# Patient Record
Sex: MALE | Race: WHITE | NOT HISPANIC OR LATINO | Employment: OTHER | URBAN - METROPOLITAN AREA
[De-identification: names, ages, dates, MRNs, and addresses within clinical notes are randomized per-mention and may not be internally consistent; named-entity substitution may affect disease eponyms.]

---

## 2017-01-23 ENCOUNTER — ALLSCRIPTS OFFICE VISIT (OUTPATIENT)
Dept: OTHER | Facility: OTHER | Age: 72
End: 2017-01-23

## 2017-04-07 ENCOUNTER — ALLSCRIPTS OFFICE VISIT (OUTPATIENT)
Dept: OTHER | Facility: OTHER | Age: 72
End: 2017-04-07

## 2017-05-01 ENCOUNTER — HOSPITAL ENCOUNTER (OUTPATIENT)
Dept: CT IMAGING | Facility: HOSPITAL | Age: 72
Discharge: HOME | End: 2017-05-01
Payer: MEDICARE

## 2017-05-01 ENCOUNTER — ALLSCRIPTS OFFICE VISIT (OUTPATIENT)
Dept: OTHER | Facility: OTHER | Age: 72
End: 2017-05-01

## 2017-05-01 ENCOUNTER — APPOINTMENT (OUTPATIENT)
Dept: LAB | Facility: HOSPITAL | Age: 72
End: 2017-05-01
Payer: MEDICARE

## 2017-05-01 ENCOUNTER — TRANSCRIBE ORDERS (OUTPATIENT)
Dept: ADMINISTRATIVE | Facility: HOSPITAL | Age: 72
End: 2017-05-01

## 2017-05-01 DIAGNOSIS — R10.9 ABDOMINAL PAIN: ICD-10-CM

## 2017-05-01 DIAGNOSIS — N18.2 CHRONIC KIDNEY DISEASE, STAGE II (MILD): ICD-10-CM

## 2017-05-01 LAB
ALBUMIN SERPL BCP-MCNC: 3.5 G/DL (ref 3.5–5)
ALP SERPL-CCNC: 68 U/L (ref 46–116)
ALT SERPL W P-5'-P-CCNC: 45 U/L (ref 12–78)
AMYLASE SERPL-CCNC: 56 IU/L (ref 25–115)
ANION GAP SERPL CALCULATED.3IONS-SCNC: 10 MMOL/L (ref 4–13)
AST SERPL W P-5'-P-CCNC: 38 U/L (ref 5–45)
BASOPHILS # BLD AUTO: 0.02 THOUSANDS/ΜL (ref 0–0.1)
BASOPHILS NFR BLD AUTO: 0 % (ref 0–1)
BILIRUB SERPL-MCNC: 0.6 MG/DL (ref 0.2–1)
BILIRUB UR QL STRIP: NORMAL
BUN SERPL-MCNC: 20 MG/DL (ref 5–25)
CALCIUM SERPL-MCNC: 8.5 MG/DL (ref 8.3–10.1)
CHLORIDE SERPL-SCNC: 109 MMOL/L (ref 100–108)
CLARITY UR: NORMAL
CO2 SERPL-SCNC: 28 MMOL/L (ref 21–32)
COLOR UR: YELLOW
CREAT SERPL-MCNC: 1.38 MG/DL (ref 0.6–1.3)
EOSINOPHIL # BLD AUTO: 0.11 THOUSAND/ΜL (ref 0–0.61)
EOSINOPHIL NFR BLD AUTO: 2 % (ref 0–6)
ERYTHROCYTE [DISTWIDTH] IN BLOOD BY AUTOMATED COUNT: 13.7 % (ref 11.6–15.1)
ERYTHROCYTE [SEDIMENTATION RATE] IN BLOOD: 9 MM/HOUR (ref 0–10)
GFR SERPL CREATININE-BSD FRML MDRD: 50.6 ML/MIN/1.73SQ M
GLUCOSE (HISTORICAL): NORMAL
GLUCOSE SERPL-MCNC: 113 MG/DL (ref 65–140)
HCT VFR BLD AUTO: 42.5 % (ref 36.5–49.3)
HGB BLD-MCNC: 14.4 G/DL (ref 12–17)
HGB UR QL STRIP.AUTO: NORMAL
KETONES UR STRIP-MCNC: NORMAL MG/DL
LEUKOCYTE ESTERASE UR QL STRIP: NORMAL
LIPASE SERPL-CCNC: 193 U/L (ref 73–393)
LYMPHOCYTES # BLD AUTO: 1.96 THOUSANDS/ΜL (ref 0.6–4.47)
LYMPHOCYTES NFR BLD AUTO: 31 % (ref 14–44)
MCH RBC QN AUTO: 32.9 PG (ref 26.8–34.3)
MCHC RBC AUTO-ENTMCNC: 33.9 G/DL (ref 31.4–37.4)
MCV RBC AUTO: 97 FL (ref 82–98)
MONOCYTES # BLD AUTO: 0.72 THOUSAND/ΜL (ref 0.17–1.22)
MONOCYTES NFR BLD AUTO: 11 % (ref 4–12)
NEUTROPHILS # BLD AUTO: 3.58 THOUSANDS/ΜL (ref 1.85–7.62)
NEUTS SEG NFR BLD AUTO: 56 % (ref 43–75)
NITRITE UR QL STRIP: NORMAL
PH UR STRIP.AUTO: 6 [PH]
PLATELET # BLD AUTO: 157 THOUSANDS/UL (ref 149–390)
PMV BLD AUTO: 10.4 FL (ref 8.9–12.7)
POTASSIUM SERPL-SCNC: 4.2 MMOL/L (ref 3.5–5.3)
PROT SERPL-MCNC: 7 G/DL (ref 6.4–8.2)
PROT UR STRIP-MCNC: NORMAL MG/DL
RBC # BLD AUTO: 4.38 MILLION/UL (ref 3.88–5.62)
SODIUM SERPL-SCNC: 147 MMOL/L (ref 136–145)
SP GR UR STRIP.AUTO: 1
UROBILINOGEN UR QL STRIP.AUTO: NORMAL
WBC # BLD AUTO: 6.39 THOUSAND/UL (ref 4.31–10.16)

## 2017-05-01 PROCEDURE — 36415 COLL VENOUS BLD VENIPUNCTURE: CPT

## 2017-05-01 PROCEDURE — 82150 ASSAY OF AMYLASE: CPT

## 2017-05-01 PROCEDURE — 74178 CT ABD&PLV WO CNTR FLWD CNTR: CPT

## 2017-05-01 PROCEDURE — 80053 COMPREHEN METABOLIC PANEL: CPT

## 2017-05-01 PROCEDURE — 83690 ASSAY OF LIPASE: CPT

## 2017-05-01 PROCEDURE — 85652 RBC SED RATE AUTOMATED: CPT

## 2017-05-01 PROCEDURE — 85025 COMPLETE CBC W/AUTO DIFF WBC: CPT

## 2017-05-01 RX ADMIN — IODIXANOL 85 ML: 320 INJECTION, SOLUTION INTRAVASCULAR at 14:51

## 2017-05-03 ENCOUNTER — APPOINTMENT (OUTPATIENT)
Dept: LAB | Facility: CLINIC | Age: 72
End: 2017-05-03
Payer: MEDICARE

## 2017-05-03 ENCOUNTER — GENERIC CONVERSION - ENCOUNTER (OUTPATIENT)
Dept: OTHER | Facility: OTHER | Age: 72
End: 2017-05-03

## 2017-05-03 DIAGNOSIS — N18.2 CHRONIC KIDNEY DISEASE, STAGE II (MILD): ICD-10-CM

## 2017-05-03 LAB
ANION GAP SERPL CALCULATED.3IONS-SCNC: 9 MMOL/L (ref 4–13)
BUN SERPL-MCNC: 18 MG/DL (ref 5–25)
CALCIUM SERPL-MCNC: 9 MG/DL (ref 8.3–10.1)
CHLORIDE SERPL-SCNC: 106 MMOL/L (ref 100–108)
CO2 SERPL-SCNC: 27 MMOL/L (ref 21–32)
CREAT SERPL-MCNC: 1.19 MG/DL (ref 0.6–1.3)
GFR SERPL CREATININE-BSD FRML MDRD: >60 ML/MIN/1.73SQ M
GLUCOSE P FAST SERPL-MCNC: 103 MG/DL (ref 65–99)
POTASSIUM SERPL-SCNC: 4.2 MMOL/L (ref 3.5–5.3)
SODIUM SERPL-SCNC: 142 MMOL/L (ref 136–145)

## 2017-05-03 PROCEDURE — 36415 COLL VENOUS BLD VENIPUNCTURE: CPT

## 2017-05-03 PROCEDURE — 80048 BASIC METABOLIC PNL TOTAL CA: CPT

## 2017-05-15 ENCOUNTER — APPOINTMENT (OUTPATIENT)
Dept: LAB | Facility: CLINIC | Age: 72
End: 2017-05-15
Payer: MEDICARE

## 2017-05-15 DIAGNOSIS — C64.9 MALIGNANT NEOPLASM OF KIDNEY EXCLUDING RENAL PELVIS (HCC): ICD-10-CM

## 2017-05-15 LAB
ALBUMIN SERPL BCP-MCNC: 3.7 G/DL (ref 3.5–5)
ALP SERPL-CCNC: 66 U/L (ref 46–116)
ALT SERPL W P-5'-P-CCNC: 35 U/L (ref 12–78)
ANION GAP SERPL CALCULATED.3IONS-SCNC: 8 MMOL/L (ref 4–13)
AST SERPL W P-5'-P-CCNC: 26 U/L (ref 5–45)
BASOPHILS # BLD MANUAL: 0.13 THOUSAND/UL (ref 0–0.1)
BASOPHILS NFR MAR MANUAL: 2 % (ref 0–1)
BILIRUB SERPL-MCNC: 0.77 MG/DL (ref 0.2–1)
BUN SERPL-MCNC: 19 MG/DL (ref 5–25)
CALCIUM SERPL-MCNC: 9 MG/DL (ref 8.3–10.1)
CHLORIDE SERPL-SCNC: 103 MMOL/L (ref 100–108)
CO2 SERPL-SCNC: 29 MMOL/L (ref 21–32)
CREAT SERPL-MCNC: 1.23 MG/DL (ref 0.6–1.3)
EOSINOPHIL # BLD MANUAL: 0.13 THOUSAND/UL (ref 0–0.4)
EOSINOPHIL NFR BLD MANUAL: 2 % (ref 0–6)
ERYTHROCYTE [DISTWIDTH] IN BLOOD BY AUTOMATED COUNT: 13.7 % (ref 11.6–15.1)
GFR SERPL CREATININE-BSD FRML MDRD: 57.8 ML/MIN/1.73SQ M
GLUCOSE P FAST SERPL-MCNC: 93 MG/DL (ref 65–99)
HCT VFR BLD AUTO: 44.1 % (ref 36.5–49.3)
HGB BLD-MCNC: 14.9 G/DL (ref 12–17)
LDH SERPL-CCNC: 169 U/L (ref 81–234)
LYMPHOCYTES # BLD AUTO: 1.43 THOUSAND/UL (ref 0.6–4.47)
LYMPHOCYTES # BLD AUTO: 22 % (ref 14–44)
MCH RBC QN AUTO: 33 PG (ref 26.8–34.3)
MCHC RBC AUTO-ENTMCNC: 33.8 G/DL (ref 31.4–37.4)
MCV RBC AUTO: 98 FL (ref 82–98)
MONOCYTES # BLD AUTO: 0.78 THOUSAND/UL (ref 0–1.22)
MONOCYTES NFR BLD: 12 % (ref 4–12)
NEUTROPHILS # BLD MANUAL: 3.25 THOUSAND/UL (ref 1.85–7.62)
NEUTS SEG NFR BLD AUTO: 50 % (ref 43–75)
NRBC BLD AUTO-RTO: 0 /100 WBCS
PLATELET # BLD AUTO: 198 THOUSANDS/UL (ref 149–390)
PLATELET BLD QL SMEAR: ADEQUATE
PMV BLD AUTO: 11.1 FL (ref 8.9–12.7)
POTASSIUM SERPL-SCNC: 3.9 MMOL/L (ref 3.5–5.3)
PROT SERPL-MCNC: 7.2 G/DL (ref 6.4–8.2)
RBC # BLD AUTO: 4.51 MILLION/UL (ref 3.88–5.62)
RBC MORPH BLD: NORMAL
SODIUM SERPL-SCNC: 140 MMOL/L (ref 136–145)
VARIANT LYMPHS # BLD AUTO: 12 %
WBC # BLD AUTO: 6.5 THOUSAND/UL (ref 4.31–10.16)

## 2017-05-15 PROCEDURE — 80053 COMPREHEN METABOLIC PANEL: CPT

## 2017-05-15 PROCEDURE — 83615 LACTATE (LD) (LDH) ENZYME: CPT

## 2017-05-15 PROCEDURE — 85007 BL SMEAR W/DIFF WBC COUNT: CPT

## 2017-05-15 PROCEDURE — 85027 COMPLETE CBC AUTOMATED: CPT

## 2017-05-15 PROCEDURE — 36415 COLL VENOUS BLD VENIPUNCTURE: CPT

## 2017-05-22 ENCOUNTER — ALLSCRIPTS OFFICE VISIT (OUTPATIENT)
Dept: OTHER | Facility: OTHER | Age: 72
End: 2017-05-22

## 2017-06-07 ENCOUNTER — TRANSCRIBE ORDERS (OUTPATIENT)
Dept: ADMINISTRATIVE | Facility: HOSPITAL | Age: 72
End: 2017-06-07

## 2017-06-07 ENCOUNTER — APPOINTMENT (OUTPATIENT)
Dept: LAB | Facility: CLINIC | Age: 72
End: 2017-06-07
Payer: MEDICARE

## 2017-06-07 DIAGNOSIS — R33.9 RETENTION OF URINE, UNSPECIFIED: Primary | ICD-10-CM

## 2017-06-07 DIAGNOSIS — C64.9 ADENOCARCINOMA, RENAL CELL, UNSPECIFIED LATERALITY (HCC): ICD-10-CM

## 2017-06-07 DIAGNOSIS — R33.9 RETENTION OF URINE, UNSPECIFIED: ICD-10-CM

## 2017-06-07 DIAGNOSIS — E78.5 OTHER AND UNSPECIFIED HYPERLIPIDEMIA: ICD-10-CM

## 2017-06-07 DIAGNOSIS — E78.5 OTHER AND UNSPECIFIED HYPERLIPIDEMIA: Primary | ICD-10-CM

## 2017-06-07 LAB
BACTERIA UR QL AUTO: NORMAL /HPF
BILIRUB UR QL STRIP: NEGATIVE
CHOLEST SERPL-MCNC: 178 MG/DL (ref 50–200)
CLARITY UR: CLEAR
COLOR UR: YELLOW
EST. AVERAGE GLUCOSE BLD GHB EST-MCNC: 120 MG/DL
GLUCOSE UR STRIP-MCNC: NEGATIVE MG/DL
HBA1C MFR BLD: 5.8 % (ref 4.2–6.3)
HDLC SERPL-MCNC: 34 MG/DL (ref 40–60)
HGB UR QL STRIP.AUTO: ABNORMAL
HYALINE CASTS #/AREA URNS LPF: NORMAL /LPF
KETONES UR STRIP-MCNC: NEGATIVE MG/DL
LDLC SERPL CALC-MCNC: 73 MG/DL (ref 0–100)
LEUKOCYTE ESTERASE UR QL STRIP: NEGATIVE
NITRITE UR QL STRIP: NEGATIVE
NON-SQ EPI CELLS URNS QL MICRO: NORMAL /HPF
PH UR STRIP.AUTO: 6 [PH] (ref 4.5–8)
PROT UR STRIP-MCNC: NEGATIVE MG/DL
PSA SERPL-MCNC: 1.9 NG/ML (ref 0–4)
RBC #/AREA URNS AUTO: NORMAL /HPF
SP GR UR STRIP.AUTO: 1.02 (ref 1–1.03)
TRIGL SERPL-MCNC: 357 MG/DL
TSH SERPL DL<=0.05 MIU/L-ACNC: 1.29 UIU/ML (ref 0.36–3.74)
UROBILINOGEN UR QL STRIP.AUTO: 0.2 E.U./DL
WBC #/AREA URNS AUTO: NORMAL /HPF

## 2017-06-07 PROCEDURE — G0103 PSA SCREENING: HCPCS

## 2017-06-07 PROCEDURE — 83036 HEMOGLOBIN GLYCOSYLATED A1C: CPT

## 2017-06-07 PROCEDURE — 84443 ASSAY THYROID STIM HORMONE: CPT

## 2017-06-07 PROCEDURE — 36415 COLL VENOUS BLD VENIPUNCTURE: CPT

## 2017-06-07 PROCEDURE — 81001 URINALYSIS AUTO W/SCOPE: CPT | Performed by: UROLOGY

## 2017-06-07 PROCEDURE — 80061 LIPID PANEL: CPT

## 2017-06-26 ENCOUNTER — ALLSCRIPTS OFFICE VISIT (OUTPATIENT)
Dept: OTHER | Facility: OTHER | Age: 72
End: 2017-06-26

## 2017-07-24 ENCOUNTER — ALLSCRIPTS OFFICE VISIT (OUTPATIENT)
Dept: OTHER | Facility: OTHER | Age: 72
End: 2017-07-24

## 2017-10-15 ENCOUNTER — HOSPITAL ENCOUNTER (EMERGENCY)
Facility: HOSPITAL | Age: 72
Discharge: HOME | End: 2017-10-15
Attending: EMERGENCY MEDICINE | Admitting: EMERGENCY MEDICINE
Payer: MEDICARE

## 2017-10-15 VITALS
BODY MASS INDEX: 28.7 KG/M2 | SYSTOLIC BLOOD PRESSURE: 168 MMHG | HEART RATE: 72 BPM | HEIGHT: 71 IN | DIASTOLIC BLOOD PRESSURE: 85 MMHG | WEIGHT: 205 LBS | TEMPERATURE: 97.4 F | RESPIRATION RATE: 16 BRPM

## 2017-10-15 DIAGNOSIS — S61.012A LACERATION OF LEFT THUMB: Primary | ICD-10-CM

## 2017-10-15 PROCEDURE — 99282 EMERGENCY DEPT VISIT SF MDM: CPT

## 2017-10-15 PROCEDURE — 90715 TDAP VACCINE 7 YRS/> IM: CPT | Performed by: EMERGENCY MEDICINE

## 2017-10-15 PROCEDURE — 90471 IMMUNIZATION ADMIN: CPT

## 2017-10-15 RX ORDER — LIDOCAINE HYDROCHLORIDE 10 MG/ML
5 INJECTION, SOLUTION EPIDURAL; INFILTRATION; INTRACAUDAL; PERINEURAL ONCE
Status: COMPLETED | OUTPATIENT
Start: 2017-10-15 | End: 2017-10-15

## 2017-10-15 RX ADMIN — LIDOCAINE HYDROCHLORIDE 5 ML: 10 INJECTION, SOLUTION EPIDURAL; INFILTRATION; INTRACAUDAL; PERINEURAL at 13:17

## 2017-10-15 RX ADMIN — TETANUS TOXOID, REDUCED DIPHTHERIA TOXOID AND ACELLULAR PERTUSSIS VACCINE, ADSORBED 0.5 ML: 5; 2.5; 8; 8; 2.5 SUSPENSION INTRAMUSCULAR at 13:17

## 2017-10-15 NOTE — ED PROVIDER NOTES
History  Chief Complaint   Patient presents with    Finger Laceration     Ambulatory into ED with c/o laceration to tip of left thumb which just occurred while cutting with a bandsaw, bleeding controlled with pressure     Cut tip of L thumb with band saw; 2 5 cm lac noted, no bleeding        History provided by:  Patient   used: No    Finger Laceration   Location:  Finger  Finger laceration location:  L thumb  Length:  2 5  Depth: Through dermis  Bleeding: controlled    Injury mechanism: saw  Pain details:     Quality:  Dull    Severity:  Mild    Timing:  Constant    Progression:  Unchanged  Foreign body present:  No foreign bodies  Relieved by:  Nothing  Worsened by:  Nothing  Ineffective treatments:  None tried  Tetanus status:  Out of date      Prior to Admission Medications   Prescriptions Last Dose Informant Patient Reported? Taking?   multivitamin (THERAGRAN) TABS   Yes No   Sig: Take 1 tablet by mouth daily  rosuvastatin (CRESTOR) 10 MG tablet   Yes No   Sig: Take 10 mg by mouth daily Indications: High Amount of Fats in the Blood  Facility-Administered Medications: None       Past Medical History:   Diagnosis Date    Cancer of left kidney ([facility] [address] )     Hyperlipidemia        Past Surgical History:   Procedure Laterality Date    NEPHRECTOMY Left     TOTAL HIP ARTHROPLASTY Left     original hardware became infected and pt needed a second surgery to replace hardware       History reviewed  No pertinent family history  I have reviewed and agree with the history as documented  Social History   Substance Use Topics    Smoking status: Former Smoker    Smokeless tobacco: Never Used    Alcohol use Yes        Review of Systems   Musculoskeletal:        Cut L thumb   All other systems reviewed and are negative        Physical Exam  ED Triage Vitals [10/15/17 1241]   Temperature Pulse Respirations Blood Pressure SpO2   (!) 97 4 °F (36 3 °C) 72 16 168/85 --      Temp src Heart Rate Source Patient Position - Orthostatic VS BP Location FiO2 (%)   -- -- -- -- --      Pain Score       4           Physical Exam   Constitutional: He is oriented to person, place, and time  He appears well-developed and well-nourished  HENT:   Nose: Nose normal    Eyes: Pupils are equal, round, and reactive to light  Neck: Normal range of motion  Cardiovascular: Normal rate  Pulmonary/Chest: Effort normal    Abdominal: Soft  Neurological: He is alert and oriented to person, place, and time  Skin: Skin is warm and dry  Psychiatric: He has a normal mood and affect  His behavior is normal  Judgment and thought content normal    Nursing note and vitals reviewed  ED Medications  Medications   lidocaine (PF) (XYLOCAINE-MPF) 1 % injection 5 mL (5 mL Infiltration Given 10/15/17 1317)   tetanus-diphtheria-acellular pertussis (BOOSTRIX) IM injection 0 5 mL (0 5 mL Intramuscular Given 10/15/17 1317)       Diagnostic Studies  Labs Reviewed - No data to display    No orders to display       Procedures  Lac Repair  Date/Time: 10/15/2017 3:27 PM  Performed by: Dorothy Polanco  Authorized by: Dorothy Polanco     Patient location:  ED  Other Assisting Provider: No    Consent:     Consent obtained:  Verbal  Universal protocol:     Patient identity confirmed:  Verbally with patient  Anesthesia (see MAR for exact dosages):      Anesthesia method:  Local infiltration    Local anesthetic:  Lidocaine 1% w/o epi  Laceration details:     Location:  Finger    Finger location:  L thumb    Length (cm) of Repair:  2 5    Depth (mm):  3  Repair type:     Repair type:  Simple  Pre-procedure details:     Preparation:  Patient was prepped and draped in usual sterile fashion  Exploration:     Hemostasis achieved with:  Direct pressure    Contaminated: no    Treatment:     Area cleansed with:  Betadine    Visualized foreign bodies/material removed: no    Skin repair:     Repair method:  Sutures    Suture size:  5-0 Suture material:  Nylon    Number of sutures:  4  Approximation:     Approximation:  Close    Approximation difficulty:  Simple    Vermilion border: well-aligned    Post-procedure details:     Dressing:  Sterile dressing    Patient tolerance of procedure: Tolerated well, no immediate complications          Phone Contacts  ED Phone Contact    ED Course  ED Course                                MDM  Number of Diagnoses or Management Options  Laceration of left thumb: new and does not require workup  Patient Progress  Patient progress: improved    CritCare Time    Disposition  Final diagnoses:   Laceration of left thumb     ED Disposition     ED Disposition Condition Comment    Discharge  Fang De La Cruz discharge to home/self care  Condition at discharge: Stable        Follow-up Information     Follow up With Specialties Details Why Contact Info      In 10 days For suture removal     Robinson Mora MD [address] [place] [address] Road  [phone]          Discharge Medication List as of 10/15/2017 12:54 PM      CONTINUE these medications which have NOT CHANGED    Details   multivitamin (THERAGRAN) TABS Take 1 tablet by mouth daily  , Until Discontinued, Historical Med      rosuvastatin (CRESTOR) 10 MG tablet Take 10 mg by mouth daily Indications: High Amount of Fats in the Blood , Until Discontinued, Historical Med           No discharge procedures on file      ED Provider  Electronically Signed by       Juan Antonio Mensah MD  10/15/17 5366

## 2017-10-15 NOTE — DISCHARGE INSTRUCTIONS
Finger Laceration   WHAT YOU NEED TO KNOW:   A finger laceration is a deep cut in your skin  It is often caused by a sharp object, such as a knife, or blunt force to your finger  Your blood vessels, bones, joints, tendons, or nerves may also be injured  DISCHARGE INSTRUCTIONS:   Return to the emergency department if:   · Your wound comes apart  · Blood soaks through your bandage  · You have severe pain in your finger or hand  · Your finger is pale and cold  · You have sudden trouble moving your finger  · Your swelling suddenly gets worse  · You have red streaks on your skin coming from your wound  Contact your healthcare provider or hand specialist if:   · You have new numbness or tingling  · Your finger feels warm, looks swollen or red, and is draining pus  · You have a fever  · You have questions or concerns about your condition or care  Medicines: You may  need any of the following:  · Antibiotics  help prevent a bacterial infection  · Acetaminophen  decreases pain and fever  It is available without a doctor's order  Ask how much to take and how often to take it  Follow directions  Read the labels of all other medicines you are using to see if they also contain acetaminophen, or ask your doctor or pharmacist  Acetaminophen can cause liver damage if not taken correctly  Do not use more than 4 grams (4,000 milligrams) total of acetaminophen in one day  · Prescription pain medicine  may be given  Ask your healthcare provider how to take this medicine safely  Some prescription pain medicines contain acetaminophen  Do not take other medicines that contain acetaminophen without talking to your healthcare provider  Too much acetaminophen may cause liver damage  Prescription pain medicine may cause constipation  Ask your healthcare provider how to prevent or treat constipation  · Take your medicine as directed    Contact your healthcare provider if you think your medicine is not helping or if you have side effects  Tell him or her if you are allergic to any medicine  Keep a list of the medicines, vitamins, and herbs you take  Include the amounts, and when and why you take them  Bring the list or the pill bottles to follow-up visits  Carry your medicine list with you in case of an emergency  Self-care:   · Apply ice  on your finger for 15 to 20 minutes every hour or as directed  Use an ice pack, or put crushed ice in a plastic bag  Cover it with a towel before you apply it to your skin  Ice helps prevent tissue damage and decreases swelling and pain  · Elevate  your hand above the level of your heart as often as you can  This will help decrease swelling and pain  Prop your hand on pillows or blankets to keep it elevated comfortably  · Wear your splint as directed  A splint will decrease movement and stress on your wound  The splint may help your wound heal faster  Ask your healthcare provider how to apply and remove a splint  · Apply ointments to decrease scarring  Do not apply ointments until your healthcare provider says it is okay  You may need to wait until your wound is healed  Ask which ointment to buy and how often to use it  Wound care:   · Do not get your wound wet until your healthcare provider says it is okay  Do not soak your hand in water  Do not go swimming until your healthcare provider says it is okay  When your healthcare provider says it is okay, carefully wash around the wound with soap and water  Let soap and water run over your wound  Gently pat the area dry or allow it to air dry  · Change your bandages when they get wet, dirty, or after washing  Apply new, clean bandages as directed  Do not apply elastic bandages or tape too tightly  Do not put powders or lotions on your wound  · Apply antibiotic ointment as directed  Your healthcare provider may give you antibiotic ointment to put over your wound if you have stitches   If you have Strips-Strips over your wound, let them dry up and fall off on their own  If they do not fall off within 14 days, gently remove them  If you have glue over your wound, do not remove or pick at it  If your glue comes off, do not replace it with glue that you have at home  · Check your wound every day for signs of infection  Signs of infection include swelling, redness, or pus  Follow up with your healthcare provider or hand specialist in 2 days:  Write down your questions so you remember to ask them during your visits  © 2017 [employer] [employer] Information is for End User's use only and may not be sold, redistributed or otherwise used for commercial purposes  All illustrations and images included in CareNotes® are the copyrighted property of [email or web address] , [employer]  or [employer]ish [employer]  The above information is an  only  It is not intended as medical advice for individual conditions or treatments  Talk to your doctor, nurse or pharmacist before following any medical regimen to see if it is safe and effective for you    Keep cleqan, dry, covered; sutures out 10 days

## 2017-10-25 ENCOUNTER — ALLSCRIPTS OFFICE VISIT (OUTPATIENT)
Dept: OTHER | Facility: OTHER | Age: 72
End: 2017-10-25

## 2017-10-26 NOTE — PROCEDURES
Current Meds   1  Fish Oil OIL; Therapy: (Recorded:21Nov2016) to Recorded   2  Multivitamin TABS; Therapy: (Recorded:21Nov2016) to Recorded   3  Rosuvastatin Calcium 10 MG Oral Tablet; take 1 tablet every day; Therapy: [id number] to (Evaluate:02Shl7375)  Requested for: [id number]; Last   Rx:04Jan2017 Ordered    Allergies  1  No Known Drug Allergies    Vitals  Signs   Temperature: 98 3 F, Tympanic  Heart Rate: 80, L Radial  Pulse Quality: Regular, L Radial  Systolic: 884, LUE, Sitting  Diastolic: 82, LUE, Sitting  Height: 5 ft 11 in  Weight: 210 lb   BMI Calculated: 29 29  BSA Calculated: 2 15    Procedure  Procedure: suture removal  The wound was located on the left thumb finger(s)  The wound was 2 cm in length  Wound Exam: well healed with no sign of infection  Procedure Note: 4 sutures were removed  Dressing: an antibiotic ointment was applied  Patient Status:  the patient tolerated the procedure well  Complications:  there were no complications  Assessment  1  Laceration of left thumb, sequela (906 1) (Q80 288B)    Future Appointments    Date/Time Provider Specialty Site   12/04/2017 09:20 AM ANGELES Muller   Hematology [facility]   02/05/2018 01:20 PM Saulo Busby MD Family Medicine Dominguez Hennessy MD   07/02/2018 10:15 AM Buck Chacko, [facility]     Signatures   Electronically signed by : Riley Flaherty MD; Oct 25 2017  2:10PM EST                       (Author)

## 2017-11-17 ENCOUNTER — APPOINTMENT (OUTPATIENT)
Dept: LAB | Facility: CLINIC | Age: 72
End: 2017-11-17
Payer: MEDICARE

## 2017-11-17 DIAGNOSIS — C64.9 MALIGNANT NEOPLASM OF KIDNEY EXCLUDING RENAL PELVIS (HCC): ICD-10-CM

## 2017-11-17 LAB
ALBUMIN SERPL BCP-MCNC: 3.7 G/DL (ref 3.5–5)
ALP SERPL-CCNC: 73 U/L (ref 46–116)
ALT SERPL W P-5'-P-CCNC: 36 U/L (ref 12–78)
ANION GAP SERPL CALCULATED.3IONS-SCNC: 4 MMOL/L (ref 4–13)
AST SERPL W P-5'-P-CCNC: 28 U/L (ref 5–45)
BASOPHILS # BLD AUTO: 0.03 THOUSANDS/ΜL (ref 0–0.1)
BASOPHILS NFR BLD AUTO: 0 % (ref 0–1)
BILIRUB SERPL-MCNC: 0.39 MG/DL (ref 0.2–1)
BUN SERPL-MCNC: 19 MG/DL (ref 5–25)
CALCIUM SERPL-MCNC: 9.2 MG/DL (ref 8.3–10.1)
CHLORIDE SERPL-SCNC: 107 MMOL/L (ref 100–108)
CO2 SERPL-SCNC: 31 MMOL/L (ref 21–32)
CREAT SERPL-MCNC: 1.37 MG/DL (ref 0.6–1.3)
EOSINOPHIL # BLD AUTO: 0.18 THOUSAND/ΜL (ref 0–0.61)
EOSINOPHIL NFR BLD AUTO: 3 % (ref 0–6)
ERYTHROCYTE [DISTWIDTH] IN BLOOD BY AUTOMATED COUNT: 13.3 % (ref 11.6–15.1)
GFR SERPL CREATININE-BSD FRML MDRD: 51 ML/MIN/1.73SQ M
GLUCOSE P FAST SERPL-MCNC: 101 MG/DL (ref 65–99)
HCT VFR BLD AUTO: 43.6 % (ref 36.5–49.3)
HGB BLD-MCNC: 14.8 G/DL (ref 12–17)
LYMPHOCYTES # BLD AUTO: 2.02 THOUSANDS/ΜL (ref 0.6–4.47)
LYMPHOCYTES NFR BLD AUTO: 29 % (ref 14–44)
MCH RBC QN AUTO: 32.7 PG (ref 26.8–34.3)
MCHC RBC AUTO-ENTMCNC: 33.9 G/DL (ref 31.4–37.4)
MCV RBC AUTO: 97 FL (ref 82–98)
MONOCYTES # BLD AUTO: 0.77 THOUSAND/ΜL (ref 0.17–1.22)
MONOCYTES NFR BLD AUTO: 11 % (ref 4–12)
NEUTROPHILS # BLD AUTO: 3.96 THOUSANDS/ΜL (ref 1.85–7.62)
NEUTS SEG NFR BLD AUTO: 57 % (ref 43–75)
NRBC BLD AUTO-RTO: 0 /100 WBCS
PLATELET # BLD AUTO: 183 THOUSANDS/UL (ref 149–390)
PMV BLD AUTO: 11.1 FL (ref 8.9–12.7)
POTASSIUM SERPL-SCNC: 4.3 MMOL/L (ref 3.5–5.3)
PROT SERPL-MCNC: 7.2 G/DL (ref 6.4–8.2)
RBC # BLD AUTO: 4.52 MILLION/UL (ref 3.88–5.62)
SODIUM SERPL-SCNC: 142 MMOL/L (ref 136–145)
WBC # BLD AUTO: 6.98 THOUSAND/UL (ref 4.31–10.16)

## 2017-11-17 PROCEDURE — 36415 COLL VENOUS BLD VENIPUNCTURE: CPT

## 2017-11-17 PROCEDURE — 80053 COMPREHEN METABOLIC PANEL: CPT

## 2017-11-17 PROCEDURE — 85025 COMPLETE CBC W/AUTO DIFF WBC: CPT

## 2017-11-29 ENCOUNTER — HOSPITAL ENCOUNTER (OUTPATIENT)
Dept: CT IMAGING | Facility: HOSPITAL | Age: 72
Discharge: HOME | End: 2017-11-29
Attending: INTERNAL MEDICINE
Payer: MEDICARE

## 2017-11-29 DIAGNOSIS — C64.9 MALIGNANT NEOPLASM OF KIDNEY EXCLUDING RENAL PELVIS (HCC): ICD-10-CM

## 2017-11-29 PROCEDURE — 71250 CT THORAX DX C-: CPT

## 2017-12-04 ENCOUNTER — GENERIC CONVERSION - ENCOUNTER (OUTPATIENT)
Dept: OTHER | Facility: OTHER | Age: 72
End: 2017-12-04

## 2018-01-10 NOTE — RESULT NOTES
Verified Results  (1) COMPREHENSIVE METABOLIC PANEL [id number] 28:49KW Martha Garcia   Has the patient been fasting for 10-12 hours? -YES     Test Name Result Flag Reference   SODIUM 137 mmol/L  136-145   POTASSIUM 4 3 mmol/L  3 5-5 3   CHLORIDE 106 mmol/L  100-108   CARBON DIOXIDE 25 mmol/L  21-32   ANION GAP (CALC) 6 mmol/L  4-13   BILI, TOTAL 0 43 mg/dL  0 20-1 00   TOTAL PROTEIN 7 2 g/dL  6 4-8 2   ALK PHOSPHATAS 89 U/L     ALT (SGPT) 30 U/L  12-78   AST(SGOT) 20 U/L  5-45   GLUCOSE,RANDM 112 mg/dL     If patient is fasting, the ADA then defines impaired fasting glucose as  >100 mg/dl and diabetes as  >or equal to 126 mg/dl  ALBUMIN 3 6 g/dL  3 5-5 0   BLOOD UREA NITROGEN 21 mg/dL  5-25   CALCIUM 8 6 mg/dL  8 3-10 1   CREATININE 1 22 mg/dL  0 60-1 30   Standardized to IDMS reference method   eGFR African American >60 ml/min/1 73sq m     [facility] Energy Disease Education Program recommendations are as  follows:  GFR calculation is accurate only with a steady state creatinine  Chronic Kidney disease less than 60 ml/min/1 73 sq  meters  Kidney failure less than 15 ml/min/1 73 sq  meters  eGFR Non-African American 59 ml/min/1 73sq m       (1) LIPID PANEL, FASTING [id number] 07:15AM Nieves Gonzalez   Has the patient been fasting for 10-12 hours? -YES     Test Name Result Flag Reference   TRIGLYCERIDES 642 mg/dL     TRIGLYCERIDE:  Normal               <150 mg/dl  Borderline High     150-199 mg/dl  High                200-499 mg/dl  Very High           >499 mg/dl  _______________________________________   CHOLESTEROL 218 mg/dL     CHOLESTEROL:  Desirable         <200 mg/dl  Borderline High   200-239 mg/dl  High               >239 mg/dl  ____________________________________   HDL,DIRECT 32 mg/dL     HDL:  High       >59 mg/dl  Low        <41 mg/dl  ______________________________   LDL CHOLESTEROL CALCULATED   0-100   LDL- unable to calculate when Triglyceride > 400 mg/dL   LDL, CALCULATED:  This screening LDL is a calculated result  It does not have the accuracy of the Direct Measured LDL in the  monitoring of patients with hyperlipidemia and/or statin therapy  Direct Measured LDL (Test Code 7394) must be ordered separately in these  patients   ______________________________     (1) TSH WITH FT4 REFLEX [id number] 07:15AM Cali Enrique     Test Name Result Flag Reference   TSH, 3RD GEN W/REFLEX TO FT4 1 100 uIU/ML  0 358-3 740   *Patients undergoing fluorescein dye angiography may retain small  amounts of fluorescein in the body for 48-72 hours post procedure  Samples containing fluorescein can produce falsely depressed TSH   values  If the patient had this procedure, a specimen should be  resubmitted post fluorescein clearance  Pt aware  1      1 Amended By: Christoph Aquino; Jan 11 2016 1:48 PM EST    Discussion/Summary   Liver and kidney tests are good but triglyceride fats are high  Can he increase the Crestor to 20mg? He can double up the 10mg tabs he has now

## 2018-01-12 VITALS
DIASTOLIC BLOOD PRESSURE: 80 MMHG | WEIGHT: 215.13 LBS | HEIGHT: 68 IN | OXYGEN SATURATION: 98 % | RESPIRATION RATE: 18 BRPM | HEART RATE: 85 BPM | SYSTOLIC BLOOD PRESSURE: 132 MMHG | TEMPERATURE: 98.5 F | BODY MASS INDEX: 32.6 KG/M2

## 2018-01-12 NOTE — RESULT NOTES
Verified Results  (1) LIPID PANEL, FASTING 01Sep2016 07:10AM Joanna Kwon Order Number: [id number]     Test Name Result Flag Reference   CHOLESTEROL 177 mg/dL     HDL,DIRECT 36 mg/dL L 40-60   Specimen collection should occur prior to Metamizole administration due to the potential for falsely depressed results  LDL CHOLESTEROL CALCULATED 88 mg/dL  0-100   - Patient Instructions: This is a fasting blood test  Water,black tea or black  coffee only after 9:00pm the night before test   Drink 2 glasses of water the morning of test     - Patient Instructions: This is a fasting blood test  Water,black tea or black  coffee only after 9:00pm the night before test Drink 2 glasses of water the morning of test   Triglyceride:         Normal              <150 mg/dl       Borderline High    150-199 mg/dl       High               200-499 mg/dl       Very High          >499 mg/dl  Cholesterol:         Desirable        <200 mg/dl      Borderline High  200-239 mg/dl      High             >239 mg/dl  HDL Cholesterol:        High    >59 mg/dL      Low     <41 mg/dL  LDL CALCULATED:    This screening LDL is a calculated result  It does not have the accuracy of the Direct Measured LDL in the monitoring of patients with hyperlipidemia and/or statin therapy  Direct Measure LDL (JMK369) must be ordered separately in these patients  TRIGLYCERIDES 266 mg/dL H <=150   Specimen collection should occur prior to N-Acetylcysteine or Metamizole administration due to the potential for falsely depressed results  Discussion/Summary    Cholesterol is excellent  Keep same   Patient aware

## 2018-01-13 VITALS
BODY MASS INDEX: 29.4 KG/M2 | HEIGHT: 71 IN | DIASTOLIC BLOOD PRESSURE: 86 MMHG | WEIGHT: 210 LBS | HEART RATE: 72 BPM | SYSTOLIC BLOOD PRESSURE: 144 MMHG | TEMPERATURE: 98.3 F

## 2018-01-13 VITALS
WEIGHT: 213.2 LBS | SYSTOLIC BLOOD PRESSURE: 138 MMHG | TEMPERATURE: 98.7 F | HEIGHT: 68 IN | HEART RATE: 84 BPM | DIASTOLIC BLOOD PRESSURE: 88 MMHG | BODY MASS INDEX: 32.31 KG/M2

## 2018-01-14 VITALS
WEIGHT: 211.5 LBS | HEIGHT: 71 IN | DIASTOLIC BLOOD PRESSURE: 78 MMHG | HEART RATE: 80 BPM | BODY MASS INDEX: 29.61 KG/M2 | SYSTOLIC BLOOD PRESSURE: 136 MMHG

## 2018-01-14 VITALS
SYSTOLIC BLOOD PRESSURE: 142 MMHG | BODY MASS INDEX: 32.13 KG/M2 | WEIGHT: 212 LBS | HEART RATE: 90 BPM | TEMPERATURE: 99.9 F | DIASTOLIC BLOOD PRESSURE: 100 MMHG | HEIGHT: 68 IN

## 2018-01-14 VITALS
HEIGHT: 71 IN | SYSTOLIC BLOOD PRESSURE: 140 MMHG | TEMPERATURE: 98.3 F | WEIGHT: 210 LBS | HEART RATE: 80 BPM | BODY MASS INDEX: 29.4 KG/M2 | DIASTOLIC BLOOD PRESSURE: 82 MMHG

## 2018-01-14 VITALS
HEART RATE: 88 BPM | BODY MASS INDEX: 32.77 KG/M2 | SYSTOLIC BLOOD PRESSURE: 138 MMHG | WEIGHT: 216.2 LBS | DIASTOLIC BLOOD PRESSURE: 84 MMHG | HEIGHT: 68 IN | TEMPERATURE: 99.2 F

## 2018-01-14 NOTE — RESULT NOTES
Verified Results  (1) BASIC METABOLIC PROFILE [id number] 07:31AM Jorge Alberto Learn Order Number: [id number]     Test Name Result Flag Reference   SODIUM 142 mmol/L  136-145   POTASSIUM 4 2 mmol/L  3 5-5 3   CHLORIDE 106 mmol/L  100-108   CARBON DIOXIDE 27 mmol/L  21-32   ANION GAP (CALC) 9 mmol/L  4-13   BLOOD UREA NITROGEN 18 mg/dL  5-25   CREATININE 1 19 mg/dL  0 60-1 30   Standardized to IDMS reference method   CALCIUM 9 0 mg/dL  8 3-10 1   eGFR Non-African American      >60 0 ml/min/1 73sq m   [facility] Energy Disease Education Program recommendations are as follows:  GFR calculation is accurate only with a steady state creatinine  Chronic Kidney disease less than 60 ml/min/1 73 sq  meters  Kidney failure less than 15 ml/min/1 73 sq  meters     GLUCOSE FASTING 103 mg/dL H 65-99          Patient notified of results

## 2018-01-17 NOTE — PROGRESS NOTES
Assessment    1  Encounter for preventive health examination (V70 0) (Z00 00)   2  Hypertension (401 9) (I10)   3  Hyperlipidemia (272 4) (E78 5)   4  Acute maxillary sinusitis, recurrence not specified (461 0) (J01 00)    Plan  Abnormal fasting glucose, Hyperlipidemia    · (1) HEMOGLOBIN A1C; Status:Hold For - Exact Date; Requested for:Approx 52KWQ5067;   Acute maxillary sinusitis, recurrence not specified    · Amoxicillin 500 MG Oral Capsule; TAKE 2 [facility] Maintenance    · *VB - Fall Risk Assessment  (Dx Z13 89 Screen for Neurologic Disorder);  Status:Complete;   Done: 25XZJ5333 10:49AM   · *VB - Urinary Incontinence Screen (Dx Z13 89 Screen for UI); Status:Complete;   Done:  18KHS6314 10:49AM   · Eat a low fat and low cholesterol diet ; Status:Complete;   Done: 16GQP7537   · Some eating tips that can help you lose weight ; Status:Complete;   Done: 92QMY3080   · Stretch and warm up your muscles during the first 10 minutes , then cool down your  muscles for the last 10 minutes of exercise ; Status:Complete;   Done: 36DLS9991   · The plan of care for urinary incontinence is detailed in the plan and/or discussion section  of today's note ; Status:Complete;   Done: 11XGH3018   · These are things you can do to prevent falls in and around the home ; Status:Complete;    Done: 78JFL4189   · We recommend that you bring your body mass index down to 26 ; Status:Complete;    Done: 01WUR5030   · We recommend that you create an advance directive ; Status:Complete;   Done:  69HYB5678   · Follow-up visit in 6 months Evaluation and Treatment  Follow-up  Status: Hold For -  Scheduling  Requested for: 23Jan2017  Hyperlipidemia    · (1) LIPID PANEL, FASTING; Status:Hold For - Exact Date; Requested for:Approx  03PWN6404;    · (1) TSH WITH FT4 REFLEX; Status:Hold For - Exact Date;  Requested for:Approx  [id number];     Discussion/Summary  Impression: Subsequent Annual Wellness Visit, with preventive exam as well as age and risk appropriate counseling completed  Cardiovascular screening and counseling: screening is current  Diabetes screening and counseling: screening is current  Colorectal cancer screening and counseling: screening is current and done 7/16/14  Prostate cancer screening and counseling: screening is current  Osteoporosis screening and counseling: screening not indicated  Glaucoma screening and counseling: screening is current  HIV screening and counseling: screening not indicated  Immunizations: influenza vaccine is up to date this year, the lifetime pneumococcal vaccine has been completed, hepatitis B vaccination series is not indicated at this time due to the patient's low risk of louise the disease, the risks and benefits of the Zostavax vaccine were discussed with the patient and the patient declines the Td vaccine  Advance Directive Planning: complete and up to date  Advice and education were given regarding fall risk reduction  He was referred to none  Medical Equipment/Suppliers: none  Patient Discussion: plan discussed with the patient, follow-up visit needed in 4 months  Patient is able to Self-Care  Chief Complaint  HM      Advance Directives  Advance Directive [facility]:   YES - Patient has an advance health care directive  The patient has a living will located  in patient's home  Capacity/Competence: This patient has full decision making capacity for discussion of advance care planning and This patient has full decision making competency for discussion of advance care planning  History of Present Illness  HPI: Has head congestion and runny nose over the past week, some yellow mucus  Now descending into the chest  Some heartburn sensation  Welcome to [employer] and Wellness Visits: The patient is being seen for the subsequent annual wellness visit     Medicare Screening and Risk Factors   Hospitalizations: he has been previously hospitalizied and he has been hospitalized 1 times  Once per lifetime medicare screening tests: ECG ~U() and AAA screening US has not yet been done  Medicare Screening Tests Risk Questions   Abdominal aortic aneurysm risk assessment: none indicated  Osteoporosis risk assessment: none indicated  HIV risk assessment: none indicated  Drug and Alcohol Use: The patient is a former cigarette smoker  The patient reports occasional alcohol use  He has never used illicit drugs  Diet and Physical Activity: Current diet includes well balanced meals  He exercises daily  Exercise: walking  Mood Disorder and Cognitive Impairment Screening: PHQ-9 Depression Scale   Over the past 2 weeks, how often have you been bothered by the following problems? 1 ) Little interest or pleasure in doing things? Not at all    2 ) Feeling down, depressed or hopeless? Not at all    3 ) Trouble falling asleep or sleeping too much? Not at all    4 ) Feeling tired or having little energy? Several days  5 ) Poor appetite or overeating? Not at all    6 ) Feeling bad about yourself, or that you are a failure, or have let yourself or your family down? Not at all    7 ) Trouble concentrating on things, such as reading a newspaper or watching television? Not at all    8 ) Moving or speaking so slowly that other people could have noticed, or the opposite, moving or speaking faster than usual? Not at all  TOTAL SCORE: 1  How difficult have these problems made it for you to do your work, take care of things at home, or get along with people? Not at all  Depression screening  negative for symptoms  He denies feeling down, depressed, or hopeless over the past two weeks  He denies feeling little interest or pleasure in doing things over the past two weeks     Cognitive impairment screening: denies difficulty learning/retaining new information, denies difficulty handling complex tasks, denies difficulty with reasoning, denies difficulty with spatial ability and orientation, denies difficulty with language and denies difficulty with behavior  Functional Ability/Level of Safety: Hearing is slightly decreased and a hearing aid is not used  The patient is currently able to do activities of daily living without limitations, able to do instrumental activities of daily living without limitations, able to participate in social activities without limitations and able to drive without limitations  Activities of daily living details: does not need help using the phone, no transportation help needed, does not need help shopping, no meal preparation help needed, does not need help doing housework, does not need help doing laundry, does not need help managing medications and does not need help managing money  Advance Directives: Advance directives: living will, durable power of  for health care directives and advance directives  end of life decisions were reviewed with the patient and I agree with the patient's decisions  Co-Managers and Medical Equipment/Suppliers: See Patient Care Team       Reviewed Updated [facility]:   Last Medicare Wellness Visit Information was reviewed, patient interviewed and updates made to the record today  Preventive Quality Program 65 and Older: Falls Risk: The patient fell 0 times in the past 12 months  The patient is currently asymptomatic Symptoms Include:  Associated symptoms:  No associated symptoms are reported no impaired mobility and no impaired ability to live independently  Urinary Incontinence Symptoms includes: nocturia, but no urinary incontinence, no urinary frequency, no urinary urgency and no urinary hesitancy    followed by Dr Cammy Liu  Patient Care Team    Care Team Member Role Specialty Office Number   Dontrell Mendez MD  Orthopedic Surgery [phone]   Zach Fox MD  Urology [phone] [address] [place] [phone]   Belinda RONQUILLO    Nephrology [phone]   Meenakshi Chandni Carter 76 Specialist Orthopedic Surgery [phone]     Review of Systems    Constitutional: malaise, but no fever  ENT: scratchy throat and nasal discharge, but no earache and no white patches in the mouth  Cardiovascular: no chest pain and no palpitations  Respiratory: cough and productive cough, but no shortness of breath  Genitourinary: nocturia, but no urinary frequency  Musculoskeletal: no diffuse joint pain  Integumentary and Breasts: no rashes  Active Problems    1  Abdominal pain, periumbilical (924 53) (V50 92)   2  Abnormal fasting glucose (790 29) (R73 01)   3  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)   4  Acute hip pain (719 45) (M25 559)   5  Aftercare following joint replacement surgery (V54 81) (Z47 1)   6  Aftercare following surgery of the musculoskeletal system (V58 78) (Z47 89)   7  Ankle pain (719 47) (M25 579)   8  Carcinoma, renal cell (189 0) (C64 9)   9  Cervicalgia (723 1) (M54 2)   10  CKD (chronic kidney disease), 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)   11  Claustrophobia (300 29) (F40 240)   12  Constipation (564 00) (K59 00)   13  Disc degeneration, 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)   14  Diverticulitis of colon (562 11) (K57 32)   15  Edema (782 3) (R60 9)   16  Encounter for screening colonoscopy (V76 51) (Z12 11)   17  Enlarged prostate (600 00) (N40 0)   18  Flu vaccine need (V04 81) (Z23)   19  Hip pain, left (719 45) (M25 552)   20  Hyperlipidemia (272 4) (E78 5)   21  Hypertension (401 9) (I10)   22  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)   23  Infection of prosthetic total hip joint, initial encounter (996 66,V43 64)    (T84 59XA,Z96 649)   24  Left ankle sprain (845 00) (S93 402A)   25  Mass of left kidney (593 9) (N28 89)   26  Osteoarthritis of knee (715 36) (M17 9)   27  Preoperative examination (V72 84) (Z01 818)   28  Primary localized osteoarthritis of left knee (715 16) (M17 12)   29   Primary localized osteoarthritis of right knee (715 16) (M17 11)   30  Right flank pain (789 09) (R10 9)   31  S/p nephrectomy (V45 73) (Z90 5)   32  S/P revision of total hip (V43 64) (Z96 649)   33  Special screening examination for neoplasm of prostate (V76 44) (Z12 5)   34  Stiffness of shoulder joint, unspecified laterality (719 51) (M25 619)   35  Urinary retention (788 20) (R33 9)   36  URTI (acute upper respiratory infection) (465 9) (J06 9)   37  UTI symptoms (788 99) (R39 9)   38  Weight gain (783 1) (R63 5)        Carcinoma, renal cell (189 0) (C64 9)          Past Medical History    · Aftercare following surgery of the musculoskeletal system (V58 78) (Z47 89)   · History of upper respiratory infection (V12 09) (Z87 09)   · History of Renal neoplasm (239 5) (D49 519)    The active problems and past medical history were reviewed and updated today  Surgical History    · History of Cholecystectomy Laparoscopic   · History of Diagnostic Cystoscopy   · History of Nephrectomy Left   · History of Shoulder Arthroscopy With Rotator Cuff Repair   · History of Total Hip Replacement   · History of Umbilical Hernia Repair    The surgical history was reviewed and updated today  Family History  Mother    · Family history of Tuberculosis  Father    · Family history of Emphysema lung    The family history was reviewed and updated today  Social History    · Being A Social Drinker   · Daily caffeine consumption, 2-3 servings a day   · Denied: Drug use (305 90) (F19 90)   · Former smoker (X43 14) (H22 471)   · Marital History - Currently    · Non-smoker (V49 89) (Z78 9)  The social history was reviewed and updated today  The social history was reviewed and is unchanged  Current Meds   1  Acetaminophen 325 MG Oral Tablet; Therapy: (Recorded:21Nov2016) to Recorded   2   Diclofenac Sodium 75 MG Oral Tablet Delayed Release; TAKE 1 TABLET BY MOUTH   TWICE A DAY AS NEEDED FOR PAIN;   Therapy: 01Sep2016 to (Evaluate:31Oct2016)  Requested for: 01Sep2016; Last   Rx:91Vow1910 Ordered   3  Fish Oil OIL; Therapy: (Recorded:21Nov2016) to Recorded   4  Multivitamin TABS; Therapy: (Recorded:21Nov2016) to Recorded   5  Rosuvastatin Calcium 10 MG Oral Tablet; take 1 tablet every day; Therapy: 92DFA9207 to (Diego Berger)  Requested for: 06UZY0690; Last   Rx:04Jan2017 Ordered    The medication list was reviewed and updated today  Allergies    1  No Known Drug Allergies    Immunizations   ** Printed in Appendix #1 below  Vitals  Signs    Temperature: 99 2 F, Tympanic  Heart Rate: 88, L Radial  Pulse Quality: Regular  Systolic: 510, LUE, Sitting  Diastolic: 84, LUE, Sitting  Height: 5 ft 8 in  Weight: 216 lb 3 2 oz  BMI Calculated: 32 87  BSA Calculated: 2 12    Physical Exam    Constitutional   General appearance: No acute distress, well appearing and well nourished  Ears, Nose, Mouth, and Throat   Nasal mucosa, septum, and turbinates: Abnormal   thick nasal mucus  Lips, teeth, and gums: Normal, good dentition  Oropharynx: Abnormal   red pharynx and tonsils  Neck   Neck: Supple, symmetric, trachea midline, no masses  Thyroid: Normal, no thyromegaly  Pulmonary   Respiratory effort: Abnormal   occasional cough  Auscultation of lungs: Abnormal   scattered rhonchi  Cardiovascular   Auscultation of heart: Normal rate and rhythm, normal S1 and S2, no murmurs  Carotid pulses: 2+ bilaterally  Examination of extremities for edema and/or varicosities: Normal     Lymphatic   Palpation of lymph nodes in neck: No lymphadenopathy  Musculoskeletal   Gait and station: Normal     Psychiatric   Judgment and insight: Normal     Orientation to person, place and time: Normal     Recent and remote memory: Intact  Mood and affect: Normal        Procedure    Procedure: Visual Acuity Test    Indication: routine screening  Inforrmation supplied by a Snellen chart     Results: 20/30 in both eyes with corrective device, 20/25 in the right eye with corrective device, 20/30 in the left eye with corrective device      Health Management  Encounter for screening colonoscopy   COLONOSCOPY; every 5 years; Last 37DYG2236; Next Due: 17NWE5359; Active    Future Appointments    Date/Time Provider Specialty Site   2017 09:15 AM ANGELES Benítez   Hematology [facility]cology [facility]     Signatures   Electronically signed by : Kavya Murry MD; 2017 11:05AM EST                       (Author)    Appendix #1     Patient: Michael Caballero ; : 1945; MRN: [id number]      1 2 3 4    Influenza  05-Sep-2012 Approx [id number] 22-Sep-2014 04-Constantin-2016    PPSV  12-Sep-2014       Zoster  Temporarily Deferred: Pt requests deferral

## 2018-01-24 VITALS
HEIGHT: 68 IN | SYSTOLIC BLOOD PRESSURE: 144 MMHG | RESPIRATION RATE: 18 BRPM | WEIGHT: 212 LBS | OXYGEN SATURATION: 97 % | DIASTOLIC BLOOD PRESSURE: 87 MMHG | TEMPERATURE: 97.8 F | HEART RATE: 73 BPM | BODY MASS INDEX: 32.13 KG/M2

## 2018-02-04 PROBLEM — R73.01 ABNORMAL FASTING GLUCOSE: Status: ACTIVE | Noted: 2017-01-23

## 2018-02-04 PROBLEM — R31.9 HEMATURIA: Status: ACTIVE | Noted: 2017-06-26

## 2018-02-04 RX ORDER — ROSUVASTATIN CALCIUM 10 MG/1
1 TABLET, COATED ORAL DAILY
Status: ON HOLD | COMMUNITY
Start: 2012-01-22 | End: 2018-04-27 | Stop reason: SDUPTHER

## 2018-02-04 RX ORDER — ELECTROLYTES/DEXTROSE
SOLUTION, ORAL ORAL
COMMUNITY

## 2018-02-04 RX ORDER — CHLORAL HYDRATE 500 MG
CAPSULE ORAL
COMMUNITY

## 2018-02-05 ENCOUNTER — OFFICE VISIT (OUTPATIENT)
Dept: FAMILY MEDICINE CLINIC | Facility: HOSPITAL | Age: 73
End: 2018-02-05
Payer: MEDICARE

## 2018-02-05 VITALS
BODY MASS INDEX: 29.88 KG/M2 | TEMPERATURE: 98.3 F | WEIGHT: 213.4 LBS | HEIGHT: 71 IN | RESPIRATION RATE: 18 BRPM | HEART RATE: 88 BPM | SYSTOLIC BLOOD PRESSURE: 122 MMHG | DIASTOLIC BLOOD PRESSURE: 84 MMHG

## 2018-02-05 DIAGNOSIS — Z23 NEED FOR PNEUMOCOCCAL VACCINATION: ICD-10-CM

## 2018-02-05 DIAGNOSIS — Z00.00 MEDICARE ANNUAL WELLNESS VISIT, SUBSEQUENT: Primary | ICD-10-CM

## 2018-02-05 DIAGNOSIS — R73.01 ABNORMAL FASTING GLUCOSE: ICD-10-CM

## 2018-02-05 DIAGNOSIS — Z12.11 SCREENING FOR COLON CANCER: ICD-10-CM

## 2018-02-05 DIAGNOSIS — E78.2 MIXED HYPERLIPIDEMIA: ICD-10-CM

## 2018-02-05 DIAGNOSIS — I10 ESSENTIAL HYPERTENSION: ICD-10-CM

## 2018-02-05 PROCEDURE — 90471 IMMUNIZATION ADMIN: CPT | Performed by: FAMILY MEDICINE

## 2018-02-05 PROCEDURE — 90670 PCV13 VACCINE IM: CPT | Performed by: FAMILY MEDICINE

## 2018-02-05 PROCEDURE — G0439 PPPS, SUBSEQ VISIT: HCPCS | Performed by: FAMILY MEDICINE

## 2018-02-05 NOTE — PROGRESS NOTES
Assessment/Plan:       Diagnoses and all orders for this visit:    Medicare annual wellness visit, subsequent    Essential hypertension  -     CBC and Platelet; Future  -     Comprehensive metabolic panel; Future    Abnormal fasting glucose  -     Hemoglobin A1c; Future    Mixed hyperlipidemia  -     Lipid panel; Future  -     TSH, 3rd generation; Future    Screening for colon cancer  -     Ambulatory referral to Gastroenterology; Future          Subjective:      Patient ID: Gilberto Olivares is a 67 y o  male  Pain in R flank after taking Cold and Flu medication  Not sharp pain  Started 1-2 weeks ago  No urine infections and bowels are good  The following portions of the patient's history were reviewed and updated as appropriate: allergies, current medications, past family history, past medical history, past social history, past surgical history and problem list     Review of Systems   Constitutional: Negative for chills and fatigue  HENT: Negative for congestion, postnasal drip, sinus pressure and sore throat  Eyes: Positive for itching  Respiratory: Negative for cough, chest tightness and wheezing  Cardiovascular: Negative for chest pain and palpitations  Gastrointestinal: Positive for abdominal pain  Negative for nausea  Genitourinary: Positive for flank pain  Negative for dysuria  Musculoskeletal: Positive for back pain  Negative for neck pain  Neurological: Negative for light-headedness  Psychiatric/Behavioral: The patient is not nervous/anxious          AWV Clinical     ISAR:   Previous hospitalizations?:  No       Once in a Lifetime Medicare Screening:   AAA screening performed? (if performed, please add date to Health Maintenance):  No       Medicare Screening Tests and Risk Assessment:   AAA Risk Assessment    Osteoporosis Risk Assessment    HIV Risk Assessment        Drug and Alcohol Use:   Tobacco use    Cigarettes:  former smoker    Smokeless:  never used smokeless tobacco Tobacco use duration    Tobacco Cessation Readiness    Alcohol use    Alcohol use:  occasional use    Alcohol Treatment Readiness   Illicit Drug Use    Drug use:  never        Diet & Exercise:   Diet   What is your diet?:  Regular   How many servings a day of the following:   Fruits and Vegetables:  1-2 Meat:  1-2   Whole Grains:  1    Dairy:  1 Soda:  0   Coffee:  2 Tea:  1   Exercise    Do you currently exercise?:  currently not exercising       Cognitive Impairment Screening:   Cognitive Impairment Screening        Functional Ability/Level of Safety:   Hearing    Hearing difficulties:  Yes Bilateral:  slightly decreased   Hearing aid:  No    Hearing Impairment Assessment    Current Activities    Status:  unlimited ADL's   Help needed with the folllowing:    ADL    Fall Risk   Injury History       Home Safety:   Are there hazards in your environment?:  No   If you fell, would you need help to get back up from the ground?:  No Do you have problems or concerns getting in/out of a bed, chair, tub, or toilet?:  No   Do you feel unsteady when walking?:  No Is your activity limited by pain?:  No   Do you have handrails and grab-bars in the home?:  Yes    Are you and/or family members aware of the dangers of smoking in bed?:  Yes    Do you have working smoke alarms and fire extinguisher?:  Yes    Have you left the stove on unsupervised?:  No    Home Safety Risk Factors   Unfamilar with surroundings:  No Uneven floors:  No   Stairs or handrail saftey risk:  Yes Loose rugs:  No   Household clutter:  No Poor household lighting:  No   No grab bars in bathroom:  Yes        Advanced Directives:   Advanced Directives    Living Will:  Yes Durable POA for healthcare: Yes   Advanced directive:  Yes    Patient's End of Life Decisions        Urinary Incontinence:       Glaucoma:               Objective:     Physical Exam   Constitutional: He appears well-developed and well-nourished  HENT:   Head: Atraumatic     Eyes: Conjunctivae are normal    Neck: No thyromegaly present  Cardiovascular: Normal rate, regular rhythm and normal heart sounds  Pulmonary/Chest: Effort normal  He has decreased breath sounds  He has no wheezes  He has no rhonchi  Abdominal: Bowel sounds are normal  There is no tenderness  Lymphadenopathy:     He has no cervical adenopathy

## 2018-02-05 NOTE — PATIENT INSTRUCTIONS

## 2018-02-06 ENCOUNTER — APPOINTMENT (OUTPATIENT)
Dept: LAB | Facility: CLINIC | Age: 73
End: 2018-02-06
Payer: MEDICARE

## 2018-02-06 DIAGNOSIS — I10 ESSENTIAL HYPERTENSION: ICD-10-CM

## 2018-02-06 DIAGNOSIS — E78.2 MIXED HYPERLIPIDEMIA: ICD-10-CM

## 2018-02-06 DIAGNOSIS — R73.01 ABNORMAL FASTING GLUCOSE: ICD-10-CM

## 2018-02-06 LAB
ALBUMIN SERPL BCP-MCNC: 3.7 G/DL (ref 3.5–5)
ALP SERPL-CCNC: 65 U/L (ref 46–116)
ALT SERPL W P-5'-P-CCNC: 38 U/L (ref 12–78)
ANION GAP SERPL CALCULATED.3IONS-SCNC: 7 MMOL/L (ref 4–13)
AST SERPL W P-5'-P-CCNC: 31 U/L (ref 5–45)
BILIRUB SERPL-MCNC: 0.68 MG/DL (ref 0.2–1)
BUN SERPL-MCNC: 18 MG/DL (ref 5–25)
CALCIUM SERPL-MCNC: 9 MG/DL (ref 8.3–10.1)
CHLORIDE SERPL-SCNC: 105 MMOL/L (ref 100–108)
CHOLEST SERPL-MCNC: 176 MG/DL (ref 50–200)
CO2 SERPL-SCNC: 29 MMOL/L (ref 21–32)
CREAT SERPL-MCNC: 1.21 MG/DL (ref 0.6–1.3)
ERYTHROCYTE [DISTWIDTH] IN BLOOD BY AUTOMATED COUNT: 13.5 % (ref 11.6–15.1)
EST. AVERAGE GLUCOSE BLD GHB EST-MCNC: 117 MG/DL
GFR SERPL CREATININE-BSD FRML MDRD: 59 ML/MIN/1.73SQ M
GLUCOSE P FAST SERPL-MCNC: 93 MG/DL (ref 65–99)
HBA1C MFR BLD: 5.7 % (ref 4.2–6.3)
HCT VFR BLD AUTO: 43 % (ref 36.5–49.3)
HDLC SERPL-MCNC: 33 MG/DL (ref 40–60)
HGB BLD-MCNC: 14.6 G/DL (ref 12–17)
LDLC SERPL CALC-MCNC: 63 MG/DL (ref 0–100)
MCH RBC QN AUTO: 32.4 PG (ref 26.8–34.3)
MCHC RBC AUTO-ENTMCNC: 34 G/DL (ref 31.4–37.4)
MCV RBC AUTO: 96 FL (ref 82–98)
PLATELET # BLD AUTO: 176 THOUSANDS/UL (ref 149–390)
PMV BLD AUTO: 10.9 FL (ref 8.9–12.7)
POTASSIUM SERPL-SCNC: 4.1 MMOL/L (ref 3.5–5.3)
PROT SERPL-MCNC: 7 G/DL (ref 6.4–8.2)
RBC # BLD AUTO: 4.5 MILLION/UL (ref 3.88–5.62)
SODIUM SERPL-SCNC: 141 MMOL/L (ref 136–145)
TRIGL SERPL-MCNC: 399 MG/DL
TSH SERPL DL<=0.05 MIU/L-ACNC: 1.5 UIU/ML (ref 0.36–3.74)
WBC # BLD AUTO: 8.55 THOUSAND/UL (ref 4.31–10.16)

## 2018-02-06 PROCEDURE — 84443 ASSAY THYROID STIM HORMONE: CPT

## 2018-02-06 PROCEDURE — 85027 COMPLETE CBC AUTOMATED: CPT

## 2018-02-06 PROCEDURE — 80061 LIPID PANEL: CPT

## 2018-02-06 PROCEDURE — 83036 HEMOGLOBIN GLYCOSYLATED A1C: CPT

## 2018-02-06 PROCEDURE — 80053 COMPREHEN METABOLIC PANEL: CPT

## 2018-02-06 PROCEDURE — 36415 COLL VENOUS BLD VENIPUNCTURE: CPT

## 2018-03-14 DIAGNOSIS — E78.2 MIXED HYPERLIPIDEMIA: Primary | ICD-10-CM

## 2018-03-14 RX ORDER — ROSUVASTATIN CALCIUM 10 MG/1
TABLET, COATED ORAL
Qty: 90 TABLET | Refills: 0 | Status: ON HOLD | OUTPATIENT
Start: 2018-03-14 | End: 2018-04-27 | Stop reason: SDUPTHER

## 2018-03-19 ENCOUNTER — OFFICE VISIT (OUTPATIENT)
Dept: FAMILY MEDICINE CLINIC | Facility: HOSPITAL | Age: 73
End: 2018-03-19
Payer: MEDICARE

## 2018-03-19 VITALS
DIASTOLIC BLOOD PRESSURE: 80 MMHG | RESPIRATION RATE: 18 BRPM | BODY MASS INDEX: 29.76 KG/M2 | WEIGHT: 212.6 LBS | HEIGHT: 71 IN | HEART RATE: 84 BPM | SYSTOLIC BLOOD PRESSURE: 132 MMHG | TEMPERATURE: 98.7 F

## 2018-03-19 DIAGNOSIS — R10.31 RIGHT LOWER QUADRANT PAIN: ICD-10-CM

## 2018-03-19 DIAGNOSIS — R30.0 DYSURIA: ICD-10-CM

## 2018-03-19 DIAGNOSIS — M54.6 ACUTE RIGHT-SIDED THORACIC BACK PAIN: Primary | ICD-10-CM

## 2018-03-19 LAB
SL AMB  POCT GLUCOSE, UA: NORMAL
SL AMB LEUKOCYTE ESTERASE,UA: NORMAL
SL AMB POCT BILIRUBIN,UA: NORMAL
SL AMB POCT BLOOD,UA: 50
SL AMB POCT CLARITY,UA: CLEAR
SL AMB POCT COLOR,UA: YELLOW
SL AMB POCT KETONES,UA: NORMAL
SL AMB POCT NITRITE,UA: NORMAL
SL AMB POCT PH,UA: 5
SL AMB POCT SPECIFIC GRAVITY,UA: 1.01
SL AMB POCT URINE PROTEIN: NORMAL
SL AMB POCT UROBILINOGEN: NORMAL

## 2018-03-19 PROCEDURE — 99214 OFFICE O/P EST MOD 30 MIN: CPT | Performed by: NURSE PRACTITIONER

## 2018-03-19 PROCEDURE — 81002 URINALYSIS NONAUTO W/O SCOPE: CPT | Performed by: NURSE PRACTITIONER

## 2018-03-19 PROCEDURE — 81001 URINALYSIS AUTO W/SCOPE: CPT | Performed by: NURSE PRACTITIONER

## 2018-03-19 RX ORDER — NITROFURANTOIN 25; 75 MG/1; MG/1
100 CAPSULE ORAL 2 TIMES DAILY
Qty: 10 CAPSULE | Refills: 0 | Status: SHIPPED | OUTPATIENT
Start: 2018-03-19 | End: 2018-03-24

## 2018-03-19 RX ORDER — MELOXICAM 15 MG/1
15 TABLET ORAL DAILY
Qty: 30 TABLET | Refills: 0 | Status: SHIPPED | OUTPATIENT
Start: 2018-03-19 | End: 2018-03-19 | Stop reason: CLARIF

## 2018-03-19 NOTE — PROGRESS NOTES
Assessment/Plan:     Urine dip positive so will treat presumptively for UTI  Will send urine for UA with reflex to C/S  If urine culture is negative need to consider musculoskeletal etiology vs kidney although CT A/P a few months ago was normal  Also possible GI etiology  May need spinal imaging vs retroperitoneal US vs abdominal Us  Instructed to call with no improvement or worsening symptoms  Diagnoses and all orders for this visit:    Acute right-sided thoracic back pain    Dysuria    Right lower quadrant pain          Subjective:     Patient ID: Mary Mai is a 68 y o  male  Right back pain that radiates to front  Now has upset stomach and poor appetite  Has been going on for 2 weeks  Had over 1 month ago and pain went away  Occasional Tylenol that helps a little  Denies fever, chills  Has arthritis in low back up to neck  Not losing weight  Denies night sweats  Denies leg pain  Occasional right leg weakness  Denies N/t  Has some pain when initiating urinary stream but no urgency, frequency, incontinence  Has loose Bm's over the past 1 1/2 weeks  H/o renal cell cancer left side with subsequent nephrectomy  Had Ct of A/P a few months ago and right kidney was normal  Sometimes eating will make pain worse  Review of Systems   Constitutional: Negative for chills and fever  Gastrointestinal: Positive for abdominal pain, diarrhea and nausea  Negative for blood in stool, constipation and vomiting  Genitourinary: Positive for dysuria and flank pain  Negative for decreased urine volume, difficulty urinating, frequency and urgency  Musculoskeletal: Positive for back pain  Neurological: Positive for weakness  Negative for numbness           The following portions of the patient's history were reviewed and updated as appropriate: allergies, current medications, past family history, past medical history, past social history, past surgical history and problem list     Objective:  Vitals: 03/19/18 1403   BP: 132/80   Pulse: 84   Resp: 18   Temp: 98 7 °F (37 1 °C)      Physical Exam   Constitutional: He is oriented to person, place, and time  He appears well-developed and well-nourished  Cardiovascular: Normal rate, regular rhythm and normal heart sounds  Pulmonary/Chest: Effort normal and breath sounds normal    Abdominal: Normal appearance and bowel sounds are normal  There is no hepatosplenomegaly  There is tenderness in the right lower quadrant  There is no CVA tenderness  Neurological: He is alert and oriented to person, place, and time  Skin: Skin is warm and dry  Psychiatric: He has a normal mood and affect

## 2018-03-20 LAB
BACTERIA UR QL AUTO: ABNORMAL /HPF
BILIRUB UR QL STRIP: NEGATIVE
CLARITY UR: CLEAR
COLOR UR: YELLOW
GLUCOSE UR STRIP-MCNC: NEGATIVE MG/DL
HGB UR QL STRIP.AUTO: ABNORMAL
HYALINE CASTS #/AREA URNS LPF: ABNORMAL /LPF
KETONES UR STRIP-MCNC: NEGATIVE MG/DL
LEUKOCYTE ESTERASE UR QL STRIP: ABNORMAL
NITRITE UR QL STRIP: NEGATIVE
NON-SQ EPI CELLS URNS QL MICRO: ABNORMAL /HPF
PH UR STRIP.AUTO: 6 [PH] (ref 4.5–8)
PROT UR STRIP-MCNC: NEGATIVE MG/DL
RBC #/AREA URNS AUTO: ABNORMAL /HPF
SP GR UR STRIP.AUTO: 1.02 (ref 1–1.03)
UROBILINOGEN UR QL STRIP.AUTO: 0.2 E.U./DL
WBC #/AREA URNS AUTO: ABNORMAL /HPF

## 2018-04-02 ENCOUNTER — OFFICE VISIT (OUTPATIENT)
Dept: GASTROENTEROLOGY | Facility: MEDICAL CENTER | Age: 73
End: 2018-04-02
Payer: MEDICARE

## 2018-04-02 VITALS
SYSTOLIC BLOOD PRESSURE: 138 MMHG | BODY MASS INDEX: 29.82 KG/M2 | DIASTOLIC BLOOD PRESSURE: 82 MMHG | WEIGHT: 213 LBS | TEMPERATURE: 98.6 F | HEART RATE: 91 BPM | HEIGHT: 71 IN

## 2018-04-02 DIAGNOSIS — R10.30 LOWER ABDOMINAL PAIN: Primary | ICD-10-CM

## 2018-04-02 DIAGNOSIS — Z86.010 HISTORY OF COLON POLYPS: ICD-10-CM

## 2018-04-02 DIAGNOSIS — R19.4 CHANGE IN BOWEL HABITS: ICD-10-CM

## 2018-04-02 DIAGNOSIS — Z12.11 SCREENING FOR COLON CANCER: ICD-10-CM

## 2018-04-02 DIAGNOSIS — R19.7 DIARRHEA, UNSPECIFIED TYPE: ICD-10-CM

## 2018-04-02 PROBLEM — Z86.0100 HISTORY OF COLON POLYPS: Status: ACTIVE | Noted: 2018-04-02

## 2018-04-02 PROCEDURE — 99204 OFFICE O/P NEW MOD 45 MIN: CPT | Performed by: INTERNAL MEDICINE

## 2018-04-02 NOTE — LETTER
April 2, 2018     Bárbara Shukla, [address]  [address] [facility]  [address] [address]    Patient: Gerri Baird   YOB: 1945   Date of Visit: 4/2/2018       Dear Dr Kacie Martinez: Thank you for referring Navid Blum to me for evaluation  Below are my notes for this consultation  If you have questions, please do not hesitate to call me  I look forward to following your patient along with you  Sincerely,    ANGELES Camejo  [facility]  Mobile: [phone]  Available on invino  [email or web address]             CC: No Recipients  Kaity Solis MD  4/2/2018 11:52 AM  Sign at close encounter  [facility] - Outpatient Consultation  Gerri Baird 68 y o  male MRN: [id number]  Encounter: [id number]          ASSESSMENT AND PLAN:      1  Lower abdominal pain  2  Diarrhea, unspecified type  3  Change in bowel habits  Low right-sided back pain with radiation to the RLQ  Discussed pain is likely consistent with musculoskeletal, but given temporal relationship with change in bowel habits will plan for colonoscopy for screening given his history of colon polyps and to rule out pathology  I suspect he may have some component of post-cholecystectomy syndrome causing the diarrhea and may benefit from a bile acid sequestrant  We will attempt this treatment at his next visit if colonoscopy is unrevealing  4  Screening for colon cancer  5  History of colon polyps  - Case request operating room: COLONOSCOPY; Standing  - Case request operating room: COLONOSCOPY  - Na Sulfate-K Sulfate-Mg Sulf (SUPREP BOWEL PREP KIT) 17 5-3 13-1 6 GM/180ML SOLN; Take 177 mL by mouth once for 1 dose  Dispense: 2 Bottle; Refill: 0      ______________________________________________________________________    HPI:      Patient is a 68year old male referred for evaluation of abdominal pain and diarrhea   He has a past medical history of hypertension, osteoarthritis, s/p remote cholecystectomy and renal cell carcinoma s/p left nephrectomy  He complains of a 6 month history of loose bowel movements occurring 4-5 times per day  Onset of loose stools has been associated with lower right sided back pain which radiates to the RLQ  Back pain is worsened by palpation, but has been temporally associated with loose bowel movements  He is s/p cholecystectomy remotely > 10 years ago, but change in bowel habits is recent with watery consistency and increasing frequency only occurring over the past 6 months  Pain is not relieved by bowel movements but worsens with "upset stomach "     When seen by his primary care, he did note palpation of the back right lower muscle underneath his ribs reproduced the pain  Workup for kidney/bladder infection was negative  His last colonoscopy is unknown, between 5-7 years ago, and he reports history of colonic polyps  No family history of colon cancer  He has not noted unintentional weight loss, rectal bleeding or melena  REVIEW OF SYSTEMS:    CONSTITUTIONAL: Denies any fever, chills, rigors, and weight loss  HEENT: No earache or tinnitus  Denies hearing loss or visual disturbances  CARDIOVASCULAR: No chest pain or palpitations  RESPIRATORY: Denies any cough, hemoptysis, shortness of breath or dyspnea on exertion  GASTROINTESTINAL: As noted in the History of Present Illness  GENITOURINARY: No problems with urination  Denies any hematuria or dysuria  NEUROLOGIC: No dizziness or vertigo, denies headaches  MUSCULOSKELETAL: Denies any muscle or joint pain  SKIN: Denies skin rashes or itching  ENDOCRINE: Denies excessive thirst  Denies intolerance to heat or cold  PSYCHOSOCIAL: Denies depression or anxiety  Denies any recent memory loss         Historical Information   Past Medical History:   Diagnosis Date    Cancer of left kidney ([facility] [address] [facility]     Colon polyp     Diverticulitis of colon     Last assessed - 5/12/14    Hypercholesterolemia     Hyperlipidemia     Renal neoplasm      Past Surgical History:   Procedure Laterality Date    COLONOSCOPY      CYSTOSCOPY      Onset - 5/2/16 Mike Elias     LAPAROSCOPIC CHOLECYSTECTOMY      NEPHRECTOMY Left     SHOULDER ARTHROSCOPY W/ ROTATOR CUFF REPAIR Left     TOTAL HIP ARTHROPLASTY Left     original hardware became infected and pt needed a second surgery to replace hardware    UMBILICAL HERNIA REPAIR       Social History   History   Alcohol Use    Yes     Comment: Social     History   Drug Use No     History   Smoking Status    Former Smoker   Smokeless Tobacco    Never Used     Family History   Problem Relation Age of Onset    Tuberculosis Mother     Emphysema Father      Lung       Meds/Allergies       Current Outpatient Prescriptions:     Multiple Vitamins-Minerals (MULTIVITAMIN ADULT) TABS    multivitamin (THERAGRAN) TABS    Omega-3 Fatty Acids (FISH OIL) 1,000 mg    rosuvastatin (CRESTOR) 10 MG tablet    rosuvastatin (CRESTOR) 10 MG tablet    Na Sulfate-K Sulfate-Mg Sulf (SUPREP BOWEL PREP KIT) 17 5-3 13-1 6 GM/180ML SOLN    rosuvastatin (CRESTOR) 10 MG tablet    No Known Allergies        Objective     Blood pressure 138/82, pulse 91, temperature 98 6 °F (37 °C), temperature source Oral, height 5' 10 98" (1 803 m), weight 96 6 kg (213 lb)  Body mass index is 29 72 kg/m²  PHYSICAL EXAM:      General Appearance:   Alert, cooperative, no distress   HEENT:   Normocephalic, atraumatic, anicteric      Neck:  Supple, symmetrical, trachea midline   Lungs:   Clear to auscultation bilaterally; no rales, rhonchi or wheezing; respirations unlabored    Heart[de-identified]   Regular rate and rhythm; no murmur, rub, or gallop     Abdomen:   Soft, +RLQ tenderness, mild, non-distended; normal bowel sounds; no masses, no organomegaly   Genitalia:   Deferred    Rectal:   Deferred    Extremities:  No cyanosis, clubbing or edema    Pulses:  2+ and symmetric    Skin:  No jaundice, rashes, or lesions    Lymph nodes:  No palpable cervical lymphadenopathy        Lab Results:       Radiology Results:   No results found

## 2018-04-02 NOTE — PROGRESS NOTES
[facility] - Outpatient Consultation  Minus Debbie 68 y o  male MRN: [id number]  Encounter: [id number]          ASSESSMENT AND PLAN:      1  Lower abdominal pain  2  Diarrhea, unspecified type  3  Change in bowel habits  Low right-sided back pain with radiation to the RLQ  Discussed pain is likely consistent with musculoskeletal, but given temporal relationship with change in bowel habits will plan for colonoscopy for screening given his history of colon polyps and to rule out pathology  I suspect he may have some component of post-cholecystectomy syndrome causing the diarrhea and may benefit from a bile acid sequestrant  We will attempt this treatment at his next visit if colonoscopy is unrevealing  4  Screening for colon cancer  5  History of colon polyps  - Case request operating room: COLONOSCOPY; Standing  - Case request operating room: COLONOSCOPY  - Na Sulfate-K Sulfate-Mg Sulf (SUPREP BOWEL PREP KIT) 17 5-3 13-1 6 GM/180ML SOLN; Take 177 mL by mouth once for 1 dose  Dispense: 2 Bottle; Refill: 0      ______________________________________________________________________    HPI:      Patient is a 68year old male referred for evaluation of abdominal pain and diarrhea  He has a past medical history of hypertension, osteoarthritis, s/p remote cholecystectomy and renal cell carcinoma s/p left nephrectomy  He complains of a 6 month history of loose bowel movements occurring 4-5 times per day  Onset of loose stools has been associated with lower right sided back pain which radiates to the RLQ  Back pain is worsened by palpation, but has been temporally associated with loose bowel movements  He is s/p cholecystectomy remotely > 10 years ago, but change in bowel habits is recent with watery consistency and increasing frequency only occurring over the past 6 months   Pain is not relieved by bowel movements but worsens with "upset stomach "     When seen by his primary care, he did note palpation of the back right lower muscle underneath his ribs reproduced the pain  Workup for kidney/bladder infection was negative  His last colonoscopy is unknown, between 5-7 years ago, and he reports history of colonic polyps  No family history of colon cancer  He has not noted unintentional weight loss, rectal bleeding or melena  REVIEW OF SYSTEMS:    CONSTITUTIONAL: Denies any fever, chills, rigors, and weight loss  HEENT: No earache or tinnitus  Denies hearing loss or visual disturbances  CARDIOVASCULAR: No chest pain or palpitations  RESPIRATORY: Denies any cough, hemoptysis, shortness of breath or dyspnea on exertion  GASTROINTESTINAL: As noted in the History of Present Illness  GENITOURINARY: No problems with urination  Denies any hematuria or dysuria  NEUROLOGIC: No dizziness or vertigo, denies headaches  MUSCULOSKELETAL: Denies any muscle or joint pain  SKIN: Denies skin rashes or itching  ENDOCRINE: Denies excessive thirst  Denies intolerance to heat or cold  PSYCHOSOCIAL: Denies depression or anxiety  Denies any recent memory loss         Historical Information   Past Medical History:   Diagnosis Date    Cancer of left kidney ([address] )     Colon polyp     Diverticulitis of colon     Last assessed - 5/12/14    Hypercholesterolemia     Hyperlipidemia     Renal neoplasm      Past Surgical History:   Procedure Laterality Date    COLONOSCOPY      CYSTOSCOPY      Onset - 5/2/16 Ivelisse Hall     LAPAROSCOPIC CHOLECYSTECTOMY      NEPHRECTOMY Left     SHOULDER ARTHROSCOPY W/ ROTATOR CUFF REPAIR Left     TOTAL HIP ARTHROPLASTY Left     original hardware became infected and pt needed a second surgery to replace hardware    UMBILICAL HERNIA REPAIR       Social History   History   Alcohol Use    Yes     Comment: Social     History   Drug Use No     History   Smoking Status    Former Smoker   Smokeless Tobacco    Never Used     Family History   Problem Relation Age of Onset    Tuberculosis Mother     Emphysema Father      Lung       Meds/Allergies       Current Outpatient Prescriptions:     Multiple Vitamins-Minerals (MULTIVITAMIN ADULT) TABS    multivitamin (THERAGRAN) TABS    Omega-3 Fatty Acids (FISH OIL) 1,000 mg    rosuvastatin (CRESTOR) 10 MG tablet    rosuvastatin (CRESTOR) 10 MG tablet    Na Sulfate-K Sulfate-Mg Sulf (SUPREP BOWEL PREP KIT) 17 5-3 13-1 6 GM/180ML SOLN    rosuvastatin (CRESTOR) 10 MG tablet    No Known Allergies        Objective     Blood pressure 138/82, pulse 91, temperature 98 6 °F (37 °C), temperature source Oral, height 5' 10 98" (1 803 m), weight 96 6 kg (213 lb)  Body mass index is 29 72 kg/m²  PHYSICAL EXAM:      General Appearance:   Alert, cooperative, no distress   HEENT:   Normocephalic, atraumatic, anicteric      Neck:  Supple, symmetrical, trachea midline   Lungs:   Clear to auscultation bilaterally; no rales, rhonchi or wheezing; respirations unlabored    Heart[de-identified]   Regular rate and rhythm; no murmur, rub, or gallop  Abdomen:   Soft, +RLQ tenderness, mild, non-distended; normal bowel sounds; no masses, no organomegaly   Genitalia:   Deferred    Rectal:   Deferred    Extremities:  No cyanosis, clubbing or edema    Pulses:  2+ and symmetric    Skin:  No jaundice, rashes, or lesions    Lymph nodes:  No palpable cervical lymphadenopathy        Lab Results:       Radiology Results:   No results found

## 2018-04-02 NOTE — PATIENT INSTRUCTIONS
Pt is sched at [facility] with dr Lakshmi Luevano on 04/27/2018  m/a gave pt instructions to suprep and procedure he is aware he will get a call the day before with time

## 2018-04-26 ENCOUNTER — ANESTHESIA EVENT (OUTPATIENT)
Dept: GASTROENTEROLOGY | Facility: MEDICAL CENTER | Age: 73
End: 2018-04-26
Payer: MEDICARE

## 2018-04-27 ENCOUNTER — HOSPITAL ENCOUNTER (OUTPATIENT)
Facility: MEDICAL CENTER | Age: 73
Setting detail: OUTPATIENT SURGERY
Discharge: HOME | End: 2018-04-27
Attending: INTERNAL MEDICINE | Admitting: INTERNAL MEDICINE
Payer: MEDICARE

## 2018-04-27 ENCOUNTER — ANESTHESIA (OUTPATIENT)
Dept: GASTROENTEROLOGY | Facility: MEDICAL CENTER | Age: 73
End: 2018-04-27
Payer: MEDICARE

## 2018-04-27 VITALS
RESPIRATION RATE: 12 BRPM | DIASTOLIC BLOOD PRESSURE: 105 MMHG | SYSTOLIC BLOOD PRESSURE: 201 MMHG | HEIGHT: 70 IN | OXYGEN SATURATION: 96 % | TEMPERATURE: 98.5 F | BODY MASS INDEX: 30.49 KG/M2 | HEART RATE: 78 BPM | WEIGHT: 213 LBS

## 2018-04-27 DIAGNOSIS — Z86.010 HISTORY OF COLON POLYPS: ICD-10-CM

## 2018-04-27 DIAGNOSIS — Z12.11 SCREENING FOR COLON CANCER: ICD-10-CM

## 2018-04-27 DIAGNOSIS — R19.7 DIARRHEA, UNSPECIFIED TYPE: ICD-10-CM

## 2018-04-27 DIAGNOSIS — R10.30 LOWER ABDOMINAL PAIN: ICD-10-CM

## 2018-04-27 PROCEDURE — 88305 TISSUE EXAM BY PATHOLOGIST: CPT | Performed by: PATHOLOGY

## 2018-04-27 PROCEDURE — 45385 COLONOSCOPY W/LESION REMOVAL: CPT | Performed by: INTERNAL MEDICINE

## 2018-04-27 RX ORDER — PROPOFOL 10 MG/ML
INJECTION, EMULSION INTRAVENOUS AS NEEDED
Status: DISCONTINUED | OUTPATIENT
Start: 2018-04-27 | End: 2018-04-27 | Stop reason: SURG

## 2018-04-27 RX ORDER — SODIUM CHLORIDE 9 MG/ML
125 INJECTION, SOLUTION INTRAVENOUS CONTINUOUS
Status: DISCONTINUED | OUTPATIENT
Start: 2018-04-27 | End: 2018-04-27 | Stop reason: HOSPADM

## 2018-04-27 RX ADMIN — PROPOFOL 50 MG: 10 INJECTION, EMULSION INTRAVENOUS at 07:50

## 2018-04-27 RX ADMIN — SODIUM CHLORIDE 125 ML/HR: 0.9 INJECTION, SOLUTION INTRAVENOUS at 07:24

## 2018-04-27 RX ADMIN — PROPOFOL 120 MG: 10 INJECTION, EMULSION INTRAVENOUS at 07:42

## 2018-04-27 RX ADMIN — PROPOFOL 50 MG: 10 INJECTION, EMULSION INTRAVENOUS at 07:55

## 2018-04-27 RX ADMIN — PROPOFOL 30 MG: 10 INJECTION, EMULSION INTRAVENOUS at 07:45

## 2018-04-27 RX ADMIN — PROPOFOL 20 MG: 10 INJECTION, EMULSION INTRAVENOUS at 07:48

## 2018-04-27 RX ADMIN — PROPOFOL 30 MG: 10 INJECTION, EMULSION INTRAVENOUS at 07:44

## 2018-04-27 NOTE — ANESTHESIA PREPROCEDURE EVALUATION
Review of Systems/Medical History  Patient summary reviewed  Chart reviewed  No history of anesthetic complications     Cardiovascular  Hyperlipidemia,    Pulmonary  Sleep apnea CPAP,        GI/Hepatic  Negative GI/hepatic ROS          Genitourinary malignancy renal cancer,        Endo/Other  Negative endo/other ROS      GYN  Negative gynecology ROS          Hematology  Negative hematology ROS      Musculoskeletal    Arthritis     Neurology  Negative neurology ROS      Psychology   Negative psychology ROS              Physical Exam    Airway    Mallampati score: II  TM Distance: >3 FB  Neck ROM: full     Dental   upper dentures,     Cardiovascular  Rhythm: regular, Rate: normal,     Pulmonary  Breath sounds clear to auscultation,     Other Findings        Anesthesia Plan  ASA Score- 2     Anesthesia Type- IV sedation with anesthesia with ASA Monitors  Additional Monitors:   Airway Plan:         Plan Factors- Patient instructed to abstain from smoking on day of procedure  Patient did not smoke on day of surgery  Induction- intravenous  Postoperative Plan-     Informed Consent- Anesthetic plan and risks discussed with patient

## 2018-04-27 NOTE — H&P (VIEW-ONLY)
[facility] - Outpatient Consultation  Fiona Alves 68 y o  male MRN: [id number]  Encounter: [id number]          ASSESSMENT AND PLAN:      1  Lower abdominal pain  2  Diarrhea, unspecified type  3  Change in bowel habits  Low right-sided back pain with radiation to the RLQ  Discussed pain is likely consistent with musculoskeletal, but given temporal relationship with change in bowel habits will plan for colonoscopy for screening given his history of colon polyps and to rule out pathology  I suspect he may have some component of post-cholecystectomy syndrome causing the diarrhea and may benefit from a bile acid sequestrant  We will attempt this treatment at his next visit if colonoscopy is unrevealing  4  Screening for colon cancer  5  History of colon polyps  - Case request operating room: COLONOSCOPY; Standing  - Case request operating room: COLONOSCOPY  - Na Sulfate-K Sulfate-Mg Sulf (SUPREP BOWEL PREP KIT) 17 5-3 13-1 6 GM/180ML SOLN; Take 177 mL by mouth once for 1 dose  Dispense: 2 Bottle; Refill: 0      ______________________________________________________________________    HPI:      Patient is a 68year old male referred for evaluation of abdominal pain and diarrhea  He has a past medical history of hypertension, osteoarthritis, s/p remote cholecystectomy and renal cell carcinoma s/p left nephrectomy  He complains of a 6 month history of loose bowel movements occurring 4-5 times per day  Onset of loose stools has been associated with lower right sided back pain which radiates to the RLQ  Back pain is worsened by palpation, but has been temporally associated with loose bowel movements  He is s/p cholecystectomy remotely > 10 years ago, but change in bowel habits is recent with watery consistency and increasing frequency only occurring over the past 6 months   Pain is not relieved by bowel movements but worsens with "upset stomach "     When seen by his primary care, he did note palpation of the back right lower muscle underneath his ribs reproduced the pain  Workup for kidney/bladder infection was negative  His last colonoscopy is unknown, between 5-7 years ago, and he reports history of colonic polyps  No family history of colon cancer  He has not noted unintentional weight loss, rectal bleeding or melena  REVIEW OF SYSTEMS:    CONSTITUTIONAL: Denies any fever, chills, rigors, and weight loss  HEENT: No earache or tinnitus  Denies hearing loss or visual disturbances  CARDIOVASCULAR: No chest pain or palpitations  RESPIRATORY: Denies any cough, hemoptysis, shortness of breath or dyspnea on exertion  GASTROINTESTINAL: As noted in the History of Present Illness  GENITOURINARY: No problems with urination  Denies any hematuria or dysuria  NEUROLOGIC: No dizziness or vertigo, denies headaches  MUSCULOSKELETAL: Denies any muscle or joint pain  SKIN: Denies skin rashes or itching  ENDOCRINE: Denies excessive thirst  Denies intolerance to heat or cold  PSYCHOSOCIAL: Denies depression or anxiety  Denies any recent memory loss         Historical Information   Past Medical History:   Diagnosis Date    Cancer of left kidney ([address] )     Colon polyp     Diverticulitis of colon     Last assessed - 5/12/14    Hypercholesterolemia     Hyperlipidemia     Renal neoplasm      Past Surgical History:   Procedure Laterality Date    COLONOSCOPY      CYSTOSCOPY      Onset - 5/2/16 Jonathan Nixon     LAPAROSCOPIC CHOLECYSTECTOMY      NEPHRECTOMY Left     SHOULDER ARTHROSCOPY W/ ROTATOR CUFF REPAIR Left     TOTAL HIP ARTHROPLASTY Left     original hardware became infected and pt needed a second surgery to replace hardware    UMBILICAL HERNIA REPAIR       Social History   History   Alcohol Use    Yes     Comment: Social     History   Drug Use No     History   Smoking Status    Former Smoker   Smokeless Tobacco    Never Used     Family History   Problem Relation Age of Onset    Tuberculosis Mother     Emphysema Father      Lung       Meds/Allergies       Current Outpatient Prescriptions:     Multiple Vitamins-Minerals (MULTIVITAMIN ADULT) TABS    multivitamin (THERAGRAN) TABS    Omega-3 Fatty Acids (FISH OIL) 1,000 mg    rosuvastatin (CRESTOR) 10 MG tablet    rosuvastatin (CRESTOR) 10 MG tablet    Na Sulfate-K Sulfate-Mg Sulf (SUPREP BOWEL PREP KIT) 17 5-3 13-1 6 GM/180ML SOLN    rosuvastatin (CRESTOR) 10 MG tablet    No Known Allergies        Objective     Blood pressure 138/82, pulse 91, temperature 98 6 °F (37 °C), temperature source Oral, height 5' 10 98" (1 803 m), weight 96 6 kg (213 lb)  Body mass index is 29 72 kg/m²  PHYSICAL EXAM:      General Appearance:   Alert, cooperative, no distress   HEENT:   Normocephalic, atraumatic, anicteric      Neck:  Supple, symmetrical, trachea midline   Lungs:   Clear to auscultation bilaterally; no rales, rhonchi or wheezing; respirations unlabored    Heart[de-identified]   Regular rate and rhythm; no murmur, rub, or gallop  Abdomen:   Soft, +RLQ tenderness, mild, non-distended; normal bowel sounds; no masses, no organomegaly   Genitalia:   Deferred    Rectal:   Deferred    Extremities:  No cyanosis, clubbing or edema    Pulses:  2+ and symmetric    Skin:  No jaundice, rashes, or lesions    Lymph nodes:  No palpable cervical lymphadenopathy        Lab Results:       Radiology Results:   No results found

## 2018-04-27 NOTE — OP NOTE
**** GI/ENDOSCOPY REPORT ****     PATIENT NAME: GRISEL SHORT ------ VISIT ID:  Patient ID:   [id number] YOB: 1945     INTRODUCTION: Colonoscopy - A 68 male patient presents for an outpatient   Colonoscopy at [address] [place] [address]  PREVIOUS COLONOSCOPY: Patient's last colonoscopy was 7 years ago  INDICATIONS: Change in bowel habits  Diarrhea  Screening-ADR  CONSENT:  The benefits, risks, and alternatives to the procedure were   discussed and informed consent was obtained from the patient  PREPARATION: EKG, pulse, pulse oximetry and blood pressure were monitored   throughout the procedure  The patient was identified by myself both   verbally and by visual inspection of ID band  Airway Assessment   Classification: Airway class 2 - Visualization of the soft palate, fauces   and uvula  ASA Classification: See anesthesia record  MEDICATIONS: Anesthesia-check records     PROCEDURE:  The endoscope was passed without difficulty through the anus   under direct visualization and advanced to the cecum, confirmed by   appendiceal orifice and ileocecal valve  The scope was withdrawn and the   mucosa was carefully examined  The quality of the preparation was  Cecal   Intubation Time: Minute(s) Scope Withdrawal Time: Minute(s)     RECTAL EXAM: External hemorrhoids were found  FINDINGS:  A single polyp, measuring less than 5 mm in size, was found in   the ascending colon  The polyp was completely removed by cold biopsy   polypectomy  The polyp was retrieved  Two diminutive polyps were found in   the transverse colon  All polyps were completely removed by cold biopsy   polypectomy  The polyps were retrieved  Three diminutive polyps were found   in the rectosigmoid junction  All polyps were completely removed by cold   biopsy polypectomy  The polyps were retrieved  Otherwise, the colon   appeared to be normal  A biopsy was taken from the whole colon   On   retroflexed view, small internal and external hemorrhoids were found  COMPLICATIONS: There were no complications  IMPRESSIONS: External hemorrhoids, external were found during the rectal   exam  A single polyp found in the ascending colon; removed by cold biopsy   polypectomy  Two diminutive polyps found in the transverse colon; all   polyps removed by cold biopsy polypectomy  Three diminutive polyps found   in the rectosigmoid junction; all polyps removed by cold biopsy   polypectomy  Internal and external hemorrhoids  RECOMMENDATIONS: Colonoscopy recommended in a 3-5 year  Resume regular   diet as tolerated  Follow-up on the results of the biopsy specimens  ESTIMATED BLOOD LOSS:     PATHOLOGY SPECIMENS: Completely removed by cold biopsy polypectomy  All   polyps completely removed by cold biopsy polypectomy  All polyps   completely removed by cold biopsy polypectomy  Random biopsy taken from   the whole colon  PROCEDURE CODES:     ICD-9 Codes: 787 99 Other symptoms involving digestive system 787 91   Diarrhea 455 3 External hemorrhoids without mention of complication 525 4   Benign neoplasm of colon 211 3 Benign neoplasm of colon 211 3 Benign   neoplasm of colon     ICD-10 Codes: R19 4 Change in bowel habit R19 7 Diarrhea, unspecified   K64 9 Unspecified hemorrhoids K63 5 Polyp of colon K63 5 Polyp of colon   K63 5 Polyp of colon K64 9 Unspecified hemorrhoids     PERFORMED BY: ANGELES Sorensen  on 04/27/2018  Version 1, electronically signed by ANGELES Sorensen  on 04/27/2018   at 08:12

## 2018-04-27 NOTE — DISCHARGE INSTRUCTIONS
Colonoscopy   WHAT YOU NEED TO KNOW:   A colonoscopy is a procedure to examine the inside of your colon (intestine) with a scope  Polyps or tissue growths may have been removed during your colonoscopy  It is normal to feel bloated and to have some abdominal discomfort  You should be passing gas  If you have hemorrhoids or you had polyps removed, you may have a small amount of bleeding  DISCHARGE INSTRUCTIONS:   Seek care immediately if:   · You have a large amount of bright red blood in your bowel movements  · Your abdomen is hard and firm and you have severe pain  · You have sudden trouble breathing  Contact your healthcare provider if:   · You develop a rash or hives  · You have a fever within 24 hours of your procedure       · You have not had a bowel movement for 3 days after your procedure  · You have questions or concerns about your condition or care  Activity:   · Do not lift, strain, or run  for 3 days after your procedure  · Rest after your procedure  You have been given medicine to relax you  Do not  drive or make important decisions until the day after your procedure  Return to your normal activity as directed  · Relieve gas and discomfort from bloating  by lying on your right side with a heating pad on your abdomen  You may need to take short walks to help the gas move out  Eat small meals until bloating is relieved  If you had polyps removed: For 7 days after your procedure:  · Do not  take aspirin  · Do not  go on long car rides  Follow up with your healthcare provider as directed:  Write down your questions so you remember to ask them during your visits  © 2017 [address] [employer] is for End User's use only and may not be sold, redistributed or otherwise used for commercial purposes  All illustrations and images included in CareNotes® are the copyrighted property of [employer]  or Harish Vanessa    The above information is an  only  It is not intended as medical advice for individual conditions or treatments  Talk to your doctor, nurse or pharmacist before following any medical regimen to see if it is safe and effective for you  Colorectal Polyps   WHAT YOU NEED TO KNOW:   What are colorectal polyps? Colorectal polyps are small growths of tissue in the lining of the colon and rectum  Most polyps are hyperplastic polyps and are usually benign (noncancerous)  Certain types of polyps, called adenomatous polyps, may turn into cancer  What increases my risk of colorectal polyps? The exact cause of colorectal polyps is unknown  The following may increase your risk:  · Older age    · A diet of foods high in fat and low in fiber     · Family history of polyps    · Intestinal diseases, such as Crohn's disease or ulcerative colitis    · An unhealthy lifestyle, such as physical inactivity, smoking, or drinking alcohol    · Obesity  What are the signs and symptoms of colorectal polyps? · Blood in your bowel movement or bleeding from the rectum    · Change in bowel movement habits, such as diarrhea and constipation    · Abdominal pain  How are colorectal polyps diagnosed? You should have fecal blood screening once a year for colorectal disease if you are over 48years old  You should be screened earlier if you have an intestinal disease or a family history of polyps or colorectal cancer  During this screening, a sample of your bowel movement is checked for blood, which may be an early sign of colorectal polyps or cancer  You may also need any of the following tests:  · Digital rectal exam:  Your healthcare provider will examine your anus and use a finger to check your rectum for polyps  · Barium enema: A barium enema is an x-ray of the colon  A tube is put into your anus, and a liquid called barium is put through the tube  Barium is used so that caregivers can see your colon better on the x-ray film       · Virtual colonoscopy: This is a CT scan that takes pictures of the inside of your colon and rectum  A small, flexible tube is put into your rectum and air or carbon dioxide (gas) is used to expand your colon  This lets healthcare providers clearly see your colon and any polyps on a monitor  · Colonoscopy or sigmoidoscopy: These procedures help your healthcare provider see the inside of your colon using a flexible tube with a small light and camera on the end  During a sigmoidoscopy, your healthcare provider will only look at rectum and lower colon  During a colonoscopy, healthcare providers will look at the full length of your colon  Healthcare providers may remove a small amount of tissue from the colon for a biopsy  How are colorectal polyps treated? · Polyp removal:  Polyps may be removed during your sigmoidoscopy or colonoscopy  · Polypectomy: This is surgery to remove your polyps  You may need laparoscopic or open surgery, depending on the type, size, and number of polyps that you have  Laparoscopy is done by inserting a small, flexible scope into incisions made on your abdomen  Open surgery is done by making a larger incision on your abdomen   What are the risks of colorectal polyps? You may bleed during a colonoscopy procedure  Your bowel may be perforated (torn) when polyps are removed  This may lead to an open abdominal surgery  During surgery, you may bleed too much or get an infection  Adenomatous polyps that are not removed may turn into cancer and become more difficult to treat  Where can I find support and more information? · Bernie Murry ([facility])  [address], [place] [address]  Phone: [phone]  Web Address: Africa Morris  [facility] gov  When should I contact my healthcare provider? · You have a fever  · You have chills, a cough, or feel weak and achy      · You have abdominal pain that does not go away or gets worse after you take medicine  · Your abdomen is swollen  · You are losing weight without trying  · You have questions or concerns about your condition or care  When should I seek immediate care or call 911? · You have sudden shortness of breath  · You have a fast heart rate, fast breathing, or are too dizzy to stand up  · You have severe abdominal pain  · You see blood in your bowel movement  CARE AGREEMENT:   You have the right to help plan your care  Learn about your health condition and how it may be treated  Discuss treatment options with your caregivers to decide what care you want to receive  You always have the right to refuse treatment  The above information is an  only  It is not intended as medical advice for individual conditions or treatments  Talk to your doctor, nurse or pharmacist before following any medical regimen to see if it is safe and effective for you  © 2017 [address] [employer] Information is for End User's use only and may not be sold, redistributed or otherwise used for commercial purposes  All illustrations and images included in CareNotes® are the copyrighted property of [employer]  or Harish Vanessa

## 2018-05-01 NOTE — PROGRESS NOTES
Please call patient - he had two tubular adenomas (precancerous polyps with potential to turn into cancer if we hadn't removed them)  His biopsies were negative for microscopic colitis  He needs a repeat colonoscopy in 5 years  I would recommend he try a bile acid medicine, which helps in people with diarrhea who have had their gallbladder taken out  The only side effect is constipation  Please let me know if he is willing to try this med and I will send it to his pharmacy

## 2018-05-08 DIAGNOSIS — R19.7 DIARRHEA, UNSPECIFIED TYPE: Primary | ICD-10-CM

## 2018-05-08 RX ORDER — CHOLESTYRAMINE 4 G/9G
1 POWDER, FOR SUSPENSION ORAL
Qty: 270 PACKET | Refills: 3 | Status: SHIPPED | OUTPATIENT
Start: 2018-05-08 | End: 2018-05-23 | Stop reason: ALTCHOICE

## 2018-05-23 ENCOUNTER — OFFICE VISIT (OUTPATIENT)
Dept: FAMILY MEDICINE CLINIC | Facility: HOSPITAL | Age: 73
End: 2018-05-23
Payer: MEDICARE

## 2018-05-23 VITALS
BODY MASS INDEX: 30.38 KG/M2 | WEIGHT: 212.2 LBS | HEIGHT: 70 IN | DIASTOLIC BLOOD PRESSURE: 80 MMHG | SYSTOLIC BLOOD PRESSURE: 138 MMHG | HEART RATE: 86 BPM | TEMPERATURE: 98.1 F

## 2018-05-23 DIAGNOSIS — G89.29 CHRONIC RIGHT-SIDED LOW BACK PAIN WITHOUT SCIATICA: Primary | ICD-10-CM

## 2018-05-23 DIAGNOSIS — M54.50 CHRONIC RIGHT-SIDED LOW BACK PAIN WITHOUT SCIATICA: Primary | ICD-10-CM

## 2018-05-23 DIAGNOSIS — M51.37 DISC DEGENERATION, LUMBOSACRAL: ICD-10-CM

## 2018-05-23 DIAGNOSIS — I35.8 HEART MURMUR, AORTIC: ICD-10-CM

## 2018-05-23 PROCEDURE — 99214 OFFICE O/P EST MOD 30 MIN: CPT | Performed by: NURSE PRACTITIONER

## 2018-05-23 NOTE — PROGRESS NOTES
Assessment/Plan:     Given location of pain with known DDD of lumbar spine and exam likely musculoskeletal in nature  Unable to take NSAIDs  Will refer to PT  Call with no improvement or worsening  New heart murmur over aortic area  Will order echo  Diagnoses and all orders for this visit:    Chronic right-sided low back pain without sciatica  -     Ambulatory referral to Physical Therapy; Future    Disc degeneration, lumbosacral  -     Ambulatory referral to Physical Therapy; Future    Heart murmur, aortic  -     Echo complete with contrast if indicated; Future          Subjective:     Patient ID: Danae Diaz is a 68 y o  male  Pain right side of low back  Has been ongoing for at least 3-4 months  Radiates up right side and across low back  Denies radiating pain to buttock, hip, legs  Does get N/T in both feet  Thought to be UTI in march but urine was negative  Dull pain that gets worse and better  Changing postions make better  Sometimes pain worse if leaning towards right  Feels tired  Denies fever,chills, rash  h/o lumbar DDD  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  Had recent colonoscopy and was told by anesthesia that he had heart murmur  Review of Systems   Constitutional: Negative for chills, fever and unexpected weight change  Genitourinary: Negative for dysuria, frequency and urgency  Musculoskeletal: Positive for back pain  Neurological: Positive for numbness  Negative for weakness  The following portions of the patient's history were reviewed and updated as appropriate: allergies, current medications, past family history, past medical history, past social history, past surgical history and problem list     Objective:  Vitals:    05/23/18 0953   BP: 138/80   Pulse: 86   Temp: 98 1 °F (36 7 °C)      Physical Exam   Constitutional: He is oriented to person, place, and time  He appears well-developed and well-nourished     Cardiovascular: Normal rate and regular rhythm  Murmur heard  Pulmonary/Chest: Effort normal and breath sounds normal    Musculoskeletal:        Lumbar back: He exhibits decreased range of motion  He exhibits no tenderness and no bony tenderness  Some limited lumbar spine ROM with forward flexion  Right sided back pain with forward flexion, right and left lateral bending  Neurological: He is alert and oriented to person, place, and time  He has normal strength  Reflex Scores:       Patellar reflexes are 1+ on the right side and 1+ on the left side  Achilles reflexes are 1+ on the right side and 1+ on the left side  Skin: Skin is warm and dry  Psychiatric: He has a normal mood and affect

## 2018-05-30 ENCOUNTER — HOSPITAL ENCOUNTER (OUTPATIENT)
Dept: NON INVASIVE DIAGNOSTICS | Facility: HOSPITAL | Age: 73
Discharge: HOME | End: 2018-05-30
Payer: MEDICARE

## 2018-05-30 DIAGNOSIS — I35.8 HEART MURMUR, AORTIC: ICD-10-CM

## 2018-05-30 PROCEDURE — 93306 TTE W/DOPPLER COMPLETE: CPT | Performed by: INTERNAL MEDICINE

## 2018-05-30 PROCEDURE — 93306 TTE W/DOPPLER COMPLETE: CPT

## 2018-06-01 DIAGNOSIS — C64.9 MALIGNANT NEOPLASM OF KIDNEY EXCLUDING RENAL PELVIS (HCC): ICD-10-CM

## 2018-06-04 ENCOUNTER — APPOINTMENT (OUTPATIENT)
Dept: LAB | Facility: CLINIC | Age: 73
End: 2018-06-04
Payer: MEDICARE

## 2018-06-04 DIAGNOSIS — C64.9 MALIGNANT NEOPLASM OF KIDNEY EXCLUDING RENAL PELVIS (HCC): ICD-10-CM

## 2018-06-04 LAB
ALBUMIN SERPL BCP-MCNC: 3.8 G/DL (ref 3.5–5)
ALP SERPL-CCNC: 75 U/L (ref 46–116)
ALT SERPL W P-5'-P-CCNC: 33 U/L (ref 12–78)
ANION GAP SERPL CALCULATED.3IONS-SCNC: 4 MMOL/L (ref 4–13)
AST SERPL W P-5'-P-CCNC: 26 U/L (ref 5–45)
BASOPHILS # BLD AUTO: 0.04 THOUSANDS/ΜL (ref 0–0.1)
BASOPHILS NFR BLD AUTO: 1 % (ref 0–1)
BILIRUB SERPL-MCNC: 0.52 MG/DL (ref 0.2–1)
BUN SERPL-MCNC: 24 MG/DL (ref 5–25)
CALCIUM SERPL-MCNC: 9 MG/DL (ref 8.3–10.1)
CHLORIDE SERPL-SCNC: 104 MMOL/L (ref 100–108)
CO2 SERPL-SCNC: 31 MMOL/L (ref 21–32)
CREAT SERPL-MCNC: 1.5 MG/DL (ref 0.6–1.3)
EOSINOPHIL # BLD AUTO: 0.14 THOUSAND/ΜL (ref 0–0.61)
EOSINOPHIL NFR BLD AUTO: 2 % (ref 0–6)
ERYTHROCYTE [DISTWIDTH] IN BLOOD BY AUTOMATED COUNT: 13.2 % (ref 11.6–15.1)
GFR SERPL CREATININE-BSD FRML MDRD: 46 ML/MIN/1.73SQ M
GLUCOSE P FAST SERPL-MCNC: 103 MG/DL (ref 65–99)
HCT VFR BLD AUTO: 45.5 % (ref 36.5–49.3)
HGB BLD-MCNC: 14.9 G/DL (ref 12–17)
IMM GRANULOCYTES # BLD AUTO: 0.03 THOUSAND/UL (ref 0–0.2)
IMM GRANULOCYTES NFR BLD AUTO: 0 % (ref 0–2)
LYMPHOCYTES # BLD AUTO: 2.09 THOUSANDS/ΜL (ref 0.6–4.47)
LYMPHOCYTES NFR BLD AUTO: 30 % (ref 14–44)
MCH RBC QN AUTO: 32.3 PG (ref 26.8–34.3)
MCHC RBC AUTO-ENTMCNC: 32.7 G/DL (ref 31.4–37.4)
MCV RBC AUTO: 99 FL (ref 82–98)
MONOCYTES # BLD AUTO: 0.79 THOUSAND/ΜL (ref 0.17–1.22)
MONOCYTES NFR BLD AUTO: 11 % (ref 4–12)
NEUTROPHILS # BLD AUTO: 3.96 THOUSANDS/ΜL (ref 1.85–7.62)
NEUTS SEG NFR BLD AUTO: 56 % (ref 43–75)
NRBC BLD AUTO-RTO: 0 /100 WBCS
PLATELET # BLD AUTO: 177 THOUSANDS/UL (ref 149–390)
PMV BLD AUTO: 11.2 FL (ref 8.9–12.7)
POTASSIUM SERPL-SCNC: 4.1 MMOL/L (ref 3.5–5.3)
PROT SERPL-MCNC: 7.4 G/DL (ref 6.4–8.2)
RBC # BLD AUTO: 4.62 MILLION/UL (ref 3.88–5.62)
SODIUM SERPL-SCNC: 139 MMOL/L (ref 136–145)
WBC # BLD AUTO: 7.05 THOUSAND/UL (ref 4.31–10.16)

## 2018-06-04 PROCEDURE — 85025 COMPLETE CBC W/AUTO DIFF WBC: CPT

## 2018-06-04 PROCEDURE — 80053 COMPREHEN METABOLIC PANEL: CPT

## 2018-06-04 PROCEDURE — 36415 COLL VENOUS BLD VENIPUNCTURE: CPT

## 2018-06-07 ENCOUNTER — EVALUATION (OUTPATIENT)
Dept: PHYSICAL THERAPY | Facility: CLINIC | Age: 73
End: 2018-06-07
Payer: MEDICARE

## 2018-06-07 DIAGNOSIS — G89.29 CHRONIC RIGHT-SIDED LOW BACK PAIN WITHOUT SCIATICA: ICD-10-CM

## 2018-06-07 DIAGNOSIS — M54.50 CHRONIC RIGHT-SIDED LOW BACK PAIN WITHOUT SCIATICA: ICD-10-CM

## 2018-06-07 DIAGNOSIS — M51.37 DISC DEGENERATION, LUMBOSACRAL: ICD-10-CM

## 2018-06-07 PROCEDURE — G8991 OTHER PT/OT GOAL STATUS: HCPCS | Performed by: PHYSICAL THERAPIST

## 2018-06-07 PROCEDURE — G8990 OTHER PT/OT CURRENT STATUS: HCPCS | Performed by: PHYSICAL THERAPIST

## 2018-06-07 PROCEDURE — 97110 THERAPEUTIC EXERCISES: CPT | Performed by: PHYSICAL THERAPIST

## 2018-06-07 PROCEDURE — 97161 PT EVAL LOW COMPLEX 20 MIN: CPT | Performed by: PHYSICAL THERAPIST

## 2018-06-07 NOTE — PROGRESS NOTES
PT Evaluation     Today's date: 2018  Patient name: Parks Seip  : 1945  MRN: [id number]  Referring provider: JOSE RAFAEL Dickson  Dx:   Encounter Diagnosis     ICD-10-CM    1  Chronic right-sided low back pain without sciatica M54 5 Ambulatory referral to Physical Therapy    G89 29    2  Disc degeneration, lumbosacral M51 37 Ambulatory referral to Physical Therapy                  Assessment  Impairments: abnormal coordination, abnormal gait, abnormal muscle firing, abnormal or restricted ROM, abnormal movement, activity intolerance, impaired balance, impaired physical strength, lacks appropriate home exercise program, pain with function and weight-bearing intolerance    Assessment details: Patient is a 68 y o  male who presents with the above listed impairments secondary to a 3 month history of l/s pain  Patient would benefit from skilled PT services to address these impairments and to maximize function    Thank you for the referral     Understanding of Dx/Px/POC: good   Prognosis: good    Goals  Impairment Goals  - Decrease pain to <2/10 with activity  - Improve ROM equal to contralateral side  - Increase strength equal to contralateral side    Functional Goals  - Increase Functional Status Measure to: 72  - Patient will be independent with comprehensive HEP  - Ambulation is improved to prior level of function  - Stair climbing is improved to prior level of function  - Squatting is improved to prior level of function      Plan  Patient would benefit from: skilled PT  Planned modality interventions: cryotherapy  Planned therapy interventions: joint mobilization, manual therapy, neuromuscular re-education, patient education, strengthening, stretching, therapeutic activities, therapeutic exercise, home exercise program, functional ROM exercises, Herbert taping and postural training  Frequency: 2x week (2-3x week)  Duration in weeks: 8  Treatment plan discussed with: patient  Plan details: POC ends 18        Subjective Evaluation    History of Present Illness  Mechanism of injury: Pt notes a 3 month insidious onset of l/s pain  He notes that activity seems to increase his pain (weeding, and other yard work)  He notes that he does get some pain at night, but if he gets out of bed and walks around he feels better  He notes that he does take some tylenol which does help some  He feels as if the pain is about the same intensity since the onset  Pt denies and n/t, pain into the extremities, and changes in b/b behavior  Quality of life: good    Pain  Current pain ratin  At best pain ratin  At worst pain ratin  Location: right l/s  Quality: dull ache, tight and pulling    Patient Goals  Patient goals for therapy: decreased pain, improved balance, increased motion, increased strength, independence with ADLs/IADLs and return to sport/leisure activities          Objective     Special Questions  Positive for night pain and disturbed sleep  Negative for bladder dysfunction, bowel dysfunction, saddle (S4) numbness, kidney problem, history of cancer and history of trauma    Palpation   Left   Tenderness of the erector spinae and quadratus lumborum  Right Tenderness of the erector spinae and quadratus lumborum  Tenderness     Lumbar Spine  Tenderness in the spinous process  Right Hip   Tenderness in the PSIS       Neurological Testing     Sensation     Lumbar   Left   Intact: light touch    Right   Intact: light touch    Hip   Left Hip   Intact: light touch    Right Hip   Intact: light touch    Reflexes   Left   Patellar (L4): normal (2+)  Achilles (S1): normal (2+)    Right   Patellar (L4): normal (2+)  Achilles (S1): normal (2+)    Active Range of Motion     Lumbar   Flexion: 45 degrees with pain  Extension: 5 degrees with pain  Left rotation: 55 degrees   Right rotation: 75 degrees with pain    Passive Range of Motion   Left Hip   Extension: 10 degrees     Right Hip   Extension: 10 degrees Strength/Myotome Testing     Left Hip   Planes of Motion   Flexion: 4  Extension: 3+  Abduction: 3+    Right Hip   Planes of Motion   Flexion: 4  Extension: 3+  Abduction: 4+    Left Knee   Flexion: 4+  Extension: 4+    Right Knee   Flexion: 4+  Extension: 4+    Left Ankle/Foot   Dorsiflexion: 5  Plantar flexion: 4  Great toe extension: 4+    Right Ankle/Foot   Dorsiflexion: 5  Plantar flexion: 4  Great toe extension: 4+    Muscle Activation   Patient able to activate left transverse abdominals, left external obliques, left internal obliques, left multifidus, right transverse abdominals, right external obliques, right internal obliques and right multifidus  Tests     Lumbar   Positive prone instability   Left   Positive passive SLR  Right   Positive passive SLR  Left Hip   Positive Ely's  Right Hip   Positive Ely's         Flowsheet Rows      Most Recent Value   PT/OT G-Codes   Current Score  50   Projected Score  65   FOTO information reviewed  Yes   Assessment Type  Evaluation   G code set  Other PT/OT Primary   Other PT Primary Current Status ()  CK   Other PT Primary Goal Status ()  CJ          Precautions: previous left total hip revision      Daily Treatment Diary       Manuals 6/7/18            B quad stretch nv            B hamstring stretch nv                                      Exercise Diary              Bike 10' lv1 new nv            s/l hip abd nv            Sl clams 2 position nv            Supine bridge with band GTB nv            Seated hamstring stretch 30'x3 nv            Prone active knee flex nv            Supine dead bug nv                                                                                                                                                                                                               Modalities

## 2018-06-11 ENCOUNTER — OFFICE VISIT (OUTPATIENT)
Dept: PHYSICAL THERAPY | Facility: CLINIC | Age: 73
End: 2018-06-11
Payer: MEDICARE

## 2018-06-11 DIAGNOSIS — M54.50 CHRONIC RIGHT-SIDED LOW BACK PAIN WITHOUT SCIATICA: Primary | ICD-10-CM

## 2018-06-11 DIAGNOSIS — G89.29 CHRONIC RIGHT-SIDED LOW BACK PAIN WITHOUT SCIATICA: Primary | ICD-10-CM

## 2018-06-11 DIAGNOSIS — M51.37 DISC DEGENERATION, LUMBOSACRAL: ICD-10-CM

## 2018-06-11 PROCEDURE — 97110 THERAPEUTIC EXERCISES: CPT | Performed by: PHYSICAL THERAPIST

## 2018-06-11 PROCEDURE — 97112 NEUROMUSCULAR REEDUCATION: CPT | Performed by: PHYSICAL THERAPIST

## 2018-06-14 ENCOUNTER — OFFICE VISIT (OUTPATIENT)
Dept: PHYSICAL THERAPY | Facility: CLINIC | Age: 73
End: 2018-06-14
Payer: MEDICARE

## 2018-06-14 DIAGNOSIS — M54.50 CHRONIC RIGHT-SIDED LOW BACK PAIN WITHOUT SCIATICA: Primary | ICD-10-CM

## 2018-06-14 DIAGNOSIS — M51.37 DISC DEGENERATION, LUMBOSACRAL: ICD-10-CM

## 2018-06-14 DIAGNOSIS — G89.29 CHRONIC RIGHT-SIDED LOW BACK PAIN WITHOUT SCIATICA: Primary | ICD-10-CM

## 2018-06-14 PROCEDURE — 97110 THERAPEUTIC EXERCISES: CPT

## 2018-06-14 PROCEDURE — 97112 NEUROMUSCULAR REEDUCATION: CPT

## 2018-06-14 PROCEDURE — 97140 MANUAL THERAPY 1/> REGIONS: CPT

## 2018-06-14 NOTE — PROGRESS NOTES
Daily Note     Today's date: 2018  Patient name: Aliyah Martinez  : 1945  MRN: [id number]  Referring provider: Donis Saint, CRNP  Dx:   Encounter Diagnosis     ICD-10-CM    1  Chronic right-sided low back pain without sciatica M54 5     G89 29    2  Disc degeneration, lumbosacral M51 37                   Subjective: no new c o offered      Objective: See treatment diary below  Precautions: previous left total hip revision      Daily Treatment Diary       Manuals 18          B quad stretch nv 4' 4'          B hamstring stretch nv 4' 4'                                    Exercise Diary              Bike 10' lv1 new nv 10' lv 1  lv1   10 min          s/l hip abd nv  2x10          Sl clams 2 position nv Green 2x10 ea Gtb  2x10          Supine bridge with band GTB nv GTB 2x10 GTB  2x10          Seated hamstring stretch 30'x3 nv 30''x3 30"x3          Prone active knee flex nv nv 2x10          Supine dead bug nv 2x10 ea nv          Prone knee hip ext  2x10 2x10          Prone hip ext with knee flex  2x10 2x10          Prone quad stretch   30"x3                                                                                                         Assessment: Tolerated treatment with no pain noted  Plan: Continue per plan of care

## 2018-06-18 ENCOUNTER — OFFICE VISIT (OUTPATIENT)
Dept: PHYSICAL THERAPY | Facility: CLINIC | Age: 73
End: 2018-06-18
Payer: MEDICARE

## 2018-06-18 ENCOUNTER — OFFICE VISIT (OUTPATIENT)
Dept: HEMATOLOGY ONCOLOGY | Facility: HOSPITAL | Age: 73
End: 2018-06-18
Payer: MEDICARE

## 2018-06-18 VITALS
BODY MASS INDEX: 31.83 KG/M2 | HEART RATE: 90 BPM | OXYGEN SATURATION: 96 % | TEMPERATURE: 98 F | RESPIRATION RATE: 16 BRPM | WEIGHT: 210 LBS | DIASTOLIC BLOOD PRESSURE: 82 MMHG | SYSTOLIC BLOOD PRESSURE: 136 MMHG | HEIGHT: 68 IN

## 2018-06-18 DIAGNOSIS — C64.9 CARCINOMA, RENAL CELL (HCC): Primary | ICD-10-CM

## 2018-06-18 DIAGNOSIS — M51.37 DISC DEGENERATION, LUMBOSACRAL: ICD-10-CM

## 2018-06-18 DIAGNOSIS — G89.29 CHRONIC RIGHT-SIDED LOW BACK PAIN WITHOUT SCIATICA: Primary | ICD-10-CM

## 2018-06-18 DIAGNOSIS — M54.50 CHRONIC RIGHT-SIDED LOW BACK PAIN WITHOUT SCIATICA: Primary | ICD-10-CM

## 2018-06-18 PROCEDURE — 99214 OFFICE O/P EST MOD 30 MIN: CPT | Performed by: INTERNAL MEDICINE

## 2018-06-18 PROCEDURE — 97110 THERAPEUTIC EXERCISES: CPT | Performed by: PHYSICAL THERAPIST

## 2018-06-18 PROCEDURE — 97112 NEUROMUSCULAR REEDUCATION: CPT | Performed by: PHYSICAL THERAPIST

## 2018-06-18 NOTE — PROGRESS NOTES
Daily Note     Today's date: 2018  Patient name: Chriss Kent  : 1945  MRN: [id number]  Referring provider: JOSE RAFAEL Yeboah  Dx:   Encounter Diagnosis     ICD-10-CM    1  Chronic right-sided low back pain without sciatica M54 5     G89 29    2  Disc degeneration, lumbosacral M51 37                   Subjective: pt notes that the back is not feeling bad at all  Objective: See treatment diary below  Precautions: previous left total hip revision      Daily Treatment Diary       Manuals 18         B quad stretch nv 4' 4' 4'         B hamstring stretch nv 4' 4' 4'                                   Exercise Diary              Bike 10' lv1 new nv 10' lv 1  lv1   10 min lv1 10'         s/l hip abd nv  2x10 3x10         Sl clams 2 position nv Green 2x10 ea Gtb  2x10 GTB 2x10          Supine bridge with band GTB nv GTB 2x10 GTB  2x10 BTB 3x10         Seated hamstring stretch 30'x3 nv 30''x3 30"x3 30''x3         Prone active knee flex nv nv 2x10 2x10 ea         Supine dead bug nv 2x10 ea nv 2x10         Prone knee hip ext  2x10 2x10 2x10         Prone hip ext with knee flex  2x10 2x10 2x10         Prone quad stretch   30"x3 30''x3                                                    Assessment: pt continues to have increased restrictions in the B quads and hamstings limiting both active knee flex and ext    Plan: Continue per plan of care

## 2018-06-18 NOTE — PROGRESS NOTES
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  His most recent CT scan of the abdomen and pelvis fromshowed no evidence of recurrence or metastatic disease  This was 6 months ago  There were no new areas or nodules seen  He has some chronic appearing nodules which have been there for the last 6 years and are probably benign  He is otherwise doing well  Denies any nausea denies any vomiting denies any constipation or diarrhea and his 14 point review of systems today was otherwise negative  His creatinine is slightly elevated compared to previously but it has been very hot recently  He may have been dehydrated  I have advised him to see a nephrologist to see if they would wish to start him on any medication to help protect his remaining kidney  Test Results:    Imaging: No results found      Labs:   Lab Results   Component Value Date    WBC 7 05 06/04/2018    HGB 14 9 06/04/2018    HCT 45 5 06/04/2018    MCV 99 (H) 06/04/2018     06/04/2018     Lab Results   Component Value Date     06/04/2018    K 4 1 06/04/2018     06/04/2018    CO2 31 06/04/2018    ANIONGAP 4 06/04/2018    BUN 24 06/04/2018    CREATININE 1 50 (H) 06/04/2018    GLUCOSE 113 05/01/2017    GLUF 103 (H) 06/04/2018    CALCIUM 9 0 06/04/2018    AST 26 06/04/2018    ALT 33 06/04/2018    ALKPHOS 75 06/04/2018    PROT 7 4 06/04/2018    BILITOT 0 52 06/04/2018    EGFR 46 06/04/2018         Lab Results   Component Value Date    PSA 1 9 06/07/2017    PSA 1 7 04/04/2016    PSA 2 8 11/03/2014       ROS: As stated in the history of present illness otherwise his 14 point review of systems today was negative  Active Problems:   Patient Active Problem List   Diagnosis    Abnormal fasting glucose    Carcinoma, renal cell (HCC)    CKD (chronic kidney disease), stage II    Disc degeneration, lumbosacral    Edema    Hematuria    Hyperlipidemia    Hypertension    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HCC)    Osteoarthritis of knee    Enlarged prostate    Lower abdominal pain    Screening for colon cancer    History of colon polyps    Diarrhea       Past Medical History:   Past Medical History:   Diagnosis Date    Cancer of left kidney ([facility] [address] )     2014    Colon polyp     Diverticulitis of colon     Last assessed - 5/12/14    Hypercholesterolemia     Hyperlipidemia     Renal neoplasm        Surgical History:   Past Surgical History:   Procedure Laterality Date    COLONOSCOPY      COLONOSCOPY N/A 4/27/2018    Procedure: COLONOSCOPY;  Surgeon: Wesly Garcia MD;  Location: [facility] GI LAB; Service: Gastroenterology    CYSTOSCOPY      Onset - 5/2/16 [facility]     JOINT REPLACEMENT Left     Hip    LAPAROSCOPIC CHOLECYSTECTOMY      NEPHRECTOMY Left     NEPHRECTOMY Left     SHOULDER ARTHROSCOPY W/ ROTATOR CUFF REPAIR Left     TOTAL HIP ARTHROPLASTY Left     original hardware became infected and pt needed a second surgery to replace hardware    UMBILICAL HERNIA REPAIR         Family History:    Family History   Problem Relation Age of Onset    Tuberculosis Mother     Emphysema Father         Lung       Social History:   Social History     Social History    Marital status: /Civil Union     Spouse name: N/A    Number of children: N/A    Years of education: N/A     Occupational History    Not on file       Social History Main Topics    Smoking status: Former Smoker    Smokeless tobacco: Never Used    Alcohol use Yes      Comment: Social    Drug use: No    Sexual activity: Not on file     Other Topics Concern    Not on file     Social History Narrative    Daily caffeine consumption, 2-3 servings a day       Current Medications:   Current Outpatient Prescriptions   Medication Sig Dispense Refill    Multiple Vitamins-Minerals (MULTIVITAMIN ADULT) TABS Take by mouth      multivitamin (THERAGRAN) TABS Take 1 tablet by mouth daily   Omega-3 Fatty Acids (FISH OIL) 1,000 mg by Does not apply route      rosuvastatin (CRESTOR) 10 MG tablet Take 10 mg by mouth daily Indications: High Amount of Fats in the Blood  No current facility-administered medications for this visit  Allergies: No Known Allergies    Physical Exam:    Body surface area is 2 09 meters squared  Wt Readings from Last 3 Encounters:   06/18/18 95 3 kg (210 lb)   05/23/18 96 3 kg (212 lb 3 2 oz)   04/27/18 96 6 kg (213 lb)        Temp Readings from Last 3 Encounters:   06/18/18 98 °F (36 7 °C) (Tympanic)   05/23/18 98 1 °F (36 7 °C) (Tympanic)   04/27/18 98 5 °F (36 9 °C) (Temporal)        BP Readings from Last 3 Encounters:   06/18/18 136/82   05/23/18 138/80   04/27/18 (!) 201/105         Pulse Readings from Last 3 Encounters:   06/18/18 90   05/23/18 86   04/27/18 78       Physical Exam     Constitutional   General appearance: No acute distress, well appearing and well nourished  Eyes   Conjunctiva and lids: No swelling, erythema or discharge  Pupils and irises: Equal, round and reactive to light  Ears, Nose, Mouth, and Throat   External inspection of ears and nose: Normal     Nasal mucosa, septum, and turbinates: Normal without edema or erythema  Oropharynx: Normal with no erythema, edema, exudate or lesions  Pulmonary   Respiratory effort: No increased work of breathing or signs of respiratory distress  Auscultation of lungs: Clear to auscultation  Cardiovascular   Palpation of heart: Normal PMI, no thrills      Auscultation of heart: Normal rate and rhythm, normal S1 and S2, without murmurs  Examination of extremities for edema and/or varicosities: Normal     Carotid pulses: Normal     Abdomen   Abdomen: Non-tender, no masses  Liver and spleen: No hepatomegaly or splenomegaly  Lymphatic   Palpation of lymph nodes in neck: No lymphadenopathy  Musculoskeletal   Gait and station: Normal     Digits and nails: Normal without clubbing or cyanosis  Inspection/palpation of joints, bones, and muscles: Normal     Skin   Skin and subcutaneous tissue: Normal without rashes or lesions  Neurologic   Cranial nerves: Cranial nerves 2-12 intact  Sensation: No sensory loss  Psychiatric   Orientation to person, place, and time: Normal     Mood and affect: Normal         Assessment / Plan: This is a pleasant 57-year-old male with past medical history of hypertension and hypercholesterolemia who was found to have a left-sided renal mass  Pathology from 10 September 2014 revealed a stage I clear cell carcinoma  He also had some lymph nodes that were enlarged at the time  It was not very clear with a lymph nodes were involved versus reactive  I Decided to observe him  He is doing well  His most recent imaging which was a CT abdomen and pelvis with and without contrast showed no evidence of recurrence  I'll see the patient back in 6 months with blood work  I will repeat imaging in a year  Goals and Barriers:  Current Goal:  Prolong Survival from Renal cell carcinoma  Barriers: None  Patient's Capacity to Self Care:  Patient  able to self care  Portions of the record may have been created with voice recognition software   Occasional wrong word or "sound a like" substitutions may have occurred due to the inherent limitations of voice recognition software   Read the chart carefully and recognize, using context, where substitutions have occurred

## 2018-06-20 ENCOUNTER — OFFICE VISIT (OUTPATIENT)
Dept: PHYSICAL THERAPY | Facility: CLINIC | Age: 73
End: 2018-06-20
Payer: MEDICARE

## 2018-06-20 DIAGNOSIS — M54.50 CHRONIC RIGHT-SIDED LOW BACK PAIN WITHOUT SCIATICA: Primary | ICD-10-CM

## 2018-06-20 DIAGNOSIS — M51.37 DISC DEGENERATION, LUMBOSACRAL: ICD-10-CM

## 2018-06-20 DIAGNOSIS — G89.29 CHRONIC RIGHT-SIDED LOW BACK PAIN WITHOUT SCIATICA: Primary | ICD-10-CM

## 2018-06-20 PROCEDURE — 97110 THERAPEUTIC EXERCISES: CPT

## 2018-06-20 PROCEDURE — 97140 MANUAL THERAPY 1/> REGIONS: CPT

## 2018-06-20 NOTE — PROGRESS NOTES
Daily Note     Today's date: 2018  Patient name: Elie York  : 1945  MRN: [id number]  Referring provider: JOSE RAFAEL Belcher  Dx:   Encounter Diagnosis     ICD-10-CM    1  Chronic right-sided low back pain without sciatica M54 5     G89 29    2  Disc degeneration, lumbosacral M51 37                   Subjective: better in pain and performing HEP in am daily      Objective: See treatment diary below  Precautions: previous left total hip revision      Daily Treatment Diary       Manuals 18        B quad stretch nv 4' 4' 4' 4'        B hamstring stretch nv 4' 4' 4' 4'                                  Exercise Diary              Bike 10' lv1 new nv 10' lv 1  lv1   10 min lv1 10' [id number]        s/l hip abd nv  2x10 3x10 3x10        Sl clams 2 position nv Green 2x10 ea Gtb  2x10 GTB 2x10  GTB  2x10 ea        Supine bridge with band GTB nv GTB 2x10 GTB  2x10 BTB 3x10 Blue  2x10        Seated hamstring stretch 30'x3 nv 30''x3 30"x3 30''x3 30"x3        Prone active knee flex nv nv 2x10 2x10 ea np        Supine dead bug nv 2x10 ea nv 2x10 2x10        Prone knee hip ext  2x10 2x10 2x10 2x10        Prone hip ext with knee flex  2x10 2x10 2x10 2x10        Prone quad stretch   30"x3 30''x3 30"x3        Ball press     5"  2x10                                        Assessment: Tolerated treatment with no increased pain in back  Patient required only minor adjustments for positioning      Plan: Continue per plan of care

## 2018-06-21 ENCOUNTER — APPOINTMENT (OUTPATIENT)
Dept: PHYSICAL THERAPY | Facility: CLINIC | Age: 73
End: 2018-06-21
Payer: MEDICARE

## 2018-06-25 ENCOUNTER — OFFICE VISIT (OUTPATIENT)
Dept: PHYSICAL THERAPY | Facility: CLINIC | Age: 73
End: 2018-06-25
Payer: MEDICARE

## 2018-06-25 ENCOUNTER — APPOINTMENT (OUTPATIENT)
Dept: LAB | Facility: CLINIC | Age: 73
End: 2018-06-25
Payer: MEDICARE

## 2018-06-25 DIAGNOSIS — M54.50 CHRONIC RIGHT-SIDED LOW BACK PAIN WITHOUT SCIATICA: Primary | ICD-10-CM

## 2018-06-25 DIAGNOSIS — R31.9 HEMATURIA: ICD-10-CM

## 2018-06-25 DIAGNOSIS — Z12.5 ENCOUNTER FOR SCREENING FOR MALIGNANT NEOPLASM OF PROSTATE: ICD-10-CM

## 2018-06-25 DIAGNOSIS — G89.29 CHRONIC RIGHT-SIDED LOW BACK PAIN WITHOUT SCIATICA: Primary | ICD-10-CM

## 2018-06-25 DIAGNOSIS — M51.37 DISC DEGENERATION, LUMBOSACRAL: ICD-10-CM

## 2018-06-25 LAB
BACTERIA UR QL AUTO: ABNORMAL /HPF
BILIRUB UR QL STRIP: NEGATIVE
CLARITY UR: CLEAR
COLOR UR: YELLOW
GLUCOSE UR STRIP-MCNC: NEGATIVE MG/DL
HGB UR QL STRIP.AUTO: ABNORMAL
HYALINE CASTS #/AREA URNS LPF: ABNORMAL /LPF
KETONES UR STRIP-MCNC: NEGATIVE MG/DL
LEUKOCYTE ESTERASE UR QL STRIP: NEGATIVE
NITRITE UR QL STRIP: NEGATIVE
NON-SQ EPI CELLS URNS QL MICRO: ABNORMAL /HPF
PH UR STRIP.AUTO: 5.5 [PH] (ref 4.5–8)
PROT UR STRIP-MCNC: NEGATIVE MG/DL
PSA SERPL-MCNC: 1.7 NG/ML (ref 0–4)
RBC #/AREA URNS AUTO: ABNORMAL /HPF
SP GR UR STRIP.AUTO: 1.02 (ref 1–1.03)
UROBILINOGEN UR QL STRIP.AUTO: 0.2 E.U./DL
WBC #/AREA URNS AUTO: ABNORMAL /HPF

## 2018-06-25 PROCEDURE — 88112 CYTOPATH CELL ENHANCE TECH: CPT | Performed by: PATHOLOGY

## 2018-06-25 PROCEDURE — 81001 URINALYSIS AUTO W/SCOPE: CPT

## 2018-06-25 PROCEDURE — 97110 THERAPEUTIC EXERCISES: CPT

## 2018-06-25 PROCEDURE — 97112 NEUROMUSCULAR REEDUCATION: CPT

## 2018-06-25 PROCEDURE — G0103 PSA SCREENING: HCPCS

## 2018-06-25 PROCEDURE — 97140 MANUAL THERAPY 1/> REGIONS: CPT

## 2018-06-25 PROCEDURE — 36415 COLL VENOUS BLD VENIPUNCTURE: CPT

## 2018-06-25 NOTE — PROGRESS NOTES
Daily Note     Today's date: 2018  Patient name: Katiana Levine  : 1945  MRN: [id number]  Referring provider: JOSE RAFAEL Buck  Dx:   Encounter Diagnosis     ICD-10-CM    1  Chronic right-sided low back pain without sciatica M54 5     G89 29    2  Disc degeneration, lumbosacral M51 37                   Subjective: pt notes stiffness as he did a lot walking over weekend  Objective: See treatment diary below  Precautions: previous left total hip revision      Daily Treatment Diary       Manuals 18         B quad stretch nv 4' 4' 4' 4'        B hamstring stretch nv 4' 4' 4' 4'                                  Exercise Diary              Bike 10' lv1 new nv 10' lv 1  lv1   10 min lv1 10' [id number] lv1  10 min       s/l hip abd nv  2x10 3x10 3x10 2#  2x10       Sl clams 2 position nv Green 2x10 ea Gtb  2x10 GTB 2x10  GTB  2x10 ea GTB  2x10       Supine bridge with band GTB nv GTB 2x10 GTB  2x10 BTB 3x10 Blue  2x10 Blue  3x10       Seated hamstring stretch 30'x3 nv 30''x3 30"x3 30''x3 30"x3 30"x3       Prone active knee flex nv nv 2x10 2x10 ea np 2#  2x10       Supine dead bug nv 2x10 ea nv 2x10 2x10 2x10       Prone knee hip ext  2x10 2x10 2x10 2x10 2#  2x10       Prone hip ext with knee flex  2x10 2x10 2x10 2x10 2#  2x10       Prone quad stretch   30"x3 30''x3 30"x3 30"x3       Ball press     5"  2x10 5"  2x10       Ball squats      2x10                          Assessment: Tolerated treatment with no pain  Patient with decreased stiffness post treatment      Plan: Continue per plan of care

## 2018-06-28 ENCOUNTER — OFFICE VISIT (OUTPATIENT)
Dept: PHYSICAL THERAPY | Facility: CLINIC | Age: 73
End: 2018-06-28
Payer: MEDICARE

## 2018-06-28 DIAGNOSIS — M54.50 CHRONIC RIGHT-SIDED LOW BACK PAIN WITHOUT SCIATICA: Primary | ICD-10-CM

## 2018-06-28 DIAGNOSIS — G89.29 CHRONIC RIGHT-SIDED LOW BACK PAIN WITHOUT SCIATICA: Primary | ICD-10-CM

## 2018-06-28 DIAGNOSIS — M51.37 DISC DEGENERATION, LUMBOSACRAL: ICD-10-CM

## 2018-06-28 PROCEDURE — 97110 THERAPEUTIC EXERCISES: CPT

## 2018-06-28 PROCEDURE — 97140 MANUAL THERAPY 1/> REGIONS: CPT

## 2018-06-28 NOTE — PROGRESS NOTES
Daily Note     Today's date: 2018  Patient name: Claire Buenrostro  : 1945  MRN: [id number]  Referring provider: JOSE RAFAEL Grant  Dx:   Encounter Diagnosis     ICD-10-CM    1  Chronic right-sided low back pain without sciatica M54 5     G89 29    2  Disc degeneration, lumbosacral M51 37                   Subjective: no c/o offered,no pain presently      Objective: See treatment diary below  Precautions: previous left total hip revision      Daily Treatment Diary       Manuals 18      B quad stretch nv 4' 4' 4' 4'  30"x4 ea      B hamstring stretch nv 4' 4' 4' 4'  30"x4ea                                Exercise Diary              Bike 10' lv1 new nv 10' lv 1  lv1   10 min lv1 10' [id number] lv1  10 min lv1  10 min      s/l hip abd nv  2x10 3x10 3x10 2#  2x10 2#  3x10      Sl clams 2 position nv Green 2x10 ea Gtb  2x10 GTB 2x10  GTB  2x10 ea GTB  2x10 GTB  2x10 ea      Supine bridge with band GTB nv GTB 2x10 GTB  2x10 BTB 3x10 Blue  2x10 Blue  3x10 Black  3x10      Seated hamstring stretch 30'x3 nv 30''x3 30"x3 30''x3 30"x3 30"x3 30"x3      Prone active knee flex nv nv 2x10 2x10 ea np 2#  2x10 2#  2x10      Supine dead bug nv 2x10 ea nv 2x10 2x10 2x10 2x10      Prone knee hip ext  2x10 2x10 2x10 2x10 2#  2x10 2#  2x10      Prone hip ext with knee flex  2x10 2x10 2x10 2x10 2#  2x10 2#  2x10      Prone quad stretch   30"x3 30''x3 30"x3 30"x3 30"x3      Ball press     5"  2x10 5"  2x10 5"  2x10      Ball squats      2x10 2x10      Step up fwd/lat       lv3  x10 B ea          Assessment: Tolerated treatment with no pain, able to advance with program         Plan: Continue per plan of care

## 2018-07-01 NOTE — PROGRESS NOTES
7/2/2018    Papo Rodriguez Dorothy  1945  [id number]      Assessment  -Renal cell carcinoma s/p left radical nephrectomy 9/2014 managed by Dr Will Quintero  -Resolved gross hematuria hematuria s/p negative cystoscopy (5/2016)  -Microscopic hematuria  -Routine prostate cancer screening    Discussion/Plan  Nicolas Park is a 68 y o  male being managed by Dr Arielle Buck        -We reviewed his recent PSA which remains stable at 1 7, last PSA was 1 9   UA shows evidence of moderate amount of blood, which is consistent with previous findings  Urine cytology negative for high grade urothelial carcinoma  He is s/p hematuria workup in 2016       -We discussed discontinuing routine prostate cancer screening, given patient's age, and no significant family history or findings on ROXI/PSA  At this time patient would like to continue routine prostate cancer screening  I do not find this unreasonable, we can discuss stopping within the next few years  -Follow up in 1 year with repeat PSA and UA prior to office visit  -All questions answered, patient agrees with plan      History of Present Illness  68 y o  male with a history of T1b RCC s/p left radical nephrectomy (9/2014) managed by oncology, microscopic/gross hematuria s/p negative workup (5/2016), and routine prostate cancer screening presents today for follow up  His kidney cancer continues to be managed by oncology  He was recently seen by Dr Will Quintero  CT scan showed no evidence of recurrene or metastatic disease  His chronic appearing nodules, have been present for the last 6 years and most likely benign  He was referred by Dr Will Quintero for further evaluation with nephrology given recent creatinine of 1 50  Patient denies any lower urinary tract symptoms, episodes of gross hematuria, or dysuria  He denies any overall changes in health since last office visit  Review of Systems  Review of Systems   Constitutional: Negative  HENT: Negative  Respiratory: Negative      Cardiovascular: Negative  Gastrointestinal: Negative  Genitourinary: Negative for decreased urine volume, difficulty urinating, dysuria, flank pain, frequency, hematuria and urgency  Musculoskeletal: Negative  Skin: Negative  Neurological: Negative  Psychiatric/Behavioral: Negative  AUA SYMPTOM SCORE      Most Recent Value   AUA SYMPTOM SCORE   How often have you had a sensation of not emptying your bladder completely after you finished urinating? 1   How often have you had to urinate again less than two hours after you finished urinating? 0   How often have you found you stopped and started again several times when you urinate? 1   How often have you found it difficult to postpone urination? 0   How often have you had a weak urinary stream?  1   How often have you had to push or strain to begin urination? 0   How many times did you most typically get up to urinate from the time you went to bed at night until the time you got up in the morning? 1   Quality of Life: If you were to spend the rest of your life with your urinary condition just the way it is now, how would you feel about that?  2   AUA SYMPTOM SCORE  4          Past Medical History  Past Medical History:   Diagnosis Date    [facility])     2014    Colon polyp     Diverticulitis of colon     Last assessed - 5/12/14    Hypercholesterolemia     Hyperlipidemia     Renal neoplasm        Past Social History  Past Surgical History:   Procedure Laterality Date    COLONOSCOPY      COLONOSCOPY N/A 4/27/2018    Procedure: COLONOSCOPY;  Surgeon: Danielle Bowles MD;  Location: [facility] GI LAB;   Service: Gastroenterology    CYSTOSCOPY      Onset - 5/2/16 [facility] Drothompson     JOINT REPLACEMENT Left     Hip    LAPAROSCOPIC CHOLECYSTECTOMY      NEPHRECTOMY Left     NEPHRECTOMY Left     SHOULDER ARTHROSCOPY W/ ROTATOR CUFF REPAIR Left     TOTAL HIP ARTHROPLASTY Left     original hardware became infected and pt needed a second surgery to replace hardware    UMBILICAL HERNIA REPAIR         Past Family History  Family History   Problem Relation Age of Onset    Tuberculosis Mother     Emphysema Father         Lung       Past Social history  Social History     Social History    Marital status: /Civil Union     Spouse name: N/A    Number of children: N/A    Years of education: N/A     Occupational History    Not on file  Social History Main Topics    Smoking status: Former Smoker    Smokeless tobacco: Never Used    Alcohol use Yes      Comment: Social    Drug use: No    Sexual activity: Not on file     Other Topics Concern    Not on file     Social History Narrative    Daily caffeine consumption, 2-3 servings a day       Current Medications  Current Outpatient Prescriptions   Medication Sig Dispense Refill    Multiple Vitamins-Minerals (MULTIVITAMIN ADULT) TABS Take by mouth      multivitamin (THERAGRAN) TABS Take 1 tablet by mouth daily   Omega-3 Fatty Acids (FISH OIL) 1,000 mg by Does not apply route      rosuvastatin (CRESTOR) 10 MG tablet Take 10 mg by mouth daily Indications: High Amount of Fats in the Blood  No current facility-administered medications for this visit  Allergies  No Known Allergies    Past Medical History, Social History, Family History, medications and allergies were reviewed  Vitals  There were no vitals filed for this visit  Physical Exam  Physical Exam   Constitutional: He is oriented to person, place, and time  He appears well-developed and well-nourished  HENT:   Head: Normocephalic  Eyes: Pupils are equal, round, and reactive to light  Neck: Normal range of motion  Cardiovascular: Normal rate and regular rhythm  Pulmonary/Chest: Effort normal    Abdominal: Soft  Normal appearance  There is no CVA tenderness     Genitourinary: Rectum normal and prostate normal    Genitourinary Comments: Prostate nl, 35gm, smooth, nontender, no nodules/induration     Musculoskeletal: Normal range of motion  Neurological: He is alert and oriented to person, place, and time  He has normal reflexes  Skin: Skin is warm and dry  Psychiatric: He has a normal mood and affect  His behavior is normal  Judgment and thought content normal        Results    I have personally reviewed all pertinent lab results and reviewed with patient  Lab Results   Component Value Date    PSA 1 7 06/25/2018    PSA 1 9 06/07/2017    PSA 1 7 04/04/2016     Lab Results   Component Value Date    GLUCOSE 113 05/01/2017    CALCIUM 9 0 06/04/2018     06/04/2018    K 4 1 06/04/2018    CO2 31 06/04/2018     06/04/2018    BUN 24 06/04/2018    CREATININE 1 50 (H) 06/04/2018     Lab Results   Component Value Date    WBC 7 05 06/04/2018    HGB 14 9 06/04/2018    HCT 45 5 06/04/2018    MCV 99 (H) 06/04/2018     06/04/2018     No results found for this or any previous visit (from the past 1 hour(s))

## 2018-07-02 ENCOUNTER — OFFICE VISIT (OUTPATIENT)
Dept: PHYSICAL THERAPY | Facility: CLINIC | Age: 73
End: 2018-07-02
Payer: MEDICARE

## 2018-07-02 ENCOUNTER — OFFICE VISIT (OUTPATIENT)
Dept: UROLOGY | Facility: AMBULATORY SURGERY CENTER | Age: 73
End: 2018-07-02
Payer: MEDICARE

## 2018-07-02 VITALS
SYSTOLIC BLOOD PRESSURE: 122 MMHG | HEART RATE: 78 BPM | DIASTOLIC BLOOD PRESSURE: 78 MMHG | HEIGHT: 70 IN | WEIGHT: 208.2 LBS | BODY MASS INDEX: 29.81 KG/M2

## 2018-07-02 DIAGNOSIS — G89.29 CHRONIC RIGHT-SIDED LOW BACK PAIN WITHOUT SCIATICA: Primary | ICD-10-CM

## 2018-07-02 DIAGNOSIS — M51.37 DISC DEGENERATION, LUMBOSACRAL: ICD-10-CM

## 2018-07-02 DIAGNOSIS — C64.2 RENAL CELL CARCINOMA OF LEFT KIDNEY (HCC): ICD-10-CM

## 2018-07-02 DIAGNOSIS — Z12.5 SCREENING FOR PROSTATE CANCER: Primary | ICD-10-CM

## 2018-07-02 DIAGNOSIS — R31.29 MICROSCOPIC HEMATURIA: ICD-10-CM

## 2018-07-02 DIAGNOSIS — M54.50 CHRONIC RIGHT-SIDED LOW BACK PAIN WITHOUT SCIATICA: Primary | ICD-10-CM

## 2018-07-02 PROCEDURE — G8990 OTHER PT/OT CURRENT STATUS: HCPCS | Performed by: PHYSICAL THERAPIST

## 2018-07-02 PROCEDURE — 99213 OFFICE O/P EST LOW 20 MIN: CPT | Performed by: NURSE PRACTITIONER

## 2018-07-02 PROCEDURE — 97110 THERAPEUTIC EXERCISES: CPT | Performed by: PHYSICAL THERAPIST

## 2018-07-02 PROCEDURE — G8991 OTHER PT/OT GOAL STATUS: HCPCS | Performed by: PHYSICAL THERAPIST

## 2018-07-02 PROCEDURE — 97140 MANUAL THERAPY 1/> REGIONS: CPT | Performed by: PHYSICAL THERAPIST

## 2018-07-05 ENCOUNTER — OFFICE VISIT (OUTPATIENT)
Dept: PHYSICAL THERAPY | Facility: CLINIC | Age: 73
End: 2018-07-05
Payer: MEDICARE

## 2018-07-05 DIAGNOSIS — G89.29 CHRONIC RIGHT-SIDED LOW BACK PAIN WITHOUT SCIATICA: Primary | ICD-10-CM

## 2018-07-05 DIAGNOSIS — M51.37 DISC DEGENERATION, LUMBOSACRAL: ICD-10-CM

## 2018-07-05 DIAGNOSIS — M54.50 CHRONIC RIGHT-SIDED LOW BACK PAIN WITHOUT SCIATICA: Primary | ICD-10-CM

## 2018-07-05 PROCEDURE — 97110 THERAPEUTIC EXERCISES: CPT

## 2018-07-05 NOTE — PROGRESS NOTES
Daily Note     Today's date: 2018  Patient name: Dallin Mclaughlin  : 1945  MRN: [id number]  Referring provider: JOSE RAFAEL Burton Res  Dx:   Encounter Diagnosis     ICD-10-CM    1  Chronic right-sided low back pain without sciatica M54 5     G89 29    2  Disc degeneration, lumbosacral M51 37                   Subjective: no c/o offered      Objective: See treatment diary below  Precautions: previous left total hip revision      Daily Treatment Diary       Manuals 18    B quad stretch nv 4' 4' 4' 4'  30"x4 ea nv nv    B hamstring stretch nv 4' 4' 4' 4'  30"x4ea 30"x4 ea 30"x4                              Exercise Diary              Bike 10' lv1 new nv 10' lv 1  lv1   10 min lv1 10' [id number] lv1  10 min lv1  10 min lv 1 10' lv1  10min    s/l hip abd nv  2x10 3x10 3x10 2#  2x10 2#  3x10 2# 3x10 2#  3x10    Sl clams 2 position nv Green 2x10 ea Gtb  2x10 GTB 2x10  GTB  2x10 ea GTB  2x10 GTB  2x10 ea BTB 2x10 Blue  2x10    Supine bridge with band GTB nv GTB 2x10 GTB  2x10 BTB 3x10 Blue  2x10 Blue  3x10 Black  3x10 Black 3x10 Black  3x10    Seated hamstring stretch 30'x3 nv 30''x3 30"x3 30''x3 30"x3 30"x3 30"x3 30''x3 30"x3    Prone active knee flex nv nv 2x10 2x10 ea np 2#  2x10 2#  2x10 lunges x10 ea Lunges  x10    Supine dead bug nv 2x10 ea nv 2x10 2x10 2x10 2x10 2x10 2x10    Prone  hip ext  2x10 2x10 2x10 2x10 2#  2x10 2#  2x10 2# 2x10 2#  2x10    Prone hip ext with knee flex  2x10 2x10 2x10 2x10 2#  2x10 2#  2x10 2# 2x10 2#  2x10    Prone quad stretch   30"x3 30''x3 30"x3 30"x3 30"x3 30''x3 30"x3    Ball press     5"  2x10 5"  2x10 5"  2x10 5''x2x10 5"  2x10    Ball squats      2x10 2x10 3*10 3x10    Step up fwd/lat       lv3  x10 B ea lv 3 lateral x10  lv3  Lateral  x10    crunch        2x10 2x10        Assessment: Tolerated treatment with good knowledge of exercises with minor corrections for forma    Patient demonstrated fatigue post treatment      Plan: Continue per plan of care

## 2018-07-09 ENCOUNTER — OFFICE VISIT (OUTPATIENT)
Dept: PHYSICAL THERAPY | Facility: CLINIC | Age: 73
End: 2018-07-09
Payer: MEDICARE

## 2018-07-09 DIAGNOSIS — G89.29 CHRONIC RIGHT-SIDED LOW BACK PAIN WITHOUT SCIATICA: Primary | ICD-10-CM

## 2018-07-09 DIAGNOSIS — M51.37 DISC DEGENERATION, LUMBOSACRAL: ICD-10-CM

## 2018-07-09 DIAGNOSIS — M54.50 CHRONIC RIGHT-SIDED LOW BACK PAIN WITHOUT SCIATICA: Primary | ICD-10-CM

## 2018-07-09 PROCEDURE — 97110 THERAPEUTIC EXERCISES: CPT

## 2018-07-09 PROCEDURE — 97140 MANUAL THERAPY 1/> REGIONS: CPT

## 2018-07-09 PROCEDURE — 97112 NEUROMUSCULAR REEDUCATION: CPT

## 2018-07-09 NOTE — PROGRESS NOTES
Daily Note     Today's date: 2018  Patient name: Montse Serna  : 1945  MRN: [id number]  Referring provider: JOSE RAFAEL Disla  Dx:   Encounter Diagnosis     ICD-10-CM    1  Chronic right-sided low back pain without sciatica M54 5     G89 29    2  Disc degeneration, lumbosacral M51 37       1:1 with PTA CR 11:40- 12:35  Subjective: " Some time if I move the wrong way I'll feel it " but not constant and does not restrict activity  Objective: See treatment diary below  Precautions: previous left total hip revision      Daily Treatment Diary       Manuals 18   B quad stretch nv 4' 4' 4' 4'  30"x4 ea nv nv 30"x4   B hamstring stretch nv 4' 4' 4' 4'  30"x4ea 30"x4 ea 30"x4 30"x4                             Exercise Diary              Bike 10' lv1 new nv 10' lv 1  lv1   10 min lv1 10' [id number] lv1  10 min lv1  10 min lv 1 10' lv1  10min lv1  10 mins     s/l hip abd nv  2x10 3x10 3x10 2#  2x10 2#  3x10 2# 3x10 2#  3x10 2#  3x10   Sl clams 2 position nv Green 2x10 ea Gtb  2x10 GTB 2x10  GTB  2x10 ea GTB  2x10 GTB  2x10 ea BTB 2x10 Blue  2x10 BTB  2x10   Supine bridge with band GTB nv GTB 2x10 GTB  2x10 BTB 3x10 Blue  2x10 Blue  3x10 Black  3x10 Black 3x10 Black  3x10 Black   3x10   Seated hamstring stretch 30'x3 nv 30''x3 30"x3 30''x3 30"x3 30"x3 30"x3 30''x3 30"x3 30"x3   Prone active knee flex nv nv 2x10 2x10 ea np 2#  2x10 2#  2x10 lunges x10 ea Lunges  x10 Lunges  x10 ea     Supine dead bug nv 2x10 ea nv 2x10 2x10 2x10 2x10 2x10 2x10 2x10   Prone  hip ext  2x10 2x10 2x10 2x10 2#  2x10 2#  2x10 2# 2x10 2#  2x10 2#  2x10   Prone hip ext with knee flex  2x10 2x10 2x10 2x10 2#  2x10 2#  2x10 2# 2x10 2#  2x10 2#  2x10   Prone quad stretch   30"x3 30''x3 30"x3 30"x3 30"x3 30''x3 30"x3 manual   Ball press     5"  2x10 5"  2x10 5"  2x10 5''x2x10 5"  2x10 5"  2x10   Ball squats      2x10 2x10 3*10 3x10 3x10   Step up fwd/lat       lv3  x10 B ea lv 3 lateral x10  lv3  Lateral  x10 L3  10 ea  crunch        2x10 2x10 2x10       Assessment:  Patient would benefit from continued PT  Resumed manual quad stretching  Occasional cues for technique but remained pain free  Cues to avoid valsalva  Plan: Continue per plan of care

## 2018-07-12 ENCOUNTER — OFFICE VISIT (OUTPATIENT)
Dept: PHYSICAL THERAPY | Facility: CLINIC | Age: 73
End: 2018-07-12
Payer: MEDICARE

## 2018-07-12 DIAGNOSIS — M51.37 DISC DEGENERATION, LUMBOSACRAL: ICD-10-CM

## 2018-07-12 DIAGNOSIS — G89.29 CHRONIC RIGHT-SIDED LOW BACK PAIN WITHOUT SCIATICA: Primary | ICD-10-CM

## 2018-07-12 DIAGNOSIS — M54.50 CHRONIC RIGHT-SIDED LOW BACK PAIN WITHOUT SCIATICA: Primary | ICD-10-CM

## 2018-07-12 PROCEDURE — 97140 MANUAL THERAPY 1/> REGIONS: CPT

## 2018-07-12 PROCEDURE — 97110 THERAPEUTIC EXERCISES: CPT

## 2018-07-12 NOTE — PROGRESS NOTES
Daily Note     Today's date: 2018  Patient name: Rebeca Wallace  : 1945  MRN: [id number]  Referring provider: JOSE RAFAEL Gómez  Dx:   Encounter Diagnosis     ICD-10-CM    1  Chronic right-sided low back pain without sciatica M54 5     G89 29    2  Disc degeneration, lumbosacral M51 37          Subjective: Patient noted no new complaints  Objective: See treatment diary below      Assessment: Tolerated treatment well  Increased reps for step ups with no patient complaints  VC for lunges to correct form  Patient would benefit from continued PT      Plan: Continue per plan of care  Precautions: previous left total hip revision      Daily Treatment Diary       Manuals 18   B quad stretch 30"x4 4' 4' 4' 4'  30"x4 ea nv nv 30"x4   B hamstring stretch 30"x4 4' 4' 4' 4'  30"x4ea 30"x4 ea 30"x4 30"x4                             Exercise Diary              Bike 10 min lvl 1 10' lv 1  lv1   10 min lv1 10' ax86vpl lv1  10 min lv1  10 min lv 1 10' lv1  10min lv1  10 mins     s/l hip abd 2#  3x10  2x10 3x10 3x10 2#  2x10 2#  3x10 2# 3x10 2#  3x10 2#  3x10   Sl clams 2 position Blue 2x10 Green 2x10 ea Gtb  2x10 GTB 2x10  GTB  2x10 ea GTB  2x10 GTB  2x10 ea BTB 2x10 Blue  2x10 BTB  2x10   Supine bridge with band Black 3x10 GTB 2x10 GTB  2x10 BTB 3x10 Blue  2x10 Blue  3x10 Black  3x10 Black 3x10 Black  3x10 Black   3x10   Seated hamstring stretch 30"x3 30''x3 30"x3 30''x3 30"x3 30"x3 30"x3 30''x3 30"x3 30"x3   Prone active knee flex Lunges x10 ea nv 2x10 2x10 ea np 2#  2x10 2#  2x10 lunges x10 ea Lunges  x10 Lunges  x10 ea     Supine dead bug nv 2x10 ea nv 2x10 2x10 2x10 2x10 2x10 2x10 2x10   Prone  hip ext 2#  2x10 2x10 2x10 2x10 2x10 2#  2x10 2#  2x10 2# 2x10 2#  2x10 2#  2x10   Prone hip ext with knee flex 2#  2x10 2x10 2x10 2x10 2x10 2#  2x10 2#  2x10 2# 2x10 2#  2x10 2#  2x10   Prone quad stretch manual  30"x3 30''x3 30"x3 30"x3 30"x3 30''x3 30"x3 manual Ball press 5"  2x10    5"  2x10 5"  2x10 5"  2x10 5''x2x10 5"  2x10 5"  2x10   Ball squats      2x10 2x10 3*10 3x10 3x10   Step up fwd/lat L3  15 ea  lv3  x10 B ea lv 3 lateral x10  lv3  Lateral  x10 L3  10 ea     crunch 2x10       2x10 2x10 2x10

## 2018-07-16 ENCOUNTER — OFFICE VISIT (OUTPATIENT)
Dept: PHYSICAL THERAPY | Facility: CLINIC | Age: 73
End: 2018-07-16
Payer: MEDICARE

## 2018-07-16 DIAGNOSIS — M51.37 DISC DEGENERATION, LUMBOSACRAL: ICD-10-CM

## 2018-07-16 DIAGNOSIS — M54.50 CHRONIC RIGHT-SIDED LOW BACK PAIN WITHOUT SCIATICA: Primary | ICD-10-CM

## 2018-07-16 DIAGNOSIS — G89.29 CHRONIC RIGHT-SIDED LOW BACK PAIN WITHOUT SCIATICA: Primary | ICD-10-CM

## 2018-07-16 PROCEDURE — 97140 MANUAL THERAPY 1/> REGIONS: CPT

## 2018-07-16 PROCEDURE — 97110 THERAPEUTIC EXERCISES: CPT

## 2018-07-16 PROCEDURE — G8991 OTHER PT/OT GOAL STATUS: HCPCS

## 2018-07-16 PROCEDURE — G8990 OTHER PT/OT CURRENT STATUS: HCPCS

## 2018-07-16 NOTE — PROGRESS NOTES
Daily Note     Today's date: 2018  Patient name: Semaj Argueta  : 1945  MRN: [id number]  Referring provider: JOSE RAFAEL Martínez  Dx:   Encounter Diagnosis     ICD-10-CM    1  Chronic right-sided low back pain without sciatica M54 5     G89 29    2  Disc degeneration, lumbosacral M51 37                   Subjective: pt notes stiffness in legs after walking over weekend a lot  Notes pain in back is when he gets OOB but goes away quickly with walking      Objective: See treatment diary below  Precautions: previous left total hip revision      Daily Treatment Diary       Manuals    B quad stretch 30"x4 30"x4        30"x4   B hamstring stretch 30"x4 30"x4        30"x4                             Exercise Diary              Bike 10 min lvl 1 lv1  10  min        lv1  10 mins     s/l hip abd 2#  3x10 2 5#  2x10        2#  3x10   Sl clams 2 position Blue 2x10 Blue  2x10        BTB  2x10   Supine bridge with band Black 3x10 nv        Black   3x10   Seated hamstring stretch 30"x3 30"x3        30"x3   Prone active knee flex Lunges x10 ea lunges x10        Lunges  x10 ea  Supine dead bug nv 2x10        2x10   Prone  hip ext 2#  2x10 2 5#  2x10        2#  2x10   Prone hip ext with knee flex 2#  2x10 2 5#  2x10        2#  2x10   Prone quad stretch manual         manual   Ball press 5"  2x10 5"  2x10        5"  2x10   Ball squats  3x10        3x10   Step up fwd/lat L3  15 ea  L3  15ea        L3  10 ea  crunch 2x10 2x10        2x10             Assessment: Tolerated treatment with noted pain with step up with right foot but not left  Patient had increased difficulty with clamshell on left side vs right      Plan: Continue per plan of care

## 2018-07-19 ENCOUNTER — OFFICE VISIT (OUTPATIENT)
Dept: PHYSICAL THERAPY | Facility: CLINIC | Age: 73
End: 2018-07-19
Payer: MEDICARE

## 2018-07-19 DIAGNOSIS — M51.37 DISC DEGENERATION, LUMBOSACRAL: ICD-10-CM

## 2018-07-19 DIAGNOSIS — G89.29 CHRONIC RIGHT-SIDED LOW BACK PAIN WITHOUT SCIATICA: Primary | ICD-10-CM

## 2018-07-19 DIAGNOSIS — M54.50 CHRONIC RIGHT-SIDED LOW BACK PAIN WITHOUT SCIATICA: Primary | ICD-10-CM

## 2018-07-19 PROCEDURE — 97110 THERAPEUTIC EXERCISES: CPT | Performed by: PHYSICAL THERAPIST

## 2018-07-19 NOTE — PROGRESS NOTES
Daily Note     Today's date: 2018  Patient name: Judy Barclay  : 1945  MRN: [id number]  Referring provider: Orman Simmonds, CRNP  Dx:   Encounter Diagnosis     ICD-10-CM    1  Chronic right-sided low back pain without sciatica M54 5     G89 29    2  Disc degeneration, lumbosacral M51 37                   Subjective: pt notes he really just has had pain in the l/s when he wakes  But moves around and it feels better  Objective: See treatment diary below  Precautions: previous left total hip revision      Daily Treatment Diary       Manuals           B quad stretch 30"x4 30"x4 30''x4          B hamstring stretch 30"x4 30"x4 30''x4          L/s pa and rot mobs   Db pt                       Exercise Diary              Bike 10 min lvl 1 lv1  10  min lv1 10'          s/l hip abd 2#  3x10 2 5#  2x10 2 5# 2x10          Sl clams 2 position Blue 2x10 Blue  2x10 Blue 2x10          Supine bridge with band Black 3x10 nv [id number]          Seated hamstring stretch 30"x3 30"x3 30''x3          lunge  x10 ea  x10 x10          Supine dead bug nv 2x10 2x10          Prone  hip ext 2#  2x10 2 5#  2x10 2 5# 2x10          Prone hip ext with knee flex 2#  2x10 2 5#  2x10 2 5# 2x10          Prone quad stretch manual            Ball press 5"  2x10 5"  2x10 diagonal crunch 2x10   (chop)       Ball squats  3x10 3*10          Step up fwd/lat L3  15 ea  L3  15ea nv          crunch 2x10 2x10 2*10  (pallof press)             Assessment: pt with increased hypomobility of the l/s that was improved with grade 3 l/s mobs in prone    Plan: Continue per plan of care

## 2018-07-23 ENCOUNTER — OFFICE VISIT (OUTPATIENT)
Dept: PHYSICAL THERAPY | Facility: CLINIC | Age: 73
End: 2018-07-23
Payer: MEDICARE

## 2018-07-23 DIAGNOSIS — M54.50 CHRONIC RIGHT-SIDED LOW BACK PAIN WITHOUT SCIATICA: Primary | ICD-10-CM

## 2018-07-23 DIAGNOSIS — G89.29 CHRONIC RIGHT-SIDED LOW BACK PAIN WITHOUT SCIATICA: Primary | ICD-10-CM

## 2018-07-23 DIAGNOSIS — M51.37 DISC DEGENERATION, LUMBOSACRAL: ICD-10-CM

## 2018-07-23 PROCEDURE — 97110 THERAPEUTIC EXERCISES: CPT

## 2018-07-23 PROCEDURE — 97140 MANUAL THERAPY 1/> REGIONS: CPT

## 2018-07-23 NOTE — PROGRESS NOTES
Daily Note     Today's date: 2018  Patient name: Rebeca Wallace  : 1945  MRN: [id number]  Referring provider: JOSE RAFAEL Gómez  Dx:   Encounter Diagnosis     ICD-10-CM    1  Chronic right-sided low back pain without sciatica M54 5     G89 29    2  Disc degeneration, lumbosacral M51 37      Subjective: Pt denies back pain upon arrival and "is having a good day"  Objective: See treatment diary below    Precautions: previous left total hip revision    Daily Treatment Diary   Manuals          B quad stretch 30"x4 30"x4 30''x4 SH         B hamstring stretch 30"x4 30"x4 30''x4 SH         L/s pa and rot mobs   Db pt DB                      Exercise Diary           Bike 10 min lvl 1 lv1  10 min lv1 10' L1 10'         s/l hip abd 2#  3x10 2 5#  2x10 2 5# 2x10 2 5#  2x10 ea         S/l clamshells   2 position Blue 2x10 Blue  2x10 Blue 2x10 Blue/  2 5# 2x10         Supine bridge with band Black 3x10 nv Black 3x10 Black 3x10         Seated hamstring stretch 30"x3 30"x3 30''x3 30"x3 B         Lunge B  x10 ea  x10 x10 10x ea         Supine dead bug nv 2x10 2x10 20x legs         Prone hip ext 2#  2x10 2 5#  2x10 2 5# 2x10 2 5# 2x10 ea         Prone hip ext with knee flex 2#  2x10 2 5#  2x10 2 5# 2x10 2 5# 2x10 ea         Prone quad stretch manual   -         Ball press 5"  2x10 5"  2x10 diagonal crunch 2x10 NP  (chop)       Ball squats  3x10 3x10 3x10         Step up fwd/lat L3  15 ea  L3  15ea nv L3 15x ea         crunch 2x10 2x10 2x10 GTB 2x10 ea           Assessment: Tolerated treatment well  Patient demonstrated fatigue post treatment, exhibited good technique with therapeutic exercises and would benefit from continued PT to improve core stability  Plan: Progress treatment as tolerated        Redmonjean Chavez, PTA

## 2018-07-26 ENCOUNTER — OFFICE VISIT (OUTPATIENT)
Dept: PHYSICAL THERAPY | Facility: CLINIC | Age: 73
End: 2018-07-26
Payer: MEDICARE

## 2018-07-26 DIAGNOSIS — M54.50 CHRONIC RIGHT-SIDED LOW BACK PAIN WITHOUT SCIATICA: Primary | ICD-10-CM

## 2018-07-26 DIAGNOSIS — G89.29 CHRONIC RIGHT-SIDED LOW BACK PAIN WITHOUT SCIATICA: Primary | ICD-10-CM

## 2018-07-26 DIAGNOSIS — M51.37 DISC DEGENERATION, LUMBOSACRAL: ICD-10-CM

## 2018-07-26 PROCEDURE — 97112 NEUROMUSCULAR REEDUCATION: CPT

## 2018-07-26 PROCEDURE — 97110 THERAPEUTIC EXERCISES: CPT

## 2018-07-26 NOTE — PROGRESS NOTES
Daily Note     Today's date: 2018  Patient name: Jarad Cano  : 1945  MRN: [id number]  Referring provider: JOSE RAFAEL Meier  Dx:   Encounter Diagnosis     ICD-10-CM    1  Chronic right-sided low back pain without sciatica M54 5     G89 29    2  Disc degeneration, lumbosacral M51 37                   Subjective: feeling good today      Objective: See treatment diary below  Precautions: previous left total hip revision    Daily Treatment Diary   Manuals         B quad stretch 30"x4 30"x4 30''x4 SH 30"x4        B hamstring stretch 30"x4 30"x4 30''x4 SH 30"x4        L/s pa and rot mobs   Db pt DB nv                     Exercise Diary          Bike 10 min lvl 1 lv1  10 min lv1 10' L1 10' lv1  10'        s/l hip abd 2#  3x10 2 5#  2x10 2 5# 2x10 2 5#  2x10 ea         S/l clamshells   2 position Blue 2x10 Blue  2x10 Blue 2x10 Blue/  2 5# 2x10         Supine bridge with band Black 3x10 nv Black 3x10 Black 3x10 Black  3x10        Seated hamstring stretch 30"x3 30"x3 30''x3 30"x3 B         Lunge B  x10 ea  x10 x10 10x ea nv        Supine dead bug nv 2x10 2x10 20x legs 2x10         Prone hip ext 2#  2x10 2 5#  2x10 2 5# 2x10 2 5# 2x10 ea 2 5#  2x10        Prone hip ext with knee flex 2#  2x10 2 5#  2x10 2 5# 2x10 2 5# 2x10 ea 2 5#  2x10        Prone quad stretch manual   -         Ball press 5"  2x10 5"  2x10 diagonal crunch 2x10 NP diagonal crunch  2x10        Ball squats  3x10 3x10 3x10 3x10        palloff press     GTB  2x10        chops     GTB  2x10        Step up fwd/lat L3  15 ea  L3  15ea nv L3 15x ea nv        crunch 2x10 2x10 2x10 GTB 2x10 ea Pink ball  2x10            Assessment: Tolerated treatment with Vc and TC for chops for correct positioning    Patient demonstrated fatigue post treatment      Plan: Continue per plan of care

## 2018-07-30 ENCOUNTER — APPOINTMENT (OUTPATIENT)
Dept: PHYSICAL THERAPY | Facility: CLINIC | Age: 73
End: 2018-07-30
Payer: MEDICARE

## 2018-08-01 ENCOUNTER — OFFICE VISIT (OUTPATIENT)
Dept: NEPHROLOGY | Facility: HOSPITAL | Age: 73
End: 2018-08-01
Attending: INTERNAL MEDICINE
Payer: MEDICARE

## 2018-08-01 VITALS
HEART RATE: 82 BPM | BODY MASS INDEX: 29.78 KG/M2 | WEIGHT: 208 LBS | SYSTOLIC BLOOD PRESSURE: 142 MMHG | HEIGHT: 70 IN | DIASTOLIC BLOOD PRESSURE: 82 MMHG

## 2018-08-01 DIAGNOSIS — N17.9 ACUTE RENAL FAILURE, UNSPECIFIED ACUTE RENAL FAILURE TYPE (HCC): ICD-10-CM

## 2018-08-01 DIAGNOSIS — R60.0 LOCALIZED EDEMA: ICD-10-CM

## 2018-08-01 DIAGNOSIS — I10 ESSENTIAL HYPERTENSION: ICD-10-CM

## 2018-08-01 DIAGNOSIS — N18.2 CKD (CHRONIC KIDNEY DISEASE), STAGE II: Primary | ICD-10-CM

## 2018-08-01 DIAGNOSIS — C64.9 CARCINOMA, RENAL CELL (HCC): ICD-10-CM

## 2018-08-01 LAB
SL AMB  POCT GLUCOSE, UA: NORMAL
SL AMB LEUKOCYTE ESTERASE,UA: NORMAL
SL AMB POCT BILIRUBIN,UA: NORMAL
SL AMB POCT BLOOD,UA: NORMAL
SL AMB POCT CLARITY,UA: CLEAR
SL AMB POCT COLOR,UA: YELLOW
SL AMB POCT KETONES,UA: NORMAL
SL AMB POCT NITRITE,UA: NORMAL
SL AMB POCT PH,UA: 6
SL AMB POCT SPECIFIC GRAVITY,UA: 1.01
SL AMB POCT URINE PROTEIN: NORMAL
SL AMB POCT UROBILINOGEN: 0.2

## 2018-08-01 PROCEDURE — 99214 OFFICE O/P EST MOD 30 MIN: CPT | Performed by: INTERNAL MEDICINE

## 2018-08-01 PROCEDURE — 81002 URINALYSIS NONAUTO W/O SCOPE: CPT | Performed by: INTERNAL MEDICINE

## 2018-08-01 RX ORDER — BLOOD PRESSURE TEST KIT
KIT MISCELLANEOUS DAILY
Qty: 1 EACH | Refills: 0 | Status: SHIPPED | OUTPATIENT
Start: 2018-08-01 | End: 2022-04-11 | Stop reason: ALTCHOICE

## 2018-08-01 NOTE — PROGRESS NOTES
OFFICE CONSULT - Nephrology   Claire Buenrostro 68 y o  male MRN: [id number]    Encounter: [id number]        ASSESSMENT and PLAN:    Diagnoses and all orders for this visit:    CKD (chronic kidney disease), stage II  -     Comprehensive metabolic panel  -     Magnesium  -     Phosphorus  -     Protein / creatinine ratio, urine  -     Urinalysis with microscopic  -     PTH, intact  -     Blood Pressure KIT; by Does not apply route daily    Carcinoma, renal cell ([facility] )  -     Ambulatory referral to Nephrology  -     POCT urine dip    Localized edema    Hematuria, unspecified type    Essential hypertension        ***    HPI:  Claire Buenrostro is a 68 y o male who was referred by {POD REFERRING DR:[id number]} for evaluation of No chief complaint on file     ***    I personally spent over half of a total *** minutes face to face with the patient in counseling and discussion and/or coordination of care as described above  ROS: All the systems were reviewed and were negative except as documented on the HPI  Allergies: Patient has no known allergies  Medications:   Current Outpatient Prescriptions:     Multiple Vitamins-Minerals (MULTIVITAMIN ADULT) TABS, Take by mouth, Disp: , Rfl:     multivitamin (THERAGRAN) TABS, Take 1 tablet by mouth daily  , Disp: , Rfl:     Omega-3 Fatty Acids (FISH OIL) 1,000 mg, by Does not apply route, Disp: , Rfl:     rosuvastatin (CRESTOR) 10 MG tablet, Take 10 mg by mouth daily Indications: High Amount of Fats in the Blood , Disp: , Rfl:     Blood Pressure KIT, by Does not apply route daily, Disp: 1 each, Rfl: 0    Past Medical History:   Diagnosis Date    [facility] )     2014    Colon polyp     Diverticulitis of colon     Last assessed - 5/12/14    Hypercholesterolemia     Hyperlipidemia     Renal neoplasm      Past Surgical History:   Procedure Laterality Date    COLONOSCOPY      COLONOSCOPY N/A 4/27/2018    Procedure: COLONOSCOPY;  Surgeon: Samantha Ann MD; Location: [facility] GI [facility]; Service: Gastroenterology    CYSTOSCOPY      Onset - 16 [address]     JOINT REPLACEMENT Left     Hip    LAPAROSCOPIC CHOLECYSTECTOMY      NEPHRECTOMY Left     NEPHRECTOMY Left     SHOULDER ARTHROSCOPY W/ ROTATOR CUFF REPAIR Left     TOTAL HIP ARTHROPLASTY Left     original hardware became infected and pt needed a second surgery to replace hardware    UMBILICAL HERNIA REPAIR       Family History   Problem Relation Age of Onset    Tuberculosis Mother     Emphysema Father         Lung      reports that he has quit smoking  He has never used smokeless tobacco  He reports that he drinks alcohol  He reports that he does not use drugs  Physical Exam:   Vitals:    18 1416   BP: 142/82   BP Location: Left arm   Patient Position: Sitting   Cuff Size: Adult   Pulse: 82   Weight: 94 3 kg (208 lb)   Height: 5' 10" (1 778 m)     Body mass index is 29 84 kg/m²  General: conscious, cooperative, in not acute distress  Eyes: conjunctivae pink, anicteric sclerae  ENT: lips and mucous membranes moist  Neck: supple, no JVD  Chest: clear breath sounds bilateral, no crackles, ronchus or wheezings  CVS: distinct S1 & S2, normal rate, regular rhythm  Abdomen: soft, non-tender, non-distended, normoactive bowel sounds  Extremities: no edema of both legs  Skin: no rash  Neuro: awake, alert, oriented      Lab Results:   No results found for this or any previous visit  DISCUSSION:    Patient Instructions   Makenzie Pacheco here today for follow-up of an abnormal kidney function  At this point we recommend the followin  Your blood pressure is borderline elevated today and has been elevated in previous office visits  Home blood pressures are much more accurate than office blood pressures  Please check your blood pressures at home daily for the next 2 weeks and let me know how they are doing either through my chart or by phone    We will then determine if you need blood pressure medication  2  Please try to drink at least 64 oz of  Non caffeinated non alcoholic fluid daily   3  Please repeat your blood work to make sure the creatinine isn't worsening which is your kidney number  Your electrolytes are stable   4  If everything is stable we can follow up in 6 months we will call you with the result of your blood work no matter what  If renal function is worsening I will see you sooner than that   5  We will make further recommendations after the blood work is complete  6  Avoid contrast for radiologic studies which can affect the kidneys  If contrast is needed please let me know we would will need to give you isotonic infusion before and after      Portions of the record may have been created with voice recognition software  Occasional wrong word or "sound a like" substitutions may have occurred due to the inherent limitations of voice recognition software  Read the chart carefully and recognize, using context, where substitutions have occurred  If you have any questions, please contact the dictating provider

## 2018-08-01 NOTE — LETTER
August 1, 2018     [facility] [id number]    Patient: Judy Barclay   YOB: 1945   Date of Visit: 8/1/2018       Dear Dr Cam Rene:    Thank you for referring Lorraine Gonzales to me for evaluation  Below are my notes for this consultation  If you have questions, please do not hesitate to call me  I look forward to following your patient along with you  Sincerely,        Ken Arriaza DO        CC: No Recipients  Ken Arriaza DO  8/1/2018  3:16 PM  Sign at close encounter  [id number] [facility] - Nephrology   Judy Barclay 68 y o  male MRN: [id number]    Encounter: [id number]        ASSESSMENT and PLAN:    72-year-old male with a past medical history of renal cell carcinoma status post nephrectomy, hyperlipidemia who presents for evaluation of an elevated creatinine     1  Acute kidney injury versus stage 3 chronic kidney disease  - baseline creatinine is usually around 1 2-1 3, most recent creatinine has gone up to 1 5  - he does admit to low volume intake, 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 to at least 64 oz daily  - he likely has a GFR around 55-60 for future contrast CT scan he will require intravenous isotonic fluid pre and post to reduce  Risk of contrast induced nephropathy  - urinalysis today was bland but does have a chronic history of hematuria no proteinuria  - blood pressure slightly elevated today and does have a history of hypertension document  - he is avoiding nephrotoxic agents     2  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  - baseline creatinine is around 1 2-1 3  - status post nephrectomy  - with hematuria but no proteinuria and being monitored by Urology  - will monitor for anemia related to chronic kidney disease and bone mineral disease  - other medications are appropriately dosed  - continue cardiovascular risk reduction     3   Renal cell carcinoma  - status post nephrectomy in 2014 and has been doing relatively well since the he continue surveillance no radiation or chemotherapy     4  Localized edema  - overall does not appear volume overload, does have an echocardiogram which shows a grade 2 diastolic dysfunction monitor blood pressures and volume status     5  Essential hypertension  - high blood pressures in the office currently not on any medication  - blood pressure cuff ordered for the patient  - I have asked him to check his blood pressures at home every day for the next 2 weeks and contact me with result  - he may require low-dose BP meds if blood pressure is not at goal    will repeat blood and if creatinine improves to baseline and blood pressure well controlled will follow up in 6 months  Prior to his CAT scan in December he will require isotonic fluid administration pre and post IV contrast   If this study can be done without contrast that would ideal    Diagnoses and all orders for this visit:    CKD (chronic kidney disease), stage II  -     Comprehensive metabolic panel  -     Magnesium  -     Phosphorus  -     Protein / creatinine ratio, urine  -     Urinalysis with microscopic  -     PTH, intact  -     Blood Pressure KIT; by Does not apply route daily    Carcinoma, renal cell ([facility] )  -     Ambulatory referral to Nephrology  -     POCT urine dip    Localized edema    Hematuria, unspecified type    Essential hypertension      HPI:  Natalie Acevedo is a 68 y o male who was referred by Ernesto Fajardo MD for evaluation of  Elevated creatinine     68year-old with a history of a renal cell carcinoma status post nephrectomy now on surveillance presents with a creatinine of 1 5  His baseline creatinine is around 1 3    He does admit to poor oral hydration as he works all day, denies any chest pain or shortness of Breath fevers or chills, does have a history documented of a higher blood pressure  But is not on any medication, he does not check his blood pressures at home either he has a history of hematuria and this is being monitored by Urology his surveillance scans revealed that he continues to do well without any recurrence of disease he denies any chest pain or shortness of Breath fevers or chills nausea vomiting diarrhea or constipation    I personally spent over half of a total 60 minutes face to face with the patient in counseling and discussion and/or coordination of care as described above  ROS: All the systems were reviewed and were negative except as documented on the HPI  Allergies: Patient has no known allergies  Medications:   Current Outpatient Prescriptions:     Multiple Vitamins-Minerals (MULTIVITAMIN ADULT) TABS, Take by mouth, Disp: , Rfl:     multivitamin (THERAGRAN) TABS, Take 1 tablet by mouth daily  , Disp: , Rfl:     Omega-3 Fatty Acids (FISH OIL) 1,000 mg, by Does not apply route, Disp: , Rfl:     rosuvastatin (CRESTOR) 10 MG tablet, Take 10 mg by mouth daily Indications: High Amount of Fats in the Blood , Disp: , Rfl:     Blood Pressure KIT, by Does not apply route daily, Disp: 1 each, Rfl: 0    Past Medical History:   Diagnosis Date    Cancer of left kidney ([facility] )     2014    Colon polyp     Diverticulitis of colon     Last assessed - 5/12/14    Hypercholesterolemia     Hyperlipidemia     Renal neoplasm      Past Surgical History:   Procedure Laterality Date    COLONOSCOPY      COLONOSCOPY N/A 4/27/2018    Procedure: COLONOSCOPY;  Surgeon: Ubaldo Reeves MD;  Location: [facility] GI [facility];   Service: Gastroenterology    CYSTOSCOPY      Onset - 5/2/16 [facility]     JOINT REPLACEMENT Left     Hip    LAPAROSCOPIC CHOLECYSTECTOMY      NEPHRECTOMY Left     NEPHRECTOMY Left     SHOULDER ARTHROSCOPY W/ ROTATOR CUFF REPAIR Left     TOTAL HIP ARTHROPLASTY Left     original hardware became infected and pt needed a second surgery to replace hardware    UMBILICAL HERNIA REPAIR       Family History   Problem Relation Age of Onset    Tuberculosis Mother     Emphysema Father         Lung      reports that he has quit smoking  He has never used smokeless tobacco  He reports that he drinks alcohol  He reports that he does not use drugs  Physical Exam:   Vitals:    18 1416   BP: 142/82   BP Location: Left arm   Patient Position: Sitting   Cuff Size: Adult   Pulse: 82   Weight: 94 3 kg (208 lb)   Height: 5' 10" (1 778 m)     Body mass index is 29 84 kg/m²  General: conscious, cooperative, in not acute distress  Eyes: conjunctivae pink, anicteric sclerae  ENT: lips and mucous membranes moist  Neck: supple, no JVD  Chest: clear breath sounds bilateral, no crackles, ronchus or wheezings  CVS: distinct S1 & S2, normal rate, regular rhythm  Abdomen: soft, non-tender, non-distended, normoactive bowel sounds  Extremities: no edema of both legs  Skin: no rash  Neuro: awake, alert, oriented      Lab Results:     Results for orders placed or performed in visit on 18   POCT urine dip   Result Value Ref Range    LEUKOCYTE ESTERASE,UA NEG      NITRITE,UA NEG     SL AMB POCT UROBILINOGEN 0 2     SL AMB POCT URINE PROTEIN NEG      PH,UA 6 0      BLOOD,UA NEG      SPECIFIC GRAVITY,UA 1 015      KETONES,UA NEG      BILIRUBIN,UA NEG     GLUCOSE, UA NEG      COLOR,UA YELLOW      CLARITY,UA CLEAR            DISCUSSION:    Patient Instructions   Gabriela Johnson here today for follow-up of an abnormal kidney function  At this point we recommend the followin  Your blood pressure is borderline elevated today and has been elevated in previous office visits  Home blood pressures are much more accurate than office blood pressures  Please check your blood pressures at home daily for the next 2 weeks and let me know how they are doing either through my chart or by phone  We will then determine if you need blood pressure medication  2  Please try to drink at least 64 oz of  Non caffeinated non alcoholic fluid daily   3   Please repeat your blood work to make sure the creatinine isn't worsening which is your kidney number  Your electrolytes are stable   4  If everything is stable we can follow up in 6 months we will call you with the result of your blood work no matter what  If renal function is worsening I will see you sooner than that   5  We will make further recommendations after the blood work is complete  6  Avoid contrast for radiologic studies which can affect the kidneys  If contrast is needed please let me know we would will need to give you isotonic infusion before and after      Portions of the record may have been created with voice recognition software  Occasional wrong word or "sound a like" substitutions may have occurred due to the inherent limitations of voice recognition software  Read the chart carefully and recognize, using context, where substitutions have occurred  If you have any questions, please contact the dictating provider

## 2018-08-01 NOTE — PROGRESS NOTES
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 to at least 64 oz daily  - he likely has a GFR around 55-60 for future contrast CT scan he will require intravenous isotonic fluid pre and post to reduce  Risk of contrast induced nephropathy  - urinalysis today was bland but does have a chronic history of hematuria no proteinuria  - blood pressure slightly elevated today and does have a history of hypertension document  - he is avoiding nephrotoxic agents     2  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  - baseline creatinine is around 1 2-1 3  - status post nephrectomy  - with hematuria but no proteinuria and being monitored by Urology  - will monitor for anemia related to chronic kidney disease and bone mineral disease  - other medications are appropriately dosed  - continue cardiovascular risk reduction     3  Renal cell carcinoma  - status post nephrectomy in 2014 and has been doing relatively well since the he continue surveillance no radiation or chemotherapy     4  Localized edema  - overall does not appear volume overload, does have an echocardiogram which shows a grade 2 diastolic dysfunction monitor blood pressures and volume status     5   Essential hypertension  - high blood pressures in the office currently not on any medication  - blood pressure cuff ordered for the patient  - I have asked him to check his blood pressures at home every day for the next 2 weeks and contact me with result  - he may require low-dose BP meds if blood pressure is not at goal    will repeat blood and if creatinine improves to baseline and blood pressure well controlled will follow up in 6 months  Prior to his CAT scan in December he will require isotonic fluid administration pre and post IV contrast   If this study can be done without contrast that would ideal    Diagnoses and all orders for this visit:    CKD (chronic kidney disease), stage II  -     Comprehensive metabolic panel  -     Magnesium  -     Phosphorus  -     Protein / creatinine ratio, urine  -     Urinalysis with microscopic  -     PTH, intact  -     Blood Pressure KIT; by Does not apply route daily    Carcinoma, renal cell ([facility] [address] )  -     Ambulatory referral to Nephrology  -     POCT urine dip    Localized edema    Hematuria, unspecified type    Essential hypertension      HPI:  Dallin Mclaughlin is a 68 y o male who was referred by Kelly Forrest MD for evaluation of  Elevated creatinine     68year-old with a history of a renal cell carcinoma status post nephrectomy now on surveillance presents with a creatinine of 1 5  His baseline creatinine is around 1 3  He does admit to poor oral hydration as he works all day, denies any chest pain or shortness of Breath fevers or chills, does have a history documented of a higher blood pressure  But is not on any medication, he does not check his blood pressures at home either he has a history of hematuria and this is being monitored by Urology his surveillance scans revealed that he continues to do well without any recurrence of disease he denies any chest pain or shortness of Breath fevers or chills nausea vomiting diarrhea or constipation    I personally spent over half of a total 60 minutes face to face with the patient in counseling and discussion and/or coordination of care as described above  ROS: All the systems were reviewed and were negative except as documented on the HPI      Allergies: Patient has no known allergies  Medications:   Current Outpatient Prescriptions:     Multiple Vitamins-Minerals (MULTIVITAMIN ADULT) TABS, Take by mouth, Disp: , Rfl:     multivitamin (THERAGRAN) TABS, Take 1 tablet by mouth daily  , Disp: , Rfl:     Omega-3 Fatty Acids (FISH OIL) 1,000 mg, by Does not apply route, Disp: , Rfl:     rosuvastatin (CRESTOR) 10 MG tablet, Take 10 mg by mouth daily Indications: High Amount of Fats in the Blood , Disp: , Rfl:     Blood Pressure KIT, by Does not apply route daily, Disp: 1 each, Rfl: 0    Past Medical History:   Diagnosis Date    Cancer of left kidney ([facility] [address] )     2014    Colon polyp     Diverticulitis of colon     Last assessed - 5/12/14    Hypercholesterolemia     Hyperlipidemia     Renal neoplasm      Past Surgical History:   Procedure Laterality Date    COLONOSCOPY      COLONOSCOPY N/A 4/27/2018    Procedure: COLONOSCOPY;  Surgeon: Darwin Chang MD;  Location: [facility]; Service: Gastroenterology    CYSTOSCOPY      Onset - 5/2/16 Norina Dorado     JOINT REPLACEMENT Left     Hip    LAPAROSCOPIC CHOLECYSTECTOMY      NEPHRECTOMY Left     NEPHRECTOMY Left     SHOULDER ARTHROSCOPY W/ ROTATOR CUFF REPAIR Left     TOTAL HIP ARTHROPLASTY Left     original hardware became infected and pt needed a second surgery to replace hardware    UMBILICAL HERNIA REPAIR       Family History   Problem Relation Age of Onset    Tuberculosis Mother     Emphysema Father         Lung      reports that he has quit smoking  He has never used smokeless tobacco  He reports that he drinks alcohol  He reports that he does not use drugs  Physical Exam:   Vitals:    08/01/18 1416   BP: 142/82   BP Location: Left arm   Patient Position: Sitting   Cuff Size: Adult   Pulse: 82   Weight: 94 3 kg (208 lb)   Height: 5' 10" (1 778 m)     Body mass index is 29 84 kg/m²      General: conscious, cooperative, in not acute distress  Eyes: conjunctivae pink, anicteric sclerae  ENT: lips and mucous membranes moist  Neck: supple, no JVD  Chest: clear breath sounds bilateral, no crackles, ronchus or wheezings  CVS: distinct S1 & S2, normal rate, regular rhythm  Abdomen: soft, non-tender, non-distended, normoactive bowel sounds  Extremities: no edema of both legs  Skin: no rash  Neuro: awake, alert, oriented      Lab Results:     Results for orders placed or performed in visit on 18   POCT urine dip   Result Value Ref Range    LEUKOCYTE ESTERASE,UA NEG      NITRITE,UA NEG     SL AMB POCT UROBILINOGEN 0 2     SL AMB POCT URINE PROTEIN NEG      PH,UA 6 0      BLOOD,UA NEG      SPECIFIC GRAVITY,UA 1 015      KETONES,UA NEG      BILIRUBIN,UA NEG     GLUCOSE, UA NEG      COLOR,UA YELLOW      CLARITY,UA CLEAR            DISCUSSION:    Patient Instructions   Phyllistine Lust here today for follow-up of an abnormal kidney function  At this point we recommend the followin  Your blood pressure is borderline elevated today and has been elevated in previous office visits  Home blood pressures are much more accurate than office blood pressures  Please check your blood pressures at home daily for the next 2 weeks and let me know how they are doing either through my chart or by phone  We will then determine if you need blood pressure medication  2  Please try to drink at least 64 oz of  Non caffeinated non alcoholic fluid daily   3  Please repeat your blood work to make sure the creatinine isn't worsening which is your kidney number  Your electrolytes are stable   4  If everything is stable we can follow up in 6 months we will call you with the result of your blood work no matter what  If renal function is worsening I will see you sooner than that   5  We will make further recommendations after the blood work is complete  6  Avoid contrast for radiologic studies which can affect the kidneys    If contrast is needed please let me know we would will need to give you isotonic infusion before and after      Portions of the record may have been created with voice recognition software  Occasional wrong word or "sound a like" substitutions may have occurred due to the inherent limitations of voice recognition software  Read the chart carefully and recognize, using context, where substitutions have occurred  If you have any questions, please contact the dictating provider

## 2018-08-01 NOTE — LETTER
August 1, 2018     [facility] [address]    Patient: Semaj Argueta   YOB: 1945   Date of Visit: 8/1/2018       Dear Dr Amrit Coats:    Thank you for referring Golden Burnett to me for evaluation  Below are my notes for this consultation  If you have questions, please do not hesitate to call me  I look forward to following your patient along with you  Sincerely,        Ese Nolasco DO        CC: No Recipients  Ese Nolasco DO  8/1/2018  3:16 PM  Sign at close encounter  [id number] [facility] - Nephrology   Semaj rAgueta 68 y o  male MRN: [id number]    Encounter: [id number]        ASSESSMENT and PLAN:    79-year-old male with a past medical history of renal cell carcinoma status post nephrectomy, hyperlipidemia who presents for evaluation of an elevated creatinine     1  Acute kidney injury versus stage 3 chronic kidney disease  - baseline creatinine is usually around 1 2-1 3, most recent creatinine has gone up to 1 5  - he does admit to low volume intake, 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 to at least 64 oz daily  - he likely has a GFR around 55-60 for future contrast CT scan he will require intravenous isotonic fluid pre and post to reduce  Risk of contrast induced nephropathy  - urinalysis today was bland but does have a chronic history of hematuria no proteinuria  - blood pressure slightly elevated today and does have a history of hypertension document  - he is avoiding nephrotoxic agents     2  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  - baseline creatinine is around 1 2-1 3  - status post nephrectomy  - with hematuria but no proteinuria and being monitored by Urology  - will monitor for anemia related to chronic kidney disease and bone mineral disease  - other medications are appropriately dosed  - continue cardiovascular risk reduction     3   Renal cell carcinoma  - status post nephrectomy in 2014 and has been doing relatively well since the he continue surveillance no radiation or chemotherapy     4  Localized edema  - overall does not appear volume overload, does have an echocardiogram which shows a grade 2 diastolic dysfunction monitor blood pressures and volume status     5  Essential hypertension  - high blood pressures in the office currently not on any medication  - blood pressure cuff ordered for the patient  - I have asked him to check his blood pressures at home every day for the next 2 weeks and contact me with result  - he may require low-dose BP meds if blood pressure is not at goal    will repeat blood and if creatinine improves to baseline and blood pressure well controlled will follow up in 6 months  Prior to his CAT scan in December he will require isotonic fluid administration pre and post IV contrast   If this study can be done without contrast that would ideal    Diagnoses and all orders for this visit:    CKD (chronic kidney disease), stage II  -     Comprehensive metabolic panel  -     Magnesium  -     Phosphorus  -     Protein / creatinine ratio, urine  -     Urinalysis with microscopic  -     PTH, intact  -     Blood Pressure KIT; by Does not apply route daily    Carcinoma, renal cell ([facility] [address] )  -     Ambulatory referral to Nephrology  -     POCT urine dip    Localized edema    Hematuria, unspecified type    Essential hypertension      HPI:  Felipe Kussmaul is a 68 y o male who was referred by Karina Gunn MD for evaluation of  Elevated creatinine     68year-old with a history of a renal cell carcinoma status post nephrectomy now on surveillance presents with a creatinine of 1 5  His baseline creatinine is around 1 3    He does admit to poor oral hydration as he works all day, denies any chest pain or shortness of Breath fevers or chills, does have a history documented of a higher blood pressure  But is not on any medication, he does not check his blood pressures at home either he has a history of hematuria and this is being monitored by Urology his surveillance scans revealed that he continues to do well without any recurrence of disease he denies any chest pain or shortness of Breath fevers or chills nausea vomiting diarrhea or constipation    I personally spent over half of a total 60 minutes face to face with the patient in counseling and discussion and/or coordination of care as described above  ROS: All the systems were reviewed and were negative except as documented on the HPI  Allergies: Patient has no known allergies  Medications:   Current Outpatient Prescriptions:     Multiple Vitamins-Minerals (MULTIVITAMIN ADULT) TABS, Take by mouth, Disp: , Rfl:     multivitamin (THERAGRAN) TABS, Take 1 tablet by mouth daily  , Disp: , Rfl:     Omega-3 Fatty Acids (FISH OIL) 1,000 mg, by Does not apply route, Disp: , Rfl:     rosuvastatin (CRESTOR) 10 MG tablet, Take 10 mg by mouth daily Indications: High Amount of Fats in the Blood , Disp: , Rfl:     Blood Pressure KIT, by Does not apply route daily, Disp: 1 each, Rfl: 0    Past Medical History:   Diagnosis Date    Cancer of left kidney ([facility] [address] )     2014    Colon polyp     Diverticulitis of colon     Last assessed - 5/12/14    Hypercholesterolemia     Hyperlipidemia     Renal neoplasm      Past Surgical History:   Procedure Laterality Date    COLONOSCOPY      COLONOSCOPY N/A 4/27/2018    Procedure: COLONOSCOPY;  Surgeon: Amada Núñez MD;  Location: [facility];   Service: Gastroenterology    CYSTOSCOPY      Onset - 5/2/16 Zackary Furnish     JOINT REPLACEMENT Left     Hip    LAPAROSCOPIC CHOLECYSTECTOMY      NEPHRECTOMY Left     NEPHRECTOMY Left     SHOULDER ARTHROSCOPY W/ ROTATOR CUFF REPAIR Left     TOTAL HIP ARTHROPLASTY Left     original hardware became infected and pt needed a second surgery to replace hardware    UMBILICAL HERNIA REPAIR       Family History   Problem Relation Age of Onset    Tuberculosis Mother     Emphysema Father         Lung      reports that he has quit smoking  He has never used smokeless tobacco  He reports that he drinks alcohol  He reports that he does not use drugs  Physical Exam:   Vitals:    18 1416   BP: 142/82   BP Location: Left arm   Patient Position: Sitting   Cuff Size: Adult   Pulse: 82   Weight: 94 3 kg (208 lb)   Height: 5' 10" (1 778 m)     Body mass index is 29 84 kg/m²  General: conscious, cooperative, in not acute distress  Eyes: conjunctivae pink, anicteric sclerae  ENT: lips and mucous membranes moist  Neck: supple, no JVD  Chest: clear breath sounds bilateral, no crackles, ronchus or wheezings  CVS: distinct S1 & S2, normal rate, regular rhythm  Abdomen: soft, non-tender, non-distended, normoactive bowel sounds  Extremities: no edema of both legs  Skin: no rash  Neuro: awake, alert, oriented      Lab Results:     Results for orders placed or performed in visit on 18   POCT urine dip   Result Value Ref Range    LEUKOCYTE ESTERASE,UA NEG      NITRITE,UA NEG     SL AMB POCT UROBILINOGEN 0 2     SL AMB POCT URINE PROTEIN NEG      PH,UA 6 0      BLOOD,UA NEG      SPECIFIC GRAVITY,UA 1 015      KETONES,UA NEG      BILIRUBIN,UA NEG     GLUCOSE, UA NEG      COLOR,UA YELLOW      CLARITY,UA CLEAR            DISCUSSION:    Patient Instructions   Taliaferro Line here today for follow-up of an abnormal kidney function  At this point we recommend the followin  Your blood pressure is borderline elevated today and has been elevated in previous office visits  Home blood pressures are much more accurate than office blood pressures  Please check your blood pressures at home daily for the next 2 weeks and let me know how they are doing either through my chart or by phone  We will then determine if you need blood pressure medication  2  Please try to drink at least 64 oz of  Non caffeinated non alcoholic fluid daily   3   Please repeat your blood work to make sure the creatinine isn't worsening which is your kidney number  Your electrolytes are stable   4  If everything is stable we can follow up in 6 months we will call you with the result of your blood work no matter what  If renal function is worsening I will see you sooner than that   5  We will make further recommendations after the blood work is complete  6  Avoid contrast for radiologic studies which can affect the kidneys  If contrast is needed please let me know we would will need to give you isotonic infusion before and after      Portions of the record may have been created with voice recognition software  Occasional wrong word or "sound a like" substitutions may have occurred due to the inherent limitations of voice recognition software  Read the chart carefully and recognize, using context, where substitutions have occurred  If you have any questions, please contact the dictating provider  Jose Alberto Shipley DO  8/1/2018  3:16 PM  Incomplete  OFFICE CONSULT - Nephrology   Roseline Lewis 68 y o  male MRN: [id number]    Encounter: [id number]        ASSESSMENT and PLAN:    Diagnoses and all orders for this visit:    CKD (chronic kidney disease), stage II  -     Comprehensive metabolic panel  -     Magnesium  -     Phosphorus  -     Protein / creatinine ratio, urine  -     Urinalysis with microscopic  -     PTH, intact  -     Blood Pressure KIT; by Does not apply route daily    Carcinoma, renal cell ([facility] [address] )  -     Ambulatory referral to Nephrology  -     POCT urine dip    Localized edema    Hematuria, unspecified type    Essential hypertension        ***    HPI:  Roseline Lewis is a 68 y o male who was referred by {POD REFERRING [id number]} for evaluation of No chief complaint on file     ***    I personally spent over half of a total *** minutes face to face with the patient in counseling and discussion and/or coordination of care as described above      ROS: All the systems were reviewed and were negative except as documented on the HPI     Allergies: Patient has no known allergies  Medications:   Current Outpatient Prescriptions:     Multiple Vitamins-Minerals (MULTIVITAMIN ADULT) TABS, Take by mouth, Disp: , Rfl:     multivitamin (THERAGRAN) TABS, Take 1 tablet by mouth daily  , Disp: , Rfl:     Omega-3 Fatty Acids (FISH OIL) 1,000 mg, by Does not apply route, Disp: , Rfl:     rosuvastatin (CRESTOR) 10 MG tablet, Take 10 mg by mouth daily Indications: High Amount of Fats in the Blood , Disp: , Rfl:     Blood Pressure KIT, by Does not apply route daily, Disp: 1 each, Rfl: 0    Past Medical History:   Diagnosis Date    Cancer of left kidney ([facility] [address] )     2014    Colon polyp     Diverticulitis of colon     Last assessed - 5/12/14    Hypercholesterolemia     Hyperlipidemia     Renal neoplasm      Past Surgical History:   Procedure Laterality Date    COLONOSCOPY      COLONOSCOPY N/A 4/27/2018    Procedure: COLONOSCOPY;  Surgeon: Sofia Bruner MD;  Location: [facility] GI [facility]; Service: Gastroenterology    CYSTOSCOPY      Onset - 5/2/16 Zara Pass     JOINT REPLACEMENT Left     Hip    LAPAROSCOPIC CHOLECYSTECTOMY      NEPHRECTOMY Left     NEPHRECTOMY Left     SHOULDER ARTHROSCOPY W/ ROTATOR CUFF REPAIR Left     TOTAL HIP ARTHROPLASTY Left     original hardware became infected and pt needed a second surgery to replace hardware    UMBILICAL HERNIA REPAIR       Family History   Problem Relation Age of Onset    Tuberculosis Mother     Emphysema Father         Lung      reports that he has quit smoking  He has never used smokeless tobacco  He reports that he drinks alcohol  He reports that he does not use drugs  Physical Exam:   Vitals:    08/01/18 1416   BP: 142/82   BP Location: Left arm   Patient Position: Sitting   Cuff Size: Adult   Pulse: 82   Weight: 94 3 kg (208 lb)   Height: 5' 10" (1 778 m)     Body mass index is 29 84 kg/m²      General: conscious, cooperative, in not acute distress  Eyes: conjunctivae pink, anicteric sclerae  ENT: lips and mucous membranes moist  Neck: supple, no JVD  Chest: clear breath sounds bilateral, no crackles, ronchus or wheezings  CVS: distinct S1 & S2, normal rate, regular rhythm  Abdomen: soft, non-tender, non-distended, normoactive bowel sounds  Extremities: no edema of both legs  Skin: no rash  Neuro: awake, alert, oriented      Lab Results:   No results found for this or any previous visit  DISCUSSION:    Patient Instructions   Yvonne Fransisco here today for follow-up of an abnormal kidney function  At this point we recommend the followin  Your blood pressure is borderline elevated today and has been elevated in previous office visits  Home blood pressures are much more accurate than office blood pressures  Please check your blood pressures at home daily for the next 2 weeks and let me know how they are doing either through my chart or by phone  We will then determine if you need blood pressure medication  2  Please try to drink at least 64 oz of  Non caffeinated non alcoholic fluid daily   3  Please repeat your blood work to make sure the creatinine isn't worsening which is your kidney number  Your electrolytes are stable   4  If everything is stable we can follow up in 6 months we will call you with the result of your blood work no matter what  If renal function is worsening I will see you sooner than that   5  We will make further recommendations after the blood work is complete  6  Avoid contrast for radiologic studies which can affect the kidneys  If contrast is needed please let me know we would will need to give you isotonic infusion before and after      Portions of the record may have been created with voice recognition software  Occasional wrong word or "sound a like" substitutions may have occurred due to the inherent limitations of voice recognition software  Read the chart carefully and recognize, using context, where substitutions have occurred  If you have any questions, please contact the dictating provider

## 2018-08-01 NOTE — PATIENT INSTRUCTIONS
Jerome Brooks here today for follow-up of an abnormal kidney function  At this point we recommend the followin  Your blood pressure is borderline elevated today and has been elevated in previous office visits  Home blood pressures are much more accurate than office blood pressures  Please check your blood pressures at home daily for the next 2 weeks and let me know how they are doing either through my chart or by phone  We will then determine if you need blood pressure medication  2  Please try to drink at least 64 oz of  Non caffeinated non alcoholic fluid daily   3  Please repeat your blood work to make sure the creatinine isn't worsening which is your kidney number  Your electrolytes are stable   4  If everything is stable we can follow up in 6 months we will call you with the result of your blood work no matter what  If renal function is worsening I will see you sooner than that   5  We will make further recommendations after the blood work is complete  6  Avoid contrast for radiologic studies which can affect the kidneys    If contrast is needed please let me know we would will need to give you isotonic infusion before and after

## 2018-08-02 ENCOUNTER — OFFICE VISIT (OUTPATIENT)
Dept: PHYSICAL THERAPY | Facility: CLINIC | Age: 73
End: 2018-08-02
Payer: MEDICARE

## 2018-08-02 ENCOUNTER — APPOINTMENT (OUTPATIENT)
Dept: LAB | Facility: CLINIC | Age: 73
End: 2018-08-02
Payer: MEDICARE

## 2018-08-02 DIAGNOSIS — M51.37 DISC DEGENERATION, LUMBOSACRAL: ICD-10-CM

## 2018-08-02 DIAGNOSIS — M54.50 CHRONIC RIGHT-SIDED LOW BACK PAIN WITHOUT SCIATICA: Primary | ICD-10-CM

## 2018-08-02 DIAGNOSIS — G89.29 CHRONIC RIGHT-SIDED LOW BACK PAIN WITHOUT SCIATICA: Primary | ICD-10-CM

## 2018-08-02 LAB
ALBUMIN SERPL BCP-MCNC: 3.6 G/DL (ref 3.5–5)
ALP SERPL-CCNC: 65 U/L (ref 46–116)
ALT SERPL W P-5'-P-CCNC: 32 U/L (ref 12–78)
ANION GAP SERPL CALCULATED.3IONS-SCNC: 5 MMOL/L (ref 4–13)
AST SERPL W P-5'-P-CCNC: 26 U/L (ref 5–45)
BACTERIA UR QL AUTO: ABNORMAL /HPF
BILIRUB SERPL-MCNC: 0.69 MG/DL (ref 0.2–1)
BILIRUB UR QL STRIP: NEGATIVE
BUN SERPL-MCNC: 19 MG/DL (ref 5–25)
CALCIUM SERPL-MCNC: 8.8 MG/DL (ref 8.3–10.1)
CHLORIDE SERPL-SCNC: 108 MMOL/L (ref 100–108)
CK SERPL-CCNC: 57 U/L (ref 39–308)
CLARITY UR: CLEAR
CO2 SERPL-SCNC: 29 MMOL/L (ref 21–32)
COLOR UR: YELLOW
CREAT SERPL-MCNC: 1.3 MG/DL (ref 0.6–1.3)
CREAT UR-MCNC: 95.1 MG/DL
GFR SERPL CREATININE-BSD FRML MDRD: 54 ML/MIN/1.73SQ M
GLUCOSE P FAST SERPL-MCNC: 93 MG/DL (ref 65–99)
GLUCOSE UR STRIP-MCNC: NEGATIVE MG/DL
HGB UR QL STRIP.AUTO: ABNORMAL
HYALINE CASTS #/AREA URNS LPF: ABNORMAL /LPF
KETONES UR STRIP-MCNC: NEGATIVE MG/DL
LEUKOCYTE ESTERASE UR QL STRIP: NEGATIVE
MAGNESIUM SERPL-MCNC: 2.7 MG/DL (ref 1.6–2.6)
NITRITE UR QL STRIP: NEGATIVE
NON-SQ EPI CELLS URNS QL MICRO: ABNORMAL /HPF
PH UR STRIP.AUTO: 6 [PH] (ref 4.5–8)
PHOSPHATE SERPL-MCNC: 2.6 MG/DL (ref 2.3–4.1)
POTASSIUM SERPL-SCNC: 4.4 MMOL/L (ref 3.5–5.3)
PROT SERPL-MCNC: 7 G/DL (ref 6.4–8.2)
PROT UR STRIP-MCNC: NEGATIVE MG/DL
PROT UR-MCNC: 8 MG/DL
PROT/CREAT UR: 0.08 MG/G{CREAT} (ref 0–0.1)
PTH-INTACT SERPL-MCNC: 87.1 PG/ML (ref 18.4–80.1)
RBC #/AREA URNS AUTO: ABNORMAL /HPF
SODIUM SERPL-SCNC: 142 MMOL/L (ref 136–145)
SP GR UR STRIP.AUTO: 1.01 (ref 1–1.03)
UROBILINOGEN UR QL STRIP.AUTO: 0.2 E.U./DL
WBC #/AREA URNS AUTO: ABNORMAL /HPF

## 2018-08-02 PROCEDURE — 81001 URINALYSIS AUTO W/SCOPE: CPT | Performed by: INTERNAL MEDICINE

## 2018-08-02 PROCEDURE — 83970 ASSAY OF PARATHORMONE: CPT | Performed by: INTERNAL MEDICINE

## 2018-08-02 PROCEDURE — 80053 COMPREHEN METABOLIC PANEL: CPT | Performed by: INTERNAL MEDICINE

## 2018-08-02 PROCEDURE — 84156 ASSAY OF PROTEIN URINE: CPT | Performed by: INTERNAL MEDICINE

## 2018-08-02 PROCEDURE — 83735 ASSAY OF MAGNESIUM: CPT | Performed by: INTERNAL MEDICINE

## 2018-08-02 PROCEDURE — 97112 NEUROMUSCULAR REEDUCATION: CPT

## 2018-08-02 PROCEDURE — 82570 ASSAY OF URINE CREATININE: CPT | Performed by: INTERNAL MEDICINE

## 2018-08-02 PROCEDURE — 82550 ASSAY OF CK (CPK): CPT | Performed by: INTERNAL MEDICINE

## 2018-08-02 PROCEDURE — 36415 COLL VENOUS BLD VENIPUNCTURE: CPT | Performed by: INTERNAL MEDICINE

## 2018-08-02 PROCEDURE — 97110 THERAPEUTIC EXERCISES: CPT

## 2018-08-02 PROCEDURE — 84100 ASSAY OF PHOSPHORUS: CPT | Performed by: INTERNAL MEDICINE

## 2018-08-02 NOTE — PROGRESS NOTES
Daily Note     Today's date: 2018  Patient name: Fabien Bates  : 1945  MRN: [id number]  Referring provider: JOSE RAFAEL Brush  Dx: No diagnosis found  Subjective: Patient noted minimal pain in low back  Objective: See treatment diary below      Assessment: Tolerated treatment well  Patient was able to perform exercises with increased weight and band resistance with no complaints  Added MT prone quad stretch into treatment today  Patient exhibited good technique with therapeutic exercises and would benefit from continued PT      Plan: Continue per plan of care  Precautions: previous left total hip revision    Daily Treatment Diary   Manuals  8/2       B quad stretch 30"x4 30"x4 30''x4 SH 30"x4 hold       B hamstring stretch 30"x4 30"x4 30''x4 SH 30"x4 hold       L/s pa and rot mobs   Db pt DB nv hold                    Exercise Diary   8/2       Bike 10 min lvl 1 lv1  10 min lv1 10' L1 10' lv1  10' lv1  10'       s/l hip abd 2#  3x10 2 5#  2x10 2 5# 2x10 2 5#  2x10 ea         S/l clamshells   2 position Blue 2x10 Blue  2x10 Blue 2x10 Blue/  2 5# 2x10         Supine bridge with band Black 3x10 nv Black 3x10 Black 3x10 Black  3x10 No band feet on ball       Seated hamstring stretch 30"x3 30"x3 30''x3 30"x3 B  30"x3B       Lunge B  x10 ea  x10 x10 10x ea nv        Supine dead bug nv 2x10 2x10 20x legs 2x10         Prone hip ext 2#  2x10 2 5#  2x10 2 5# 2x10 2 5# 2x10 ea 2 5#  2x10 2 5#  2x10       Prone hip ext with knee flex 2#  2x10 2 5#  2x10 2 5# 2x10 2 5# 2x10 ea 2 5#  2x10 2 5#  2x10       Prone quad stretch manual   -  MT 30"x3       Ball press 5"  2x10 5"  2x10 diagonal crunch 2x10 NP diagonal crunch  2x10        Ball squats  3x10 3x10 3x10 3x10 10#       palloff press     GTB  2x10 Jerry TB 2x10       chops     GTB  2x10 jerry TB 2x10       Step up fwd/lat L3  15 ea   L3  15ea nv L3 15x ea nv        crunch 2x10 2x10 2x10 GTB 2x10 ea Pink ball  2x10 Pink ball crunch

## 2018-08-06 ENCOUNTER — OFFICE VISIT (OUTPATIENT)
Dept: PHYSICAL THERAPY | Facility: CLINIC | Age: 73
End: 2018-08-06
Payer: MEDICARE

## 2018-08-06 ENCOUNTER — OFFICE VISIT (OUTPATIENT)
Dept: FAMILY MEDICINE CLINIC | Facility: HOSPITAL | Age: 73
End: 2018-08-06
Payer: MEDICARE

## 2018-08-06 VITALS
WEIGHT: 205.6 LBS | HEIGHT: 69 IN | HEART RATE: 88 BPM | DIASTOLIC BLOOD PRESSURE: 90 MMHG | SYSTOLIC BLOOD PRESSURE: 152 MMHG | BODY MASS INDEX: 30.45 KG/M2 | TEMPERATURE: 98.4 F

## 2018-08-06 DIAGNOSIS — E78.2 MIXED HYPERLIPIDEMIA: ICD-10-CM

## 2018-08-06 DIAGNOSIS — R73.01 ABNORMAL FASTING GLUCOSE: ICD-10-CM

## 2018-08-06 DIAGNOSIS — G89.29 CHRONIC RIGHT-SIDED LOW BACK PAIN WITHOUT SCIATICA: Primary | ICD-10-CM

## 2018-08-06 DIAGNOSIS — I10 ESSENTIAL HYPERTENSION: Primary | ICD-10-CM

## 2018-08-06 DIAGNOSIS — M54.50 CHRONIC RIGHT-SIDED LOW BACK PAIN WITHOUT SCIATICA: Primary | ICD-10-CM

## 2018-08-06 DIAGNOSIS — N18.2 CKD (CHRONIC KIDNEY DISEASE), STAGE II: ICD-10-CM

## 2018-08-06 DIAGNOSIS — M51.37 DISC DEGENERATION, LUMBOSACRAL: ICD-10-CM

## 2018-08-06 DIAGNOSIS — I51.9 LEFT VENTRICULAR DIASTOLIC DYSFUNCTION: ICD-10-CM

## 2018-08-06 DIAGNOSIS — C64.9 RENAL CELL CARCINOMA, UNSPECIFIED LATERALITY (HCC): ICD-10-CM

## 2018-08-06 PROCEDURE — 97112 NEUROMUSCULAR REEDUCATION: CPT

## 2018-08-06 PROCEDURE — 97110 THERAPEUTIC EXERCISES: CPT

## 2018-08-06 PROCEDURE — 99214 OFFICE O/P EST MOD 30 MIN: CPT | Performed by: FAMILY MEDICINE

## 2018-08-06 NOTE — PROGRESS NOTES
Daily Note     Today's date: 2018  Patient name: Michael Bravo  : 1945  MRN: [id number]  Referring provider: JOSE RAFAEL Montague  Dx:   Encounter Diagnosis     ICD-10-CM    1  Chronic right-sided low back pain without sciatica M54 5     G89 29    2  Disc degeneration, lumbosacral M51 37                   Subjective: no c/o offered      Objective: See treatment diary below  Precautions: previous left total hip revision    Daily Treatment Diary   Manuals  8/6      B quad stretch 30"x4 30"x4 30''x4 SH 30"x4 hold       B hamstring stretch 30"x4 30"x4 30''x4 SH 30"x4 hold       L/s pa and rot mobs   Db pt DB nv hold                    Exercise Diary  /2 8/6      Bike 10 min lvl 1 lv1  10 min lv1 10' L1 10' lv1  10' lv1  10' lv1  10 min      s/l hip abd 2#  3x10 2 5#  2x10 2 5# 2x10 2 5#  2x10 ea   2 5#  2x10ea      S/l clamshells   2 position Blue 2x10 Blue  2x10 Blue 2x10 Blue/  2 5# 2x10         Supine bridge with band Black 3x10 nv Black 3x10 Black 3x10 Black  3x10 No band feet on ball Ball  3x10      Seated hamstring stretch 30"x3 30"x3 30''x3 30"x3 B  30"x3B nv      Lunge B  x10 ea  x10 x10 10x ea nv        Supine dead bug nv 2x10 2x10 20x legs 2x10         Prone hip ext 2#  2x10 2 5#  2x10 2 5# 2x10 2 5# 2x10 ea 2 5#  2x10 2 5#  2x10 2 5#  2x15      Prone hip ext with knee flex 2#  2x10 2 5#  2x10 2 5# 2x10 2 5# 2x10 ea 2 5#  2x10 2 5#  2x10 2 5#  2x15      Prone quad stretch manual   -  MT 30"x3 Self stretch  30"x3      Ball press 5"  2x10 5"  2x10 diagonal crunch 2x10 NP diagonal crunch  2x10  diag  Crunch  2x10      Ball squats  3x10 3x10 3x10 3x10 10# 12 5#  ea      palloff press     GTB  2x10 Jerry TB 2x10 Blue  2x10      chops     GTB  2x10 jerry TB 2x10 Blue  210      Step up fwd/lat L3  15 ea   L3  15ea nv L3 15x ea nv        crunch 2x10 2x10 2x10 GTB 2x10 ea Pink ball  2x10 Pink ball crunch Crunch  2x10            Assessment: Tolerated treatment with no pain  Patient exhibited good technique with therapeutic exercises      Plan: Continue per plan of care

## 2018-08-06 NOTE — PROGRESS NOTES
Please let him know that I reviewed his blood work and his creatinine is back to baseline of 1 3  He does not have any protein in his urine  His PTH was slightly high so can you have him take vitamin D3 1000 Units daily  Also let me know how his home BP readings have been

## 2018-08-06 NOTE — PROGRESS NOTES
Assessment/Plan:         Diagnoses and all orders for this visit:    Essential hypertension  Comments:  Labile BP elevation  Likely will need medication after 2 week monitoring,      Left ventricular diastolic dysfunction  Comments:  LVDD on echo, would likely improve with BP reduction    CKD (chronic kidney disease), stage II  Comments:  Creatine decreasing, to follow with Dr Akila Ham    Renal cell carcinoma, unspecified laterality [facility])  Comments:  JUAN, to follow with Dr Enzo Harrison    Abnormal fasting glucose    Mixed hyperlipidemia          Subjective:      Patient ID: Lindsey Gant is a 68 y o  male  6 month follow up visit  Seen by Dr Akila Ham for CKD and non-medicated BP elevations  He was instructed to check own BP's for 2 weeks to determine need for medication  Home BP's are slightly higher than our reading today  Creatinine had mild improvement from 1 5 to 1 3    Has some light greenish sputum at times, no SOB or wheeze  The following portions of the patient's history were reviewed and updated as appropriate: allergies, current medications, past family history, past medical history, past social history, past surgical history and problem list     Review of Systems   HENT: Positive for congestion  Respiratory: Positive for cough  Negative for shortness of breath and wheezing  Genitourinary: Negative for difficulty urinating  Musculoskeletal: Negative for arthralgias  Hematological: Negative  Psychiatric/Behavioral: The patient is not nervous/anxious  All other systems reviewed and are negative  Objective:      /90   Pulse 88   Temp 98 4 °F (36 9 °C)   Ht 5' 8 75" (1 746 m)   Wt 93 3 kg (205 lb 9 6 oz)   BMI 30 58 kg/m²          Physical Exam   Constitutional: He is oriented to person, place, and time  He appears well-developed and well-nourished  Neck: No thyromegaly present  Cardiovascular: Normal rate, regular rhythm and normal heart sounds      Musculoskeletal: He exhibits no edema  Neurological: He is oriented to person, place, and time  Skin: No rash noted  Psychiatric: He has a normal mood and affect  His behavior is normal  Judgment and thought content normal    Nursing note and vitals reviewed

## 2018-08-07 ENCOUNTER — TELEPHONE (OUTPATIENT)
Dept: NEPHROLOGY | Facility: CLINIC | Age: 73
End: 2018-08-07

## 2018-08-07 DIAGNOSIS — N18.2 CKD (CHRONIC KIDNEY DISEASE) STAGE 2, GFR 60-89 ML/MIN: Primary | ICD-10-CM

## 2018-08-07 RX ORDER — MELATONIN
1000 DAILY
Qty: 30 TABLET | Refills: 5 | Status: CANCELLED | OUTPATIENT
Start: 2018-08-07

## 2018-08-07 NOTE — TELEPHONE ENCOUNTER
----- Message from Shaun Padgett DO sent at 8/6/2018  1:22 PM EDT -----  Please let him know that I reviewed his blood work and his creatinine is back to baseline of 1 3  He does not have any protein in his urine  His PTH was slightly high so can you have him take vitamin D3 1000 Units daily  Also let me know how his fabien  e BP readings have been

## 2018-08-07 NOTE — TELEPHONE ENCOUNTER
I called and spoke with [facility]  He is aware of his blood work results  He will take Vit  D 1000 units daily  He stated that his BPS have been ranging 150s-160s/90s for the past couple days

## 2018-08-07 NOTE — TELEPHONE ENCOUNTER
Dr Akila Ham, I called and spoke with [facility]  He is aware of the plan  He will start on Chorthalidone 25mg daily and repeat BMP in one week per plan  I will mail him the lab slip

## 2018-08-07 NOTE — TELEPHONE ENCOUNTER
[id number] Pura Cage that is above goal   Can you have him start chlorthalidone 25 mg daily,  This can cause hypokalemia, hyponatremia so  I would like a BMP in 1 week after starting medication he can continue to monitor BP at home  Thanks

## 2018-08-10 ENCOUNTER — EVALUATION (OUTPATIENT)
Dept: PHYSICAL THERAPY | Facility: CLINIC | Age: 73
End: 2018-08-10
Payer: MEDICARE

## 2018-08-10 DIAGNOSIS — M54.50 CHRONIC RIGHT-SIDED LOW BACK PAIN WITHOUT SCIATICA: Primary | ICD-10-CM

## 2018-08-10 DIAGNOSIS — M51.37 DISC DEGENERATION, LUMBOSACRAL: ICD-10-CM

## 2018-08-10 DIAGNOSIS — G89.29 CHRONIC RIGHT-SIDED LOW BACK PAIN WITHOUT SCIATICA: Primary | ICD-10-CM

## 2018-08-10 PROCEDURE — 97112 NEUROMUSCULAR REEDUCATION: CPT | Performed by: PHYSICAL THERAPIST

## 2018-08-10 PROCEDURE — 97110 THERAPEUTIC EXERCISES: CPT | Performed by: PHYSICAL THERAPIST

## 2018-08-10 PROCEDURE — G8990 OTHER PT/OT CURRENT STATUS: HCPCS | Performed by: PHYSICAL THERAPIST

## 2018-08-10 PROCEDURE — G8991 OTHER PT/OT GOAL STATUS: HCPCS | Performed by: PHYSICAL THERAPIST

## 2018-08-10 NOTE — PROGRESS NOTES
PT Re-Evaluation     Today's date: 8/10/2018  Patient name: Parks Seip  : 1945  MRN: [id number]  Referring provider: JOSE RAFAEL Dickson  Dx:   Encounter Diagnosis     ICD-10-CM    1  Chronic right-sided low back pain without sciatica M54 5     G89 29    2  Disc degeneration, lumbosacral M51 37                   Assessment  Impairments: abnormal coordination, abnormal gait, abnormal muscle firing, abnormal or restricted ROM, abnormal movement, activity intolerance, impaired balance, impaired physical strength, lacks appropriate home exercise program, pain with function and weight-bearing intolerance    Assessment details: Parks Seip has been compliant with attending PT and home exercise program since initial eval   Bryson Damon  has made improvements in objective data since initial eval but is still limited compared to prior level of function   Bryson Damon continues with above listed impairments and would benefit from additional skilled PT to address these deficits to return to prior level of function with transition to HEP over next 1-2 weeks    Understanding of Dx/Px/POC: good   Prognosis: good    Goals  Impairment Goals  - Decrease pain to <2/10 with activity partially met  - Improve ROM equal to contralateral side met  - Increase strength equal to contralateral side met    Functional Goals  - Increase Functional Status Measure to: 72 met  - Patient will be independent with comprehensive HEP met  - Ambulation is improved to prior level of function met  - Stair climbing is improved to prior level of function met  - Squatting is improved to prior level of function partially met      Plan  Patient would benefit from: skilled PT  Planned modality interventions: cryotherapy  Planned therapy interventions: joint mobilization, manual therapy, neuromuscular re-education, patient education, strengthening, stretching, therapeutic activities, therapeutic exercise, home exercise program, functional ROM exercises, Keon taping and postural training  Frequency: 2x week   Duration in weeks: 2  Treatment plan discussed with: patient  Plan details: POC ends 18        Subjective Evaluation    History of Present Illness  Mechanism of injury: pt notes that overall his pain levels are much lower  Pt notes that he has been doing all of his exercises but still gets some pain in the right l/s when standing for more than an hour  Quality of life: good    Pain  Current pain ratin  At best pain ratin  At worst pain ratin  Location: right l/s  Quality: dull ache, tight and pulling    Patient Goals  Patient goals for therapy: decreased pain, improved balance, increased motion, increased strength, independence with ADLs/IADLs and return to sport/leisure activities          Objective     Special Questions  negative for night pain and disturbed sleep  Negative for bladder dysfunction, bowel dysfunction, saddle (S4) numbness, kidney problem, history of cancer and history of trauma    Palpation     Right Tenderness of the erector spinae and quadratus lumborum  Tenderness     Lumbar Spine  Tenderness in the spinous process         Neurological Testing     Sensation     Lumbar   Left   Intact: light touch    Right   Intact: light touch    Hip   Left Hip   Intact: light touch    Right Hip   Intact: light touch    Reflexes   Left   Patellar (L4): normal (2+)  Achilles (S1): normal (2+)    Right   Patellar (L4): normal (2+)  Achilles (S1): normal (2+)    Active Range of Motion     Lumbar   Flexion: 65 degrees with pain  Extension: 10 degrees with pain  Left rotation: 65 degrees   Right rotation: 75 degrees with pain    Passive Range of Motion   Left Hip   Extension: 10 degrees     Right Hip   Extension: 10 degrees     Strength/Myotome Testing     Left Hip   Planes of Motion   Flexion: 4  Extension: 3+  Abduction: 3+    Right Hip   Planes of Motion   Flexion: 4  Extension: 4+  Abduction: 4-    Left Knee   Flexion: 4+  Extension: 4+    Right Knee   Flexion: 4+  Extension: 4+    Left Ankle/Foot   Dorsiflexion: 5  Plantar flexion: 4  Great toe extension: 4+    Right Ankle/Foot   Dorsiflexion: 5  Plantar flexion: 4+  Great toe extension: 4+    Muscle Activation   Patient able to activate left transverse abdominals, left external obliques, left internal obliques, left multifidus, right transverse abdominals, right external obliques, right internal obliques and right multifidus  Tests     Lumbar   negative prone instability   Left   Positive passive SLR  Right   Positive passive SLR  Left Hip   Positive Ely's  Right Hip   Positive Ely's             Precautions: previous left total hip revision  Precautions: previous left total hip revision    Daily Treatment Diary   Manuals 7/12 7/16 7/19 7/23 7/26 8/2 8/6 8/10     B quad stretch 30"x4 30"x4 30''x4 SH 30"x4 hold       B hamstring stretch 30"x4 30"x4 30''x4 SH 30"x4 hold       L/s pa and rot mobs   Db pt DB nv hold                    Exercise Diary  7/12 7/16 7/19 7/23 7/26 8/2 8/6 8/10     Bike 10 min lvl 1 lv1  10 min lv1 10' L1 10' lv1  10' lv1  10' lv1  10 min lv1 10'     s/l hip abd 2#  3x10 2 5#  2x10 2 5# 2x10 2 5#  2x10 ea   2 5#  2x10ea nv     S/l clamshells   2 position Blue 2x10 Blue  2x10 Blue 2x10 Blue/  2 5# 2x10         Supine bridge with band Black 3x10 nv Black 3x10 Black 3x10 Black  3x10 No band feet on ball Ball  3x10 Ball 3x10     Seated hamstring stretch 30"x3 30"x3 30''x3 30"x3 B  30"x3B nv      Lunge B  x10 ea  x10 x10 10x ea nv   Seated ql stretch 20''x5     Supine dead bug nv 2x10 2x10 20x legs 2x10    Supine ql stretch 20''x5     Prone hip ext 2#  2x10 2 5#  2x10 2 5# 2x10 2 5# 2x10 ea 2 5#  2x10 2 5#  2x10 2 5#  2x15      Prone hip ext with knee flex 2#  2x10 2 5#  2x10 2 5# 2x10 2 5# 2x10 ea 2 5#  2x10 2 5#  2x10 2 5#  2x15      Prone quad stretch manual   -  MT 30"x3 Self stretch  30"x3 self 30''x3     Ball press 5"  2x10 5"  2x10 diagonal crunch 2x10 NP diagonal crunch  2x10  diag  Crunch  2x10 diag 2x10     Ball squats  3x10 3x10 3x10 3x10 10# 12 5#  ea 12 5# 2x10     palloff press     GTB  2x10 Jerry TB 2x10 Blue  2x10 Blue 2x10     chops     GTB  2x10 jerry TB 2x10 Blue  210 Blue 2x10     Step up fwd/lat L3  15 ea   L3  15ea nv L3 15x ea nv        crunch 2x10 2x10 2x10 GTB 2x10 ea Pink ball  2x10 Pink ball crunch Crunch  2x10 2x10

## 2018-08-13 ENCOUNTER — OFFICE VISIT (OUTPATIENT)
Dept: PHYSICAL THERAPY | Facility: CLINIC | Age: 73
End: 2018-08-13
Payer: MEDICARE

## 2018-08-13 DIAGNOSIS — M54.50 CHRONIC RIGHT-SIDED LOW BACK PAIN WITHOUT SCIATICA: Primary | ICD-10-CM

## 2018-08-13 DIAGNOSIS — M51.37 DISC DEGENERATION, LUMBOSACRAL: ICD-10-CM

## 2018-08-13 DIAGNOSIS — G89.29 CHRONIC RIGHT-SIDED LOW BACK PAIN WITHOUT SCIATICA: Primary | ICD-10-CM

## 2018-08-13 PROCEDURE — 97112 NEUROMUSCULAR REEDUCATION: CPT

## 2018-08-13 PROCEDURE — 97110 THERAPEUTIC EXERCISES: CPT

## 2018-08-13 NOTE — PROGRESS NOTES
Daily Note     Today's date: 2018  Patient name: Jarad Cano  : 1945  MRN: [id number]  Referring provider: JOSE RAFAEL Meier  Dx:   Encounter Diagnosis     ICD-10-CM    1  Chronic right-sided low back pain without sciatica M54 5     G89 29    2  Disc degeneration, lumbosacral M51 37      Subjective: Pt reports significant decrease in pain since beginning therapy and did not have any memorable episodes of back pain this past weekend      Objective: See treatment diary below    Precautions: previous left total hip revision    Daily Treatment Diary   Manuals 7/12 7/16 7/19 7/23 7/26 8/2 8/6 8/10 8/13    B quad stretch 30"x4 30"x4 30''x4 SH 30"x4 hold       B hamstring stretch 30"x4 30"x4 30''x4 SH 30"x4 hold       L/s pa and rot mobs   Db pt DB nv hold                    Exercise Diary  7/12 7/16 7/19 7/23 7/26 8/2 8/6 8/10 8/13    Bike 10 min lvl 1 lv1  10 min lv1 10' L1 10' lv1  10' lv1  10' lv1  10 min lv1 10' L1 10'    s/l hip abd 2#  3x10 2 5#  2x10 2 5# 2x10 2 5#  2x10 ea   2 5#  2x10ea nv 2 5#  2x10 ea    S/l clamshells   2 position Blue 2x10 Blue  2x10 Blue 2x10 Blue/  2 5# 2x10     -    Supine bridge with band Black 3x10 nv Black 3x10 Black 3x10 Black  3x10 No band feet on ball Ball  3x10 Ball 3x10 No band  3x10 on  ball    Seated hamstring stretch 30"x3 30"x3 30''x3 30"x3 B  30"x3B nv  -    Lunge B  x10 ea  x10 x10 10x ea nv   Seated ql stretch 20''x5 Seated QL stretch to L  20"x5    Supine dead bug nv 2x10 2x10 20x legs 2x10    Supine ql stretch 20''x5 "A" for effort    Prone hip ext 2#  2x10 2 5#  2x10 2 5# 2x10 2 5# 2x10 ea 2 5#  2x10 2 5#  2x10 2 5#  2x15  2 5#  2x10 ea    Prone hip ext with knee flex 2#  2x10 2 5#  2x10 2 5# 2x10 2 5# 2x10 ea 2 5#  2x10 2 5#  2x10 2 5#  2x15  2 5#  2x10 ea    Prone quad stretch manual   -  MT 30"x3 Self stretch  30"x3 self 30''x3 30"x3 strap    Ball press 5"  2x10 5"  2x10 diagonal crunch 2x10 NP diagonal crunch  2x10  diag  Crunch  2x10 diag 2x10 diag  Pink ball  2x10 ea    Ball squats  3x10 3x10 3x10 3x10 10# 12 5#  ea 12 5# 2x10 12 5#  2x10    palloff press     GTB  2x10 Jerry TB 2x10 Blue  2x10 Blue 2x10 Blue 2x10    chops     GTB  2x10 jerry TB 2x10 Blue  210 Blue 2x10 Blue  2x10    Step up fwd/lat L3  15 ea  L3  15ea nv L3 15x ea nv    -    crunch 2x10 2x10 2x10 GTB 2x10 ea Pink ball  2x10 Pink ball crunch Crunch  2x10 2x10 Pink ball  2x10      Assessment: Tolerated treatment well  Patient demonstrated fatigue post treatment, exhibited good technique with therapeutic exercises and would benefit from continued PT to address instability  Plan: Progress treatment as tolerated        Linda Magallanes, PTA

## 2018-08-14 ENCOUNTER — TELEPHONE (OUTPATIENT)
Dept: NEPHROLOGY | Facility: CLINIC | Age: 73
End: 2018-08-14

## 2018-08-14 RX ORDER — CHLORTHALIDONE 25 MG/1
25 TABLET ORAL DAILY
Qty: 30 TABLET | Refills: 2 | Status: SHIPPED | OUTPATIENT
Start: 2018-08-14 | End: 2019-02-04

## 2018-08-14 RX ORDER — MELATONIN
1000 DAILY
Qty: 30 TABLET | Refills: 5 | Status: SHIPPED | OUTPATIENT
Start: 2018-08-14

## 2018-08-14 NOTE — TELEPHONE ENCOUNTER
Patient called saying Dr Nathanael Giles was suppose to send him out Vitamin D and a blood pressure medication   In his chart and patient would like to know what is he suppose to be taking

## 2018-08-16 ENCOUNTER — OFFICE VISIT (OUTPATIENT)
Dept: PHYSICAL THERAPY | Facility: CLINIC | Age: 73
End: 2018-08-16
Payer: MEDICARE

## 2018-08-16 DIAGNOSIS — M51.37 DISC DEGENERATION, LUMBOSACRAL: Primary | ICD-10-CM

## 2018-08-16 DIAGNOSIS — G89.29 CHRONIC RIGHT-SIDED LOW BACK PAIN WITHOUT SCIATICA: ICD-10-CM

## 2018-08-16 DIAGNOSIS — M54.50 CHRONIC RIGHT-SIDED LOW BACK PAIN WITHOUT SCIATICA: ICD-10-CM

## 2018-08-16 PROCEDURE — 97110 THERAPEUTIC EXERCISES: CPT | Performed by: PHYSICAL THERAPIST

## 2018-08-16 PROCEDURE — 97112 NEUROMUSCULAR REEDUCATION: CPT | Performed by: PHYSICAL THERAPIST

## 2018-08-16 NOTE — PROGRESS NOTES
Daily Note     Today's date: 2018  Patient name: Tracee Hardin  : 1945  MRN: [id number]  Referring provider: JOSE RAFAEL Phillips  Dx:   Encounter Diagnosis     ICD-10-CM    1  Disc degeneration, lumbosacral M51 37    2  Chronic right-sided low back pain without sciatica M54 5     G89 29      Subjective: Pt reports significant decrease in pain since beginning therapy and did not have any memorable episodes of back pain this past weekend  Pt continues to report he is pain free these past few days  diag    Objective: See treatment diary below    Precautions: previous left total hip revision    Daily Treatment Diary   Manuals 7/12 7/16 7/19 7/23 7/26 8/2 8/6 8/10 8/13 8/16   B quad stretch 30"x4 30"x4 30''x4 SH 30"x4 hold       B hamstring stretch 30"x4 30"x4 30''x4 SH 30"x4 hold       L/s pa and rot mobs   Db pt DB nv hold                    Exercise Diary  7/12 7/16 7/19 7/23 7/26 8/2 8/6 8/10 8/13 8/16   Bike 10 min lvl 1 lv1  10 min lv1 10' L1 10' lv1  10' lv1  10' lv1  10 min lv1 10' L1 10' L1 10'   s/l hip abd 2#  3x10 2 5#  2x10 2 5# 2x10 2 5#  2x10 ea   2 5#  2x10ea nv 2 5#  2x10 ea 2 5# 2X10ea   S/l clamshells   2 position Blue 2x10 Blue  2x10 Blue 2x10 Blue/  2 5# 2x10     -    Supine bridge with band Black 3x10 nv Black 3x10 Black 3x10 Black  3x10 No band feet on ball Ball  3x10 Ball 3x10 No band  3x10 on  ball No ban 3x10 on ball   Seated hamstring stretch 30"x3 30"x3 30''x3 30"x3 B  30"x3B nv  -    Lunge B  x10 ea  x10 x10 10x ea nv   Seated ql stretch 20''x5 Seated QL stretch to L  20"x5 Seated QL stretch to L 20"X5   Supine dead bug nv 2x10 2x10 20x legs 2x10    Supine ql stretch 20''x5 "A" for effort    Prone hip ext 2#  2x10 2 5#  2x10 2 5# 2x10 2 5# 2x10 ea 2 5#  2x10 2 5#  2x10 2 5#  2x15  2 5#  2x10 ea 2 5# 2x10 ea   Prone hip ext with knee flex 2#  2x10 2 5#  2x10 2 5# 2x10 2 5# 2x10 ea 2 5#  2x10 2 5#  2x10 2 5#  2x15  2 5#  2x10 ea 2 5# 2x10ea   Prone quad stretch manual   -  MT 30"x3 Self stretch  30"x3 self 30''x3 30"x3 strap 30"X3 strap   Ball press 5"  2x10 5"  2x10 diagonal crunch 2x10 NP diagonal crunch  2x10  diag  Crunch  2x10 diag 2x10 diag  Pink ball  2x10 ea diag Pink Ball 2x10 ea   Ball squats  3x10 3x10 3x10 3x10 10# 12 5#  ea 12 5# 2x10 12 5#  2x10 12 5# 2X10   palloff press     GTB  2x10 Jerry TB 2x10 Blue  2x10 Blue 2x10 Blue 2x10 Blue 2X10   chops     GTB  2x10 jerry TB 2x10 Blue  210 Blue 2x10 Blue  2x10 Blue 2x10   Step up fwd/lat L3  15 ea  L3  15ea nv L3 15x ea nv    -    crunch 2x10 2x10 2x10 GTB 2x10 ea Pink ball  2x10 Pink ball crunch Crunch  2x10 2x10 Pink ball  2x10 Pink Ball 2x10     Assessment: Tolerated treatment well  Patient demonstrated fatigue post treatment, exhibited good technique with therapeutic exercises and would benefit from continued PT to address instability  Plan: Progress treatment as tolerated        Alyse Collet, PT

## 2018-11-16 NOTE — PROGRESS NOTES
Daily Note     Today's date: 2018  Patient name: Xavi Faye  : 1945  MRN: [id number]  Referring provider: JOSE RAFAEL Chu  Dx:   Encounter Diagnosis     ICD-10-CM    1  Chronic right-sided low back pain without sciatica M54 5     G89 29    2  Disc degeneration, lumbosacral M51 37                   Subjective: pt notes that everything has been feeling pretty good since starting PT  Objective: See treatment diary below    Precautions: previous left total hip revision      Daily Treatment Diary       Manuals 18     B quad stretch nv 4' 4' 4' 4'  30"x4 ea      B hamstring stretch nv 4' 4' 4' 4'  30"x4ea                                Exercise Diary              Bike 10' lv1 new nv 10' lv 1  lv1   10 min lv1 10' [id number] lv1  10 min lv1  10 min lv 1 10'     s/l hip abd nv  2x10 3x10 3x10 2#  2x10 2#  3x10 2# 3x10     Sl clams 2 position nv Green 2x10 ea Gtb  2x10 GTB 2x10  GTB  2x10 ea GTB  2x10 GTB  2x10 ea BTB 2x10     Supine bridge with band GTB nv GTB 2x10 GTB  2x10 BTB 3x10 Blue  2x10 Blue  3x10 Black  3x10 Black 3x10     Seated hamstring stretch 30'x3 nv 30''x3 30"x3 30''x3 30"x3 30"x3 30"x3 30''x3     Prone active knee flex nv nv 2x10 2x10 ea np 2#  2x10 2#  2x10 lunges x10 ea     Supine dead bug nv 2x10 ea nv 2x10 2x10 2x10 2x10 2x10     Prone  hip ext  2x10 2x10 2x10 2x10 2#  2x10 2#  2x10 2# 2x10     Prone hip ext with knee flex  2x10 2x10 2x10 2x10 2#  2x10 2#  2x10 2# 2x10     Prone quad stretch   30"x3 30''x3 30"x3 30"x3 30"x3 30''x3     Ball press     5"  2x10 5"  2x10 5"  2x10 5''x2x10     Ball squats      2x10 2x10 3*10     Step up fwd/lat       lv3  x10 B ea lv 3 lateral x10      crunch        2x10       Assessment:pt needs consistent cues during lunges, secondary to increased forward translation instead of decent towards ground    Plan: Continue per plan of care  last six months

## 2018-12-03 ENCOUNTER — APPOINTMENT (OUTPATIENT)
Dept: LAB | Facility: CLINIC | Age: 73
End: 2018-12-03
Payer: MEDICARE

## 2018-12-03 LAB
ALBUMIN SERPL BCP-MCNC: 3.7 G/DL (ref 3.5–5)
ALP SERPL-CCNC: 67 U/L (ref 46–116)
ALT SERPL W P-5'-P-CCNC: 33 U/L (ref 12–78)
ANION GAP SERPL CALCULATED.3IONS-SCNC: 2 MMOL/L (ref 4–13)
AST SERPL W P-5'-P-CCNC: 23 U/L (ref 5–45)
BASOPHILS # BLD AUTO: 0.04 THOUSANDS/ΜL (ref 0–0.1)
BASOPHILS NFR BLD AUTO: 1 % (ref 0–1)
BILIRUB SERPL-MCNC: 0.62 MG/DL (ref 0.2–1)
BUN SERPL-MCNC: 20 MG/DL (ref 5–25)
CALCIUM SERPL-MCNC: 9.8 MG/DL (ref 8.3–10.1)
CHLORIDE SERPL-SCNC: 106 MMOL/L (ref 100–108)
CO2 SERPL-SCNC: 29 MMOL/L (ref 21–32)
CREAT SERPL-MCNC: 1.36 MG/DL (ref 0.6–1.3)
EOSINOPHIL # BLD AUTO: 0.16 THOUSAND/ΜL (ref 0–0.61)
EOSINOPHIL NFR BLD AUTO: 2 % (ref 0–6)
ERYTHROCYTE [DISTWIDTH] IN BLOOD BY AUTOMATED COUNT: 13.2 % (ref 11.6–15.1)
GFR SERPL CREATININE-BSD FRML MDRD: 51 ML/MIN/1.73SQ M
GLUCOSE P FAST SERPL-MCNC: 106 MG/DL (ref 65–99)
HCT VFR BLD AUTO: 44.5 % (ref 36.5–49.3)
HGB BLD-MCNC: 14.6 G/DL (ref 12–17)
IMM GRANULOCYTES # BLD AUTO: 0.02 THOUSAND/UL (ref 0–0.2)
IMM GRANULOCYTES NFR BLD AUTO: 0 % (ref 0–2)
LYMPHOCYTES # BLD AUTO: 2.02 THOUSANDS/ΜL (ref 0.6–4.47)
LYMPHOCYTES NFR BLD AUTO: 25 % (ref 14–44)
MCH RBC QN AUTO: 32.2 PG (ref 26.8–34.3)
MCHC RBC AUTO-ENTMCNC: 32.8 G/DL (ref 31.4–37.4)
MCV RBC AUTO: 98 FL (ref 82–98)
MONOCYTES # BLD AUTO: 0.72 THOUSAND/ΜL (ref 0.17–1.22)
MONOCYTES NFR BLD AUTO: 9 % (ref 4–12)
NEUTROPHILS # BLD AUTO: 5.11 THOUSANDS/ΜL (ref 1.85–7.62)
NEUTS SEG NFR BLD AUTO: 63 % (ref 43–75)
NRBC BLD AUTO-RTO: 0 /100 WBCS
PLATELET # BLD AUTO: 184 THOUSANDS/UL (ref 149–390)
PMV BLD AUTO: 10.8 FL (ref 8.9–12.7)
POTASSIUM SERPL-SCNC: 4 MMOL/L (ref 3.5–5.3)
PROT SERPL-MCNC: 7.4 G/DL (ref 6.4–8.2)
RBC # BLD AUTO: 4.53 MILLION/UL (ref 3.88–5.62)
SODIUM SERPL-SCNC: 137 MMOL/L (ref 136–145)
WBC # BLD AUTO: 8.07 THOUSAND/UL (ref 4.31–10.16)

## 2018-12-03 PROCEDURE — 36415 COLL VENOUS BLD VENIPUNCTURE: CPT | Performed by: INTERNAL MEDICINE

## 2018-12-03 PROCEDURE — 80053 COMPREHEN METABOLIC PANEL: CPT | Performed by: INTERNAL MEDICINE

## 2018-12-03 PROCEDURE — 85025 COMPLETE CBC W/AUTO DIFF WBC: CPT | Performed by: INTERNAL MEDICINE

## 2018-12-10 ENCOUNTER — HOSPITAL ENCOUNTER (OUTPATIENT)
Dept: CT IMAGING | Facility: HOSPITAL | Age: 73
Discharge: HOME | End: 2018-12-10
Attending: INTERNAL MEDICINE
Payer: MEDICARE

## 2018-12-10 DIAGNOSIS — C64.9 CARCINOMA, RENAL CELL (HCC): ICD-10-CM

## 2018-12-10 PROCEDURE — 74177 CT ABD & PELVIS W/CONTRAST: CPT

## 2018-12-10 PROCEDURE — 71260 CT THORAX DX C+: CPT

## 2018-12-10 RX ADMIN — IOHEXOL 100 ML: 350 INJECTION, SOLUTION INTRAVENOUS at 11:57

## 2018-12-17 ENCOUNTER — OFFICE VISIT (OUTPATIENT)
Dept: HEMATOLOGY ONCOLOGY | Facility: HOSPITAL | Age: 73
End: 2018-12-17
Payer: MEDICARE

## 2018-12-17 VITALS
HEIGHT: 69 IN | DIASTOLIC BLOOD PRESSURE: 92 MMHG | RESPIRATION RATE: 16 BRPM | SYSTOLIC BLOOD PRESSURE: 168 MMHG | OXYGEN SATURATION: 98 % | HEART RATE: 85 BPM | TEMPERATURE: 96.4 F | BODY MASS INDEX: 30.66 KG/M2 | WEIGHT: 207 LBS

## 2018-12-17 DIAGNOSIS — C64.9 RENAL CELL CARCINOMA, UNSPECIFIED LATERALITY (HCC): Primary | ICD-10-CM

## 2018-12-17 PROCEDURE — 99214 OFFICE O/P EST MOD 30 MIN: CPT | Performed by: INTERNAL MEDICINE

## 2018-12-17 NOTE — PROGRESS NOTES
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 for which he had a nephrectomy  His most recent CT scan of the abdomen and pelvis fromshowed no evidence of recurrence or metastatic disease  This was 6 months ago  There were no new areas or nodules seen  He has some chronic appearing nodules which have been there for the last 7 years and are probably benign  He is otherwise doing well  Denies any nausea denies any vomiting denies any constipation or diarrhea and his 14 point review of systems today was otherwise negative  He does have a slightly elevated creatinine and does follow with our colleagues in nephrology  Test Results:    Imaging: Ct Chest Abdomen Pelvis W Contrast    Result Date: 12/11/2018  Narrative: CT CHEST, ABDOMEN AND PELVIS WITH IV CONTRAST INDICATION:   C64 9: Malignant neoplasm of unspecified kidney, except renal pelvis  COMPARISON:  Multiple prior examinations including most recent chest CT performed November 29, 2017 and most recent CT of the abdomen and pelvis performed May 1, 2017  Most recent PET CT scan was performed November 14, 2016  TECHNIQUE: CT examination of the chest, abdomen and pelvis was performed  Axial, sagittal, and coronal 2D reformatted images were created from the source data and submitted for interpretation  Radiation dose length product (DLP) for this visit:  1000 mGy-cm     This examination, like all CT scans performed in the [facility], was performed utilizing techniques to minimize radiation dose exposure, including the use of iterative reconstruction and automated exposure control  IV Contrast:  100 mL of iohexol (OMNIPAQUE) Enteric Contrast: Enteric contrast was administered  FINDINGS: CHEST LUNGS:  Scattered benign tiny calcified and noncalcified pulmonary nodules, unchanged as far back as 2011  No new or suspicious pulmonary nodule or mass is identified to suggest the possibility of pulmonary metastatic disease  No tracheal or endobronchial mass  PLEURA:  Unremarkable  HEART/GREAT VESSELS:  Unremarkable for patient's age  MEDIASTINUM AND PRAVEEN:  Unremarkable  CHEST WALL AND LOWER NECK:   Unremarkable  ABDOMEN LIVER/BILIARY TREE:  Unremarkable  GALLBLADDER:  Gallbladder is surgically absent  SPLEEN:  Unremarkable  PANCREAS:  Unremarkable  ADRENAL GLANDS:  Unremarkable  KIDNEYS/URETERS:  Or has been left nephrectomy and there is no suspicious mass in the left nephrectomy space  Normal right kidney and ureter are identified  STOMACH AND BOWEL:  Unremarkable  APPENDIX:  No findings to suggest appendicitis  ABDOMINOPELVIC CAVITY:  No ascites or free intraperitoneal air  No lymphadenopathy  VESSELS:  Unremarkable for patient's age  PELVIS: There is streak artifact over the pelvis is result of metal left hip arthroplasty  REPRODUCTIVE ORGANS:  Prostate is mildly enlarged  URINARY BLADDER:  Unremarkable  ABDOMINAL WALL/INGUINAL REGIONS:  Unremarkable  OSSEOUS STRUCTURES:  No acute fracture or destructive osseous lesion  Intact left hip arthroplasty  Impression: No CT evidence of recurrent or metastatic tumor in the chest, abdomen or pelvis   Workstation performed: [id number]       Labs:   Lab Results   Component Value Date    WBC 8 07 12/03/2018    HGB 14 6 12/03/2018    HCT 44 5 12/03/2018    MCV 98 12/03/2018     12/03/2018     Lab Results   Component Value Date     01/06/2016    K 4 0 12/03/2018     12/03/2018    CO2 29 12/03/2018    ANIONGAP 6 01/06/2016    BUN 20 12/03/2018    CREATININE 1 36 (H) 12/03/2018 GLUCOSE 112 01/06/2016    GLUF 106 (H) 12/03/2018    CALCIUM 9 8 12/03/2018    AST 23 12/03/2018    ALT 33 12/03/2018    ALKPHOS 67 12/03/2018    PROT 7 2 01/06/2016    BILITOT 0 43 01/06/2016    EGFR 51 12/03/2018       Lab Results   Component Value Date    PSA 1 7 06/25/2018    PSA 1 9 06/07/2017    PSA 1 7 04/04/2016       ROS: As stated in the history of present illness otherwise his 14 point review of systems today was negative  Active Problems:   Patient Active Problem List   Diagnosis    Abnormal fasting glucose    Carcinoma, renal cell (HCC)    CKD (chronic kidney disease), stage II    Disc degeneration, lumbosacral    Edema    Hematuria    Hyperlipidemia    Hypertension    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HCC)    Osteoarthritis of knee    Enlarged prostate    Lower abdominal pain    Screening for colon cancer    History of colon polyps    Diarrhea    Left ventricular diastolic dysfunction       Past Medical History:   Past Medical History:   Diagnosis Date    Cancer of left kidney ([facility] [address] )     2014    Colon polyp     Diverticulitis of colon     Last assessed - 5/12/14    Hypercholesterolemia     Hyperlipidemia     Renal neoplasm        Surgical History:   Past Surgical History:   Procedure Laterality Date    COLONOSCOPY      COLONOSCOPY N/A 4/27/2018    Procedure: COLONOSCOPY;  Surgeon: Carno Ga MD;  Location: [facility] GI LAB;   Service: Gastroenterology    CYSTOSCOPY      Onset - 5/2/16 Joie Ode     JOINT REPLACEMENT Left     Hip    LAPAROSCOPIC CHOLECYSTECTOMY      NEPHRECTOMY Left     NEPHRECTOMY Left     SHOULDER ARTHROSCOPY W/ ROTATOR CUFF REPAIR Left     TOTAL HIP ARTHROPLASTY Left     original hardware became infected and pt needed a second surgery to replace hardware    UMBILICAL HERNIA REPAIR         Family History:    Family History   Problem Relation Age of Onset    Tuberculosis Mother     Emphysema Father         Lung       Cancer-related family history is not on file  Social History:   Social History     Social History    Marital status: /Civil Union     Spouse name: N/A    Number of children: N/A    Years of education: N/A     Occupational History    Not on file  Social History Main Topics    Smoking status: Former Smoker    Smokeless tobacco: Never Used    Alcohol use Yes      Comment: Social    Drug use: No    Sexual activity: Not on file     Other Topics Concern    Not on file     Social History Narrative    Daily caffeine consumption, 2-3 servings a day       Current Medications:   Current Outpatient Prescriptions   Medication Sig Dispense Refill    Blood Pressure KIT by Does not apply route daily 1 each 0    chlorthalidone 25 mg tablet Take 1 tablet (25 mg total) by mouth daily 30 tablet 2    cholecalciferol (VITAMIN D3) 1,000 units tablet Take 1 tablet (1,000 Units total) by mouth daily 30 tablet 5    Multiple Vitamins-Minerals (MULTIVITAMIN ADULT) TABS Take by mouth      multivitamin (THERAGRAN) TABS Take 1 tablet by mouth daily   Omega-3 Fatty Acids (FISH OIL) 1,000 mg by Does not apply route      rosuvastatin (CRESTOR) 10 MG tablet Take 10 mg by mouth daily Indications: High Amount of Fats in the Blood  No current facility-administered medications for this visit  Allergies: No Known Allergies    Physical Exam:    Body surface area is 2 09 meters squared      Wt Readings from Last 3 Encounters:   12/17/18 93 9 kg (207 lb)   08/06/18 93 3 kg (205 lb 9 6 oz)   08/01/18 94 3 kg (208 lb)        Temp Readings from Last 3 Encounters:   12/17/18 (!) 96 4 °F (35 8 °C) (Tympanic)   08/06/18 98 4 °F (36 9 °C)   06/18/18 98 °F (36 7 °C) (Tympanic)        BP Readings from Last 3 Encounters:   12/17/18 168/92   08/06/18 152/90   08/01/18 142/82         Pulse Readings from Last 3 Encounters:   12/17/18 85   08/06/18 88   08/01/18 82         Physical Exam     Constitutional   General appearance: No acute distress, well appearing and well nourished  Eyes   Conjunctiva and lids: No swelling, erythema or discharge  Pupils and irises: Equal, round and reactive to light  Ears, Nose, Mouth, and Throat   External inspection of ears and nose: Normal     Nasal mucosa, septum, and turbinates: Normal without edema or erythema  Oropharynx: Normal with no erythema, edema, exudate or lesions  Pulmonary   Respiratory effort: No increased work of breathing or signs of respiratory distress  Auscultation of lungs: Clear to auscultation  Cardiovascular   Palpation of heart: Normal PMI, no thrills  Auscultation of heart: Normal rate and rhythm, normal S1 and S2, without murmurs  Examination of extremities for edema and/or varicosities: Normal     Carotid pulses: Normal     Abdomen   Abdomen: Non-tender, no masses  Liver and spleen: No hepatomegaly or splenomegaly  Lymphatic   Palpation of lymph nodes in neck: No lymphadenopathy  Musculoskeletal   Gait and station: Normal     Digits and nails: Normal without clubbing or cyanosis  Inspection/palpation of joints, bones, and muscles: Normal     Skin   Skin and subcutaneous tissue: Normal without rashes or lesions  Neurologic   Cranial nerves: Cranial nerves 2-12 intact  Sensation: No sensory loss  Psychiatric   Orientation to person, place, and time: Normal     Mood and affect: Normal         Assessment / Plan: This is a pleasant 79-year-old male with past medical history of hypertension and hypercholesterolemia who was found to have a left-sided renal mass  Pathology from 10 September 2014 revealed a stage I clear cell carcinoma  He also had some lymph nodes that were enlarged at the time  It was not very clear with a lymph nodes were involved versus reactive  I Decided to observe him  He is doing well  His most recent imaging From December 10, 2018 showed no evidence of recurrence  I'll see the patient back in 6 months with blood work   I will repeat imaging in a year         Goals and Barriers:  Current Goal:  Prolong Survival from Renal cell carcinoma  Barriers: None  Patient's Capacity to Self Care:  Patient  able to self care  Portions of the record may have been created with voice recognition software   Occasional wrong word or "sound a like" substitutions may have occurred due to the inherent limitations of voice recognition software   Read the chart carefully and recognize, using context, where substitutions have occurred

## 2019-02-04 ENCOUNTER — OFFICE VISIT (OUTPATIENT)
Dept: NEPHROLOGY | Facility: HOSPITAL | Age: 74
End: 2019-02-04
Payer: MEDICARE

## 2019-02-04 VITALS
DIASTOLIC BLOOD PRESSURE: 87 MMHG | HEIGHT: 70 IN | WEIGHT: 209.2 LBS | BODY MASS INDEX: 29.95 KG/M2 | SYSTOLIC BLOOD PRESSURE: 137 MMHG | HEART RATE: 88 BPM

## 2019-02-04 DIAGNOSIS — N18.2 CKD (CHRONIC KIDNEY DISEASE), STAGE II: ICD-10-CM

## 2019-02-04 DIAGNOSIS — R60.0 LOCALIZED EDEMA: ICD-10-CM

## 2019-02-04 DIAGNOSIS — N18.30 STAGE 3 CHRONIC KIDNEY DISEASE (HCC): ICD-10-CM

## 2019-02-04 DIAGNOSIS — I51.9 LEFT VENTRICULAR DIASTOLIC DYSFUNCTION: ICD-10-CM

## 2019-02-04 DIAGNOSIS — I10 ESSENTIAL HYPERTENSION: Primary | ICD-10-CM

## 2019-02-04 PROCEDURE — 99214 OFFICE O/P EST MOD 30 MIN: CPT | Performed by: INTERNAL MEDICINE

## 2019-02-04 NOTE — PROGRESS NOTES
OFFICE CONSULT - Nephrology   yr Clock 68 y o  male MRN: [id number]    Encounter: [id number]        ASSESSMENT and PLAN:    59-year-old male with a past medical history of renal cell carcinoma status post nephrectomy, hyperlipidemia who presents for evaluation of an elevated creatinine     1  Acute kidney injury versus stage 3 chronic kidney disease  - baseline creatinine has stabilized around 1 3  - making a better effort on increased hydration decreasing sodium intake  - he likely has a GFR around 55-60 for future contrast CT scan he will require intravenous isotonic fluid pre and post to reduce  Risk of contrast induced nephropathy  - urinalysis bland  - blood pressures are above goal today in the office  - he is avoiding nephrotoxic agents     2  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  - baseline creatinine is around 1 2-1 3  - status post nephrectomy  - with hematuria but no proteinuria and being monitored by Urology  - will monitor for anemia related to chronic kidney disease and bone mineral disease  - other medications are appropriately dosed  - continue cardiovascular risk reduction     3  Renal cell carcinoma  - status post nephrectomy in 2014 and has been doing relatively well since the he continue surveillance no radiation or chemotherapy     4  Localized edema  - overall does not appear volume overload, does have an echocardiogram which shows a grade 2 diastolic dysfunction monitor blood pressures and volume status     5  Essential hypertension  - high blood pressures in the office currently not on any medication  - with attempted chlorthalidone at his last office visit he took this for 3 days stated he did not feel right  -he was checking his blood pressures at home but has stopped  He does not recall what his blood pressure readings were  Have asked him to write down his blood pressure readings over the next 2 weeks from home and bring them to the office    If his blood pressures remain high will consider adding a additional blood pressure medication versus close monitoring in 6 months  If  blood pressures are reasonably well controlled then will follow up in 1 year    Overall he is doing relatively well blood pressures are high in the office unclear what his blood pressures are at home will follow-up with home blood pressure readings in 2 weeks and potential yearly surveillance thereafter if everything is stable    Diagnoses and all orders for this visit:    Essential hypertension  -     Comprehensive metabolic panel  -     Magnesium  -     Phosphorus  -     Protein / creatinine ratio, urine  -     PTH, intact  -     Urinalysis with microscopic  -     CBC and differential; Future    Left ventricular diastolic dysfunction  -     Comprehensive metabolic panel  -     Magnesium  -     Phosphorus  -     Protein / creatinine ratio, urine  -     PTH, intact  -     Urinalysis with microscopic  -     CBC and differential; Future    CKD (chronic kidney disease), stage II  -     Comprehensive metabolic panel  -     Magnesium  -     Phosphorus  -     Protein / creatinine ratio, urine  -     PTH, intact  -     Urinalysis with microscopic  -     CBC and differential; Future    Localized edema  -     Comprehensive metabolic panel  -     Magnesium  -     Phosphorus  -     Protein / creatinine ratio, urine  -     PTH, intact  -     Urinalysis with microscopic  -     CBC and differential; Future    Stage 3 chronic kidney disease (HCC)      HPI:  Lianna Nelson is a 68 y o male who was referred by Bryce Galan MD for evaluation of  Elevated creatinine     68year-old with a history of a renal cell carcinoma status post nephrectomy now on surveillance presents with a creatinine of 1 5  His baseline creatinine is around 1 3    He does admit to poor oral hydration as he works all day, denies any chest pain or shortness of Breath fevers or chills, does have a history documented of a higher blood pressure      Of at his last office visit we did start chlorthalidone he took this for 3 days in stated he did not feel right does not recall why he actually stop the medicine however he stopped also checking his blood pressures but he stated they were high he does not know how high they were he has no headaches no blurry vision no chest pain or shortness of Breath no fevers or chills no difficulty breathing no difficulty with urination no foamy urine or any bloody urine    ROS: All the systems were reviewed and were negative except as documented on the HPI  Allergies: Patient has no known allergies  Medications:   Current Outpatient Prescriptions:     Blood Pressure KIT, by Does not apply route daily, Disp: 1 each, Rfl: 0    cholecalciferol (VITAMIN D3) 1,000 units tablet, Take 1 tablet (1,000 Units total) by mouth daily, Disp: 30 tablet, Rfl: 5    Multiple Vitamins-Minerals (MULTIVITAMIN ADULT) TABS, Take by mouth, Disp: , Rfl:     Omega-3 Fatty Acids (FISH OIL) 1,000 mg, by Does not apply route, Disp: , Rfl:     rosuvastatin (CRESTOR) 10 MG tablet, Take 10 mg by mouth daily Indications: High Amount of Fats in the Blood , Disp: , Rfl:     multivitamin (THERAGRAN) TABS, Take 1 tablet by mouth daily  , Disp: , Rfl:     Past Medical History:   Diagnosis Date    Cancer of left kidney ([facility] [address] )     2014    Colon polyp     Diverticulitis of colon     Last assessed - 5/12/14    Hypercholesterolemia     Hyperlipidemia     Renal neoplasm      Past Surgical History:   Procedure Laterality Date    COLONOSCOPY      COLONOSCOPY N/A 4/27/2018    Procedure: COLONOSCOPY;  Surgeon: Lea Kent MD;  [facility];   Service: Gastroenterology    CYSTOSCOPY      Onset - 5/2/16 Herminio Burner     JOINT REPLACEMENT Left     Hip    LAPAROSCOPIC CHOLECYSTECTOMY      NEPHRECTOMY Left     NEPHRECTOMY Left     SHOULDER ARTHROSCOPY W/ ROTATOR CUFF REPAIR Left     TOTAL HIP ARTHROPLASTY Left     original hardware became infected and pt needed a second surgery to replace hardware    UMBILICAL HERNIA REPAIR       Family History   Problem Relation Age of Onset    Tuberculosis Mother     Emphysema Father         Lung      reports that he has quit smoking  He has never used smokeless tobacco  He reports that he drinks alcohol  He reports that he does not use drugs  Physical Exam:   Vitals:    02/04/19 1154 02/04/19 1221   BP: 162/96 137/87   BP Location: Right arm    Patient Position: Sitting    Cuff Size: Standard    Pulse: 88    Weight: 94 9 kg (209 lb 3 2 oz)    Height: 5' 10" (1 778 m)      Body mass index is 30 02 kg/m²      General: conscious, cooperative, in not acute distress  Eyes: conjunctivae pink, anicteric sclerae  ENT: lips and mucous membranes moist  Neck: supple, no JVD  Chest: clear breath sounds bilateral, no crackles, ronchus or wheezings  CVS: distinct S1 & S2, normal rate, regular rhythm  Abdomen: soft, non-tender, non-distended, normoactive bowel sounds  Extremities: no edema of both legs  Skin: no rash  Neuro: awake, alert, oriented        Lab Results:     Results for orders placed or performed in visit on 08/01/18   Comprehensive metabolic panel   Result Value Ref Range    Sodium 142 136 - 145 mmol/L    Potassium 4 4 3 5 - 5 3 mmol/L    Chloride 108 100 - 108 mmol/L    CO2 29 21 - 32 mmol/L    ANION GAP 5 4 - 13 mmol/L    BUN 19 5 - 25 mg/dL    Creatinine 1 30 0 60 - 1 30 mg/dL    Glucose, Fasting 93 65 - 99 mg/dL    Calcium 8 8 8 3 - 10 1 mg/dL    AST 26 5 - 45 U/L    ALT 32 12 - 78 U/L    Alkaline Phosphatase 65 46 - 116 U/L    Total Protein 7 0 6 4 - 8 2 g/dL    Albumin 3 6 3 5 - 5 0 g/dL    Total Bilirubin 0 69 0 20 - 1 00 mg/dL    eGFR 54 ml/min/1 73sq m   Magnesium   Result Value Ref Range    Magnesium 2 7 (H) 1 6 - 2 6 mg/dL   Phosphorus   Result Value Ref Range    Phosphorus 2 6 2 3 - 4 1 mg/dL   Protein / creatinine ratio, urine   Result Value Ref Range    Creatinine, Ur 95 1 mg/dL    Protein Urine Random 8 mg/dL    Prot/Creat Ratio, Ur 0 08 0 00 - 0 10   Urinalysis with microscopic   Result Value Ref Range    Clarity, UA Clear     Color, UA Yellow     Specific [place], UA 1 014 1 003 - 1 030    pH, UA 6 0 4 5 - 8 0    Glucose, UA Negative Negative mg/dl    Ketones, UA Negative Negative mg/dl    Blood, UA Trace (A) Negative    Protein, UA Negative Negative mg/dl    Nitrite, UA Negative Negative    Bilirubin, UA Negative Negative    Urobilinogen, UA 0 2 0 2, 1 0 E U /dl E U /dl    Leukocytes, UA Negative Negative    WBC, UA None Seen None Seen, 0-5, 5-55, 5-65 /hpf    RBC, UA None Seen None Seen, 0-5 /hpf    Hyaline Casts, UA None Seen None Seen /lpf    Bacteria, UA None Seen None Seen, Occasional /hpf    Epithelial Cells None Seen None Seen, Occasional /hpf   PTH, intact   Result Value Ref Range    PTH 87 1 (H) 18 4 - 80 1 pg/mL   CK   Result Value Ref Range    Total CK 57 39 - 308 U/L   POCT urine dip   Result Value Ref Range    LEUKOCYTE ESTERASE,UA NEG     NITRITE,UA NEG     SL AMB POCT UROBILINOGEN 0 2     POCT URINE PROTEIN NEG      PH,UA 6 0     BLOOD,UA NEG     SPECIFIC GRAVITY,UA 1 015     KETONES,UA NEG     BILIRUBIN,UA NEG     GLUCOSE, UA NEG      COLOR,UA YELLOW     CLARITY,UA CLEAR            DISCUSSION:    Patient Instructions     Mr Marquise Eddy, your here for follow-up regarding your chronic kidney disease and your blood pressure  Your kidney function is stable from your blood work in December  Your creatinine is around 1 3 and her estimated kidney function is around 50-60 mL per minute  Blood pressure is slightly on the higher side you did not tolerate the chlorthalidone  It is okay to discontinue for now  Please check your blood pressures daily for the next 2 weeks and drop off a blood pressure reading at our office  We will make further recommendations once we have these blood pressure readings  If blood pressures are high then we will follow up in 6 months    If blood pressures remain normal we can then change are office visits to yearly  If you have any other questions or concerns please do not hesitate to ask  Portions of the record may have been created with voice recognition software  Occasional wrong word or "sound a like" substitutions may have occurred due to the inherent limitations of voice recognition software  Read the chart carefully and recognize, using context, where substitutions have occurred  If you have any questions, please contact the dictating provider

## 2019-02-04 NOTE — LETTER
February 4, 2019     Lukas Officer, MD [place]  [address] [facility]  [address] [address]    Patient: Shannon Montero   YOB: 1945   Date of Visit: 2/4/2019       Dear Dr Sunday Flaherty: Thank you for referring Blue Lucas to me for evaluation  Below are my notes for this consultation  If you have questions, please do not hesitate to call me  I look forward to following your patient along with you           Sincerely,        Taiwo Redmond,         CC: No Recipients

## 2019-02-04 NOTE — PATIENT INSTRUCTIONS
Mr Venessa Ignacio, your here for follow-up regarding your chronic kidney disease and your blood pressure  Your kidney function is stable from your blood work in December  Your creatinine is around 1 3 and her estimated kidney function is around 50-60 mL per minute  Blood pressure is slightly on the higher side you did not tolerate the chlorthalidone  It is okay to discontinue for now  Please check your blood pressures daily for the next 2 weeks and drop off a blood pressure reading at our office  We will make further recommendations once we have these blood pressure readings  If blood pressures are high then we will follow up in 6 months  If blood pressures remain normal we can then change are office visits to yearly  If you have any other questions or concerns please do not hesitate to ask  Chronic Hypertension   AMBULATORY CARE:   Hypertension  is high blood pressure (BP)  Your BP is the force of your blood moving against the walls of your arteries  Normal BP is less than 120/80  Prehypertension is between 120/80 and 139/89  Hypertension is 140/90 or higher  Hypertension causes your BP to get so high that your heart has to work much harder than normal  This can damage your heart  Chronic hypertension is a long-term condition that you can control with a healthy lifestyle or medicines  A controlled blood pressure helps protect your organs, such as your heart, lungs, brain, and kidneys  Common symptoms include the following:   · Headache     · Blurred vision    · Chest pain     · Dizziness or weakness     · Trouble breathing     · Nosebleeds  Call 911 for any of the following:   · You have discomfort in your chest that feels like squeezing, pressure, fullness, or pain  · You become confused or have difficulty speaking  · You suddenly feel lightheaded or have trouble breathing  · You have pain or discomfort in your back, neck, jaw, stomach, or arm    Seek care immediately if:   · You have a severe headache or vision loss  · You have weakness in an arm or leg  Contact your healthcare provider if:   · You feel faint, dizzy, confused, or drowsy  · You have been taking your BP medicine and your BP is still higher than your healthcare provider says it should be  · You have questions or concerns about your condition or care  Treatment for chronic hypertension  may include medicine to lower your BP and lower your cholesterol level  A low cholesterol level helps prevent heart disease and makes it easier to control your blood pressure  Heart disease can make your blood pressure harder to control  You may also need to make lifestyle changes  Take your medicine exactly as directed  Manage chronic hypertension:  Talk with your healthcare provider about these and other ways to manage hypertension:  · Take your BP at home  Sit and rest for 5 minutes before you take your BP  Extend your arm and support it on a flat surface  Your arm should be at the same level as your heart  Follow the directions that came with your BP monitor  If possible, take at least 2 BP readings each time  Take your BP at least twice a day at the same times each day, such as morning and evening  Keep a record of your BP readings and bring it to your follow-up visits  Ask your healthcare provider what your blood pressure should be  · Limit sodium (salt) as directed  Too much sodium can affect your fluid balance  Check labels to find low-sodium or no-salt-added foods  Some low-sodium foods use potassium salts for flavor  Too much potassium can also cause health problems  Your healthcare provider will tell you how much sodium and potassium are safe for you to have in a day  He or she may recommend that you limit sodium to 2,300 mg a day  · Follow the meal plan recommended by your healthcare provider    A dietitian or your provider can give you more information on low-sodium plans or the DASH (Dietary Approaches to Stop Hypertension) eating plan  The DASH plan is low in sodium, unhealthy fats, and total fat  It is high in potassium, calcium, and fiber  · Exercise to maintain a healthy weight  Exercise at least 30 minutes per day, on most days of the week  This will help decrease your blood pressure  Ask about the best exercise plan for you  · Decrease stress  This may help lower your BP  Learn ways to relax, such as deep breathing or listening to music  · Limit alcohol  Women should limit alcohol to 1 drink a day  Men should limit alcohol to 2 drinks a day  A drink of alcohol is 12 ounces of beer, 5 ounces of wine, or 1½ ounces of liquor  · Do not smoke  Nicotine and other chemicals in cigarettes and cigars can increase your BP and also cause lung damage  Ask your healthcare provider for information if you currently smoke and need help to quit  E-cigarettes or smokeless tobacco still contain nicotine  Talk to your healthcare provider before you use these products  Follow up with your healthcare provider as directed: You will need to return to have your BP checked and to have other lab tests done  Write down your questions so you remember to ask them during your visits  © 2017 [address] [facility] Information is for End User's use only and may not be sold, redistributed or otherwise used for commercial purposes  All illustrations and images included in CareNotes® are the copyrighted property of [employer] , [employer]  or Harish Vanessa  The above information is an  only  It is not intended as medical advice for individual conditions or treatments  Talk to your doctor, nurse or pharmacist before following any medical regimen to see if it is safe and effective for you

## 2019-02-11 ENCOUNTER — OFFICE VISIT (OUTPATIENT)
Dept: FAMILY MEDICINE CLINIC | Facility: HOSPITAL | Age: 74
End: 2019-02-11
Payer: MEDICARE

## 2019-02-11 VITALS
HEIGHT: 69 IN | SYSTOLIC BLOOD PRESSURE: 140 MMHG | HEART RATE: 87 BPM | TEMPERATURE: 97.5 F | WEIGHT: 210.4 LBS | BODY MASS INDEX: 31.16 KG/M2 | DIASTOLIC BLOOD PRESSURE: 80 MMHG

## 2019-02-11 DIAGNOSIS — N18.2 CKD (CHRONIC KIDNEY DISEASE), STAGE II: ICD-10-CM

## 2019-02-11 DIAGNOSIS — E78.2 MIXED HYPERLIPIDEMIA: ICD-10-CM

## 2019-02-11 DIAGNOSIS — C64.9 RENAL CELL CARCINOMA, UNSPECIFIED LATERALITY (HCC): ICD-10-CM

## 2019-02-11 DIAGNOSIS — I10 ESSENTIAL HYPERTENSION: ICD-10-CM

## 2019-02-11 DIAGNOSIS — Z12.5 SCREENING FOR MALIGNANT NEOPLASM OF PROSTATE: ICD-10-CM

## 2019-02-11 DIAGNOSIS — J01.00 ACUTE MAXILLARY SINUSITIS, RECURRENCE NOT SPECIFIED: ICD-10-CM

## 2019-02-11 DIAGNOSIS — Z00.00 MEDICARE ANNUAL WELLNESS VISIT, SUBSEQUENT: Primary | ICD-10-CM

## 2019-02-11 PROCEDURE — 99214 OFFICE O/P EST MOD 30 MIN: CPT | Performed by: FAMILY MEDICINE

## 2019-02-11 PROCEDURE — G0439 PPPS, SUBSEQ VISIT: HCPCS | Performed by: FAMILY MEDICINE

## 2019-02-11 RX ORDER — ROSUVASTATIN CALCIUM 10 MG/1
10 TABLET, COATED ORAL DAILY
Qty: 90 TABLET | Refills: 3 | Status: SHIPPED | OUTPATIENT
Start: 2019-02-11 | End: 2019-04-09 | Stop reason: SDUPTHER

## 2019-02-11 RX ORDER — DOXYCYCLINE HYCLATE 100 MG/1
100 CAPSULE ORAL EVERY 12 HOURS SCHEDULED
Qty: 14 CAPSULE | Refills: 0 | Status: SHIPPED | OUTPATIENT
Start: 2019-02-11 | End: 2019-02-18

## 2019-02-11 NOTE — PROGRESS NOTES
Assessment and Plan:  Problem List Items Addressed This Visit        Respiratory    Acute maxillary sinusitis    Relevant Medications    doxycycline hyclate (VIBRAMYCIN) 100 mg capsule       Cardiovascular and Mediastinum    Essential hypertension    Relevant Orders    CBC and differential    Comprehensive metabolic panel       Genitourinary    Renal cell carcinoma (HCC)    CKD (chronic kidney disease), stage II    Relevant Orders    TSH, 3rd generation with Free T4 reflex       Other    Mixed hyperlipidemia    Relevant Medications    rosuvastatin (CRESTOR) 10 MG tablet    Other Relevant Orders    Lipid Panel with Direct LDL reflex    Medicare annual wellness visit, subsequent - Primary      Other Visit Diagnoses     Screening for malignant neoplasm of prostate        Relevant Orders    PSA, Total Screen        Health Maintenance Due   Topic Date Due    Hepatitis C Screening  1945    BMI: Followup Plan  02/13/1963    PT PLAN OF CARE  09/09/2018         HPI:  Patient Active Problem List   Diagnosis    Abnormal fasting glucose    Renal cell carcinoma (HCC)    CKD (chronic kidney disease), stage II    Disc degeneration, lumbosacral    Edema    Hematuria    Mixed hyperlipidemia    Essential hypertension    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[address] )    Osteoarthritis of knee    Enlarged prostate    Lower abdominal pain    Screening for colon cancer    History of colon polyps    Diarrhea    Left ventricular diastolic dysfunction    Stage 3 chronic kidney disease ([address] )    Medicare annual wellness visit, subsequent    Acute maxillary sinusitis     Past Medical History:   Diagnosis Date    Cancer of left kidney ([address] )     2014    Colon polyp     Diverticulitis of colon     Last assessed - 5/12/14    Hypercholesterolemia     Hyperlipidemia     Renal neoplasm      Past Surgical History:   Procedure Laterality Date    COLONOSCOPY      COLONOSCOPY N/A 4/27/2018    Procedure: COLONOSCOPY;  Surgeon: Jason Blankenship MD;  Location: [facility]; Service: Gastroenterology    CYSTOSCOPY      Onset - 5/2/16 Richarda Favorite     JOINT REPLACEMENT Left     Hip    LAPAROSCOPIC CHOLECYSTECTOMY      NEPHRECTOMY Left     NEPHRECTOMY Left     SHOULDER ARTHROSCOPY W/ ROTATOR CUFF REPAIR Left     TOTAL HIP ARTHROPLASTY Left     original hardware became infected and pt needed a second surgery to replace hardware    UMBILICAL HERNIA REPAIR       Family History   Problem Relation Age of Onset    Tuberculosis Mother     Emphysema Father         Lung     Social History     Tobacco Use   Smoking Status Former Smoker   Smokeless Tobacco Never Used     Social History     Substance and Sexual Activity   Alcohol Use Yes    Comment: Social      Social History     Substance and Sexual Activity   Drug Use No         Current Outpatient Medications   Medication Sig Dispense Refill    Blood Pressure KIT by Does not apply route daily 1 each 0    cholecalciferol (VITAMIN D3) 1,000 units tablet Take 1 tablet (1,000 Units total) by mouth daily 30 tablet 5    Multiple Vitamins-Minerals (MULTIVITAMIN ADULT) TABS Take by mouth      multivitamin (THERAGRAN) TABS Take 1 tablet by mouth daily   Omega-3 Fatty Acids (FISH OIL) 1,000 mg by Does not apply route      rosuvastatin (CRESTOR) 10 MG tablet Take 1 tablet (10 mg total) by mouth daily 90 tablet 3    doxycycline hyclate (VIBRAMYCIN) 100 mg capsule Take 1 capsule (100 mg total) by mouth every 12 (twelve) hours for 7 days 14 capsule 0     No current facility-administered medications for this visit        No Known Allergies  Immunization History   Administered Date(s) Administered    INFLUENZA 11/07/2018    Influenza Split High Dose Preservative Free IM 09/05/2012, 09/22/2014, 01/04/2016    Influenza TIV (IM) 10/21/2013    Pneumococcal Conjugate 13-Valent 02/05/2018    Pneumococcal Polysaccharide PPV23 09/12/2014, 11/07/2018    Tdap 10/15/2017       Patient Care Team:  Hakeem Mccall Justice Barrientos MD as PCP - Devang Arrieta, MD Arnaud Truong MD Betti Neither, MD Malachi Hastings, MD    Medicare Screening Tests and Risk Assessments:  Amy Irby is here for his Subsequent Wellness visit  Health Risk Assessment:  Patient rates overall health as very good  Patient feels that their physical health rating is Slightly better  Eyesight was rated as Same  Hearing was rated as Slightly worse  Patient feels that their emotional and mental health rating is Same  Pain experienced by patient in the last 7 days has been None  Patient states that he has experienced no weight loss or gain in last 6 months  Emotional/Mental Health:  Patient has been feeling nervous/anxious  PHQ-9 Depression Screening:    Frequency of the following problems over the past two weeks:      1  Little interest or pleasure in doing things: 0 - not at all      2  Feeling down, depressed, or hopeless: 0 - not at all  PHQ-2 Score: 0          Broken Bones/Falls: Fall Risk Assessment:    In the past year, patient has experienced: No history of falling in past year          Bladder/Bowel:  Patient has not leaked urine accidently in the last six months  Patient reports no loss of bowel control  Immunizations:  Patient has had a flu vaccination within the last year  Patient has received a pneumonia shot  Patient has not received a shingles shot  Patient has received tetanus/diphtheria shot  Date of tetanus/diphtheria shot: 10/15/2017    Home Safety:  Patient does not have trouble with stairs inside or outside of their home  Patient currently reports that there are no safety hazards present in home, working smoke alarms, working carbon monoxide detectors        Preventative Screenings:   prostate cancer screen performed, 6/25/2018  colon cancer screen completed, 7/16/2014  cholesterol screen completed, 2/6/2019  no glaucoma eye exam completed    Nutrition:  Current diet: Regular and No Added Salt with servings of the following:    Medications:  Patient is not currently taking any over-the-counter supplements  Patient is able to manage medications  Lifestyle Choices:  Patient reports no tobacco use  Patient has not smoked or used tobacco in the past   Patient reports alcohol use  Alcohol use per week: 0-1  Patient drives a vehicle  Patient wears seat belt  Current level of exercise of physical activity described by patient as: No regular exercise  Activities of Daily Living:  Can get out of bed by his or her self, able to dress self, able to make own meals, able to do own shopping, able to bathe self, can do own laundry/housekeeping, can manage own money, pay bills and track expenses    Previous Hospitalizations:  No hospitalization or ED visit in past 12 months        Advanced Directives:  Patient has decided on a power of   Patient has spoken to designated power of   Patient has completed advanced directive  Preventative Screening/Counseling:      Cardiovascular:      General: Screening Current          Diabetes:      General: Screening Current          Colorectal Cancer:      General: Screening Current          Prostate Cancer:      General: Screening Current          Osteoporosis:      General: Screening Not Indicated          AAA:      General: Screening Not Indicated          Glaucoma:      General: Screening Current          HIV:      General: Screening Not Indicated          Hepatitis C:      General: Risks and Benefits Discussed        Advanced Directives:   Patient has living will for healthcare, has durable POA for healthcare, patient does not have an advanced directive  Information on ACP and/or AD not provided  No 5 wishes given  End of life assessment reviewed with patient  Provider agrees with end of life decisions             Visual Acuity Screening    Right eye Left eye Both eyes   Without correction:      With correction: 20/30 20/30 20/25 Physical Exam:  Review of Systems   Gastrointestinal: Negative for bowel incontinence  Psychiatric/Behavioral: The patient is not nervous/anxious  Vitals:    02/11/19 1029   BP: 140/80   Pulse: 87   Temp: 97 5 °F (36 4 °C)   Weight: 95 4 kg (210 lb 6 4 oz)   Height: 5' 8 75" (1 746 m)   Body mass index is 31 3 kg/m²      Physical Exam

## 2019-02-11 NOTE — PROGRESS NOTES
Assessment/Plan:         Diagnoses and all orders for this visit:    Medicare annual wellness visit, subsequent    Renal cell carcinoma, unspecified laterality ([facility] [address] )  Comments:  JUAN, following with Urology    CKD (chronic kidney disease), stage II  Comments:  Stable creatinine  Orders:  -     TSH, 3rd generation with Free T4 reflex; Future  -     TSH, 3rd generation with Free T4 reflex    Acute maxillary sinusitis, recurrence not specified  -     doxycycline hyclate (VIBRAMYCIN) 100 mg capsule; Take 1 capsule (100 mg total) by mouth every 12 (twelve) hours for 7 days    Mixed hyperlipidemia  -     rosuvastatin (CRESTOR) 10 MG tablet; Take 1 tablet (10 mg total) by mouth daily  -     Lipid Panel with Direct LDL reflex; Future  -     Lipid Panel with Direct LDL reflex    Essential hypertension  Comments:  Excellent BP control  Orders:  -     CBC and differential; Future  -     Comprehensive metabolic panel; Future  -     CBC and differential  -     Comprehensive metabolic panel    Screening for malignant neoplasm of prostate  -     PSA, Total Screen; Future  -     PSA, Total Screen          Subjective:      Patient ID: Chauncey Rose is a 68 y o  male  6 month follow up    Has L sided sinus congestion and drainage, eyes water  Some discolored drainage  Symptoms present and getting more bothersome over the past 2 weeks  Chest has felt pretty good  Using OTC decongestant  No fever  Saw Dr Prince Norris and is watching BP's and Sofi Sor will report  BP's  Had tried Chlorthalidone but didn't like it, so stopped  The following portions of the patient's history were reviewed and updated as appropriate: allergies, current medications, past family history, past medical history, past social history, past surgical history and problem list     Review of Systems   Constitutional: Positive for appetite change and unexpected weight change  Negative for activity change and fatigue     HENT: Positive for congestion, postnasal drip, sinus pressure and sinus pain  Negative for sore throat  Eyes: Negative for visual disturbance  Respiratory: Negative  Cardiovascular: Negative  Gastrointestinal: Negative  Genitourinary: Negative for frequency  Musculoskeletal: Positive for arthralgias  Neurological: Negative  Hematological: Negative  Psychiatric/Behavioral: Negative  All other systems reviewed and are negative  Objective:      /80   Pulse 87   Temp 97 5 °F (36 4 °C)   Ht 5' 8 75" (1 746 m)   Wt 95 4 kg (210 lb 6 4 oz)   BMI 31 30 kg/m²          Physical Exam   Constitutional: He is oriented to person, place, and time  He appears well-developed and well-nourished  HENT:   Head: Normocephalic and atraumatic  Right Ear: External ear normal    Left Ear: External ear normal    Mouth/Throat: Oropharynx is clear and moist  No oropharyngeal exudate  Eyes: Pupils are equal, round, and reactive to light  EOM are normal    Neck: Normal range of motion  Cardiovascular: Normal rate, regular rhythm and intact distal pulses  Pulmonary/Chest: Effort normal and breath sounds normal    Musculoskeletal: Normal range of motion  Neurological: He is alert and oriented to person, place, and time  Skin: No rash noted  Psychiatric: He has a normal mood and affect  His behavior is normal  Judgment and thought content normal    Nursing note and vitals reviewed  BMI Counseling: Body mass index is 31 3 kg/m²  Discussed the patient's BMI with him  The BMI is above average  BMI counseling and education was provided to the patient  Nutrition recommendations include reducing portion sizes and moderation in carbohydrate intake  Exercise recommendations include exercising 3-5 times per week

## 2019-02-12 PROBLEM — J01.00 ACUTE MAXILLARY SINUSITIS: Status: ACTIVE | Noted: 2019-02-12

## 2019-03-11 ENCOUNTER — OFFICE VISIT (OUTPATIENT)
Dept: FAMILY MEDICINE CLINIC | Facility: HOSPITAL | Age: 74
End: 2019-03-11
Payer: MEDICARE

## 2019-03-11 VITALS
SYSTOLIC BLOOD PRESSURE: 142 MMHG | WEIGHT: 206.2 LBS | HEIGHT: 69 IN | BODY MASS INDEX: 30.54 KG/M2 | HEART RATE: 78 BPM | DIASTOLIC BLOOD PRESSURE: 82 MMHG | TEMPERATURE: 98.2 F

## 2019-03-11 DIAGNOSIS — J06.9 VIRAL UPPER RESPIRATORY TRACT INFECTION: Primary | ICD-10-CM

## 2019-03-11 PROCEDURE — 99213 OFFICE O/P EST LOW 20 MIN: CPT | Performed by: NURSE PRACTITIONER

## 2019-03-11 NOTE — PROGRESS NOTES
Assessment/Plan:     Likely viral URI with short duration of symptoms  Discussed progression of viral illness  Symptomatic treatment with OTC Flonase and Tylenol for pain  Instructed to call if worse by the end of the week or if no better in 1 week  Diagnoses and all orders for this visit:    Viral upper respiratory tract infection          Subjective:     Patient ID: Shannon Montero is a 76 y o  male  Sinus pain and pressure  Ears are full  Nasal congestion  Started 4 days ago  Taking OTC meds which help a little but now not helping  Has chills  Felt feverish but not checking temp  Has slight cough  Feels sob when lying back  No wheezing  Slight sore throat  Review of Systems   Constitutional: Positive for chills and fever  HENT: Positive for congestion, ear pain, postnasal drip, sinus pressure, sinus pain and sore throat  Respiratory: Positive for cough and shortness of breath  Negative for wheezing  The following portions of the patient's history were reviewed and updated as appropriate: allergies, current medications, past family history, past medical history, past social history, past surgical history and problem list     Objective:  Vitals:    03/11/19 0847   BP: 142/82   Pulse: 78   Temp: 98 2 °F (36 8 °C)      Physical Exam   Constitutional: He is oriented to person, place, and time  He appears well-developed and well-nourished  HENT:   Right Ear: Tympanic membrane, external ear and ear canal normal    Left Ear: Tympanic membrane, external ear and ear canal normal    Mouth/Throat: Uvula is midline, oropharynx is clear and moist and mucous membranes are normal    Cardiovascular: Normal rate, regular rhythm and normal heart sounds  No murmur heard  Pulmonary/Chest: Effort normal and breath sounds normal    Lymphadenopathy:     He has no cervical adenopathy  Neurological: He is alert and oriented to person, place, and time  Skin: Skin is warm and dry     Psychiatric: He has a normal mood and affect  Vitals reviewed

## 2019-04-09 DIAGNOSIS — E78.2 MIXED HYPERLIPIDEMIA: ICD-10-CM

## 2019-04-09 RX ORDER — ROSUVASTATIN CALCIUM 10 MG/1
TABLET, COATED ORAL
Qty: 90 TABLET | Refills: 3 | Status: SHIPPED | OUTPATIENT
Start: 2019-04-09 | End: 2020-03-24

## 2019-04-15 ENCOUNTER — TELEPHONE (OUTPATIENT)
Dept: FAMILY MEDICINE CLINIC | Facility: HOSPITAL | Age: 74
End: 2019-04-15

## 2019-04-15 DIAGNOSIS — G47.33 OSA ON CPAP: Primary | ICD-10-CM

## 2019-04-15 DIAGNOSIS — Z99.89 OSA ON CPAP: Primary | ICD-10-CM

## 2019-06-10 ENCOUNTER — APPOINTMENT (OUTPATIENT)
Dept: LAB | Facility: CLINIC | Age: 74
End: 2019-06-10
Payer: MEDICARE

## 2019-06-10 DIAGNOSIS — C64.9 RENAL CELL CARCINOMA, UNSPECIFIED LATERALITY (HCC): ICD-10-CM

## 2019-06-10 LAB
ALBUMIN SERPL BCP-MCNC: 3.7 G/DL (ref 3.5–5)
ALP SERPL-CCNC: 63 U/L (ref 46–116)
ALT SERPL W P-5'-P-CCNC: 35 U/L (ref 12–78)
ANION GAP SERPL CALCULATED.3IONS-SCNC: 9 MMOL/L (ref 4–13)
AST SERPL W P-5'-P-CCNC: 25 U/L (ref 5–45)
BASOPHILS # BLD AUTO: 0.05 THOUSANDS/ΜL (ref 0–0.1)
BASOPHILS NFR BLD AUTO: 1 % (ref 0–1)
BILIRUB SERPL-MCNC: 0.57 MG/DL (ref 0.2–1)
BUN SERPL-MCNC: 21 MG/DL (ref 5–25)
CALCIUM SERPL-MCNC: 8.8 MG/DL (ref 8.3–10.1)
CHLORIDE SERPL-SCNC: 106 MMOL/L (ref 100–108)
CO2 SERPL-SCNC: 28 MMOL/L (ref 21–32)
CREAT SERPL-MCNC: 1.37 MG/DL (ref 0.6–1.3)
EOSINOPHIL # BLD AUTO: 0.11 THOUSAND/ΜL (ref 0–0.61)
EOSINOPHIL NFR BLD AUTO: 2 % (ref 0–6)
ERYTHROCYTE [DISTWIDTH] IN BLOOD BY AUTOMATED COUNT: 13.4 % (ref 11.6–15.1)
GFR SERPL CREATININE-BSD FRML MDRD: 50 ML/MIN/1.73SQ M
GLUCOSE P FAST SERPL-MCNC: 100 MG/DL (ref 65–99)
HCT VFR BLD AUTO: 42 % (ref 36.5–49.3)
HGB BLD-MCNC: 14.1 G/DL (ref 12–17)
IMM GRANULOCYTES # BLD AUTO: 0.01 THOUSAND/UL (ref 0–0.2)
IMM GRANULOCYTES NFR BLD AUTO: 0 % (ref 0–2)
LYMPHOCYTES # BLD AUTO: 2.17 THOUSANDS/ΜL (ref 0.6–4.47)
LYMPHOCYTES NFR BLD AUTO: 30 % (ref 14–44)
MCH RBC QN AUTO: 33.1 PG (ref 26.8–34.3)
MCHC RBC AUTO-ENTMCNC: 33.6 G/DL (ref 31.4–37.4)
MCV RBC AUTO: 99 FL (ref 82–98)
MONOCYTES # BLD AUTO: 0.76 THOUSAND/ΜL (ref 0.17–1.22)
MONOCYTES NFR BLD AUTO: 11 % (ref 4–12)
NEUTROPHILS # BLD AUTO: 4.09 THOUSANDS/ΜL (ref 1.85–7.62)
NEUTS SEG NFR BLD AUTO: 56 % (ref 43–75)
NRBC BLD AUTO-RTO: 0 /100 WBCS
PLATELET # BLD AUTO: 159 THOUSANDS/UL (ref 149–390)
PMV BLD AUTO: 10.6 FL (ref 8.9–12.7)
POTASSIUM SERPL-SCNC: 4.1 MMOL/L (ref 3.5–5.3)
PROT SERPL-MCNC: 6.8 G/DL (ref 6.4–8.2)
RBC # BLD AUTO: 4.26 MILLION/UL (ref 3.88–5.62)
SODIUM SERPL-SCNC: 143 MMOL/L (ref 136–145)
WBC # BLD AUTO: 7.19 THOUSAND/UL (ref 4.31–10.16)

## 2019-06-10 PROCEDURE — 80053 COMPREHEN METABOLIC PANEL: CPT

## 2019-06-10 PROCEDURE — 36415 COLL VENOUS BLD VENIPUNCTURE: CPT

## 2019-06-10 PROCEDURE — 85025 COMPLETE CBC W/AUTO DIFF WBC: CPT

## 2019-06-17 ENCOUNTER — OFFICE VISIT (OUTPATIENT)
Dept: HEMATOLOGY ONCOLOGY | Facility: HOSPITAL | Age: 74
End: 2019-06-17
Payer: MEDICARE

## 2019-06-17 VITALS
HEART RATE: 91 BPM | SYSTOLIC BLOOD PRESSURE: 138 MMHG | TEMPERATURE: 98.1 F | HEIGHT: 69 IN | BODY MASS INDEX: 30.96 KG/M2 | RESPIRATION RATE: 16 BRPM | WEIGHT: 209 LBS | OXYGEN SATURATION: 96 % | DIASTOLIC BLOOD PRESSURE: 72 MMHG

## 2019-06-17 DIAGNOSIS — C64.9 RENAL CELL CARCINOMA, UNSPECIFIED LATERALITY (HCC): Primary | ICD-10-CM

## 2019-06-17 PROCEDURE — 99214 OFFICE O/P EST MOD 30 MIN: CPT | Performed by: INTERNAL MEDICINE

## 2019-06-24 ENCOUNTER — APPOINTMENT (OUTPATIENT)
Dept: LAB | Facility: CLINIC | Age: 74
End: 2019-06-24
Payer: MEDICARE

## 2019-06-24 DIAGNOSIS — Z12.5 SCREENING FOR PROSTATE CANCER: ICD-10-CM

## 2019-06-24 LAB — PSA SERPL-MCNC: 2.3 NG/ML (ref 0–4)

## 2019-06-24 PROCEDURE — 36415 COLL VENOUS BLD VENIPUNCTURE: CPT

## 2019-06-24 PROCEDURE — G0103 PSA SCREENING: HCPCS

## 2019-07-01 ENCOUNTER — OFFICE VISIT (OUTPATIENT)
Dept: UROLOGY | Facility: AMBULATORY SURGERY CENTER | Age: 74
End: 2019-07-01
Payer: MEDICARE

## 2019-07-01 VITALS
DIASTOLIC BLOOD PRESSURE: 80 MMHG | BODY MASS INDEX: 30.36 KG/M2 | WEIGHT: 205 LBS | HEART RATE: 60 BPM | SYSTOLIC BLOOD PRESSURE: 132 MMHG | HEIGHT: 69 IN

## 2019-07-01 DIAGNOSIS — C64.2 RENAL CELL CARCINOMA OF LEFT KIDNEY (HCC): ICD-10-CM

## 2019-07-01 DIAGNOSIS — R31.21 ASYMPTOMATIC MICROSCOPIC HEMATURIA: Primary | ICD-10-CM

## 2019-07-01 DIAGNOSIS — Z80.42 FAMILY HISTORY OF PROSTATE CANCER: ICD-10-CM

## 2019-07-01 PROCEDURE — 99214 OFFICE O/P EST MOD 30 MIN: CPT | Performed by: NURSE PRACTITIONER

## 2019-07-01 NOTE — PROGRESS NOTES
7/1/2019    Rebeca Wallace  1945  [id number]      Assessment  -Renal cell carcinoma s/p left radical nephrectomy 9/2014 managed by Dr Ruby Saldana  -Resolved gross hematuria hematuria s/p negative cystoscopy (5/2016)  -Microscopic hematuria  -Routine prostate cancer screening    Discussion/Plan  Yaima Mckeon is a 76 y o  male being managed by Dr Annmarie Donohue        -We reviewed results of recent PSA which is 2 3, previously 1 7 (6/2018)  I discussed discontinuing routine prostate cancer screening per AUA guidelines given his advanced age, no significant risk factors, and stability of PSA  He is amenable with this plan  -Patient will continue to closely follow under hematology/oncology due to history of renal cell carcinoma  He states he has pending CT scan in 6 months  Patient will also follow under nephrology for CKD3  -Follow up in 1 year for re-evaluation and BMP, or sooner if needed  -All questions answered, patient agrees with plan      History of Present Illness  76 y o  male with a history of T1b RCC s/p left radical nephrectomy (9/2014) managed by oncology, microscopic/gross hematuria s/p negative workup (5/2016) and routine prostate cancer screening presents today for follow up  He continues to follow under hematology/oncology for history of clear cell carcinoma of kidney and Nephrology  His last PSA 6/25/18 was 1 7  Patient denies any strong family history of prostate cancer  A urinalysis from 8/2018 showed no evidence of any blood or RBC  Recent imaging from December 2018 showed no evidence of recurrence or progression of lymph nodes  He denies any lower urinary tract symptoms, gross hematuria, or dysuria  No overall changes to his health since last office visit  Review of Systems  Review of Systems   Constitutional: Negative  HENT: Negative  Respiratory: Negative  Cardiovascular: Negative  Gastrointestinal: Negative      Genitourinary: Negative for decreased urine volume, difficulty urinating, dysuria, flank pain, frequency, hematuria and urgency  Musculoskeletal: Negative  Skin: Negative  Neurological: Negative  Psychiatric/Behavioral: Negative  AUA SYMPTOM SCORE      Most Recent Value   AUA SYMPTOM SCORE   How often have you had a sensation of not emptying your bladder completely after you finished urinating? 0   How often have you had to urinate again less than two hours after you finished urinating? 0   How often have you found you stopped and started again several times when you urinate?  0   How often have you found it difficult to postpone urination? 0   How often have you had a weak urinary stream?  0   How often have you had to push or strain to begin urination? 0   How many times did you most typically get up to urinate from the time you went to bed at night until the time you got up in the morning? 1   Quality of Life: If you were to spend the rest of your life with your urinary condition just the way it is now, how would you feel about that?  --   AUA SYMPTOM SCORE  1          Past Medical History  Past Medical History:   Diagnosis Date    [facility] kidney [facility])     2014    Colon polyp     Diverticulitis of colon     Last assessed - 5/12/14    Hypercholesterolemia     Hyperlipidemia     Renal neoplasm        Past Social History  Past Surgical History:   Procedure Laterality Date    COLONOSCOPY      COLONOSCOPY N/A 4/27/2018    Procedure: COLONOSCOPY;  Surgeon: Henny Samayoa MD;  Location: [facility] GI LAB;   Service: Gastroenterology    CYSTOSCOPY      Onset - 5/2/16 Yessenia Graver     JOINT REPLACEMENT Left     Hip    LAPAROSCOPIC CHOLECYSTECTOMY      NEPHRECTOMY Left     NEPHRECTOMY Left     SHOULDER ARTHROSCOPY W/ ROTATOR CUFF REPAIR Left     TOTAL HIP ARTHROPLASTY Left     original hardware became infected and pt needed a second surgery to replace hardware    UMBILICAL HERNIA REPAIR         Past Family History  Family History   Problem Relation Age of Onset    Tuberculosis Mother     Emphysema Father         Lung       Past Social history  Social History     Socioeconomic History    Marital status: /Civil Union     Spouse name: Not on file    Number of children: Not on file    Years of education: Not on file    Highest education level: Not on file   Occupational History    Not on file   Social Needs    Financial resource strain: Not on file    Food insecurity:     Worry: Not on file     Inability: Not on file    Transportation needs:     Medical: Not on file     Non-medical: Not on file   Tobacco Use    Smoking status: Former Smoker    Smokeless tobacco: Never Used   Substance and Sexual Activity    Alcohol use: Yes     Comment: Social    Drug use: No    Sexual activity: Not on file   Lifestyle    Physical activity:     Days per week: Not on file     Minutes per session: Not on file    Stress: Not on file   Relationships    Social connections:     Talks on phone: Not on file     Gets together: Not on file     Attends Jainism service: Not on file     Active member of club or organization: Not on file     Attends meetings of clubs or organizations: Not on file     Relationship status: Not on file    Intimate partner violence:     Fear of current or ex partner: Not on file     Emotionally abused: Not on file     Physically abused: Not on file     Forced sexual activity: Not on file   Other Topics Concern    Not on file   Social History Narrative    Daily caffeine consumption, 2-3 servings a day       Current Medications  Current Outpatient Medications   Medication Sig Dispense Refill    Blood Pressure KIT by Does not apply route daily 1 each 0    cholecalciferol (VITAMIN D3) 1,000 units tablet Take 1 tablet (1,000 Units total) by mouth daily 30 tablet 5    Multiple Vitamins-Minerals (MULTIVITAMIN ADULT) TABS Take by mouth      Omega-3 Fatty Acids (FISH OIL) 1,000 mg by Does not apply route      rosuvastatin (CRESTOR) 10 MG tablet TAKE 1 TABLET EVERY DAY 90 tablet 3     No current facility-administered medications for this visit  Allergies  No Known Allergies    Past Medical History, Social History, Family History, medications and allergies were reviewed  Vitals  There were no vitals filed for this visit  Physical Exam  Physical Exam   Constitutional: He is oriented to person, place, and time  He appears well-developed and well-nourished  HENT:   Head: Normocephalic  Eyes: Pupils are equal, round, and reactive to light  Neck: Normal range of motion  Cardiovascular: Normal rate and regular rhythm  Pulmonary/Chest: Effort normal    Abdominal: Soft  Normal appearance  He exhibits no distension  There is no tenderness  There is no CVA tenderness  Musculoskeletal: Normal range of motion  Neurological: He is alert and oriented to person, place, and time  Skin: Skin is warm and dry  Psychiatric: He has a normal mood and affect  His behavior is normal  Judgment and thought content normal        Results    I have personally reviewed all pertinent lab results and reviewed with patient  Lab Results   Component Value Date    PSA 2 3 06/24/2019    PSA 1 7 06/25/2018    PSA 1 9 06/07/2017     Lab Results   Component Value Date    GLUCOSE 112 01/06/2016    CALCIUM 8 8 06/10/2019     01/06/2016    K 4 1 06/10/2019    CO2 28 06/10/2019     06/10/2019    BUN 21 06/10/2019    CREATININE 1 37 (H) 06/10/2019     Lab Results   Component Value Date    WBC 7 19 06/10/2019    HGB 14 1 06/10/2019    HCT 42 0 06/10/2019    MCV 99 (H) 06/10/2019     06/10/2019     No results found for this or any previous visit (from the past 1 hour(s))

## 2019-07-02 ENCOUNTER — TELEPHONE (OUTPATIENT)
Dept: GASTROENTEROLOGY | Facility: CLINIC | Age: 74
End: 2019-07-02

## 2019-07-22 ENCOUNTER — TELEPHONE (OUTPATIENT)
Dept: OTHER | Facility: OTHER | Age: 74
End: 2019-07-22

## 2019-07-30 ENCOUNTER — TELEPHONE (OUTPATIENT)
Dept: FAMILY MEDICINE CLINIC | Facility: HOSPITAL | Age: 74
End: 2019-07-30

## 2019-08-12 ENCOUNTER — APPOINTMENT (OUTPATIENT)
Dept: LAB | Facility: CLINIC | Age: 74
End: 2019-08-12
Payer: MEDICARE

## 2019-08-12 DIAGNOSIS — I10 ESSENTIAL HYPERTENSION, MALIGNANT: Primary | ICD-10-CM

## 2019-08-12 DIAGNOSIS — Z12.5 SPECIAL SCREENING FOR MALIGNANT NEOPLASM OF PROSTATE: ICD-10-CM

## 2019-08-12 DIAGNOSIS — N18.2 CHRONIC KIDNEY DISEASE, STAGE II (MILD): ICD-10-CM

## 2019-08-12 DIAGNOSIS — E78.2 MIXED HYPERLIPIDEMIA: ICD-10-CM

## 2019-08-12 LAB
ALBUMIN SERPL BCP-MCNC: 3.9 G/DL (ref 3.5–5)
ALP SERPL-CCNC: 73 U/L (ref 46–116)
ALT SERPL W P-5'-P-CCNC: 28 U/L (ref 12–78)
ANION GAP SERPL CALCULATED.3IONS-SCNC: 7 MMOL/L (ref 4–13)
AST SERPL W P-5'-P-CCNC: 23 U/L (ref 5–45)
BASOPHILS # BLD AUTO: 0.05 THOUSANDS/ΜL (ref 0–0.1)
BASOPHILS NFR BLD AUTO: 1 % (ref 0–1)
BILIRUB SERPL-MCNC: 0.56 MG/DL (ref 0.2–1)
BUN SERPL-MCNC: 19 MG/DL (ref 5–25)
CALCIUM SERPL-MCNC: 9.3 MG/DL (ref 8.3–10.1)
CHLORIDE SERPL-SCNC: 109 MMOL/L (ref 100–108)
CHOLEST SERPL-MCNC: 167 MG/DL (ref 50–200)
CO2 SERPL-SCNC: 28 MMOL/L (ref 21–32)
CREAT SERPL-MCNC: 1.4 MG/DL (ref 0.6–1.3)
EOSINOPHIL # BLD AUTO: 0.12 THOUSAND/ΜL (ref 0–0.61)
EOSINOPHIL NFR BLD AUTO: 2 % (ref 0–6)
ERYTHROCYTE [DISTWIDTH] IN BLOOD BY AUTOMATED COUNT: 13.2 % (ref 11.6–15.1)
GFR SERPL CREATININE-BSD FRML MDRD: 49 ML/MIN/1.73SQ M
GLUCOSE P FAST SERPL-MCNC: 98 MG/DL (ref 65–99)
HCT VFR BLD AUTO: 43.7 % (ref 36.5–49.3)
HDLC SERPL-MCNC: 34 MG/DL (ref 40–60)
HGB BLD-MCNC: 14.5 G/DL (ref 12–17)
IMM GRANULOCYTES # BLD AUTO: 0.03 THOUSAND/UL (ref 0–0.2)
IMM GRANULOCYTES NFR BLD AUTO: 0 % (ref 0–2)
LDLC SERPL CALC-MCNC: 64 MG/DL (ref 0–100)
LYMPHOCYTES # BLD AUTO: 1.9 THOUSANDS/ΜL (ref 0.6–4.47)
LYMPHOCYTES NFR BLD AUTO: 28 % (ref 14–44)
MCH RBC QN AUTO: 32.4 PG (ref 26.8–34.3)
MCHC RBC AUTO-ENTMCNC: 33.2 G/DL (ref 31.4–37.4)
MCV RBC AUTO: 98 FL (ref 82–98)
MONOCYTES # BLD AUTO: 0.76 THOUSAND/ΜL (ref 0.17–1.22)
MONOCYTES NFR BLD AUTO: 11 % (ref 4–12)
NEUTROPHILS # BLD AUTO: 3.89 THOUSANDS/ΜL (ref 1.85–7.62)
NEUTS SEG NFR BLD AUTO: 58 % (ref 43–75)
NRBC BLD AUTO-RTO: 0 /100 WBCS
PLATELET # BLD AUTO: 167 THOUSANDS/UL (ref 149–390)
PMV BLD AUTO: 11 FL (ref 8.9–12.7)
POTASSIUM SERPL-SCNC: 4.2 MMOL/L (ref 3.5–5.3)
PROT SERPL-MCNC: 7.2 G/DL (ref 6.4–8.2)
PSA SERPL-MCNC: 2.1 NG/ML (ref 0–4)
RBC # BLD AUTO: 4.47 MILLION/UL (ref 3.88–5.62)
SODIUM SERPL-SCNC: 144 MMOL/L (ref 136–145)
TRIGL SERPL-MCNC: 346 MG/DL
TSH SERPL DL<=0.05 MIU/L-ACNC: 1.21 UIU/ML (ref 0.36–3.74)
WBC # BLD AUTO: 6.75 THOUSAND/UL (ref 4.31–10.16)

## 2019-08-12 PROCEDURE — 80061 LIPID PANEL: CPT

## 2019-08-12 PROCEDURE — 36415 COLL VENOUS BLD VENIPUNCTURE: CPT

## 2019-08-12 PROCEDURE — 84443 ASSAY THYROID STIM HORMONE: CPT

## 2019-08-12 PROCEDURE — 85025 COMPLETE CBC W/AUTO DIFF WBC: CPT

## 2019-08-12 PROCEDURE — G0103 PSA SCREENING: HCPCS

## 2019-08-12 PROCEDURE — 80053 COMPREHEN METABOLIC PANEL: CPT

## 2019-08-19 ENCOUNTER — OFFICE VISIT (OUTPATIENT)
Dept: FAMILY MEDICINE CLINIC | Facility: HOSPITAL | Age: 74
End: 2019-08-19
Payer: MEDICARE

## 2019-08-19 VITALS
TEMPERATURE: 98 F | WEIGHT: 203 LBS | DIASTOLIC BLOOD PRESSURE: 80 MMHG | HEART RATE: 91 BPM | SYSTOLIC BLOOD PRESSURE: 142 MMHG | BODY MASS INDEX: 30.07 KG/M2 | HEIGHT: 69 IN

## 2019-08-19 DIAGNOSIS — N18.2 CKD (CHRONIC KIDNEY DISEASE), STAGE II: ICD-10-CM

## 2019-08-19 DIAGNOSIS — Z99.89 OSA ON CPAP: ICD-10-CM

## 2019-08-19 DIAGNOSIS — C64.9 RENAL CELL CARCINOMA, UNSPECIFIED LATERALITY (HCC): ICD-10-CM

## 2019-08-19 DIAGNOSIS — G47.33 OSA ON CPAP: ICD-10-CM

## 2019-08-19 DIAGNOSIS — I10 ESSENTIAL HYPERTENSION: ICD-10-CM

## 2019-08-19 DIAGNOSIS — J01.10 ACUTE FRONTAL SINUSITIS, RECURRENCE NOT SPECIFIED: ICD-10-CM

## 2019-08-19 DIAGNOSIS — E78.2 MIXED HYPERLIPIDEMIA: Primary | ICD-10-CM

## 2019-08-19 PROCEDURE — 99214 OFFICE O/P EST MOD 30 MIN: CPT | Performed by: FAMILY MEDICINE

## 2019-08-19 RX ORDER — AZITHROMYCIN 250 MG/1
TABLET, FILM COATED ORAL
Qty: 6 TABLET | Refills: 0 | Status: SHIPPED | OUTPATIENT
Start: 2019-08-19 | End: 2019-08-23

## 2019-08-19 NOTE — PROGRESS NOTES
Assessment/Plan:         Diagnoses and all orders for this visit:    Mixed hyperlipidemia  -     Lipid Panel with Direct LDL reflex; Future    CKD (chronic kidney disease), stage II  -     Comprehensive metabolic panel; Future    Renal cell carcinoma, unspecified laterality (HCC)  -     CBC and differential; Future    Essential hypertension    Acute frontal sinusitis, recurrence not specified  -     azithromycin (ZITHROMAX) 250 mg tablet; Take 2 tablets today then 1 tablet daily x 4 days    PARVIZ on CPAP  Comments:  Excellent benefit with nightly CPAP use at [facility]  Continue nightly therapy for lifetime  Subjective:      Patient ID: Gerri Baird is a 76 y o  male  6 month follow up    Has over the past week some head and chest congestion and overall achiness  Energy has been off  Appetite is a bit down     Has CT chest abd pelvis in December    Has tingling of the hands off and on   He has been a [employer] for his profession      The following portions of the patient's history were reviewed and updated as appropriate: allergies, current medications, past family history, past medical history, past social history, past surgical history and problem list     Review of Systems   HENT: Positive for congestion and sinus pressure  Eyes: Negative  Respiratory: Negative  Cardiovascular: Negative  Gastrointestinal: Negative  Endocrine: Negative  Genitourinary: Negative  Musculoskeletal: Positive for arthralgias and myalgias  Negative for gait problem  Neurological: Positive for numbness  Psychiatric/Behavioral: Negative  All other systems reviewed and are negative  Objective:      /80   Pulse 91   Temp 98 °F (36 7 °C)   Ht 5' 8 5" (1 74 m)   Wt 92 1 kg (203 lb)   BMI 30 42 kg/m²          Physical Exam   Constitutional: He is oriented to person, place, and time  He appears well-developed and well-nourished  HENT:   Head: Normocephalic     Right Ear: External ear normal    Left Ear: External ear normal    Eyes: Pupils are equal, round, and reactive to light  Neck: No thyromegaly present  Cardiovascular: Normal rate, regular rhythm, normal heart sounds and intact distal pulses  Pulmonary/Chest: Effort normal and breath sounds normal    Neurological: He is alert and oriented to person, place, and time  Nursing note and vitals reviewed

## 2019-09-05 PROBLEM — Z99.89 OSA ON CPAP: Status: ACTIVE | Noted: 2019-09-05

## 2019-09-05 PROBLEM — G47.33 OSA ON CPAP: Status: ACTIVE | Noted: 2019-09-05

## 2019-11-18 ENCOUNTER — TELEPHONE (OUTPATIENT)
Dept: NEPHROLOGY | Facility: CLINIC | Age: 74
End: 2019-11-18

## 2019-11-18 NOTE — TELEPHONE ENCOUNTER
Left message for patient to call the office to schedule a FU appointment with Dr Teena Hartman in the Q[facility] for February

## 2019-12-04 ENCOUNTER — APPOINTMENT (OUTPATIENT)
Dept: LAB | Facility: CLINIC | Age: 74
End: 2019-12-04
Payer: MEDICARE

## 2019-12-04 DIAGNOSIS — C64.9 RENAL CELL CARCINOMA, UNSPECIFIED LATERALITY (HCC): Primary | ICD-10-CM

## 2019-12-04 LAB
ALBUMIN SERPL BCP-MCNC: 4.1 G/DL (ref 3.5–5)
ALP SERPL-CCNC: 65 U/L (ref 46–116)
ALT SERPL W P-5'-P-CCNC: 26 U/L (ref 12–78)
ANION GAP SERPL CALCULATED.3IONS-SCNC: 7 MMOL/L (ref 4–13)
AST SERPL W P-5'-P-CCNC: 22 U/L (ref 5–45)
BASOPHILS # BLD AUTO: 0.05 THOUSANDS/ΜL (ref 0–0.1)
BASOPHILS NFR BLD AUTO: 1 % (ref 0–1)
BILIRUB SERPL-MCNC: 0.63 MG/DL (ref 0.2–1)
BUN SERPL-MCNC: 22 MG/DL (ref 5–25)
CALCIUM SERPL-MCNC: 9.3 MG/DL (ref 8.3–10.1)
CHLORIDE SERPL-SCNC: 107 MMOL/L (ref 100–108)
CO2 SERPL-SCNC: 27 MMOL/L (ref 21–32)
CREAT SERPL-MCNC: 1.28 MG/DL (ref 0.6–1.3)
EOSINOPHIL # BLD AUTO: 0.13 THOUSAND/ΜL (ref 0–0.61)
EOSINOPHIL NFR BLD AUTO: 2 % (ref 0–6)
ERYTHROCYTE [DISTWIDTH] IN BLOOD BY AUTOMATED COUNT: 13.1 % (ref 11.6–15.1)
GFR SERPL CREATININE-BSD FRML MDRD: 55 ML/MIN/1.73SQ M
GLUCOSE P FAST SERPL-MCNC: 105 MG/DL (ref 65–99)
HCT VFR BLD AUTO: 46 % (ref 36.5–49.3)
HGB BLD-MCNC: 15.3 G/DL (ref 12–17)
IMM GRANULOCYTES # BLD AUTO: 0.01 THOUSAND/UL (ref 0–0.2)
IMM GRANULOCYTES NFR BLD AUTO: 0 % (ref 0–2)
LYMPHOCYTES # BLD AUTO: 2.02 THOUSANDS/ΜL (ref 0.6–4.47)
LYMPHOCYTES NFR BLD AUTO: 28 % (ref 14–44)
MCH RBC QN AUTO: 32.6 PG (ref 26.8–34.3)
MCHC RBC AUTO-ENTMCNC: 33.3 G/DL (ref 31.4–37.4)
MCV RBC AUTO: 98 FL (ref 82–98)
MONOCYTES # BLD AUTO: 0.75 THOUSAND/ΜL (ref 0.17–1.22)
MONOCYTES NFR BLD AUTO: 10 % (ref 4–12)
NEUTROPHILS # BLD AUTO: 4.25 THOUSANDS/ΜL (ref 1.85–7.62)
NEUTS SEG NFR BLD AUTO: 59 % (ref 43–75)
NRBC BLD AUTO-RTO: 0 /100 WBCS
PLATELET # BLD AUTO: 173 THOUSANDS/UL (ref 149–390)
PMV BLD AUTO: 10.8 FL (ref 8.9–12.7)
POTASSIUM SERPL-SCNC: 4 MMOL/L (ref 3.5–5.3)
PROT SERPL-MCNC: 7.1 G/DL (ref 6.4–8.2)
RBC # BLD AUTO: 4.69 MILLION/UL (ref 3.88–5.62)
SODIUM SERPL-SCNC: 141 MMOL/L (ref 136–145)
WBC # BLD AUTO: 7.21 THOUSAND/UL (ref 4.31–10.16)

## 2019-12-04 PROCEDURE — 80053 COMPREHEN METABOLIC PANEL: CPT

## 2019-12-04 PROCEDURE — 85025 COMPLETE CBC W/AUTO DIFF WBC: CPT

## 2019-12-04 PROCEDURE — 36415 COLL VENOUS BLD VENIPUNCTURE: CPT

## 2019-12-09 ENCOUNTER — HOSPITAL ENCOUNTER (OUTPATIENT)
Dept: CT IMAGING | Facility: HOSPITAL | Age: 74
Discharge: HOME | End: 2019-12-09
Attending: INTERNAL MEDICINE
Payer: MEDICARE

## 2019-12-09 DIAGNOSIS — C64.9 RENAL CELL CARCINOMA, UNSPECIFIED LATERALITY (HCC): ICD-10-CM

## 2019-12-09 PROCEDURE — 71250 CT THORAX DX C-: CPT

## 2019-12-09 PROCEDURE — 74176 CT ABD & PELVIS W/O CONTRAST: CPT

## 2019-12-13 NOTE — TELEPHONE ENCOUNTER
Left message for patient to call the office to schedule FU appointment with Dr Justyn Brownlee in the [facility] for February  Letter sent to patient regarding scheduling the appointment

## 2019-12-16 ENCOUNTER — OFFICE VISIT (OUTPATIENT)
Dept: HEMATOLOGY ONCOLOGY | Facility: HOSPITAL | Age: 74
End: 2019-12-16
Payer: MEDICARE

## 2019-12-16 VITALS
BODY MASS INDEX: 30.51 KG/M2 | OXYGEN SATURATION: 95 % | DIASTOLIC BLOOD PRESSURE: 92 MMHG | TEMPERATURE: 98.5 F | HEART RATE: 97 BPM | WEIGHT: 206 LBS | SYSTOLIC BLOOD PRESSURE: 142 MMHG | RESPIRATION RATE: 16 BRPM | HEIGHT: 69 IN

## 2019-12-16 DIAGNOSIS — C64.9 RENAL CELL CARCINOMA, UNSPECIFIED LATERALITY (HCC): Primary | ICD-10-CM

## 2019-12-16 PROCEDURE — 99214 OFFICE O/P EST MOD 30 MIN: CPT | Performed by: INTERNAL MEDICINE

## 2019-12-16 RX ORDER — AMOXICILLIN 500 MG/1
CAPSULE ORAL
Refills: 3 | COMMUNITY
Start: 2019-09-30 | End: 2022-04-11 | Stop reason: ALTCHOICE

## 2019-12-16 NOTE — PROGRESS NOTES
Hematology/Oncology Outpatient Follow- up Note  Dayo Cope 76 y o  male MRN: @ Encounter: [id number]        Date:  12/16/2019    Presenting Complaint/Diagnosis : Stage 1 renal cell carcinoma    Previous Hematologic/ Oncologic History:      Nephrectomy    Current Hematologic/ Oncologic Treatment:      Observation    Interval History:      The patient returns for a followup visit  He states he is doing well  Again he had a stage I clear cell carcinoma of the kidney for which he had a nephrectomy  His most recent CT scan In December 2019 showed no evidence of recurrence or metastatic disease  There were no new areas or nodules seen  He is otherwise doing well  Denies any nausea denies any vomiting denies any constipation or diarrhea and his 14 point review of systems today was otherwise negative  Blood counts are within acceptable limits  Test Results:    Imaging: Ct Chest Abdomen Pelvis Wo Contrast    Result Date: 12/12/2019  Narrative: CT CHEST, ABDOMEN AND PELVIS WITHOUT IV CONTRAST INDICATION:   C64 9: Malignant neoplasm of unspecified kidney, except renal pelvis  COMPARISON:  12/10/2018  TECHNIQUE: CT examination of the chest, abdomen and pelvis was performed without intravenous contrast   Axial, sagittal, and coronal 2D reformatted images were created from the source data and submitted for interpretation  Radiation dose length product (DLP) for this visit:  766 mGy-cm   This examination, like all CT scans performed in the [facility], was performed utilizing techniques to minimize radiation dose exposure, including the use of iterative reconstruction and automated exposure control  Enteric contrast was administered  FINDINGS: CHEST LUNGS:  Benign subcentimeter pulmonary nodules are again identified unchanged from prior exam   There is no tracheal or endobronchial lesion  PLEURA:  Unremarkable  HEART/GREAT VESSELS:  Unremarkable for patient's age  MEDIASTINUM AND PRAVEEN:  Unremarkable  CHEST WALL AND LOWER NECK:   Unremarkable  ABDOMEN LIVER/BILIARY TREE:  Unremarkable  GALLBLADDER:  Gallbladder is surgically absent  SPLEEN:  Unremarkable  PANCREAS:  Unremarkable  ADRENAL GLANDS:  Unremarkable  KIDNEYS/URETERS:  Patient is status post left nephrectomy  Left nephrectomy bed is unremarkable  STOMACH AND BOWEL:  Unremarkable  APPENDIX:  No findings to suggest appendicitis  ABDOMINOPELVIC CAVITY:  No ascites or free intraperitoneal air  No lymphadenopathy  VESSELS:  Unremarkable for patient's age  PELVIS REPRODUCTIVE ORGANS:  Unremarkable for patient's age  URINARY BLADDER:  Unremarkable  ABDOMINAL WALL/INGUINAL REGIONS:  Unremarkable  OSSEOUS STRUCTURES:  No acute fracture or destructive osseous lesion  Left total hip arthroplasty is present  Impression: Stable exam without evidence of recurrence or metastatic disease  Workstation performed: [id number]       Labs:   Lab Results   Component Value Date    WBC 7 21 12/04/2019    HGB 15 3 12/04/2019    HCT 46 0 12/04/2019    MCV 98 12/04/2019     12/04/2019     Lab Results   Component Value Date     01/06/2016    K 4 0 12/04/2019     12/04/2019    CO2 27 12/04/2019    ANIONGAP 6 01/06/2016    BUN 22 12/04/2019    CREATININE 1 28 12/04/2019    GLUCOSE 112 01/06/2016    GLUF 105 (H) 12/04/2019    CALCIUM 9 3 12/04/2019    AST 22 12/04/2019    ALT 26 12/04/2019    ALKPHOS 65 12/04/2019    PROT 7 2 01/06/2016    BILITOT 0 43 01/06/2016    EGFR 55 12/04/2019       Lab Results   Component Value Date    PSA 2 1 08/12/2019    PSA 2 3 06/24/2019    PSA 1 7 06/25/2018     ROS: As stated in the history of present illness otherwise his 14 point review of systems today was negative        Active Problems:   Patient Active Problem List   Diagnosis    Abnormal fasting glucose    Renal cell carcinoma (HCC)    CKD (chronic kidney disease), stage II    Disc degeneration, lumbosacral    Edema    Hematuria    Mixed hyperlipidemia    Essential hypertension    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HCC)    Osteoarthritis of knee    Enlarged prostate    Lower abdominal pain    Screening for colon cancer    History of colon polyps    Diarrhea    Left ventricular diastolic dysfunction    Stage 3 chronic kidney disease ([facility] [address] )    Medicare annual wellness visit, subsequent    Acute maxillary sinusitis    Acute frontal sinusitis    PARVIZ on CPAP       Past Medical History:   Past Medical History:   Diagnosis Date    Cancer of left kidney ([facility] [address] )     2014    Colon polyp     Diverticulitis of colon     Last assessed - 5/12/14    Hypercholesterolemia     Hyperlipidemia     Renal neoplasm        Surgical History:   Past Surgical History:   Procedure Laterality Date    COLONOSCOPY      COLONOSCOPY N/A 4/27/2018    Procedure: COLONOSCOPY;  Surgeon: Regine Burgess MD;  Location: [facility] GI LAB; Service: Gastroenterology    CYSTOSCOPY      Onset - 5/2/16 [place]     JOINT REPLACEMENT Left     Hip    LAPAROSCOPIC CHOLECYSTECTOMY      NEPHRECTOMY Left     NEPHRECTOMY Left     SHOULDER ARTHROSCOPY W/ ROTATOR CUFF REPAIR Left     TOTAL HIP ARTHROPLASTY Left     original hardware became infected and pt needed a second surgery to replace hardware    UMBILICAL HERNIA REPAIR         Family History:    Family History   Problem Relation Age of Onset    Tuberculosis Mother     Emphysema Father         Lung       Cancer-related family history is not on file      Social History:   Social History     Socioeconomic History    Marital status: /Civil Union     Spouse name: Not on file    Number of children: Not on file    Years of education: Not on file    Highest education level: Not on file   Occupational History    Not on file   Social Needs    Financial resource strain: Not on file    Food insecurity:     Worry: Not on file     Inability: Not on file    Transportation needs:     Medical: Not on file     Non-medical: Not on file Tobacco Use    Smoking status: Former Smoker    Smokeless tobacco: Never Used   Substance and Sexual Activity    Alcohol use: Yes     Comment: Social    Drug use: No    Sexual activity: Not on file   Lifestyle    Physical activity:     Days per week: Not on file     Minutes per session: Not on file    Stress: Not on file   Relationships    Social connections:     Talks on phone: Not on file     Gets together: Not on file     Attends [employer] service: Not on file     Active member of club or organization: Not on file     Attends meetings of clubs or organizations: Not on file     Relationship status: Not on file    Intimate partner violence:     Fear of current or ex partner: Not on file     Emotionally abused: Not on file     Physically abused: Not on file     Forced sexual activity: Not on file   Other Topics Concern    Not on file   Social History Narrative    Daily caffeine consumption, 2-3 servings a day       Current Medications:   Current Outpatient Medications   Medication Sig Dispense Refill    amoxicillin (AMOXIL) 500 mg capsule TAKE 4 CAPSULES BY MOUTH 1 HOUR PRIOR TO APPOINTMENT  3    Blood Pressure KIT by Does not apply route daily 1 each 0    cholecalciferol (VITAMIN D3) 1,000 units tablet Take 1 tablet (1,000 Units total) by mouth daily 30 tablet 5    Multiple Vitamins-Minerals (MULTIVITAMIN ADULT) TABS Take by mouth      Omega-3 Fatty Acids (FISH OIL) 1,000 mg by Does not apply route      rosuvastatin (CRESTOR) 10 MG tablet TAKE 1 TABLET EVERY DAY 90 tablet 3     No current facility-administered medications for this visit  Allergies: No Known Allergies    Physical Exam:    Body surface area is 2 08 meters squared      Wt Readings from Last 3 Encounters:   12/16/19 93 4 kg (206 lb)   08/19/19 92 1 kg (203 lb)   07/01/19 93 kg (205 lb)        Temp Readings from Last 3 Encounters:   12/16/19 98 5 °F (36 9 °C) (Tympanic)   08/19/19 98 °F (36 7 °C)   06/17/19 98 1 °F (36 7 °C) (Tympanic)        BP Readings from Last 3 Encounters:   12/16/19 142/92   08/19/19 142/80   07/01/19 132/80         Pulse Readings from Last 3 Encounters:   12/16/19 97   08/19/19 91   07/01/19 60         Physical Exam     Constitutional   General appearance: No acute distress, well appearing and well nourished  Eyes   Conjunctiva and lids: No swelling, erythema or discharge  Pupils and irises: Equal, round and reactive to light  Ears, Nose, Mouth, and Throat   External inspection of ears and nose: Normal     Nasal mucosa, septum, and turbinates: Normal without edema or erythema  Oropharynx: Normal with no erythema, edema, exudate or lesions  Pulmonary   Respiratory effort: No increased work of breathing or signs of respiratory distress  Auscultation of lungs: Clear to auscultation  Cardiovascular   Palpation of heart: Normal PMI, no thrills  Auscultation of heart: Normal rate and rhythm, normal S1 and S2, without murmurs  Examination of extremities for edema and/or varicosities: Normal     Carotid pulses: Normal     Abdomen   Abdomen: Non-tender, no masses  Liver and spleen: No hepatomegaly or splenomegaly  Lymphatic   Palpation of lymph nodes in neck: No lymphadenopathy  Musculoskeletal   Gait and station: Normal     Digits and nails: Normal without clubbing or cyanosis  Inspection/palpation of joints, bones, and muscles: Normal     Skin   Skin and subcutaneous tissue: Normal without rashes or lesions  Neurologic   Cranial nerves: Cranial nerves 2-12 intact  Sensation: No sensory loss  Psychiatric   Orientation to person, place, and time: Normal     Mood and affect: Normal         Assessment / Plan: This is a pleasant 68-year-old male with past medical history of hypertension and hypercholesterolemia who was found to have a left-sided renal mass  Pathology from 10 September 2014 revealed a stage I clear cell carcinoma   He also had some lymph nodes that were enlarged at the time  It was not very clear with a lymph nodes were involved versus reactive  I Decided to observe him  He is doing well  His most recent imaging Shows no evidence of metastatic disease  I'll see the patient back in 6 months with blood work  I will reimage her in a year  Goals and Barriers:  Current Goal:  Prolong Survival from Kidney cancer  Barriers: None  Patient's Capacity to Self Care:  Patient able to self care  Portions of the record may have been created with voice recognition software   Occasional wrong word or "sound a like" substitutions may have occurred due to the inherent limitations of voice recognition software   Read the chart carefully and recognize, using context, where substitutions have occurred

## 2020-02-04 ENCOUNTER — OFFICE VISIT (OUTPATIENT)
Dept: FAMILY MEDICINE CLINIC | Facility: HOSPITAL | Age: 75
End: 2020-02-04
Payer: MEDICARE

## 2020-02-04 VITALS
DIASTOLIC BLOOD PRESSURE: 104 MMHG | HEART RATE: 86 BPM | SYSTOLIC BLOOD PRESSURE: 170 MMHG | BODY MASS INDEX: 30.66 KG/M2 | WEIGHT: 207 LBS | TEMPERATURE: 97.8 F | HEIGHT: 69 IN

## 2020-02-04 DIAGNOSIS — I10 ESSENTIAL HYPERTENSION: ICD-10-CM

## 2020-02-04 DIAGNOSIS — J06.9 VIRAL UPPER RESPIRATORY TRACT INFECTION WITH COUGH: Primary | ICD-10-CM

## 2020-02-04 PROBLEM — J01.00 ACUTE MAXILLARY SINUSITIS: Status: RESOLVED | Noted: 2019-02-12 | Resolved: 2020-02-04

## 2020-02-04 PROBLEM — Z00.00 MEDICARE ANNUAL WELLNESS VISIT, SUBSEQUENT: Status: RESOLVED | Noted: 2019-02-11 | Resolved: 2020-02-04

## 2020-02-04 PROBLEM — J01.10 ACUTE FRONTAL SINUSITIS: Status: RESOLVED | Noted: 2019-08-19 | Resolved: 2020-02-04

## 2020-02-04 PROCEDURE — 4040F PNEUMOC VAC/ADMIN/RCVD: CPT | Performed by: NURSE PRACTITIONER

## 2020-02-04 PROCEDURE — 1160F RVW MEDS BY RX/DR IN RCRD: CPT | Performed by: NURSE PRACTITIONER

## 2020-02-04 PROCEDURE — 99213 OFFICE O/P EST LOW 20 MIN: CPT | Performed by: NURSE PRACTITIONER

## 2020-02-04 PROCEDURE — 3008F BODY MASS INDEX DOCD: CPT | Performed by: NURSE PRACTITIONER

## 2020-02-04 PROCEDURE — 3080F DIAST BP >= 90 MM HG: CPT | Performed by: NURSE PRACTITIONER

## 2020-02-04 PROCEDURE — 3077F SYST BP >= 140 MM HG: CPT | Performed by: NURSE PRACTITIONER

## 2020-02-04 PROCEDURE — 1036F TOBACCO NON-USER: CPT | Performed by: NURSE PRACTITIONER

## 2020-02-04 NOTE — PATIENT INSTRUCTIONS
Switch to Coricidin HBP for congestion  Flonase nasal spray to help with sinus pressure  Continue with Vicks rub

## 2020-02-04 NOTE — PROGRESS NOTES
Assessment/Plan:   Symptoms are likely viral with short duration and relatively normal exam    I am concerned that OTC decongestants are causing increase in BP  Advised he start Coricidin HBP instead  Can also start Flonase  Instructed to call with significant worsening or no improvement in 1 week  Diagnoses and all orders for this visit:    Viral upper respiratory tract infection with cough    Essential hypertension          Subjective:     Patient ID: Suzanne Gloria is a 76 y o  male  Head and chest congestion  Occasional coughing  Some sob and wheezing  Has sore throat  Ears feel "tingly"  No pain  No fever or chills  Has all over body aches  Had flu shot  No recent travel  No recent sick contacts  Taking OTC cold and flu medication  Helps a little bit  Review of Systems   Constitutional: Negative for chills and fever  HENT: Positive for congestion, postnasal drip, sinus pressure and sore throat  Negative for ear pain and sinus pain  Respiratory: Positive for cough, shortness of breath and wheezing  The following portions of the patient's history were reviewed and updated as appropriate: allergies, current medications, past family history, past medical history, past social history, past surgical history and problem list     Objective:  Vitals:    02/04/20 1009   BP: (!) 170/104   Pulse: 86   Temp: 97 8 °F (36 6 °C)      Physical Exam   Constitutional: He is oriented to person, place, and time  He appears well-developed and well-nourished  HENT:   Right Ear: Tympanic membrane, external ear and ear canal normal    Left Ear: Tympanic membrane, external ear and ear canal normal    Mouth/Throat: Uvula is midline, oropharynx is clear and moist and mucous membranes are normal  No oropharyngeal exudate  Cardiovascular: Normal rate and regular rhythm  Murmur heard  Pulmonary/Chest: Effort normal  He has no wheezes  He has rhonchi in the left lower field  He has no rales  Lymphadenopathy:     He has no cervical adenopathy  Neurological: He is alert and oriented to person, place, and time  Skin: Skin is warm and dry  Capillary refill takes less than 2 seconds  Psychiatric: He has a normal mood and affect  Vitals reviewed

## 2020-02-17 ENCOUNTER — APPOINTMENT (OUTPATIENT)
Dept: LAB | Facility: CLINIC | Age: 75
End: 2020-02-17
Payer: MEDICARE

## 2020-02-17 DIAGNOSIS — E78.2 MIXED HYPERLIPIDEMIA: ICD-10-CM

## 2020-02-17 DIAGNOSIS — N18.2 CKD (CHRONIC KIDNEY DISEASE), STAGE II: ICD-10-CM

## 2020-02-17 DIAGNOSIS — C64.9 RENAL CELL CARCINOMA, UNSPECIFIED LATERALITY (HCC): ICD-10-CM

## 2020-02-17 LAB
ALBUMIN SERPL BCP-MCNC: 3.5 G/DL (ref 3.5–5)
ALP SERPL-CCNC: 69 U/L (ref 46–116)
ALT SERPL W P-5'-P-CCNC: 28 U/L (ref 12–78)
ANION GAP SERPL CALCULATED.3IONS-SCNC: 4 MMOL/L (ref 4–13)
AST SERPL W P-5'-P-CCNC: 20 U/L (ref 5–45)
BASOPHILS # BLD AUTO: 0.08 THOUSANDS/ΜL (ref 0–0.1)
BASOPHILS NFR BLD AUTO: 1 % (ref 0–1)
BILIRUB SERPL-MCNC: 0.6 MG/DL (ref 0.2–1)
BUN SERPL-MCNC: 22 MG/DL (ref 5–25)
CALCIUM SERPL-MCNC: 9.1 MG/DL (ref 8.3–10.1)
CHLORIDE SERPL-SCNC: 108 MMOL/L (ref 100–108)
CHOLEST SERPL-MCNC: 180 MG/DL (ref 50–200)
CO2 SERPL-SCNC: 26 MMOL/L (ref 21–32)
CREAT SERPL-MCNC: 1.19 MG/DL (ref 0.6–1.3)
EOSINOPHIL # BLD AUTO: 0.18 THOUSAND/ΜL (ref 0–0.61)
EOSINOPHIL NFR BLD AUTO: 2 % (ref 0–6)
ERYTHROCYTE [DISTWIDTH] IN BLOOD BY AUTOMATED COUNT: 13 % (ref 11.6–15.1)
GFR SERPL CREATININE-BSD FRML MDRD: 59 ML/MIN/1.73SQ M
GLUCOSE P FAST SERPL-MCNC: 112 MG/DL (ref 65–99)
HCT VFR BLD AUTO: 44.5 % (ref 36.5–49.3)
HDLC SERPL-MCNC: 31 MG/DL
HGB BLD-MCNC: 14.8 G/DL (ref 12–17)
IMM GRANULOCYTES # BLD AUTO: 0.03 THOUSAND/UL (ref 0–0.2)
IMM GRANULOCYTES NFR BLD AUTO: 0 % (ref 0–2)
LDLC SERPL DIRECT ASSAY-MCNC: 78 MG/DL (ref 0–100)
LYMPHOCYTES # BLD AUTO: 2.42 THOUSANDS/ΜL (ref 0.6–4.47)
LYMPHOCYTES NFR BLD AUTO: 30 % (ref 14–44)
MCH RBC QN AUTO: 32.7 PG (ref 26.8–34.3)
MCHC RBC AUTO-ENTMCNC: 33.3 G/DL (ref 31.4–37.4)
MCV RBC AUTO: 99 FL (ref 82–98)
MONOCYTES # BLD AUTO: 0.96 THOUSAND/ΜL (ref 0.17–1.22)
MONOCYTES NFR BLD AUTO: 12 % (ref 4–12)
NEUTROPHILS # BLD AUTO: 4.53 THOUSANDS/ΜL (ref 1.85–7.62)
NEUTS SEG NFR BLD AUTO: 55 % (ref 43–75)
NRBC BLD AUTO-RTO: 0 /100 WBCS
PLATELET # BLD AUTO: 187 THOUSANDS/UL (ref 149–390)
PMV BLD AUTO: 10.1 FL (ref 8.9–12.7)
POTASSIUM SERPL-SCNC: 4.3 MMOL/L (ref 3.5–5.3)
PROT SERPL-MCNC: 6.9 G/DL (ref 6.4–8.2)
RBC # BLD AUTO: 4.52 MILLION/UL (ref 3.88–5.62)
SODIUM SERPL-SCNC: 138 MMOL/L (ref 136–145)
TRIGL SERPL-MCNC: 407 MG/DL
WBC # BLD AUTO: 8.2 THOUSAND/UL (ref 4.31–10.16)

## 2020-02-17 PROCEDURE — 36415 COLL VENOUS BLD VENIPUNCTURE: CPT

## 2020-02-17 PROCEDURE — 85025 COMPLETE CBC W/AUTO DIFF WBC: CPT

## 2020-02-17 PROCEDURE — 80061 LIPID PANEL: CPT

## 2020-02-17 PROCEDURE — 83721 ASSAY OF BLOOD LIPOPROTEIN: CPT

## 2020-02-17 PROCEDURE — 80053 COMPREHEN METABOLIC PANEL: CPT

## 2020-02-24 ENCOUNTER — OFFICE VISIT (OUTPATIENT)
Dept: FAMILY MEDICINE CLINIC | Facility: HOSPITAL | Age: 75
End: 2020-02-24
Payer: MEDICARE

## 2020-02-24 ENCOUNTER — OFFICE VISIT (OUTPATIENT)
Dept: NEPHROLOGY | Facility: HOSPITAL | Age: 75
End: 2020-02-24
Payer: MEDICARE

## 2020-02-24 VITALS
DIASTOLIC BLOOD PRESSURE: 82 MMHG | BODY MASS INDEX: 30.06 KG/M2 | RESPIRATION RATE: 16 BRPM | WEIGHT: 210 LBS | HEART RATE: 82 BPM | SYSTOLIC BLOOD PRESSURE: 136 MMHG | HEIGHT: 70 IN

## 2020-02-24 VITALS
WEIGHT: 211.6 LBS | DIASTOLIC BLOOD PRESSURE: 86 MMHG | HEIGHT: 70 IN | SYSTOLIC BLOOD PRESSURE: 128 MMHG | HEART RATE: 88 BPM | BODY MASS INDEX: 30.29 KG/M2 | TEMPERATURE: 98.4 F

## 2020-02-24 DIAGNOSIS — N18.2 CKD (CHRONIC KIDNEY DISEASE), STAGE II: ICD-10-CM

## 2020-02-24 DIAGNOSIS — N18.30 STAGE 3 CHRONIC KIDNEY DISEASE (HCC): Primary | ICD-10-CM

## 2020-02-24 DIAGNOSIS — Z12.5 SCREENING FOR MALIGNANT NEOPLASM OF PROSTATE: ICD-10-CM

## 2020-02-24 DIAGNOSIS — M79.2 PERIPHERAL NEUROPATHIC PAIN: ICD-10-CM

## 2020-02-24 DIAGNOSIS — Z11.59 ENCOUNTER FOR HEPATITIS C SCREENING TEST FOR LOW RISK PATIENT: ICD-10-CM

## 2020-02-24 DIAGNOSIS — Z00.00 MEDICARE ANNUAL WELLNESS VISIT, SUBSEQUENT: Primary | ICD-10-CM

## 2020-02-24 DIAGNOSIS — I10 ESSENTIAL HYPERTENSION: ICD-10-CM

## 2020-02-24 DIAGNOSIS — E78.2 MIXED HYPERLIPIDEMIA: ICD-10-CM

## 2020-02-24 DIAGNOSIS — C64.9 RENAL CELL CARCINOMA, UNSPECIFIED LATERALITY (HCC): ICD-10-CM

## 2020-02-24 DIAGNOSIS — R73.01 ABNORMAL FASTING GLUCOSE: ICD-10-CM

## 2020-02-24 PROCEDURE — 3079F DIAST BP 80-89 MM HG: CPT | Performed by: FAMILY MEDICINE

## 2020-02-24 PROCEDURE — 99214 OFFICE O/P EST MOD 30 MIN: CPT | Performed by: NURSE PRACTITIONER

## 2020-02-24 PROCEDURE — 1170F FXNL STATUS ASSESSED: CPT | Performed by: NURSE PRACTITIONER

## 2020-02-24 PROCEDURE — 3079F DIAST BP 80-89 MM HG: CPT | Performed by: NURSE PRACTITIONER

## 2020-02-24 PROCEDURE — 4040F PNEUMOC VAC/ADMIN/RCVD: CPT | Performed by: FAMILY MEDICINE

## 2020-02-24 PROCEDURE — 1036F TOBACCO NON-USER: CPT | Performed by: FAMILY MEDICINE

## 2020-02-24 PROCEDURE — 1170F FXNL STATUS ASSESSED: CPT | Performed by: FAMILY MEDICINE

## 2020-02-24 PROCEDURE — 1160F RVW MEDS BY RX/DR IN RCRD: CPT | Performed by: FAMILY MEDICINE

## 2020-02-24 PROCEDURE — 3075F SYST BP GE 130 - 139MM HG: CPT | Performed by: FAMILY MEDICINE

## 2020-02-24 PROCEDURE — 3008F BODY MASS INDEX DOCD: CPT | Performed by: NURSE PRACTITIONER

## 2020-02-24 PROCEDURE — 99214 OFFICE O/P EST MOD 30 MIN: CPT | Performed by: FAMILY MEDICINE

## 2020-02-24 PROCEDURE — 1123F ACP DISCUSS/DSCN MKR DOCD: CPT | Performed by: FAMILY MEDICINE

## 2020-02-24 PROCEDURE — 4040F PNEUMOC VAC/ADMIN/RCVD: CPT | Performed by: NURSE PRACTITIONER

## 2020-02-24 PROCEDURE — 1125F AMNT PAIN NOTED PAIN PRSNT: CPT | Performed by: NURSE PRACTITIONER

## 2020-02-24 PROCEDURE — 1160F RVW MEDS BY RX/DR IN RCRD: CPT | Performed by: NURSE PRACTITIONER

## 2020-02-24 PROCEDURE — 3075F SYST BP GE 130 - 139MM HG: CPT | Performed by: NURSE PRACTITIONER

## 2020-02-24 PROCEDURE — 1125F AMNT PAIN NOTED PAIN PRSNT: CPT | Performed by: FAMILY MEDICINE

## 2020-02-24 PROCEDURE — G0439 PPPS, SUBSEQ VISIT: HCPCS | Performed by: FAMILY MEDICINE

## 2020-02-24 PROCEDURE — 1036F TOBACCO NON-USER: CPT | Performed by: NURSE PRACTITIONER

## 2020-02-24 NOTE — PROGRESS NOTES
Assessment/Plan:         Diagnoses and all orders for this visit:    Medicare annual wellness visit, subsequent    Mixed hyperlipidemia  Comments:  High TG- return to 2 Omega fish oil daily  Orders:  -     Lipid Panel with Direct LDL reflex; Future    Abnormal fasting glucose  Comments:  Stable glucose  Orders:  -     HEMOGLOBIN A1C W/ EAG ESTIMATION; Future    CKD (chronic kidney disease), stage II  Comments:  Stable/improved creatinine with unilateral R kidney    Essential hypertension  Comments:  Excellent BP control  Orders:  -     Comprehensive metabolic panel; Future  -     TSH, 3rd generation with Free T4 reflex; Future    Peripheral neuropathic pain  Comments:  OK to try Neurontin as per Podiatry    Encounter for hepatitis C screening test for low risk patient  -     Hepatitis C antibody; Future    Screening for malignant neoplasm of prostate  -     PSA, Total Screen; Future          Subjective:      Patient ID: Cash Ann is a 76 y o  male  6 month follow up  Feeling well overall  He does note continuing head congestion for over a month with some sinus pressure and drainage  Advised use of Flonase    Review of medications with patient, all well tolerated  Labs reviewed in detail and copy given to patient  Creatinine elevated but actually lower than last check  To follow with Urology and Nephrology          The following portions of the patient's history were reviewed and updated as appropriate: allergies, current medications, past family history, past medical history, past social history, past surgical history and problem list     Review of Systems   Constitutional: Negative for unexpected weight change  HENT: Positive for congestion, postnasal drip and sinus pressure  Respiratory: Positive for cough  Cardiovascular: Negative  Gastrointestinal: Negative  Genitourinary: Negative  Musculoskeletal: Positive for arthralgias  Neurological: Negative      Hematological: Negative  Psychiatric/Behavioral: Negative  All other systems reviewed and are negative  Objective:      /86   Pulse 88   Temp 98 4 °F (36 9 °C)   Ht 5' 9 75" (1 772 m)   Wt 96 kg (211 lb 9 6 oz)   BMI 30 58 kg/m²          Physical Exam   Constitutional: He is oriented to person, place, and time  He appears well-developed and well-nourished  Neck: No thyromegaly present  Cardiovascular: Normal rate, regular rhythm, normal heart sounds and intact distal pulses  Pulmonary/Chest: Effort normal and breath sounds normal    Musculoskeletal: He exhibits no edema  Neurological: He is alert and oriented to person, place, and time  Psychiatric: He has a normal mood and affect  His behavior is normal  Judgment and thought content normal    Nursing note and vitals reviewed

## 2020-02-24 NOTE — PATIENT INSTRUCTIONS
We discussed her most recent renal function  Her most recent creatinine was 1 1  You are doing very well from a kidney standpoint  Please continue to avoid any Motrin containing products  Please continue to drink water throughout the day  We discussed recommending pre and post hydration prior to CAT scans that involved IV contrast   We will see you in 1 year for routine follow-up or sooner if needed  Please obtain blood work and urinalysis a couple days prior to next appointment  Please continue to follow with Hematology  Your blood pressure is very acceptable  Please call the office if you are experiencing any symptoms such as feeling dizzy, lightheaded, or chest pain

## 2020-02-24 NOTE — PATIENT INSTRUCTIONS
Medicare Preventive Visit Patient Instructions  Thank you for completing your Welcome to Medicare Visit or Medicare Annual Wellness Visit today  Your next wellness visit will be due in one year (2/24/2021)  The screening/preventive services that you may require over the next 5-10 years are detailed below  Some tests may not apply to you based off risk factors and/or age  Screening tests ordered at today's visit but not completed yet may show as past due  Also, please note that scanned in results may not display below  Preventive Screenings:  Service Recommendations Previous Testing/Comments   Colorectal Cancer Screening  · Colonoscopy    · Fecal Occult Blood Test (FOBT)/Fecal Immunochemical Test (FIT)  · Fecal DNA/Cologuard Test  · Flexible Sigmoidoscopy Age: 54-65 years old   Colonoscopy: every 10 years (May be performed more frequently if at higher risk)  OR  FOBT/FIT: every 1 year  OR  Cologuard: every 3 years  OR  Sigmoidoscopy: every 5 years  Screening may be recommended earlier than age 48 if at higher risk for colorectal cancer  Also, an individualized decision between you and your healthcare provider will decide whether screening between the ages of 74-80 would be appropriate   Colonoscopy: 04/27/2018  FOBT/FIT: Not on file  Cologuard: Not on file  Sigmoidoscopy: Not on file    Screening Current     Prostate Cancer Screening Individualized decision between patient and health care provider in men between ages of 53-78   Medicare will cover every 12 months beginning on the day after your 50th birthday PSA: 2 1 ng/mL     Screening Not Indicated     Hepatitis C Screening Once for adults born between 1945 and 1965  More frequently in patients at high risk for Hepatitis C Hep C Antibody: Not on file       Diabetes Screening 1-2 times per year if you're at risk for diabetes or have pre-diabetes Fasting glucose: 112 mg/dL   A1C: 5 7 %    Screening Current   Cholesterol Screening Once every 5 years if you don't have a lipid disorder  May order more often based on risk factors  Lipid panel: 02/17/2020    Screening Not Indicated  History Lipid Disorder      Other Preventive Screenings Covered by Medicare:  1  Abdominal Aortic Aneurysm (AAA) Screening: covered once if your at risk  You're considered to be at risk if you have a family history of AAA or a male between the age of 73-68 who smoking at least 100 cigarettes in your lifetime  2  Lung Cancer Screening: covers low dose CT scan once per year if you meet all of the following conditions: (1) Age 50-69; (2) No signs or symptoms of lung cancer; (3) Current smoker or have quit smoking within the last 15 years; (4) You have a tobacco smoking history of at least 30 pack years (packs per day x number of years you smoked); (5) You get a written order from a healthcare provider  3  Glaucoma Screening: covered annually if you're considered high risk: (1) You have diabetes OR (2) Family history of glaucoma OR (3)  aged 48 and older OR (3)  American aged 72 and older  3  Osteoporosis Screening: covered every 2 years if you meet one of the following conditions: (1) Have a vertebral abnormality; (2) On glucocorticoid therapy for more than 3 months; (3) Have primary hyperparathyroidism; (4) On osteoporosis medications and need to assess response to drug therapy  5  HIV Screening: covered annually if you're between the age of 12-76  Also covered annually if you are younger than 13 and older than 72 with risk factors for HIV infection  For pregnant patients, it is covered up to 3 times per pregnancy      Immunizations:  Immunization Recommendations   Influenza Vaccine Annual influenza vaccination during flu season is recommended for all persons aged >= 6 months who do not have contraindications   Pneumococcal Vaccine (Prevnar and Pneumovax)  * Prevnar = PCV13  * Pneumovax = PPSV23 Adults 25-60 years old: 1-3 doses may be recommended based on certain risk factors  Adults 72 years old: Prevnar (PCV13) vaccine recommended followed by Pneumovax (PPSV23) vaccine  If already received PPSV23 since turning 65, then PCV13 recommended at least one year after PPSV23 dose  Hepatitis B Vaccine 3 dose series if at intermediate or high risk (ex: diabetes, end stage renal disease, liver disease)   Tetanus (Td) Vaccine - COST NOT COVERED BY MEDICARE PART B Following completion of primary series, a booster dose should be given every 10 years to maintain immunity against tetanus  Td may also be given as tetanus wound prophylaxis  Tdap Vaccine - COST NOT COVERED BY MEDICARE PART B Recommended at least once for all adults  For pregnant patients, recommended with each pregnancy  Shingles Vaccine (Shingrix) - COST NOT COVERED BY MEDICARE PART B  2 shot series recommended in those aged 48 and above     Health Maintenance Due:      Topic Date Due    Hepatitis C Screening  1945    CRC Screening: Colonoscopy  04/27/2023     Immunizations Due:  There are no preventive care reminders to display for this patient  Advance Directives   What are advance directives? Advance directives are legal documents that state your wishes and plans for medical care  These plans are made ahead of time in case you lose your ability to make decisions for yourself  Advance directives can apply to any medical decision, such as the treatments you want, and if you want to donate organs  What are the types of advance directives? There are many types of advance directives, and each state has rules about how to use them  You may choose a combination of any of the following:  · Living will: This is a written record of the treatment you want  You can also choose which treatments you do not want, which to limit, and which to stop at a certain time  This includes surgery, medicine, IV fluid, and tube feedings  · Durable power of  for healthcare [facility]):   This is a written record that states who you want to make healthcare choices for you when you are unable to make them for yourself  This person, called a proxy, is usually a family member or a friend  You may choose more than 1 proxy  · Do not resuscitate (DNR) order:  A DNR order is used in case your heart stops beating or you stop breathing  It is a request not to have certain forms of treatment, such as CPR  A DNR order may be included in other types of advance directives  · Medical directive: This covers the care that you want if you are in a coma, near death, or unable to make decisions for yourself  You can list the treatments you want for each condition  Treatment may include pain medicine, surgery, blood transfusions, dialysis, IV or tube feedings, and a ventilator (breathing machine)  · Values history: This document has questions about your views, beliefs, and how you feel and think about life  This information can help others choose the care that you would choose  Why are advance directives important? An advance directive helps you control your care  Although spoken wishes may be used, it is better to have your wishes written down  Spoken wishes can be misunderstood, or not followed  Treatments may be given even if you do not want them  An advance directive may make it easier for your family to make difficult choices about your care  Weight Management   Why it is important to manage your weight:  Being overweight increases your risk of health conditions such as heart disease, high blood pressure, type 2 diabetes, and certain types of cancer  It can also increase your risk for osteoarthritis, sleep apnea, and other respiratory problems  Aim for a slow, steady weight loss  Even a small amount of weight loss can lower your risk of health problems  How to lose weight safely:  A safe and healthy way to lose weight is to eat fewer calories and get regular exercise   You can lose up about 1 pound a week by decreasing the number of calories you eat by 500 calories each day  Healthy meal plan for weight management:  A healthy meal plan includes a variety of foods, contains fewer calories, and helps you stay healthy  A healthy meal plan includes the following:  · Eat whole-grain foods more often  A healthy meal plan should contain fiber  Fiber is the part of grains, fruits, and vegetables that is not broken down by your body  Whole-grain foods are healthy and provide extra fiber in your diet  Some examples of whole-grain foods are whole-wheat breads and pastas, oatmeal, brown rice, and bulgur  · Eat a variety of vegetables every day  Include dark, leafy greens such as spinach, kale, chandler greens, and mustard greens  Eat yellow and orange vegetables such as carrots, sweet potatoes, and winter squash  · Eat a variety of fruits every day  Choose fresh or canned fruit (canned in its own juice or light syrup) instead of juice  Fruit juice has very little or no fiber  · Eat low-fat dairy foods  Drink fat-free (skim) milk or 1% milk  Eat fat-free yogurt and low-fat cottage cheese  Try low-fat cheeses such as mozzarella and other reduced-fat cheeses  · Choose meat and other protein foods that are low in fat  Choose beans or other legumes such as split peas or lentils  Choose fish, skinless poultry (chicken or turkey), or lean cuts of red meat (beef or pork)  Before you cook meat or poultry, cut off any visible fat  · Use less fat and oil  Try baking foods instead of frying them  Add less fat, such as margarine, sour cream, regular salad dressing and mayonnaise to foods  Eat fewer high-fat foods  Some examples of high-fat foods include french fries, doughnuts, ice cream, and cakes  · Eat fewer sweets  Limit foods and drinks that are high in sugar  This includes candy, cookies, regular soda, and sweetened drinks  Exercise:  Exercise at least 30 minutes per day on most days of the week   Some examples of exercise include walking, biking, dancing, and swimming  You can also fit in more physical activity by taking the stairs instead of the elevator or parking farther away from stores  Ask your healthcare provider about the best exercise plan for you  © Copyright [employer] 2018 Information is for End User's use only and may not be sold, redistributed or otherwise used for commercial purposes  All illustrations and images included in CareNotes® are the copyrighted property of [employer]  or [address] [place] [address] for Adults   AMBULATORY CARE:   A wellness visit  is when you see your healthcare provider to get screened for health problems  You can also get advice on how to stay healthy  Write down your questions so you remember to ask them  Ask your healthcare provider how often you should have a wellness visit  What happens at a wellness visit:  Your healthcare provider will ask about your health, and your family history of health problems  This includes high blood pressure, heart disease, and cancer  He or she will ask if you have symptoms that concern you, if you smoke, and about your mood  You may also be asked about your intake of medicines, supplements, food, and alcohol  Any of the following may be done:  · Your weight  will be checked  Your height may also be checked so your body mass index (BMI) can be calculated  Your BMI shows if you are at a healthy weight  · Your blood pressure  and heart rate will be checked  Your temperature may also be checked  · Blood and urine tests  may be done  Blood tests may be done to check your cholesterol levels  Abnormal cholesterol levels increase your risk for heart disease and stroke  You may also need a blood or urine test to check for diabetes if you are at increased risk  Urine tests may be done to look for signs of an infection or kidney disease  · A physical exam  includes checking your heartbeat and lungs with a stethoscope   Your healthcare provider may also check your skin to look for sun damage  · Screening tests  may be recommended  A screening test is done to check for diseases that may not cause symptoms  The screening tests you may need depend on your age, gender, family history, and lifestyle habits  For example, colorectal screening may be recommended if you are 48years old or older  Screening tests you need if you are a woman:   · A Pap smear  is used to screen for cervical cancer  Pap smears are usually done every 3 to 5 years depending on your age  You may need them more often if you have had abnormal Pap smear test results in the past  Ask your healthcare provider how often you should have a Pap smear  · A mammogram  is an x-ray of your breasts to screen for breast cancer  Experts recommend mammograms every 2 years starting at age 48 years  You may need a mammogram at age 52 years or younger if you have an increased risk for breast cancer  Talk to your healthcare provider about when you should start having mammograms and how often you need them  Vaccines you may need:   · Get an influenza vaccine  every year  The influenza vaccine protects you from the flu  Several types of viruses cause the flu  The viruses change over time, so new vaccines are made each year  · Get a tetanus-diphtheria (Td) booster vaccine  every 10 years  This vaccine protects you against tetanus and diphtheria  Tetanus is a severe infection that may cause painful muscle spasms and lockjaw  Diphtheria is a severe bacterial infection that causes a thick covering in the back of your mouth and throat  · Get a human papillomavirus (HPV) vaccine  if you are female and aged 23 to 32 or male 23 to 24 and never received it  This vaccine protects you from HPV infection  HPV is the most common infection spread by sexual contact  HPV may also cause vaginal, penile, and anal cancers  · Get a pneumococcal vaccine  if you are aged 72 years or older   The pneumococcal vaccine is an injection given to protect you from pneumococcal disease  Pneumococcal disease is an infection caused by pneumococcal bacteria  The infection may cause pneumonia, meningitis, or an ear infection  · Get a shingles vaccine  if you are aged 61 or older, even if you have had shingles before  The shingles vaccine is an injection to protect you from the varicella-zoster virus  This is the same virus that causes chickenpox  Shingles is a painful rash that develops in people who had chickenpox or have been exposed to the virus  How to eat healthy:  My Plate is a model for planning healthy meals  It shows the types and amounts of foods that should go on your plate  Fruits and vegetables make up about half of your plate, and grains and protein make up the other half  A serving of dairy is included on the side of your plate  The amount of calories and serving sizes you need depends on your age, gender, weight, and height  Examples of healthy foods are listed below:  · Eat a variety of vegetables  such as dark green, red, and orange vegetables  You can also include canned vegetables low in sodium (salt) and frozen vegetables without added butter or sauces  · Eat a variety of fresh fruits , canned fruit in 100% juice, frozen fruit, and dried fruit  · Include whole grains  At least half of the grains you eat should be whole grains  Examples include whole-wheat bread, wheat pasta, brown rice, and whole-grain cereals such as oatmeal     · Eat a variety of protein foods such as seafood (fish and shellfish), lean meat, and poultry without skin (turkey and chicken)  Examples of lean meats include pork leg, shoulder, or tenderloin, and beef round, sirloin, tenderloin, and extra lean ground beef  Other protein foods include eggs and egg substitutes, beans, peas, soy products, nuts, and seeds  · Choose low-fat dairy products such as skim or 1% milk or low-fat yogurt, cheese, and cottage cheese       · Limit unhealthy fats  such as butter, hard margarine, and shortening  Exercise:  Exercise at least 30 minutes per day on most days of the week  Some examples of exercise include walking, biking, dancing, and swimming  You can also fit in more physical activity by taking the stairs instead of the elevator or parking farther away from stores  Include muscle strengthening activities 2 days each week  Regular exercise provides many health benefits  It helps you manage your weight, and decreases your risk for type 2 diabetes, heart disease, stroke, and high blood pressure  Exercise can also help improve your mood  Ask your healthcare provider about the best exercise plan for you  General health and safety guidelines:   · Do not smoke  Nicotine and other chemicals in cigarettes and cigars can cause lung damage  Ask your healthcare provider for information if you currently smoke and need help to quit  E-cigarettes or smokeless tobacco still contain nicotine  Talk to your healthcare provider before you use these products  · Limit alcohol  A drink of alcohol is 12 ounces of beer, 5 ounces of wine, or 1½ ounces of liquor  · Lose weight, if needed  Being overweight increases your risk of certain health conditions  These include heart disease, high blood pressure, type 2 diabetes, and certain types of cancer  · Protect your skin  Do not sunbathe or use tanning beds  Use sunscreen with a SPF 15 or higher  Apply sunscreen at least 15 minutes before you go outside  Reapply sunscreen every 2 hours  Wear protective clothing, hats, and sunglasses when you are outside  · Drive safely  Always wear your seatbelt  Make sure everyone in your car wears a seatbelt  A seatbelt can save your life if you are in an accident  Do not use your cell phone when you are driving  This could distract you and cause an accident  Pull over if you need to make a call or send a text message  · Practice safe sex    Use latex condoms if are sexually active and have more than one partner  Your healthcare provider may recommend screening tests for sexually transmitted infections (STIs)  · Wear helmets, lifejackets, and protective gear  Always wear a helmet when you ride a bike or motorcycle, go skiing, or play sports that could cause a head injury  Wear protective equipment when you play sports  Wear a lifejacket when you are on a boat or doing water sports  © 2017 [facility] Information is for End User's use only and may not be sold, redistributed or otherwise used for commercial purposes  All illustrations and images included in CareNotes® are the copyrighted property of [employer]  or [employer]  The above information is an  only  It is not intended as medical advice for individual conditions or treatments  Talk to your doctor, nurse or pharmacist before following any medical regimen to see if it is safe and effective for you

## 2020-02-24 NOTE — PROGRESS NOTES
OFFICE FOLLOW UP - Nephrology   Anitra Arriaza 76 y o  male MRN: [id number]       ASSESSMENT and PLAN:  Diagnoses and all orders for this visit:    Stage 3 chronic kidney disease ([facility] [address] )    Essential hypertension    Renal cell carcinoma, unspecified laterality ([facility] [address] )        CKD stage III:  In the setting of solitary right kidney  Follows with Sammy Cunningham  Baseline creatinine around 1 3   -renal function remains fairly stable with most recent creatinine 1 1   -no recent urinalysis since last appointment   -continue to encourage oral hydration   -avoid NSAIDs   -would recommend use of hydration pre and post contrast imaging studies  -will see again in follow-up in 1 year or sooner if needed   -will check BMP and UA prior to next appointment  Renal cell carcinoma:  Status post nephrectomy in 2014  History of stage I clear cell carcinoma  Hematology following with 6 months surveillance  Last CT scan was negative for Mets   -hematology continuing surveillance with CT imaging every 6 months  Hypertension:  Blood pressure is acceptable in the office today  Previously on chlorthalidone per last nephrology note but patient stopped because he did not feel right  Most recent echo showed EF of 55% with grade 1 diastolic dysfunction  -currently not on antihypertensives  -did have elevated blood pressure when taking over-the-counter cold remedies  Recent upper respiratory tract infection:  PCP managing  Encouraged him to take over-the-counter Flonase and coricidin  Patient will follow up in 1 year  with Dr Antonio Hodge      HPI: Anitra Arriaza is a 76 y o  male with past medical history of CKD stage III, renal cell carcinoma, status post nephrectomy, and hypertension, who is here for routine follow-up  The patient states he is doing well  He denies any hospitalizations or severe illness over the past year  He denies chest pain or shortness of breath  He denies nausea, vomiting, diarrhea, or issues with urination  He admits to having a good appetite  He drinks about 3-4 glasses of water per day  He denies any weight gain or weight loss  He continues to follow with Hematology  ROS:   A complete review of systems was done  Pertinent positives and negatives as noted in the HPI, otherwise the review of systems is negative  Allergies: Patient has no known allergies  Medications:   Current Outpatient Medications:     amoxicillin (AMOXIL) 500 mg capsule, TAKE 4 CAPSULES BY MOUTH 1 HOUR PRIOR TO APPOINTMENT, Disp: , Rfl: 3    Blood Pressure KIT, by Does not apply route daily, Disp: 1 each, Rfl: 0    cholecalciferol (VITAMIN D3) 1,000 units tablet, Take 1 tablet (1,000 Units total) by mouth daily, Disp: 30 tablet, Rfl: 5    Multiple Vitamins-Minerals (MULTIVITAMIN ADULT) TABS, Take by mouth, Disp: , Rfl:     Omega-3 Fatty Acids (FISH OIL) 1,000 mg, by Does not apply route, Disp: , Rfl:     rosuvastatin (CRESTOR) 10 MG tablet, TAKE 1 TABLET EVERY DAY, Disp: 90 tablet, Rfl: 3    Past Medical History:   Diagnosis Date    Cancer of left kidney ([facility] [address] )     2014    Colon polyp     Diverticulitis of colon     Last assessed - 5/12/14    Hypercholesterolemia     Hyperlipidemia     Renal neoplasm      Past Surgical History:   Procedure Laterality Date    COLONOSCOPY      COLONOSCOPY N/A 4/27/2018    Procedure: COLONOSCOPY;  Surgeon: Jose Hernández MD;  Location: [facility] GI [facility]; Service: Gastroenterology    CYSTOSCOPY      Onset - 5/2/16 Jefelotte Alter     JOINT REPLACEMENT Left     Hip    LAPAROSCOPIC CHOLECYSTECTOMY      NEPHRECTOMY Left     NEPHRECTOMY Left     SHOULDER ARTHROSCOPY W/ ROTATOR CUFF REPAIR Left     TOTAL HIP ARTHROPLASTY Left     original hardware became infected and pt needed a second surgery to replace hardware    UMBILICAL HERNIA REPAIR       Family History   Problem Relation Age of Onset    Tuberculosis Mother     Emphysema Father         Lung      reports that he has quit smoking   He has never used smokeless tobacco  He reports that he drinks alcohol  He reports that he does not use drugs  Physical Exam:   Vitals:    02/24/20 1527   BP: 136/82   BP Location: Left arm   Patient Position: Sitting   Cuff Size: Large   Pulse: 82   Resp: 16   Weight: 95 3 kg (210 lb)   Height: 5' 9 75" (1 772 m)     Body mass index is 30 35 kg/m²  General: no acute distress   Eyes: conjunctivae pink, anicteric sclerae  ENT: mucous membranes moist  Neck: supple, no JVD  Chest: clear to auscultation bilaterally with no wheezes, rale or rhochi  CVS: regular rate and rhythm   Abdomen: soft, non-tender, non-distended  Extremities: no lower extremity edema   Skin: no rash  Neuro: awake and alert       Lab Results:  Results for orders placed or performed in visit on 02/17/20   CBC and differential   Result Value Ref Range    WBC 8 20 4  31 - 10 16 Thousand/uL    RBC 4 52 3 88 - 5 62 Million/uL    Hemoglobin 14 8 12 0 - 17 0 g/dL    Hematocrit 44 5 36 5 - 49 3 %    MCV 99 (H) 82 - 98 fL    MCH 32 7 26 8 - 34 3 pg    MCHC 33 3 31 4 - 37 4 g/dL    RDW 13 0 11 6 - 15 1 %    MPV 10 1 8 9 - 12 7 fL    Platelets 094 983 - 886 Thousands/uL    nRBC 0 /100 WBCs    Neutrophils Relative 55 43 - 75 %    Immat GRANS % 0 0 - 2 %    Lymphocytes Relative 30 14 - 44 %    Monocytes Relative 12 4 - 12 %    Eosinophils Relative 2 0 - 6 %    Basophils Relative 1 0 - 1 %    Neutrophils Absolute 4 53 1 85 - 7 62 Thousands/µL    Immature Grans Absolute 0 03 0 00 - 0 20 Thousand/uL    Lymphocytes Absolute 2 42 0 60 - 4 47 Thousands/µL    Monocytes Absolute 0 96 0 17 - 1 22 Thousand/µL    Eosinophils Absolute 0 18 0 00 - 0 61 Thousand/µL    Basophils Absolute 0 08 0 00 - 0 10 Thousands/µL   Comprehensive metabolic panel   Result Value Ref Range    Sodium 138 136 - 145 mmol/L    Potassium 4 3 3 5 - 5 3 mmol/L    Chloride 108 100 - 108 mmol/L    CO2 26 21 - 32 mmol/L    ANION GAP 4 4 - 13 mmol/L    BUN 22 5 - 25 mg/dL    Creatinine 1 19 0 60 - 1 30 mg/dL    Glucose, Fasting 112 (H) 65 - 99 mg/dL    Calcium 9 1 8 3 - 10 1 mg/dL    AST 20 5 - 45 U/L    ALT 28 12 - 78 U/L    Alkaline Phosphatase 69 46 - 116 U/L    Total Protein 6 9 6 4 - 8 2 g/dL    Albumin 3 5 3 5 - 5 0 g/dL    Total Bilirubin 0 60 0 20 - 1 00 mg/dL    eGFR 59 ml/min/1 73sq m   Lipid Panel with Direct LDL reflex   Result Value Ref Range    Cholesterol 180 50 - 200 mg/dL    Triglycerides 407 (H) <=150 mg/dL    HDL, Direct 31 (L) >=40 mg/dL    LDL Calculated     LDL cholesterol, direct   Result Value Ref Range    LDL Direct 78 0 - 100 mg/dl             Invalid input(s): ALBUMIN      Portions of the record may have been created with voice recognition software  Occasional wrong word or "sound a like" substitutions may have occurred due to the inherent limitations of voice recognition software  Read the chart carefully and recognize, using context, where substitutions have occurred  If you have any questions, please contact the dictating provider

## 2020-02-24 NOTE — PROGRESS NOTES
Assessment and Plan:     Problem List Items Addressed This Visit        Cardiovascular and Mediastinum    Essential hypertension    Relevant Orders    Comprehensive metabolic panel    TSH, 3rd generation with Free T4 reflex       Genitourinary    CKD (chronic kidney disease), stage II       Other    Abnormal fasting glucose    Relevant Orders    HEMOGLOBIN A1C W/ EAG ESTIMATION    Mixed hyperlipidemia    Relevant Orders    Lipid Panel with Direct LDL reflex    Medicare annual wellness visit, subsequent - Primary    Peripheral neuropathic pain      Other Visit Diagnoses     Encounter for hepatitis C screening test for low risk patient        Relevant Orders    Hepatitis C antibody    Screening for malignant neoplasm of prostate        Relevant Orders    PSA, Total Screen        BMI Counseling: Body mass index is 30 58 kg/m²  The BMI is above normal  Nutrition recommendations include decreasing portion sizes, moderation in carbohydrate intake and reducing intake of cholesterol  Exercise recommendations include exercising 3-5 times per week  No pharmacotherapy was ordered  Preventive health issues were discussed with patient, and age appropriate screening tests were ordered as noted in patient's After Visit Summary  Personalized health advice and appropriate referrals for health education or preventive services given if needed, as noted in patient's After Visit Summary       History of Present Illness:     Patient presents for Medicare Annual Wellness visit    Patient Care Team:  Jaguar Oglesby MD as PCP - General  DO Shama Buitrago MD Rubie Sons, MD Beth Gibbons, MD Cloria Morris, MD Rinda Griffith, MD as Endoscopist     Problem List:     Patient Active Problem List   Diagnosis    Abnormal fasting glucose    Renal cell carcinoma ([facility] [address] )    CKD (chronic kidney disease), stage II    Disc degeneration, lumbosacral    Edema    Hematuria    Mixed hyperlipidemia    Essential hypertension    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HCC)    Osteoarthritis of knee    Enlarged prostate    Lower abdominal pain    Screening for colon cancer    History of colon polyps    Diarrhea    Left ventricular diastolic dysfunction    Stage 3 chronic kidney disease ([facility] [address] )    Medicare annual wellness visit, subsequent    PARVIZ on CPAP    Peripheral neuropathic pain      Past Medical and Surgical History:     Past Medical History:   Diagnosis Date    Cancer of left kidney ([facility] [address] )     2014    Colon polyp     Diverticulitis of colon     Last assessed - 5/12/14    Hypercholesterolemia     Hyperlipidemia     Renal neoplasm      Past Surgical History:   Procedure Laterality Date    COLONOSCOPY      COLONOSCOPY N/A 4/27/2018    Procedure: COLONOSCOPY;  Surgeon: Amanda Gordon MD;  Location: [facility];   Service: Gastroenterology    CYSTOSCOPY      Onset - 5/2/16 Chloé Batters     JOINT REPLACEMENT Left     Hip    LAPAROSCOPIC CHOLECYSTECTOMY      NEPHRECTOMY Left     NEPHRECTOMY Left     SHOULDER ARTHROSCOPY W/ ROTATOR CUFF REPAIR Left     TOTAL HIP ARTHROPLASTY Left     original hardware became infected and pt needed a second surgery to replace hardware    UMBILICAL HERNIA REPAIR        Family History:     Family History   Problem Relation Age of Onset    Tuberculosis Mother     Emphysema Father         Lung      Social History:        Social History     Socioeconomic History    Marital status: /Civil Union     Spouse name: None    Number of children: None    Years of education: None    Highest education level: None   Occupational History    None   Social Needs    Financial resource strain: None    Food insecurity:     Worry: None     Inability: None    Transportation needs:     Medical: None     Non-medical: None   Tobacco Use    Smoking status: Former Smoker    Smokeless tobacco: Never Used   Substance and Sexual Activity    Alcohol use: Yes     Frequency: Monthly or less Comment: Social    Drug use: No    Sexual activity: None   Lifestyle    Physical activity:     Days per week: None     Minutes per session: None    Stress: None   Relationships    Social connections:     Talks on phone: None     Gets together: None     Attends Holiness service: None     Active member of club or organization: None     Attends meetings of clubs or organizations: None     Relationship status: None    Intimate partner violence:     Fear of current or ex partner: None     Emotionally abused: None     Physically abused: None     Forced sexual activity: None   Other Topics Concern    None   Social History Narrative    Daily caffeine consumption, 2-3 servings a day      Medications and Allergies:     Current Outpatient Medications   Medication Sig Dispense Refill    Blood Pressure KIT by Does not apply route daily 1 each 0    cholecalciferol (VITAMIN D3) 1,000 units tablet Take 1 tablet (1,000 Units total) by mouth daily 30 tablet 5    Multiple Vitamins-Minerals (MULTIVITAMIN ADULT) TABS Take by mouth      Omega-3 Fatty Acids (FISH OIL) 1,000 mg by Does not apply route      rosuvastatin (CRESTOR) 10 MG tablet TAKE 1 TABLET EVERY DAY 90 tablet 3    amoxicillin (AMOXIL) 500 mg capsule TAKE 4 CAPSULES BY MOUTH 1 HOUR PRIOR TO APPOINTMENT  3     No current facility-administered medications for this visit        No Known Allergies   Immunizations:     Immunization History   Administered Date(s) Administered    INFLUENZA 11/07/2018, 10/21/2019    Influenza Split High Dose Preservative Free IM 09/05/2012, 09/22/2014, 01/04/2016, 10/21/2019    Influenza TIV (IM) 10/21/2013    Pneumococcal Conjugate 13-Valent 02/05/2018    Pneumococcal Polysaccharide PPV23 09/12/2014, 11/07/2018    Tdap 10/15/2017      Health Maintenance:         Topic Date Due    Hepatitis C Screening  1945    CRC Screening: Colonoscopy  04/27/2023     There are no preventive care reminders to display for this patient  Medicare Health Risk Assessment:     /86   Pulse 88   Temp 98 4 °F (36 9 °C)   Ht 5' 9 75" (1 772 m)   Wt 96 kg (211 lb 9 6 oz)   BMI 30 58 kg/m²      Brandon Rodriguez is here for his Subsequent Wellness visit  Last Medicare Wellness visit information reviewed, patient interviewed and updates made to the record today  Health Risk Assessment:   Patient rates overall health as very good  Patient feels that their physical health rating is same  Eyesight was rated as slightly worse  Hearing was rated as slightly worse  Patient feels that their emotional and mental health rating is same  Pain experienced in the last 7 days has been none  Patient states that he has experienced no weight loss or gain in last 6 months  Depression Screening:   PHQ-2 Score: 0      Fall Risk Screening: In the past year, patient has experienced: no history of falling in past year      Home Safety:  Patient does not have trouble with stairs inside or outside of their home  Patient has working smoke alarms and has working carbon monoxide detector  Home safety hazards include: none  Nutrition:   Current diet is Regular  Medications:   Patient is currently taking over-the-counter supplements  OTC medications include: see medication list  Patient is able to manage medications  Activities of Daily Living (ADLs)/Instrumental Activities of Daily Living (IADLs):   Walk and transfer into and out of bed and chair?: Yes  Dress and groom yourself?: Yes    Bathe or shower yourself?: Yes    Feed yourself? Yes  Do your laundry/housekeeping?: Yes  Manage your money, pay your bills and track your expenses?: Yes  Make your own meals?: Yes    Do your own shopping?: Yes    Previous Hospitalizations:   Any hospitalizations or ED visits within the last 12 months?: No      Advance Care Planning:   Living will: Yes    Durable POA for healthcare:  Yes    Advanced directive: Yes    Advanced directive counseling given: No    Five wishes given: No    Patient declined ACP directive: No    End of Life Decisions reviewed with patient: Yes    Provider agrees with end of life decisions: Yes      Cognitive Screening:   Provider or family/friend/caregiver concerned regarding cognition?: No    PREVENTIVE SCREENINGS      Cardiovascular Screening:    General: Screening Not Indicated and History Lipid Disorder      Diabetes Screening:     General: Screening Current      Colorectal Cancer Screening:     General: Screening Current      Prostate Cancer Screening:    General: Screening Not Indicated      Osteoporosis Screening:    General: Screening Not Indicated      Abdominal Aortic Aneurysm (AAA) Screening:    Risk factors include: age between 73-67 yo and tobacco use        General: Screening Current      Lung Cancer Screening:     General: Screening Current      Hepatitis C Screening:    General: Risks and Benefits Discussed    Hep C Screening Accepted: Yes        Stacie Ruvalcaba MD

## 2020-03-24 DIAGNOSIS — E78.2 MIXED HYPERLIPIDEMIA: ICD-10-CM

## 2020-03-24 RX ORDER — ROSUVASTATIN CALCIUM 10 MG/1
TABLET, COATED ORAL
Qty: 90 TABLET | Refills: 3 | Status: SHIPPED | OUTPATIENT
Start: 2020-03-24 | End: 2020-03-27 | Stop reason: CLARIF

## 2020-03-26 ENCOUNTER — TELEPHONE (OUTPATIENT)
Dept: OTHER | Facility: OTHER | Age: 75
End: 2020-03-26

## 2020-03-26 NOTE — TELEPHONE ENCOUNTER
Pharmacy is wondering if they can substitute pravastatin 80MG, Simzastatin 40MG or Atorvastatin 20MG for the patient's Rosuvastatin 10MG to lower the patients cost

## 2020-03-27 DIAGNOSIS — E78.2 MIXED HYPERLIPIDEMIA: Primary | ICD-10-CM

## 2020-03-27 RX ORDER — ATORVASTATIN CALCIUM 20 MG/1
20 TABLET, FILM COATED ORAL DAILY
Qty: 90 TABLET | Refills: 3 | Status: SHIPPED | OUTPATIENT
Start: 2020-03-27 | End: 2020-07-02 | Stop reason: SDUPTHER

## 2020-06-08 ENCOUNTER — APPOINTMENT (OUTPATIENT)
Dept: LAB | Facility: CLINIC | Age: 75
End: 2020-06-08
Payer: MEDICARE

## 2020-06-08 DIAGNOSIS — C64.9 RENAL CELL CARCINOMA, UNSPECIFIED LATERALITY (HCC): ICD-10-CM

## 2020-06-08 LAB
ALBUMIN SERPL BCP-MCNC: 3.5 G/DL (ref 3.5–5)
ALP SERPL-CCNC: 63 U/L (ref 46–116)
ALT SERPL W P-5'-P-CCNC: 36 U/L (ref 12–78)
ANION GAP SERPL CALCULATED.3IONS-SCNC: 5 MMOL/L (ref 4–13)
AST SERPL W P-5'-P-CCNC: 29 U/L (ref 5–45)
BASOPHILS # BLD AUTO: 0.03 THOUSANDS/ΜL (ref 0–0.1)
BASOPHILS NFR BLD AUTO: 0 % (ref 0–1)
BILIRUB SERPL-MCNC: 0.66 MG/DL (ref 0.2–1)
BUN SERPL-MCNC: 17 MG/DL (ref 5–25)
CALCIUM SERPL-MCNC: 9.2 MG/DL (ref 8.3–10.1)
CHLORIDE SERPL-SCNC: 106 MMOL/L (ref 100–108)
CO2 SERPL-SCNC: 29 MMOL/L (ref 21–32)
CREAT SERPL-MCNC: 1.36 MG/DL (ref 0.6–1.3)
EOSINOPHIL # BLD AUTO: 0.13 THOUSAND/ΜL (ref 0–0.61)
EOSINOPHIL NFR BLD AUTO: 2 % (ref 0–6)
ERYTHROCYTE [DISTWIDTH] IN BLOOD BY AUTOMATED COUNT: 12.9 % (ref 11.6–15.1)
GFR SERPL CREATININE-BSD FRML MDRD: 51 ML/MIN/1.73SQ M
GLUCOSE P FAST SERPL-MCNC: 106 MG/DL (ref 65–99)
HCT VFR BLD AUTO: 44.4 % (ref 36.5–49.3)
HGB BLD-MCNC: 14.6 G/DL (ref 12–17)
IMM GRANULOCYTES # BLD AUTO: 0.03 THOUSAND/UL (ref 0–0.2)
IMM GRANULOCYTES NFR BLD AUTO: 0 % (ref 0–2)
LYMPHOCYTES # BLD AUTO: 1.91 THOUSANDS/ΜL (ref 0.6–4.47)
LYMPHOCYTES NFR BLD AUTO: 28 % (ref 14–44)
MCH RBC QN AUTO: 32.4 PG (ref 26.8–34.3)
MCHC RBC AUTO-ENTMCNC: 32.9 G/DL (ref 31.4–37.4)
MCV RBC AUTO: 98 FL (ref 82–98)
MONOCYTES # BLD AUTO: 0.75 THOUSAND/ΜL (ref 0.17–1.22)
MONOCYTES NFR BLD AUTO: 11 % (ref 4–12)
NEUTROPHILS # BLD AUTO: 4.08 THOUSANDS/ΜL (ref 1.85–7.62)
NEUTS SEG NFR BLD AUTO: 59 % (ref 43–75)
NRBC BLD AUTO-RTO: 0 /100 WBCS
PLATELET # BLD AUTO: 163 THOUSANDS/UL (ref 149–390)
PMV BLD AUTO: 10.5 FL (ref 8.9–12.7)
POTASSIUM SERPL-SCNC: 4.3 MMOL/L (ref 3.5–5.3)
PROT SERPL-MCNC: 6.9 G/DL (ref 6.4–8.2)
RBC # BLD AUTO: 4.51 MILLION/UL (ref 3.88–5.62)
SODIUM SERPL-SCNC: 140 MMOL/L (ref 136–145)
WBC # BLD AUTO: 6.93 THOUSAND/UL (ref 4.31–10.16)

## 2020-06-08 PROCEDURE — 80053 COMPREHEN METABOLIC PANEL: CPT

## 2020-06-08 PROCEDURE — 36415 COLL VENOUS BLD VENIPUNCTURE: CPT

## 2020-06-08 PROCEDURE — 85025 COMPLETE CBC W/AUTO DIFF WBC: CPT

## 2020-06-15 ENCOUNTER — TELEPHONE (OUTPATIENT)
Dept: HEMATOLOGY ONCOLOGY | Facility: CLINIC | Age: 75
End: 2020-06-15

## 2020-06-15 ENCOUNTER — OFFICE VISIT (OUTPATIENT)
Dept: HEMATOLOGY ONCOLOGY | Facility: HOSPITAL | Age: 75
End: 2020-06-15
Payer: MEDICARE

## 2020-06-15 VITALS
HEIGHT: 70 IN | RESPIRATION RATE: 16 BRPM | OXYGEN SATURATION: 96 % | WEIGHT: 208 LBS | BODY MASS INDEX: 29.78 KG/M2 | HEART RATE: 111 BPM | SYSTOLIC BLOOD PRESSURE: 170 MMHG | DIASTOLIC BLOOD PRESSURE: 108 MMHG | TEMPERATURE: 98.5 F

## 2020-06-15 DIAGNOSIS — C64.9 RENAL CELL CARCINOMA, UNSPECIFIED LATERALITY (HCC): Primary | ICD-10-CM

## 2020-06-15 PROCEDURE — 99214 OFFICE O/P EST MOD 30 MIN: CPT | Performed by: INTERNAL MEDICINE

## 2020-06-15 PROCEDURE — 3077F SYST BP >= 140 MM HG: CPT | Performed by: INTERNAL MEDICINE

## 2020-06-15 PROCEDURE — 3008F BODY MASS INDEX DOCD: CPT | Performed by: INTERNAL MEDICINE

## 2020-06-15 PROCEDURE — 1036F TOBACCO NON-USER: CPT | Performed by: INTERNAL MEDICINE

## 2020-06-15 PROCEDURE — 1160F RVW MEDS BY RX/DR IN RCRD: CPT | Performed by: INTERNAL MEDICINE

## 2020-06-15 PROCEDURE — 3080F DIAST BP >= 90 MM HG: CPT | Performed by: INTERNAL MEDICINE

## 2020-06-15 PROCEDURE — 4040F PNEUMOC VAC/ADMIN/RCVD: CPT | Performed by: INTERNAL MEDICINE

## 2020-06-15 RX ORDER — GABAPENTIN 100 MG/1
100 CAPSULE ORAL 3 TIMES DAILY
COMMUNITY
Start: 2020-05-20

## 2020-06-29 ENCOUNTER — APPOINTMENT (OUTPATIENT)
Dept: LAB | Facility: CLINIC | Age: 75
End: 2020-06-29
Payer: MEDICARE

## 2020-06-29 DIAGNOSIS — R73.01 ABNORMAL FASTING GLUCOSE: ICD-10-CM

## 2020-06-29 DIAGNOSIS — Z11.59 ENCOUNTER FOR HEPATITIS C SCREENING TEST FOR LOW RISK PATIENT: ICD-10-CM

## 2020-06-29 DIAGNOSIS — N18.30 STAGE 3 CHRONIC KIDNEY DISEASE (HCC): ICD-10-CM

## 2020-06-29 DIAGNOSIS — E78.2 MIXED HYPERLIPIDEMIA: ICD-10-CM

## 2020-06-29 DIAGNOSIS — I10 ESSENTIAL HYPERTENSION: ICD-10-CM

## 2020-06-29 DIAGNOSIS — Z12.5 SCREENING FOR MALIGNANT NEOPLASM OF PROSTATE: ICD-10-CM

## 2020-06-29 LAB
ALBUMIN SERPL BCP-MCNC: 3.7 G/DL (ref 3.5–5)
ALP SERPL-CCNC: 71 U/L (ref 46–116)
ALT SERPL W P-5'-P-CCNC: 30 U/L (ref 12–78)
ANION GAP SERPL CALCULATED.3IONS-SCNC: 7 MMOL/L (ref 4–13)
AST SERPL W P-5'-P-CCNC: 25 U/L (ref 5–45)
BILIRUB SERPL-MCNC: 0.6 MG/DL (ref 0.2–1)
BUN SERPL-MCNC: 19 MG/DL (ref 5–25)
CALCIUM SERPL-MCNC: 9.2 MG/DL (ref 8.3–10.1)
CHLORIDE SERPL-SCNC: 109 MMOL/L (ref 100–108)
CHOLEST SERPL-MCNC: 167 MG/DL (ref 50–200)
CO2 SERPL-SCNC: 24 MMOL/L (ref 21–32)
CREAT SERPL-MCNC: 1.22 MG/DL (ref 0.6–1.3)
EST. AVERAGE GLUCOSE BLD GHB EST-MCNC: 108 MG/DL
GFR SERPL CREATININE-BSD FRML MDRD: 58 ML/MIN/1.73SQ M
GLUCOSE P FAST SERPL-MCNC: 97 MG/DL (ref 65–99)
HBA1C MFR BLD: 5.4 %
HCV AB SER QL: NORMAL
HDLC SERPL-MCNC: 31 MG/DL
LDLC SERPL CALC-MCNC: 80 MG/DL (ref 0–100)
POTASSIUM SERPL-SCNC: 3.8 MMOL/L (ref 3.5–5.3)
PROT SERPL-MCNC: 7.1 G/DL (ref 6.4–8.2)
PSA SERPL-MCNC: 2 NG/ML (ref 0–4)
SODIUM SERPL-SCNC: 140 MMOL/L (ref 136–145)
TRIGL SERPL-MCNC: 281 MG/DL
TSH SERPL DL<=0.05 MIU/L-ACNC: 1.23 UIU/ML (ref 0.36–3.74)

## 2020-06-29 PROCEDURE — 86803 HEPATITIS C AB TEST: CPT

## 2020-06-29 PROCEDURE — 83036 HEMOGLOBIN GLYCOSYLATED A1C: CPT

## 2020-06-29 PROCEDURE — G0103 PSA SCREENING: HCPCS

## 2020-06-29 PROCEDURE — 36415 COLL VENOUS BLD VENIPUNCTURE: CPT

## 2020-06-29 PROCEDURE — 80053 COMPREHEN METABOLIC PANEL: CPT

## 2020-06-29 PROCEDURE — 80061 LIPID PANEL: CPT

## 2020-06-29 PROCEDURE — 84443 ASSAY THYROID STIM HORMONE: CPT

## 2020-07-02 ENCOUNTER — TELEPHONE (OUTPATIENT)
Dept: FAMILY MEDICINE CLINIC | Facility: HOSPITAL | Age: 75
End: 2020-07-02

## 2020-07-02 DIAGNOSIS — E78.2 MIXED HYPERLIPIDEMIA: ICD-10-CM

## 2020-07-02 RX ORDER — ATORVASTATIN CALCIUM 20 MG/1
20 TABLET, FILM COATED ORAL DAILY
Qty: 30 TABLET | Refills: 3 | Status: SHIPPED | OUTPATIENT
Start: 2020-07-02 | End: 2020-10-30

## 2020-07-02 NOTE — TELEPHONE ENCOUNTER
Patient needs Rosuvastatin sent to [facility] in [facility]  It was originally cancelled in March due to cost, but patient said that he is ok to pay that price  Atorvastatin was sent to the mail order, but they don't use a mail order service  [facility] inactivated previous prescription, please send a new prescription for him  He only has one pill left

## 2020-07-02 NOTE — TELEPHONE ENCOUNTER
Pt's wife called to have Rosuvastatin sent to pharmacy  This medication is not in pt's current med list  Atorvastatin is in med list, pt's wife states as far as she knows pt is not taking Atorvastatin   PCB

## 2020-07-06 ENCOUNTER — OFFICE VISIT (OUTPATIENT)
Dept: UROLOGY | Facility: AMBULATORY SURGERY CENTER | Age: 75
End: 2020-07-06
Payer: MEDICARE

## 2020-07-06 VITALS
HEART RATE: 88 BPM | SYSTOLIC BLOOD PRESSURE: 128 MMHG | WEIGHT: 207 LBS | HEIGHT: 70 IN | TEMPERATURE: 99.6 F | BODY MASS INDEX: 29.63 KG/M2 | DIASTOLIC BLOOD PRESSURE: 84 MMHG

## 2020-07-06 DIAGNOSIS — C64.9 RENAL CELL CARCINOMA, UNSPECIFIED LATERALITY (HCC): Primary | ICD-10-CM

## 2020-07-06 PROCEDURE — 3074F SYST BP LT 130 MM HG: CPT | Performed by: NURSE PRACTITIONER

## 2020-07-06 PROCEDURE — 3008F BODY MASS INDEX DOCD: CPT | Performed by: NURSE PRACTITIONER

## 2020-07-06 PROCEDURE — 99213 OFFICE O/P EST LOW 20 MIN: CPT | Performed by: NURSE PRACTITIONER

## 2020-07-06 PROCEDURE — 4040F PNEUMOC VAC/ADMIN/RCVD: CPT | Performed by: NURSE PRACTITIONER

## 2020-07-06 PROCEDURE — 1036F TOBACCO NON-USER: CPT | Performed by: NURSE PRACTITIONER

## 2020-07-06 PROCEDURE — 1160F RVW MEDS BY RX/DR IN RCRD: CPT | Performed by: NURSE PRACTITIONER

## 2020-07-06 PROCEDURE — 3079F DIAST BP 80-89 MM HG: CPT | Performed by: NURSE PRACTITIONER

## 2020-07-06 NOTE — PROGRESS NOTES
7/6/2020    Martha Zuñiga  1945  [id number]      Assessment  -Renal cell carcinoma s/p left radical nephrectomy 9/2014 managed by Dr Lauren Gilliland  -Resolved gross hematuria hematuria s/p negative cystoscopy (5/2016)  -Routine prostate cancer screening    Discussion/Plan  Saskia Garrett is a 76 y o  male being managed by our office    1  Stage I renal cell carcinoma s/p left radical nephrectomy (2014)- his recent creatinine from 06/29/2020 remains stable at 1 22  He will continue to follow under hematology/oncology  Patient's next appointment is in 6 months with repeat CT scan  We will continue to check BMP on yearly basis  Prostate cancer screening discontinued following AUA guidelines  Patient amenable with plan    Follow up in 1 year with BMP  -All questions answered, patient agrees with plan      History of Present Illness  76 y o  male with a history of left RCC s/p radical nephrectomy, hematuria, and prostate cancer screening presents today for follow up  Patient previously known to Dr Shelley Lipscomb  He continues to follow under Hematology/Oncology and Nephrology  His most recent CT scan from December 2019 showed no evidence of recurrence or metastatic disease  Patient denies any episodes of flank pain, lower urinary tract symptoms, gross hematuria, or dysuria  Prostate cancer screening was discontinued at last office visit  However, recent PSA from 06/29/2020 was obtained stable at 2 0  No additional changes to his overall health  Review of Systems  Review of Systems   Constitutional: Negative  HENT: Negative  Respiratory: Negative  Cardiovascular: Negative  Gastrointestinal: Negative  Genitourinary: Negative for decreased urine volume, difficulty urinating, dysuria, flank pain, frequency, hematuria and urgency  Musculoskeletal: Negative  Skin: Negative  Neurological: Negative  Psychiatric/Behavioral: Negative          Past Medical History  Past Medical History:   Diagnosis Date  Cancer of left kidney [facility])     2014    Colon polyp     Diverticulitis of colon     Last assessed - 5/12/14    Hypercholesterolemia     Hyperlipidemia     Renal neoplasm        Past Social History  Past Surgical History:   Procedure Laterality Date    COLONOSCOPY      COLONOSCOPY N/A 4/27/2018    Procedure: COLONOSCOPY;  Surgeon: Caron Ga MD;  Location: [facility];   Service: Gastroenterology    CYSTOSCOPY      Onset - 5/2/16 Joie Ode     JOINT REPLACEMENT Left     Hip    LAPAROSCOPIC CHOLECYSTECTOMY      NEPHRECTOMY Left     NEPHRECTOMY Left     SHOULDER ARTHROSCOPY W/ ROTATOR CUFF REPAIR Left     TOTAL HIP ARTHROPLASTY Left     original hardware became infected and pt needed a second surgery to replace hardware    UMBILICAL HERNIA REPAIR         Past Family History  Family History   Problem Relation Age of Onset    Tuberculosis Mother     Emphysema Father         Lung       Past Social history  Social History     Socioeconomic History    Marital status: /Civil Union     Spouse name: Not on file    Number of children: Not on file    Years of education: Not on file    Highest education level: Not on file   Occupational History    Not on file   Social Needs    Financial resource strain: Not on file    Food insecurity:     Worry: Not on file     Inability: Not on file    Transportation needs:     Medical: Not on file     Non-medical: Not on file   Tobacco Use    Smoking status: Former Smoker    Smokeless tobacco: Never Used   Substance and Sexual Activity    Alcohol use: Yes     Frequency: Monthly or less     Comment: Social    Drug use: No    Sexual activity: Not on file   Lifestyle    Physical activity:     Days per week: Not on file     Minutes per session: Not on file    Stress: Not on file   Relationships    Social connections:     Talks on phone: Not on file     Gets together: Not on file     Attends Restoration service: Not on file     Active member of club or organization: Not on file     Attends meetings of clubs or organizations: Not on file     Relationship status: Not on file    Intimate partner violence:     Fear of current or ex partner: Not on file     Emotionally abused: Not on file     Physically abused: Not on file     Forced sexual activity: Not on file   Other Topics Concern    Not on file   Social History Narrative    Daily caffeine consumption, 2-3 servings a day       Current Medications  Current Outpatient Medications   Medication Sig Dispense Refill    amoxicillin (AMOXIL) 500 mg capsule TAKE 4 CAPSULES BY MOUTH 1 HOUR PRIOR TO APPOINTMENT  3    atorvastatin (LIPITOR) 20 mg tablet Take 1 tablet (20 mg total) by mouth daily 30 tablet 3    Blood Pressure KIT by Does not apply route daily 1 each 0    cholecalciferol (VITAMIN D3) 1,000 units tablet Take 1 tablet (1,000 Units total) by mouth daily 30 tablet 5    Multiple Vitamins-Minerals (MULTIVITAMIN ADULT) TABS Take by mouth      Omega-3 Fatty Acids (FISH OIL) 1,000 mg by Does not apply route      gabapentin (NEURONTIN) 100 mg capsule Take 100 mg by mouth 3 (three) times a day       No current facility-administered medications for this visit  Allergies  No Known Allergies    Past Medical History, Social History, Family History, medications and allergies were reviewed  Vitals  Vitals:    07/06/20 1344   BP: 128/84   BP Location: Right arm   Patient Position: Sitting   Cuff Size: Adult   Pulse: 88   Temp: 99 6 °F (37 6 °C)   TempSrc: Temporal   Weight: 93 9 kg (207 lb)   Height: 5' 9 7" (1 77 m)       Physical Exam  Physical Exam   Constitutional: He is oriented to person, place, and time  He appears well-developed and well-nourished  HENT:   Head: Normocephalic  Eyes: Pupils are equal, round, and reactive to light  Neck: Normal range of motion  Pulmonary/Chest: Effort normal    Abdominal: Soft  Normal appearance  There is no CVA tenderness     Musculoskeletal: Normal range of motion  Neurological: He is alert and oriented to person, place, and time  Skin: Skin is warm and dry  Psychiatric: He has a normal mood and affect  His behavior is normal  Judgment and thought content normal        Results    I have personally reviewed all pertinent lab results and reviewed with patient  Lab Results   Component Value Date    PSA 2 0 06/29/2020    PSA 2 1 08/12/2019    PSA 2 3 06/24/2019     Lab Results   Component Value Date    GLUCOSE 112 01/06/2016    CALCIUM 9 2 06/29/2020     01/06/2016    K 3 8 06/29/2020    CO2 24 06/29/2020     (H) 06/29/2020    BUN 19 06/29/2020    CREATININE 1 22 06/29/2020     Lab Results   Component Value Date    WBC 6 93 06/08/2020    HGB 14 6 06/08/2020    HCT 44 4 06/08/2020    MCV 98 06/08/2020     06/08/2020     No results found for this or any previous visit (from the past 1 hour(s))

## 2020-08-24 ENCOUNTER — OFFICE VISIT (OUTPATIENT)
Dept: FAMILY MEDICINE CLINIC | Facility: HOSPITAL | Age: 75
End: 2020-08-24
Payer: MEDICARE

## 2020-08-24 VITALS
HEIGHT: 69 IN | BODY MASS INDEX: 30.96 KG/M2 | DIASTOLIC BLOOD PRESSURE: 100 MMHG | WEIGHT: 209 LBS | SYSTOLIC BLOOD PRESSURE: 176 MMHG | HEART RATE: 92 BPM | TEMPERATURE: 98.6 F

## 2020-08-24 DIAGNOSIS — I35.8 AORTIC VALVE SCLEROSIS: ICD-10-CM

## 2020-08-24 DIAGNOSIS — Z23 ENCOUNTER FOR IMMUNIZATION: ICD-10-CM

## 2020-08-24 DIAGNOSIS — R73.01 ABNORMAL FASTING GLUCOSE: ICD-10-CM

## 2020-08-24 DIAGNOSIS — E78.2 MIXED HYPERLIPIDEMIA: ICD-10-CM

## 2020-08-24 DIAGNOSIS — I10 ESSENTIAL HYPERTENSION: Primary | ICD-10-CM

## 2020-08-24 DIAGNOSIS — C64.9 RENAL CELL CARCINOMA, UNSPECIFIED LATERALITY (HCC): ICD-10-CM

## 2020-08-24 PROCEDURE — 3080F DIAST BP >= 90 MM HG: CPT | Performed by: FAMILY MEDICINE

## 2020-08-24 PROCEDURE — 1036F TOBACCO NON-USER: CPT | Performed by: FAMILY MEDICINE

## 2020-08-24 PROCEDURE — 1160F RVW MEDS BY RX/DR IN RCRD: CPT | Performed by: FAMILY MEDICINE

## 2020-08-24 PROCEDURE — 4040F PNEUMOC VAC/ADMIN/RCVD: CPT | Performed by: FAMILY MEDICINE

## 2020-08-24 PROCEDURE — 99214 OFFICE O/P EST MOD 30 MIN: CPT | Performed by: FAMILY MEDICINE

## 2020-08-24 PROCEDURE — 90662 IIV NO PRSV INCREASED AG IM: CPT

## 2020-08-24 PROCEDURE — 3077F SYST BP >= 140 MM HG: CPT | Performed by: FAMILY MEDICINE

## 2020-08-24 PROCEDURE — G0008 ADMIN INFLUENZA VIRUS VAC: HCPCS

## 2020-08-24 PROCEDURE — 3008F BODY MASS INDEX DOCD: CPT | Performed by: FAMILY MEDICINE

## 2020-08-24 RX ORDER — AMLODIPINE BESYLATE 5 MG/1
5 TABLET ORAL DAILY
Qty: 30 TABLET | Refills: 5 | Status: SHIPPED | OUTPATIENT
Start: 2020-08-24 | End: 2021-01-30

## 2020-08-24 NOTE — PROGRESS NOTES
Assessment/Plan:         Diagnoses and all orders for this visit:    Essential hypertension  Comments:  Persistent BP elevation- needs to start medication  Orders:  -     amLODIPine (NORVASC) 5 mg tablet; Take 1 tablet (5 mg total) by mouth daily    Mixed hyperlipidemia  Comments:  Very good lipid control    Abnormal fasting glucose  Comments:  Continue carbohydrate limit    Renal cell carcinoma, unspecified laterality (HCC)  Comments:  JUAN- continue Urology F/U    Aortic valve sclerosis  Comments:  Last echo 2018  Will recheck at next visit  Encounter for immunization  -     influenza vaccine, high-dose, PF 0 7 mL (FLUZONE HIGH-DOSE)          Subjective:      Patient ID: Mireille Meeks is a 76 y o  male  6 month follow up    No recent illness or injury  No COVID contact  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  Not much energy   Sleeps fairly well but not refreshed  Some slow urinary stream  Nocturia 1-2 times a night  Labs reviewed in detail and copy given to him    Discussed significance of elevations in BP      The following portions of the patient's history were reviewed and updated as appropriate: allergies, current medications, past family history, past medical history, past social history, past surgical history and problem list     Review of Systems   Constitutional: Positive for fatigue  Negative for unexpected weight change  HENT: Negative  Respiratory: Negative  Cardiovascular: Negative  Gastrointestinal: Negative  Genitourinary: Positive for difficulty urinating  Musculoskeletal: Negative  Neurological: Negative  All other systems reviewed and are negative  Objective:      BP (!) 176/100   Pulse 92   Temp 98 6 °F (37 °C)   Ht 5' 9" (1 753 m)   Wt 94 8 kg (209 lb)   BMI 30 86 kg/m²          Physical Exam  Vitals signs and nursing note reviewed  Constitutional:       Appearance: Normal appearance     Cardiovascular:      Rate and Rhythm: Normal rate and regular rhythm  Heart sounds: Murmur present  Pulmonary:      Effort: Pulmonary effort is normal       Breath sounds: Normal breath sounds  Musculoskeletal: Normal range of motion  Skin:     General: Skin is warm  Neurological:      Mental Status: He is alert  Psychiatric:         Mood and Affect: Mood normal          Behavior: Behavior normal          Thought Content:  Thought content normal          Judgment: Judgment normal

## 2020-10-05 ENCOUNTER — OFFICE VISIT (OUTPATIENT)
Dept: FAMILY MEDICINE CLINIC | Facility: HOSPITAL | Age: 75
End: 2020-10-05
Payer: MEDICARE

## 2020-10-05 VITALS
SYSTOLIC BLOOD PRESSURE: 124 MMHG | TEMPERATURE: 96.7 F | WEIGHT: 214 LBS | DIASTOLIC BLOOD PRESSURE: 76 MMHG | HEART RATE: 80 BPM | BODY MASS INDEX: 31.7 KG/M2 | HEIGHT: 69 IN

## 2020-10-05 DIAGNOSIS — I10 ESSENTIAL HYPERTENSION: Primary | ICD-10-CM

## 2020-10-05 DIAGNOSIS — R73.01 ABNORMAL FASTING GLUCOSE: ICD-10-CM

## 2020-10-05 DIAGNOSIS — E78.2 MIXED HYPERLIPIDEMIA: ICD-10-CM

## 2020-10-05 PROCEDURE — 99214 OFFICE O/P EST MOD 30 MIN: CPT | Performed by: FAMILY MEDICINE

## 2020-10-30 DIAGNOSIS — E78.2 MIXED HYPERLIPIDEMIA: ICD-10-CM

## 2020-10-30 RX ORDER — ATORVASTATIN CALCIUM 20 MG/1
TABLET, FILM COATED ORAL
Qty: 90 TABLET | Refills: 1 | Status: SHIPPED | OUTPATIENT
Start: 2020-10-30 | End: 2021-04-26

## 2020-11-23 ENCOUNTER — TELEPHONE (OUTPATIENT)
Dept: HEMATOLOGY ONCOLOGY | Facility: CLINIC | Age: 75
End: 2020-11-23

## 2020-11-27 ENCOUNTER — LAB (OUTPATIENT)
Dept: LAB | Facility: CLINIC | Age: 75
End: 2020-11-27
Payer: MEDICARE

## 2020-11-27 DIAGNOSIS — C64.9 RENAL CELL CARCINOMA, UNSPECIFIED LATERALITY (HCC): ICD-10-CM

## 2020-11-27 LAB
ALBUMIN SERPL BCP-MCNC: 3.8 G/DL (ref 3.5–5)
ALP SERPL-CCNC: 71 U/L (ref 46–116)
ALT SERPL W P-5'-P-CCNC: 37 U/L (ref 12–78)
ANION GAP SERPL CALCULATED.3IONS-SCNC: 6 MMOL/L (ref 4–13)
AST SERPL W P-5'-P-CCNC: 30 U/L (ref 5–45)
BASOPHILS # BLD AUTO: 0.05 THOUSANDS/ΜL (ref 0–0.1)
BASOPHILS NFR BLD AUTO: 1 % (ref 0–1)
BILIRUB SERPL-MCNC: 0.8 MG/DL (ref 0.2–1)
BUN SERPL-MCNC: 19 MG/DL (ref 5–25)
CALCIUM SERPL-MCNC: 9.2 MG/DL (ref 8.3–10.1)
CHLORIDE SERPL-SCNC: 105 MMOL/L (ref 100–108)
CO2 SERPL-SCNC: 30 MMOL/L (ref 21–32)
CREAT SERPL-MCNC: 1.35 MG/DL (ref 0.6–1.3)
EOSINOPHIL # BLD AUTO: 0.18 THOUSAND/ΜL (ref 0–0.61)
EOSINOPHIL NFR BLD AUTO: 2 % (ref 0–6)
ERYTHROCYTE [DISTWIDTH] IN BLOOD BY AUTOMATED COUNT: 13.1 % (ref 11.6–15.1)
GFR SERPL CREATININE-BSD FRML MDRD: 51 ML/MIN/1.73SQ M
GLUCOSE P FAST SERPL-MCNC: 98 MG/DL (ref 65–99)
HCT VFR BLD AUTO: 46.8 % (ref 36.5–49.3)
HGB BLD-MCNC: 15.2 G/DL (ref 12–17)
IMM GRANULOCYTES # BLD AUTO: 0.03 THOUSAND/UL (ref 0–0.2)
IMM GRANULOCYTES NFR BLD AUTO: 0 % (ref 0–2)
LYMPHOCYTES # BLD AUTO: 2.45 THOUSANDS/ΜL (ref 0.6–4.47)
LYMPHOCYTES NFR BLD AUTO: 30 % (ref 14–44)
MCH RBC QN AUTO: 32.5 PG (ref 26.8–34.3)
MCHC RBC AUTO-ENTMCNC: 32.5 G/DL (ref 31.4–37.4)
MCV RBC AUTO: 100 FL (ref 82–98)
MONOCYTES # BLD AUTO: 0.8 THOUSAND/ΜL (ref 0.17–1.22)
MONOCYTES NFR BLD AUTO: 10 % (ref 4–12)
NEUTROPHILS # BLD AUTO: 4.76 THOUSANDS/ΜL (ref 1.85–7.62)
NEUTS SEG NFR BLD AUTO: 57 % (ref 43–75)
NRBC BLD AUTO-RTO: 0 /100 WBCS
PLATELET # BLD AUTO: 201 THOUSANDS/UL (ref 149–390)
PMV BLD AUTO: 11.1 FL (ref 8.9–12.7)
POTASSIUM SERPL-SCNC: 4.3 MMOL/L (ref 3.5–5.3)
PROT SERPL-MCNC: 7.3 G/DL (ref 6.4–8.2)
RBC # BLD AUTO: 4.67 MILLION/UL (ref 3.88–5.62)
SODIUM SERPL-SCNC: 141 MMOL/L (ref 136–145)
WBC # BLD AUTO: 8.27 THOUSAND/UL (ref 4.31–10.16)

## 2020-11-27 PROCEDURE — 80053 COMPREHEN METABOLIC PANEL: CPT

## 2020-11-27 PROCEDURE — 36415 COLL VENOUS BLD VENIPUNCTURE: CPT

## 2020-11-27 PROCEDURE — 85025 COMPLETE CBC W/AUTO DIFF WBC: CPT

## 2020-12-07 ENCOUNTER — HOSPITAL ENCOUNTER (OUTPATIENT)
Dept: CT IMAGING | Facility: HOSPITAL | Age: 75
Discharge: HOME | End: 2020-12-07
Attending: INTERNAL MEDICINE
Payer: MEDICARE

## 2020-12-07 DIAGNOSIS — C64.9 RENAL CELL CARCINOMA, UNSPECIFIED LATERALITY (HCC): ICD-10-CM

## 2020-12-07 PROCEDURE — 74176 CT ABD & PELVIS W/O CONTRAST: CPT

## 2020-12-07 PROCEDURE — 71250 CT THORAX DX C-: CPT

## 2020-12-14 ENCOUNTER — OFFICE VISIT (OUTPATIENT)
Dept: HEMATOLOGY ONCOLOGY | Facility: HOSPITAL | Age: 75
End: 2020-12-14
Payer: MEDICARE

## 2020-12-14 VITALS
RESPIRATION RATE: 16 BRPM | OXYGEN SATURATION: 96 % | HEIGHT: 69 IN | WEIGHT: 212 LBS | DIASTOLIC BLOOD PRESSURE: 76 MMHG | SYSTOLIC BLOOD PRESSURE: 162 MMHG | TEMPERATURE: 99.8 F | BODY MASS INDEX: 31.4 KG/M2 | HEART RATE: 120 BPM

## 2020-12-14 DIAGNOSIS — C64.9 RENAL CELL CARCINOMA, UNSPECIFIED LATERALITY (HCC): Primary | ICD-10-CM

## 2020-12-14 PROCEDURE — 99214 OFFICE O/P EST MOD 30 MIN: CPT | Performed by: INTERNAL MEDICINE

## 2021-01-30 DIAGNOSIS — I10 ESSENTIAL HYPERTENSION: ICD-10-CM

## 2021-01-30 RX ORDER — AMLODIPINE BESYLATE 5 MG/1
TABLET ORAL
Qty: 30 TABLET | Refills: 5 | Status: SHIPPED | OUTPATIENT
Start: 2021-01-30 | End: 2021-04-05 | Stop reason: SDUPTHER

## 2021-02-15 ENCOUNTER — LAB (OUTPATIENT)
Dept: LAB | Facility: CLINIC | Age: 76
End: 2021-02-15
Payer: MEDICARE

## 2021-02-15 DIAGNOSIS — R73.01 ABNORMAL FASTING GLUCOSE: ICD-10-CM

## 2021-02-15 DIAGNOSIS — I10 ESSENTIAL HYPERTENSION: ICD-10-CM

## 2021-02-15 DIAGNOSIS — E78.2 MIXED HYPERLIPIDEMIA: ICD-10-CM

## 2021-02-15 LAB
ALBUMIN SERPL BCP-MCNC: 3.8 G/DL (ref 3.5–5)
ALP SERPL-CCNC: 68 U/L (ref 46–116)
ALT SERPL W P-5'-P-CCNC: 40 U/L (ref 12–78)
ANION GAP SERPL CALCULATED.3IONS-SCNC: 3 MMOL/L (ref 4–13)
AST SERPL W P-5'-P-CCNC: 32 U/L (ref 5–45)
BASOPHILS # BLD AUTO: 0.06 THOUSANDS/ΜL (ref 0–0.1)
BASOPHILS NFR BLD AUTO: 1 % (ref 0–1)
BILIRUB SERPL-MCNC: 0.89 MG/DL (ref 0.2–1)
BUN SERPL-MCNC: 18 MG/DL (ref 5–25)
CALCIUM SERPL-MCNC: 9.3 MG/DL (ref 8.3–10.1)
CHLORIDE SERPL-SCNC: 107 MMOL/L (ref 100–108)
CHOLEST SERPL-MCNC: 172 MG/DL (ref 50–200)
CO2 SERPL-SCNC: 31 MMOL/L (ref 21–32)
CREAT SERPL-MCNC: 1.3 MG/DL (ref 0.6–1.3)
EOSINOPHIL # BLD AUTO: 0.18 THOUSAND/ΜL (ref 0–0.61)
EOSINOPHIL NFR BLD AUTO: 2 % (ref 0–6)
ERYTHROCYTE [DISTWIDTH] IN BLOOD BY AUTOMATED COUNT: 13.4 % (ref 11.6–15.1)
EST. AVERAGE GLUCOSE BLD GHB EST-MCNC: 114 MG/DL
GFR SERPL CREATININE-BSD FRML MDRD: 53 ML/MIN/1.73SQ M
GLUCOSE P FAST SERPL-MCNC: 98 MG/DL (ref 65–99)
HBA1C MFR BLD: 5.6 %
HCT VFR BLD AUTO: 44.8 % (ref 36.5–49.3)
HDLC SERPL-MCNC: 31 MG/DL
HGB BLD-MCNC: 14.9 G/DL (ref 12–17)
IMM GRANULOCYTES # BLD AUTO: 0.04 THOUSAND/UL (ref 0–0.2)
IMM GRANULOCYTES NFR BLD AUTO: 1 % (ref 0–2)
LDLC SERPL CALC-MCNC: 68 MG/DL (ref 0–100)
LYMPHOCYTES # BLD AUTO: 2.55 THOUSANDS/ΜL (ref 0.6–4.47)
LYMPHOCYTES NFR BLD AUTO: 31 % (ref 14–44)
MCH RBC QN AUTO: 33 PG (ref 26.8–34.3)
MCHC RBC AUTO-ENTMCNC: 33.3 G/DL (ref 31.4–37.4)
MCV RBC AUTO: 99 FL (ref 82–98)
MONOCYTES # BLD AUTO: 0.81 THOUSAND/ΜL (ref 0.17–1.22)
MONOCYTES NFR BLD AUTO: 10 % (ref 4–12)
NEUTROPHILS # BLD AUTO: 4.5 THOUSANDS/ΜL (ref 1.85–7.62)
NEUTS SEG NFR BLD AUTO: 55 % (ref 43–75)
NRBC BLD AUTO-RTO: 0 /100 WBCS
PLATELET # BLD AUTO: 178 THOUSANDS/UL (ref 149–390)
PMV BLD AUTO: 10.8 FL (ref 8.9–12.7)
POTASSIUM SERPL-SCNC: 4.3 MMOL/L (ref 3.5–5.3)
PROT SERPL-MCNC: 7.2 G/DL (ref 6.4–8.2)
RBC # BLD AUTO: 4.52 MILLION/UL (ref 3.88–5.62)
SODIUM SERPL-SCNC: 141 MMOL/L (ref 136–145)
TRIGL SERPL-MCNC: 365 MG/DL
WBC # BLD AUTO: 8.14 THOUSAND/UL (ref 4.31–10.16)

## 2021-02-15 PROCEDURE — 80053 COMPREHEN METABOLIC PANEL: CPT

## 2021-02-15 PROCEDURE — 85025 COMPLETE CBC W/AUTO DIFF WBC: CPT

## 2021-02-15 PROCEDURE — 80061 LIPID PANEL: CPT

## 2021-02-15 PROCEDURE — 36415 COLL VENOUS BLD VENIPUNCTURE: CPT

## 2021-02-15 PROCEDURE — 83036 HEMOGLOBIN GLYCOSYLATED A1C: CPT

## 2021-02-24 ENCOUNTER — TELEPHONE (OUTPATIENT)
Dept: FAMILY MEDICINE CLINIC | Facility: HOSPITAL | Age: 76
End: 2021-02-24

## 2021-02-25 DIAGNOSIS — Z99.89 OSA ON CPAP: Primary | ICD-10-CM

## 2021-02-25 DIAGNOSIS — G47.33 OSA ON CPAP: Primary | ICD-10-CM

## 2021-03-19 ENCOUNTER — IMMUNIZATIONS (OUTPATIENT)
Dept: FAMILY MEDICINE CLINIC | Facility: HOSPITAL | Age: 76
End: 2021-03-19

## 2021-03-19 DIAGNOSIS — Z23 ENCOUNTER FOR IMMUNIZATION: Primary | ICD-10-CM

## 2021-03-19 PROCEDURE — 91300 SARS-COV-2 / COVID-19 MRNA VACCINE (PFIZER-BIONTECH) 30 MCG: CPT

## 2021-03-19 PROCEDURE — 0001A SARS-COV-2 / COVID-19 MRNA VACCINE (PFIZER-BIONTECH) 30 MCG: CPT

## 2021-04-05 ENCOUNTER — OFFICE VISIT (OUTPATIENT)
Dept: FAMILY MEDICINE CLINIC | Facility: HOSPITAL | Age: 76
End: 2021-04-05
Payer: MEDICARE

## 2021-04-05 VITALS
HEIGHT: 69 IN | DIASTOLIC BLOOD PRESSURE: 90 MMHG | TEMPERATURE: 97 F | SYSTOLIC BLOOD PRESSURE: 130 MMHG | BODY MASS INDEX: 32.35 KG/M2 | HEART RATE: 88 BPM | RESPIRATION RATE: 18 BRPM | WEIGHT: 218.4 LBS | OXYGEN SATURATION: 98 %

## 2021-04-05 DIAGNOSIS — Z00.00 MEDICARE ANNUAL WELLNESS VISIT, SUBSEQUENT: Primary | ICD-10-CM

## 2021-04-05 DIAGNOSIS — R73.01 ABNORMAL FASTING GLUCOSE: ICD-10-CM

## 2021-04-05 DIAGNOSIS — E78.2 MIXED HYPERLIPIDEMIA: ICD-10-CM

## 2021-04-05 DIAGNOSIS — I10 ESSENTIAL HYPERTENSION: ICD-10-CM

## 2021-04-05 DIAGNOSIS — I35.8 AORTIC VALVE SCLEROSIS: ICD-10-CM

## 2021-04-05 DIAGNOSIS — F39 MOOD DISORDER (HCC): ICD-10-CM

## 2021-04-05 DIAGNOSIS — N18.2 CKD (CHRONIC KIDNEY DISEASE), STAGE II: ICD-10-CM

## 2021-04-05 DIAGNOSIS — C64.9 RENAL CELL CARCINOMA, UNSPECIFIED LATERALITY (HCC): ICD-10-CM

## 2021-04-05 DIAGNOSIS — Z12.5 SCREENING FOR MALIGNANT NEOPLASM OF PROSTATE: ICD-10-CM

## 2021-04-05 PROCEDURE — G0439 PPPS, SUBSEQ VISIT: HCPCS | Performed by: FAMILY MEDICINE

## 2021-04-05 PROCEDURE — 1123F ACP DISCUSS/DSCN MKR DOCD: CPT | Performed by: FAMILY MEDICINE

## 2021-04-05 PROCEDURE — 99214 OFFICE O/P EST MOD 30 MIN: CPT | Performed by: FAMILY MEDICINE

## 2021-04-05 RX ORDER — ESCITALOPRAM OXALATE 5 MG/1
5 TABLET ORAL DAILY
Qty: 30 TABLET | Refills: 3 | Status: SHIPPED | OUTPATIENT
Start: 2021-04-05 | End: 2021-05-04 | Stop reason: SDUPTHER

## 2021-04-05 RX ORDER — AMLODIPINE BESYLATE 5 MG/1
5 TABLET ORAL DAILY
Qty: 90 TABLET | Refills: 3 | Status: SHIPPED | OUTPATIENT
Start: 2021-04-05 | End: 2022-07-07

## 2021-04-05 NOTE — PATIENT INSTRUCTIONS
Medicare Preventive Visit Patient Instructions  Thank you for completing your Welcome to Medicare Visit or Medicare Annual Wellness Visit today  Your next wellness visit will be due in one year (4/6/2022)  The screening/preventive services that you may require over the next 5-10 years are detailed below  Some tests may not apply to you based off risk factors and/or age  Screening tests ordered at today's visit but not completed yet may show as past due  Also, please note that scanned in results may not display below  Preventive Screenings:  Service Recommendations Previous Testing/Comments   Colorectal Cancer Screening  · Colonoscopy    · Fecal Occult Blood Test (FOBT)/Fecal Immunochemical Test (FIT)  · Fecal DNA/Cologuard Test  · Flexible Sigmoidoscopy Age: 54-65 years old   Colonoscopy: every 10 years (May be performed more frequently if at higher risk)  OR  FOBT/FIT: every 1 year  OR  Cologuard: every 3 years  OR  Sigmoidoscopy: every 5 years  Screening may be recommended earlier than age 48 if at higher risk for colorectal cancer  Also, an individualized decision between you and your healthcare provider will decide whether screening between the ages of 74-80 would be appropriate   Colonoscopy: Not on file  FOBT/FIT: Not on file  Cologuard: Not on file  Sigmoidoscopy: Not on file          Prostate Cancer Screening Individualized decision between patient and health care provider in men between ages of 53-78   Medicare will cover every 12 months beginning on the day after your 50th birthday PSA: 2 0 ng/mL     Screening Not Indicated     Hepatitis C Screening Once for adults born between 1945 and 1965  More frequently in patients at high risk for Hepatitis C Hep C Antibody: 06/29/2020    Screening Current   Diabetes Screening 1-2 times per year if you're at risk for diabetes or have pre-diabetes Fasting glucose: 98 mg/dL   A1C: 5 6 %    Screening Current   Cholesterol Screening Once every 5 years if you don't have a lipid disorder  May order more often based on risk factors  Lipid panel: 02/15/2021    Screening Not Indicated  History Lipid Disorder      Other Preventive Screenings Covered by Medicare:  1  Abdominal Aortic Aneurysm (AAA) Screening: covered once if your at risk  You're considered to be at risk if you have a family history of AAA or a male between the age of 73-68 who smoking at least 100 cigarettes in your lifetime  2  Lung Cancer Screening: covers low dose CT scan once per year if you meet all of the following conditions: (1) Age 50-69; (2) No signs or symptoms of lung cancer; (3) Current smoker or have quit smoking within the last 15 years; (4) You have a tobacco smoking history of at least 30 pack years (packs per day x number of years you smoked); (5) You get a written order from a healthcare provider  3  Glaucoma Screening: covered annually if you're considered high risk: (1) You have diabetes OR (2) Family history of glaucoma OR (3)  aged 48 and older OR (3)  American aged 72 and older  3  Osteoporosis Screening: covered every 2 years if you meet one of the following conditions: (1) Have a vertebral abnormality; (2) On glucocorticoid therapy for more than 3 months; (3) Have primary hyperparathyroidism; (4) On osteoporosis medications and need to assess response to drug therapy  5  HIV Screening: covered annually if you're between the age of 12-76  Also covered annually if you are younger than 13 and older than 72 with risk factors for HIV infection  For pregnant patients, it is covered up to 3 times per pregnancy      Immunizations:  Immunization Recommendations   Influenza Vaccine Annual influenza vaccination during flu season is recommended for all persons aged >= 6 months who do not have contraindications   Pneumococcal Vaccine (Prevnar and Pneumovax)  * Prevnar = PCV13  * Pneumovax = PPSV23 Adults 25-60 years old: 1-3 doses may be recommended based on certain risk factors  Adults 72 years old: Prevnar (PCV13) vaccine recommended followed by Pneumovax (PPSV23) vaccine  If already received PPSV23 since turning 65, then PCV13 recommended at least one year after PPSV23 dose  Hepatitis B Vaccine 3 dose series if at intermediate or high risk (ex: diabetes, end stage renal disease, liver disease)   Tetanus (Td) Vaccine - COST NOT COVERED BY MEDICARE PART B Following completion of primary series, a booster dose should be given every 10 years to maintain immunity against tetanus  Td may also be given as tetanus wound prophylaxis  Tdap Vaccine - COST NOT COVERED BY MEDICARE PART B Recommended at least once for all adults  For pregnant patients, recommended with each pregnancy  Shingles Vaccine (Shingrix) - COST NOT COVERED BY MEDICARE PART B  2 shot series recommended in those aged 48 and above     Health Maintenance Due:      Topic Date Due    Colonoscopy Surveillance  04/27/2023    Hepatitis C Screening  Completed     Immunizations Due:      Topic Date Due    COVID-19 Vaccine (2 - Pfizer 2-dose series) 04/09/2021     Advance Directives   What are advance directives? Advance directives are legal documents that state your wishes and plans for medical care  These plans are made ahead of time in case you lose your ability to make decisions for yourself  Advance directives can apply to any medical decision, such as the treatments you want, and if you want to donate organs  What are the types of advance directives? There are many types of advance directives, and each state has rules about how to use them  You may choose a combination of any of the following:  · Living will: This is a written record of the treatment you want  You can also choose which treatments you do not want, which to limit, and which to stop at a certain time  This includes surgery, medicine, IV fluid, and tube feedings  · Durable power of  for healthcare [facility]):   This is a written record that states who you want to make healthcare choices for you when you are unable to make them for yourself  This person, called a proxy, is usually a family member or a friend  You may choose more than 1 proxy  · Do not resuscitate (DNR) order:  A DNR order is used in case your heart stops beating or you stop breathing  It is a request not to have certain forms of treatment, such as CPR  A DNR order may be included in other types of advance directives  · Medical directive: This covers the care that you want if you are in a coma, near death, or unable to make decisions for yourself  You can list the treatments you want for each condition  Treatment may include pain medicine, surgery, blood transfusions, dialysis, IV or tube feedings, and a ventilator (breathing machine)  · Values history: This document has questions about your views, beliefs, and how you feel and think about life  This information can help others choose the care that you would choose  Why are advance directives important? An advance directive helps you control your care  Although spoken wishes may be used, it is better to have your wishes written down  Spoken wishes can be misunderstood, or not followed  Treatments may be given even if you do not want them  An advance directive may make it easier for your family to make difficult choices about your care  Weight Management   Why it is important to manage your weight:  Being overweight increases your risk of health conditions such as heart disease, high blood pressure, type 2 diabetes, and certain types of cancer  It can also increase your risk for osteoarthritis, sleep apnea, and other respiratory problems  Aim for a slow, steady weight loss  Even a small amount of weight loss can lower your risk of health problems  How to lose weight safely:  A safe and healthy way to lose weight is to eat fewer calories and get regular exercise   You can lose up about 1 pound a week by decreasing the number of calories you eat by 500 calories each day  Healthy meal plan for weight management:  A healthy meal plan includes a variety of foods, contains fewer calories, and helps you stay healthy  A healthy meal plan includes the following:  · Eat whole-grain foods more often  A healthy meal plan should contain fiber  Fiber is the part of grains, fruits, and vegetables that is not broken down by your body  Whole-grain foods are healthy and provide extra fiber in your diet  Some examples of whole-grain foods are whole-wheat breads and pastas, oatmeal, brown rice, and bulgur  · Eat a variety of vegetables every day  Include dark, leafy greens such as spinach, kale, chandler greens, and mustard greens  Eat yellow and orange vegetables such as carrots, sweet potatoes, and winter squash  · Eat a variety of fruits every day  Choose fresh or canned fruit (canned in its own juice or light syrup) instead of juice  Fruit juice has very little or no fiber  · Eat low-fat dairy foods  Drink fat-free (skim) milk or 1% milk  Eat fat-free yogurt and low-fat cottage cheese  Try low-fat cheeses such as mozzarella and other reduced-fat cheeses  · Choose meat and other protein foods that are low in fat  Choose beans or other legumes such as split peas or lentils  Choose fish, skinless poultry (chicken or turkey), or lean cuts of red meat (beef or pork)  Before you cook meat or poultry, cut off any visible fat  · Use less fat and oil  Try baking foods instead of frying them  Add less fat, such as margarine, sour cream, regular salad dressing and mayonnaise to foods  Eat fewer high-fat foods  Some examples of high-fat foods include french fries, doughnuts, ice cream, and cakes  · Eat fewer sweets  Limit foods and drinks that are high in sugar  This includes candy, cookies, regular soda, and sweetened drinks  Exercise:  Exercise at least 30 minutes per day on most days of the week   Some examples of exercise include walking, biking, dancing, and swimming  You can also fit in more physical activity by taking the stairs instead of the elevator or parking farther away from stores  Ask your healthcare provider about the best exercise plan for you  © Copyright [employer] 2018 Information is for End User's use only and may not be sold, redistributed or otherwise used for commercial purposes  All illustrations and images included in CareNotes® are the copyrighted property of [employer]  or [address] [place] [address] for Adults   AMBULATORY CARE:   A wellness visit  is when you see your healthcare provider to get screened for health problems  Your healthcare provider will also give you advice on how to stay healthy  Write down your questions so you remember to ask them  Ask your healthcare provider how often you should have a wellness visit  What happens at a wellness visit:  Your healthcare provider will ask about your health, and your family history of health problems  This includes high blood pressure, heart disease, and cancer  He or she will ask if you have symptoms that concern you, if you smoke, and about your mood  You may also be asked about your intake of medicines, supplements, food, and alcohol  Any of the following may be done:  · Your weight  will be checked  Your height may also be checked so your body mass index (BMI) can be calculated  Your BMI shows if you are at a healthy weight  · Your blood pressure  and heart rate will be checked  Your temperature may also be checked  · Blood and urine tests  may be done  Blood tests may be done to check your cholesterol levels  Abnormal cholesterol levels increase your risk for heart disease and stroke  You may also need a blood or urine test to check for diabetes if you are at increased risk  Urine tests may be done to look for signs of an infection or kidney disease  · A physical exam  includes checking your heartbeat and lungs with a stethoscope   Your healthcare provider may also check your skin to look for sun damage  · Screening tests  may be recommended  A screening test is done to check for diseases that may not cause symptoms  The screening tests you may need depend on your age, gender, family history, and lifestyle habits  For example, colorectal screening may be recommended if you are 48years old or older  Screening tests you need if you are a woman:   · A Pap smear  is used to screen for cervical cancer  Pap smears are usually done every 3 to 5 years depending on your age  You may need them more often if you have had abnormal Pap smear test results in the past  Ask your healthcare provider how often you should have a Pap smear  · A mammogram  is an x-ray of your breasts to screen for breast cancer  Experts recommend mammograms every 2 years starting at age 48 years  You may need a mammogram at age 52 years or younger if you have an increased risk for breast cancer  Talk to your healthcare provider about when you should start having mammograms and how often you need them  Vaccines you may need:   · Get an influenza vaccine  every year  The influenza vaccine protects you from the flu  Several types of viruses cause the flu  The viruses change over time, so new vaccines are made each year  · Get a tetanus-diphtheria (Td) booster vaccine  every 10 years  This vaccine protects you against tetanus and diphtheria  Tetanus is a severe infection that may cause painful muscle spasms and lockjaw  Diphtheria is a severe bacterial infection that causes a thick covering in the back of your mouth and throat  · Get a human papillomavirus (HPV) vaccine  if you are female and aged 23 to 32 or male 23 to 24 and never received it  This vaccine protects you from HPV infection  HPV is the most common infection spread by sexual contact  HPV may also cause vaginal, penile, and anal cancers  · Get a pneumococcal vaccine  if you are aged 72 years or older   The pneumococcal vaccine is an injection given to protect you from pneumococcal disease  Pneumococcal disease is an infection caused by pneumococcal bacteria  The infection may cause pneumonia, meningitis, or an ear infection  · Get a shingles vaccine  if you are 60 or older, even if you have had shingles before  The shingles vaccine is an injection to protect you from the varicella-zoster virus  This is the same virus that causes chickenpox  Shingles is a painful rash that develops in people who had chickenpox or have been exposed to the virus  How to eat healthy:  My Plate is a model for planning healthy meals  It shows the types and amounts of foods that should go on your plate  Fruits and vegetables make up about half of your plate, and grains and protein make up the other half  A serving of dairy is included on the side of your plate  The amount of calories and serving sizes you need depends on your age, gender, weight, and height  Examples of healthy foods are listed below:  · Eat a variety of vegetables  such as dark green, red, and orange vegetables  You can also include canned vegetables low in sodium (salt) and frozen vegetables without added butter or sauces  · Eat a variety of fresh fruits , canned fruit in 100% juice, frozen fruit, and dried fruit  · Include whole grains  At least half of the grains you eat should be whole grains  Examples include whole-wheat bread, wheat pasta, brown rice, and whole-grain cereals such as oatmeal     · Eat a variety of protein foods such as seafood (fish and shellfish), lean meat, and poultry without skin (turkey and chicken)  Examples of lean meats include pork leg, shoulder, or tenderloin, and beef round, sirloin, tenderloin, and extra lean ground beef  Other protein foods include eggs and egg substitutes, beans, peas, soy products, nuts, and seeds      · Choose low-fat dairy products such as skim or 1% milk or low-fat yogurt, cheese, and cottage cheese  · Limit unhealthy fats  such as butter, hard margarine, and shortening  Exercise:  Exercise at least 30 minutes per day on most days of the week  Some examples of exercise include walking, biking, dancing, and swimming  You can also fit in more physical activity by taking the stairs instead of the elevator or parking farther away from stores  Include muscle strengthening activities 2 days each week  Regular exercise provides many health benefits  It helps you manage your weight, and decreases your risk for type 2 diabetes, heart disease, stroke, and high blood pressure  Exercise can also help improve your mood  Ask your healthcare provider about the best exercise plan for you  General health and safety guidelines:   · Do not smoke  Nicotine and other chemicals in cigarettes and cigars can cause lung damage  Ask your healthcare provider for information if you currently smoke and need help to quit  E-cigarettes or smokeless tobacco still contain nicotine  Talk to your healthcare provider before you use these products  · Limit alcohol  A drink of alcohol is 12 ounces of beer, 5 ounces of wine, or 1½ ounces of liquor  · Lose weight, if needed  Being overweight increases your risk of certain health conditions  These include heart disease, high blood pressure, type 2 diabetes, and certain types of cancer  · Protect your skin  Do not sunbathe or use tanning beds  Use sunscreen with a SPF 15 or higher  Apply sunscreen at least 15 minutes before you go outside  Reapply sunscreen every 2 hours  Wear protective clothing, hats, and sunglasses when you are outside  · Drive safely  Always wear your seatbelt  Make sure everyone in your car wears a seatbelt  A seatbelt can save your life if you are in an accident  Do not use your cell phone when you are driving  This could distract you and cause an accident  Pull over if you need to make a call or send a text message  · Practice safe sex    Use latex condoms if are sexually active and have more than one partner  Your healthcare provider may recommend screening tests for sexually transmitted infections (STIs)  · Wear helmets, lifejackets, and protective gear  Always wear a helmet when you ride a bike or motorcycle, go skiing, or play sports that could cause a head injury  Wear protective equipment when you play sports  Wear a lifejacket when you are on a boat or doing water sports  © Copyright [address] Information is for End User's use only and may not be sold, redistributed or otherwise used for commercial purposes  All illustrations and images included in CareNotes® are the copyrighted property of [employer]  or [address] [place], [place] [address]  The above information is an  only  It is not intended as medical advice for individual conditions or treatments  Talk to your doctor, nurse or pharmacist before following any medical regimen to see if it is safe and effective for you

## 2021-04-05 NOTE — PROGRESS NOTES
Assessment and Plan:     Problem List Items Addressed This Visit        Cardiovascular and Mediastinum    Essential hypertension    Relevant Medications    amLODIPine (NORVASC) 5 mg tablet    Aortic valve sclerosis    Relevant Medications    amLODIPine (NORVASC) 5 mg tablet       Genitourinary    Renal cell carcinoma (HCC)    CKD (chronic kidney disease), stage II    Relevant Orders    CBC and Platelet       Other    Abnormal fasting glucose    Relevant Orders    Comprehensive metabolic panel    HEMOGLOBIN A1C W/ EAG ESTIMATION    Mixed hyperlipidemia    Relevant Orders    Lipid Panel with Direct LDL reflex    Medicare annual wellness visit, subsequent - Primary      Other Visit Diagnoses     Screening for malignant neoplasm of prostate        Relevant Orders    PSA, Total Screen    Mood disorder ([facility] )        Relevant Medications    escitalopram (LEXAPRO) 5 mg tablet           Preventive health issues were discussed with patient, and age appropriate screening tests were ordered as noted in patient's After Visit Summary  Personalized health advice and appropriate referrals for health education or preventive services given if needed, as noted in patient's After Visit Summary       History of Present Illness:     Patient presents for Medicare Annual Wellness visit    Patient Care Team:  Uzair Ivan MD as PCP - General  DO Di Ramirez MD Elizabethann Nixon, MD Theressa Cheers, MD Rudell Richardson, MD Elijio Baize, MD as Endoscopist     Problem List:     Patient Active Problem List   Diagnosis    Abnormal fasting glucose    [facility] carcinoma ([facility] )    CKD (chronic kidney disease), stage II    Disc degeneration, lumbosacral    Edema    Hematuria    Mixed hyperlipidemia    Essential hypertension    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[facility] )    Osteoarthritis of knee    Enlarged prostate    Lower abdominal pain    Screening for colon cancer    History of colon polyps    Diarrhea    Left ventricular diastolic dysfunction    Stage 3 chronic kidney disease    Medicare annual wellness visit, subsequent    PARVIZ on CPAP    Peripheral neuropathic pain    Aortic valve sclerosis      Past Medical and Surgical History:     Past Medical History:   Diagnosis Date    Cancer of [facility] kidney [facility])     2014    Colon polyp     Diverticulitis of colon     Last assessed - 5/12/14    Hypercholesterolemia     Hyperlipidemia     Renal neoplasm      Past Surgical History:   Procedure Laterality Date    COLONOSCOPY      COLONOSCOPY N/A 4/27/2018    Procedure: COLONOSCOPY;  Surgeon: Nicole Cohen MD;  Location: [facility];   Service: Gastroenterology    CYSTOSCOPY      Onset - 5/2/16 Stu Holland     JOINT REPLACEMENT Left     Hip    LAPAROSCOPIC CHOLECYSTECTOMY      NEPHRECTOMY Left     NEPHRECTOMY Left     SHOULDER ARTHROSCOPY W/ ROTATOR CUFF REPAIR Left     TOTAL HIP ARTHROPLASTY Left     original hardware became infected and pt needed a second surgery to replace hardware    UMBILICAL HERNIA REPAIR        Family History:     Family History   Problem Relation Age of Onset    Tuberculosis Mother     Emphysema Father         Lung      Social History:        Social History     Socioeconomic History    Marital status: /Civil Union     Spouse name: None    Number of children: None    Years of education: None    Highest education level: None   Occupational History    None   Social Needs    Financial resource strain: None    Food insecurity     Worry: None     Inability: None    Transportation needs     Medical: None     Non-medical: None   Tobacco Use    Smoking status: Former Smoker    Smokeless tobacco: Never Used   Substance and Sexual Activity    Alcohol use: Yes     Frequency: Monthly or less     Comment: Social    Drug use: No    Sexual activity: None   Lifestyle    Physical activity     Days per week: None     Minutes per session: None    Stress: None   Relationships    Social connections     Talks on phone: None     Gets together: None     Attends Congregation service: None     Active member of club or organization: None     Attends meetings of clubs or organizations: None     Relationship status: None    Intimate partner violence     Fear of current or ex partner: None     Emotionally abused: None     Physically abused: None     Forced sexual activity: None   Other Topics Concern    None   Social History Narrative    Daily caffeine consumption, 2-3 servings a day      Medications and Allergies:     Current Outpatient Medications   Medication Sig Dispense Refill    amLODIPine (NORVASC) 5 mg tablet Take 1 tablet (5 mg total) by mouth daily 90 tablet 3    amoxicillin (AMOXIL) 500 mg capsule TAKE 4 CAPSULES BY MOUTH 1 HOUR PRIOR TO APPOINTMENT  3    atorvastatin (LIPITOR) 20 mg tablet TAKE 1 TABLET BY MOUTH EVERY DAY 90 tablet 1    Blood Pressure KIT by Does not apply route daily 1 each 0    cholecalciferol (VITAMIN D3) 1,000 units tablet Take 1 tablet (1,000 Units total) by mouth daily 30 tablet 5    gabapentin (NEURONTIN) 100 mg capsule Take 100 mg by mouth 3 (three) times a day      Multiple Vitamins-Minerals (MULTIVITAMIN ADULT) TABS Take by mouth      Omega-3 Fatty Acids (FISH OIL) 1,000 mg by Does not apply route      escitalopram (LEXAPRO) 5 mg tablet Take 1 tablet (5 mg total) by mouth daily 30 tablet 3     No current facility-administered medications for this visit        No Known Allergies   Immunizations:     Immunization History   Administered Date(s) Administered    INFLUENZA 11/07/2018, 10/21/2019    Influenza Split High Dose Preservative Free IM 09/05/2012, 09/22/2014, 01/04/2016, 10/21/2019    Influenza, high dose seasonal 0 7 mL 08/24/2020    Influenza, seasonal, injectable 10/21/2013    Pneumococcal Conjugate 13-Valent 02/05/2018    Pneumococcal Polysaccharide PPV23 09/12/2014, 11/07/2018    SARS-CoV-2 / COVID-19 mRNA IM (Pfizer-BioNTech) 03/19/2021  Tdap 10/15/2017      Health Maintenance:         Topic Date Due    Colonoscopy Surveillance  04/27/2023    Hepatitis C Screening  Completed         Topic Date Due    COVID-19 Vaccine (2 - Pfizer 2-dose series) 04/09/2021      Medicare Health Risk Assessment:     /90 (BP Location: Left arm, Patient Position: Sitting, Cuff Size: Standard)   Pulse 88   Temp (!) 97 °F (36 1 °C) (Temporal)   Resp 18   Ht 5' 9" (1 753 m)   Wt 99 1 kg (218 lb 6 4 oz)   SpO2 98%   BMI 32 25 kg/m²      Jennifer Pagan is here for his Subsequent Wellness visit  Health Risk Assessment:   Patient rates overall health as very good  Patient feels that their physical health rating is same  Patient is satisfied with their life  Eyesight was rated as same  Hearing was rated as same  Patient feels that their emotional and mental health rating is same  Patients states they are sometimes angry  Patient states they are sometimes unusually tired/fatigued  Pain experienced in the last 7 days has been some  Patient's pain rating has been 5/10  Patient states that he has experienced weight loss or gain in last 6 months  Depression Screening:   PHQ-2 Score: 0      Fall Risk Screening: In the past year, patient has experienced: no history of falling in past year      Home Safety:  Patient does not have trouble with stairs inside or outside of their home  Patient has working smoke alarms and has no working carbon monoxide detector  Home safety hazards include: none  Nutrition:   Current diet is Regular  Medications:   Patient is currently taking over-the-counter supplements  OTC medications include: see medication list  Patient is able to manage medications  Activities of Daily Living (ADLs)/Instrumental Activities of Daily Living (IADLs):   Walk and transfer into and out of bed and chair?: Yes  Dress and groom yourself?: Yes    Bathe or shower yourself?: Yes    Feed yourself?  Yes  Do your laundry/housekeeping?: Yes  Manage your money, pay your bills and track your expenses?: Yes  Make your own meals?: Yes    Do your own shopping?: Yes    Previous Hospitalizations:   Any hospitalizations or ED visits within the last 12 months?: No      Advance Care Planning:   Living will: Yes    Durable POA for healthcare:  Yes    Advanced directive: Yes      PREVENTIVE SCREENINGS      Cardiovascular Screening:    General: Screening Not Indicated and History Lipid Disorder      Diabetes Screening:     General: Screening Current      Prostate Cancer Screening:    General: Screening Not Indicated      Abdominal Aortic Aneurysm (AAA) Screening:    Risk factors include: tobacco use        Lung Cancer Screening:     General: Screening Not Indicated      Hepatitis C Screening:    General: Screening Current    Screening, Brief Intervention, and Referral to Treatment (SBIRT)    Screening      AUDIT-C Screenin) How often did you have a drink containing alcohol in the past year? monthly or less    Single Item Drug Screening:  How often have you used an illegal drug (including marijuana) or a prescription medication for non-medical reasons in the past year? never    Single Item Drug Screen Score: 0  Interpretation: Negative screen for possible drug use disorder      Isabel Grant MD

## 2021-04-05 NOTE — PROGRESS NOTES
Assessment/Plan:         Diagnoses and all orders for this visit:    Medicare annual wellness visit, subsequent    Essential hypertension  -     amLODIPine (NORVASC) 5 mg tablet; Take 1 tablet (5 mg total) by mouth daily    Renal cell carcinoma, unspecified laterality (HCC)  Comments:  JUAN    CKD (chronic kidney disease), stage II  -     CBC and Platelet; Future    Mixed hyperlipidemia  -     Lipid Panel with Direct LDL reflex; Future    Abnormal fasting glucose  -     Comprehensive metabolic panel; Future  -     HEMOGLOBIN A1C W/ EAG ESTIMATION; Future    Screening for malignant neoplasm of prostate  -     PSA, Total Screen; Future    Mood disorder (HCC)  -     escitalopram (LEXAPRO) 5 mg tablet; Take 1 tablet (5 mg total) by mouth daily    Essential hypertension  Comments:  Persistent BP elevation- needs to start medication  Orders:  -     amLODIPine (NORVASC) 5 mg tablet; Take 1 tablet (5 mg total) by mouth daily    Aortic valve sclerosis          Subjective:      Patient ID: Connie Carrasco is a 68 y o  male  6 month follow up    Had 1st COVID injection  No recent illness or injury    Has been down because of the daily routine of cleaning his [employer], etc   Thuy Alanis his mood is suffering  Discussed use of a daily medication for this and he is willing to try    Wife had [place] before the holidays  He thinks he may have had it also but really didn't have significant symptoms  They did get the first [employer] vaccination    Labs reviewed in detail and copy given to him      The following portions of the patient's history were reviewed and updated as appropriate: allergies, current medications, past family history, past medical history, past social history, past surgical history and problem list     Review of Systems   Constitutional: Negative  HENT: Negative  Respiratory: Negative  Cardiovascular: Negative  Gastrointestinal: Negative  Genitourinary: Negative  Musculoskeletal: Negative  Neurological: Negative  Hematological: Negative  Psychiatric/Behavioral: Positive for dysphoric mood  Negative for agitation and sleep disturbance  The patient is not nervous/anxious  All other systems reviewed and are negative  Objective:      /90 (BP Location: Left arm, Patient Position: Sitting, Cuff Size: Standard)   Pulse 88   Temp (!) 97 °F (36 1 °C) (Temporal)   Resp 18   Ht 5' 9" (1 753 m)   Wt 99 1 kg (218 lb 6 4 oz)   SpO2 98%   BMI 32 25 kg/m²          Physical Exam  Vitals signs and nursing note reviewed  Constitutional:       Appearance: Normal appearance  Neck:      Vascular: No carotid bruit  Cardiovascular:      Rate and Rhythm: Normal rate and regular rhythm  Pulses: Normal pulses  Heart sounds: Murmur present  Pulmonary:      Effort: Pulmonary effort is normal       Breath sounds: Normal breath sounds  Neurological:      Mental Status: He is alert

## 2021-04-10 ENCOUNTER — IMMUNIZATIONS (OUTPATIENT)
Dept: FAMILY MEDICINE CLINIC | Facility: HOSPITAL | Age: 76
End: 2021-04-10

## 2021-04-10 DIAGNOSIS — Z23 ENCOUNTER FOR IMMUNIZATION: Primary | ICD-10-CM

## 2021-04-10 PROCEDURE — 91300 SARS-COV-2 / COVID-19 MRNA VACCINE (PFIZER-BIONTECH) 30 MCG: CPT

## 2021-04-10 PROCEDURE — 0002A SARS-COV-2 / COVID-19 MRNA VACCINE (PFIZER-BIONTECH) 30 MCG: CPT

## 2021-04-26 DIAGNOSIS — E78.2 MIXED HYPERLIPIDEMIA: ICD-10-CM

## 2021-04-26 RX ORDER — ATORVASTATIN CALCIUM 20 MG/1
TABLET, FILM COATED ORAL
Qty: 90 TABLET | Refills: 1 | Status: SHIPPED | OUTPATIENT
Start: 2021-04-26 | End: 2021-10-27

## 2021-05-04 ENCOUNTER — OFFICE VISIT (OUTPATIENT)
Dept: FAMILY MEDICINE CLINIC | Facility: HOSPITAL | Age: 76
End: 2021-05-04
Payer: MEDICARE

## 2021-05-04 VITALS
TEMPERATURE: 99.4 F | HEART RATE: 90 BPM | BODY MASS INDEX: 31.81 KG/M2 | SYSTOLIC BLOOD PRESSURE: 136 MMHG | HEIGHT: 69 IN | WEIGHT: 214.8 LBS | DIASTOLIC BLOOD PRESSURE: 80 MMHG

## 2021-05-04 DIAGNOSIS — F39 MOOD DISORDER (HCC): ICD-10-CM

## 2021-05-04 DIAGNOSIS — R10.84 GENERALIZED ABDOMINAL PAIN: ICD-10-CM

## 2021-05-04 DIAGNOSIS — M54.2 NECK PAIN: Primary | ICD-10-CM

## 2021-05-04 PROCEDURE — 99214 OFFICE O/P EST MOD 30 MIN: CPT | Performed by: NURSE PRACTITIONER

## 2021-05-04 RX ORDER — CYCLOBENZAPRINE HCL 10 MG
10 TABLET ORAL 3 TIMES DAILY PRN
Qty: 30 TABLET | Refills: 0 | Status: SHIPPED | OUTPATIENT
Start: 2021-05-04

## 2021-05-04 RX ORDER — ESCITALOPRAM OXALATE 5 MG/1
5 TABLET ORAL DAILY
Qty: 30 TABLET | Refills: 3 | Status: SHIPPED | OUTPATIENT
Start: 2021-05-04 | End: 2021-10-03

## 2021-05-04 NOTE — PROGRESS NOTES
Assessment/Plan:   I believe there are 2 seperate issues and unclear if at all r/t to 2nd COVID vaccine  Neck pain seems more muscular  Start muscle relaxant, use heat and instructed on gentle neck stretches  Given N/Y will order cervical spine xray  Abdominal pain with nausea  Check US  Pt with malignancy history  Call with worsening  Diagnoses and all orders for this visit:    Neck pain  -     XR spine cervical complete 4 or 5 vw non injury; Future  -     cyclobenzaprine (FLEXERIL) 10 mg tablet; Take 1 tablet (10 mg total) by mouth 3 (three) times a day as needed for muscle spasms    Generalized abdominal pain  -     US abdomen complete; Future          Subjective:     Patient ID: Katya Trotter is a 68 y o  male  Has abdominal pain mid abdomen  Aching  Started about 2 weeks ago after second COVID shot  Feels nauseated  No vomiting  Occasional loose stool but no diarrhea  No blood in stool  Occasionally it radiates to right side  H/o renal cancer  UTD with colonoscopy  Pain side of left neck and radiates up to head and into shoulder  Also with N/T  This started after second COVID vaccine  Had injection in left arm  Left hand feels weaker occasionally  Has taken Tylenol which only helps a little for a short amount of time  Denies h/o neck pain or trauma  Review of Systems   Constitutional: Negative for chills, fever and unexpected weight change  Gastrointestinal: Positive for abdominal pain and nausea  Negative for blood in stool, diarrhea and vomiting  Musculoskeletal: Positive for neck pain  Neurological: Positive for weakness and numbness           The following portions of the patient's history were reviewed and updated as appropriate: allergies, current medications, past family history, past medical history, past social history, past surgical history and problem list     Objective:  Vitals:    05/04/21 1515   BP: 136/80   Pulse: 90   Temp: 99 4 °F (37 4 °C)      Physical Exam  Vitals signs reviewed  Constitutional:       General: He is not in acute distress  Appearance: Normal appearance  He is not ill-appearing  Cardiovascular:      Rate and Rhythm: Normal rate and regular rhythm  Heart sounds: Murmur present  Pulmonary:      Effort: Pulmonary effort is normal       Breath sounds: Normal breath sounds  Abdominal:      General: Bowel sounds are normal       Palpations: Abdomen is soft  Tenderness: There is abdominal tenderness in the left lower quadrant  Musculoskeletal:      Left shoulder: Normal       Cervical back: He exhibits tenderness and pain  He exhibits normal range of motion, no bony tenderness, no deformity and normal pulse  Comments: Tenderness noted over lateral aspect left side of neck into upper trapezius muscle  Some tenderness noted over left bicep  Skin:     General: Skin is warm and dry  Neurological:      Mental Status: He is alert and oriented to person, place, and time  Motor: No weakness  Deep Tendon Reflexes:      Reflex Scores:       Brachioradialis reflexes are 1+ on the left side  Psychiatric:         Mood and Affect: Mood normal          Behavior: Behavior normal          Thought Content:  Thought content normal          Judgment: Judgment normal

## 2021-05-05 ENCOUNTER — HOSPITAL ENCOUNTER (OUTPATIENT)
Dept: RADIOLOGY | Facility: HOSPITAL | Age: 76
Discharge: HOME | End: 2021-05-05
Payer: MEDICARE

## 2021-05-05 DIAGNOSIS — M54.2 NECK PAIN: ICD-10-CM

## 2021-05-05 PROCEDURE — 72050 X-RAY EXAM NECK SPINE 4/5VWS: CPT

## 2021-05-09 ENCOUNTER — HOSPITAL ENCOUNTER (OUTPATIENT)
Dept: ULTRASOUND IMAGING | Facility: HOSPITAL | Age: 76
Discharge: HOME | End: 2021-05-09
Payer: MEDICARE

## 2021-05-09 DIAGNOSIS — R10.84 GENERALIZED ABDOMINAL PAIN: ICD-10-CM

## 2021-05-09 PROCEDURE — 76700 US EXAM ABDOM COMPLETE: CPT

## 2021-07-12 ENCOUNTER — APPOINTMENT (OUTPATIENT)
Dept: LAB | Facility: CLINIC | Age: 76
End: 2021-07-12
Payer: MEDICARE

## 2021-07-12 DIAGNOSIS — C64.9 RENAL CELL CARCINOMA, UNSPECIFIED LATERALITY (HCC): ICD-10-CM

## 2021-07-12 LAB
ANION GAP SERPL CALCULATED.3IONS-SCNC: 2 MMOL/L (ref 4–13)
BUN SERPL-MCNC: 22 MG/DL (ref 5–25)
CALCIUM SERPL-MCNC: 9.1 MG/DL (ref 8.3–10.1)
CHLORIDE SERPL-SCNC: 107 MMOL/L (ref 100–108)
CO2 SERPL-SCNC: 28 MMOL/L (ref 21–32)
CREAT SERPL-MCNC: 1.22 MG/DL (ref 0.6–1.3)
GFR SERPL CREATININE-BSD FRML MDRD: 57 ML/MIN/1.73SQ M
GLUCOSE SERPL-MCNC: 105 MG/DL (ref 65–140)
POTASSIUM SERPL-SCNC: 4.3 MMOL/L (ref 3.5–5.3)
SODIUM SERPL-SCNC: 137 MMOL/L (ref 136–145)

## 2021-07-12 PROCEDURE — 80048 BASIC METABOLIC PNL TOTAL CA: CPT

## 2021-07-12 PROCEDURE — 36415 COLL VENOUS BLD VENIPUNCTURE: CPT

## 2021-07-13 NOTE — PROGRESS NOTES
7/14/2021    Read Lolis Dorothy  1945  [id number]      Assessment  -Renal cell carcinoma s/p left radical nephrectomy (9/2014)  -Resolved gross hematuria hematuria s/p negative cystoscopy (5/2016)    Jason Marc is a 68 y o  male being managed by our office    1  History of renal cell carcinoma s/p left radical nephrectomy (9/2014)-    We reviewed the results of his recent creatinine which is 1 22  Patient remains asymptomatic and has no episodes of flank pain  Follow-up imaging has showed no evidence of metastatic disease or progression  He continues to follow under oncology and has an upcoming appointment in December with repeat imaging  Because he is over 5 years postoperative, and results have remained stable, we discussed follow-up with our office on as-needed basis  He will continue to see Oncology and his PCP for monitoring of his kidney function  Follow-up on as-needed basis  Patient was instructed to call with any issues     -All questions answered, patient agrees with plan      History of Present Illness  68 y o  male with a history of RCC and gross hematuria presents today for follow up  Patient underwent left radical nephrectomy in 2014 by Dr Angela Serna for  3 5 cm mass  Final pathology revealed stage I renal cell carcinoma  His postoperative imaging has been stable and showed no evidence of progression or metastatic disease  His creatinine remains stable  He continues to follow under Oncology  Patient has had no episodes of gross hematuria and denies any lower urinary tract symptoms or dysuria  He denies any changes to his overall health  Review of Systems  Review of Systems   Constitutional: Negative  HENT: Negative  Respiratory: Negative  Cardiovascular: Negative  Gastrointestinal: Negative  Genitourinary: Negative for decreased urine volume, difficulty urinating, dysuria, flank pain, frequency, hematuria and urgency  Musculoskeletal: Negative  Skin: Negative  Neurological: Negative  Psychiatric/Behavioral: Negative  AUA SYMPTOM SCORE      Most Recent Value   AUA SYMPTOM SCORE   How often have you had a sensation of not emptying your bladder completely after you finished urinating? 3   How often have you had to urinate again less than two hours after you finished urinating? 4   How often have you found you stopped and started again several times when you urinate?  0   How often have you found it difficult to postpone urination? 3   How often have you had a weak urinary stream?  3   How often have you had to push or strain to begin urination? 0   How many times did you most typically get up to urinate from the time you went to bed at night until the time you got up in the morning? 5   Quality of Life: If you were to spend the rest of your life with your urinary condition just the way it is now, how would you feel about that?  1   AUA SYMPTOM SCORE  18          Past Medical History  Past Medical History:   Diagnosis Date    [facility] )     2014    Colon polyp     Diverticulitis of colon     Last assessed - 5/12/14    Hypercholesterolemia     Hyperlipidemia     Renal neoplasm        Past Social History  Past Surgical History:   Procedure Laterality Date    COLONOSCOPY      COLONOSCOPY N/A 4/27/2018    Procedure: COLONOSCOPY;  Surgeon: Mildred Lemon MD;  Location: [facility];   Service: Gastroenterology    CYSTOSCOPY      Onset - 5/2/16 [place]     JOINT REPLACEMENT Left     Hip    LAPAROSCOPIC CHOLECYSTECTOMY      NEPHRECTOMY Left     NEPHRECTOMY Left     SHOULDER ARTHROSCOPY W/ ROTATOR CUFF REPAIR Left     TOTAL HIP ARTHROPLASTY Left     original hardware became infected and pt needed a second surgery to replace hardware    UMBILICAL HERNIA REPAIR         Past Family History  Family History   Problem Relation Age of Onset    Tuberculosis Mother     Emphysema Father         Lung       Past Social history  Social History     Socioeconomic History    Marital status: /Civil Union     Spouse name: Not on file    Number of children: Not on file    Years of education: Not on file    Highest education level: Not on file   Occupational History    Not on file   Tobacco Use    Smoking status: Former Smoker    Smokeless tobacco: Never Used   Vaping Use    Vaping Use: Never used   Substance and Sexual Activity    Alcohol use: Yes     Comment: Social    Drug use: No    Sexual activity: Not on file   Other Topics Concern    Not on file   Social History Narrative    Daily caffeine consumption, 2-3 servings a day     Social Determinants of Health     Financial Resource Strain:     Difficulty of Paying Living Expenses:    Food Insecurity:     Worried About Running Out of Food in the Last Year:     [address] in the Last Year:    Transportation Needs:     Lack of Transportation (Medical):      Lack of Transportation (Non-Medical):    Physical Activity:     Days of Exercise per Week:     Minutes of Exercise per Session:    Stress:     Feeling of Stress :    Social Connections:     Frequency of Communication with Friends and Family:     Frequency of Social Gatherings with Friends and Family:     Attends Mandae[employer] Services:     Active Member of Clubs or Organizations:     Attends Club or Organization Meetings:     Marital Status:    Intimate Partner Violence:     Fear of Current or Ex-Partner:     Emotionally Abused:     Physically Abused:     Sexually Abused:        Current Medications  Current Outpatient Medications   Medication Sig Dispense Refill    amLODIPine (NORVASC) 5 mg tablet Take 1 tablet (5 mg total) by mouth daily 90 tablet 3    amoxicillin (AMOXIL) 500 mg capsule TAKE 4 CAPSULES BY MOUTH 1 HOUR PRIOR TO APPOINTMENT  3    atorvastatin (LIPITOR) 20 mg tablet TAKE 1 TABLET BY MOUTH EVERY DAY 90 tablet 1    Blood Pressure KIT by Does not apply route daily 1 each 0    cholecalciferol (VITAMIN D3) 1,000 units tablet Take 1 tablet (1,000 Units total) by mouth daily 30 tablet 5    cyclobenzaprine (FLEXERIL) 10 mg tablet Take 1 tablet (10 mg total) by mouth 3 (three) times a day as needed for muscle spasms 30 tablet 0    escitalopram (LEXAPRO) 5 mg tablet Take 1 tablet (5 mg total) by mouth daily 30 tablet 3    gabapentin (NEURONTIN) 100 mg capsule Take 100 mg by mouth 3 (three) times a day      Multiple Vitamins-Minerals (MULTIVITAMIN ADULT) TABS Take by mouth      Omega-3 Fatty Acids (FISH OIL) 1,000 mg by Does not apply route       No current facility-administered medications for this visit  Allergies  No Known Allergies    Past Medical History, Social History, Family History, medications and allergies were reviewed  Vitals  There were no vitals filed for this visit  Physical Exam  Physical Exam  Constitutional:       Appearance: Normal appearance  He is well-developed  HENT:      Head: Normocephalic  Eyes:      Pupils: Pupils are equal, round, and reactive to light  Pulmonary:      Effort: Pulmonary effort is normal    Abdominal:      Palpations: Abdomen is soft  Tenderness: There is no right CVA tenderness or left CVA tenderness  Musculoskeletal:         General: Normal range of motion  Cervical back: Normal range of motion  Skin:     General: Skin is warm and dry  Neurological:      General: No focal deficit present  Mental Status: He is alert and oriented to person, place, and time  Psychiatric:         Mood and Affect: Mood normal          Behavior: Behavior normal          Thought Content:  Thought content normal          Judgment: Judgment normal          Results    I have personally reviewed all pertinent lab results and reviewed with patient  Lab Results   Component Value Date    PSA 2 0 06/29/2020    PSA 2 1 08/12/2019    PSA 2 3 06/24/2019     Lab Results   Component Value Date    GLUCOSE 112 01/06/2016    CALCIUM 9 1 07/12/2021     01/06/2016    K 4 3 07/12/2021    CO2 28 07/12/2021     07/12/2021    BUN 22 07/12/2021    CREATININE 1 22 07/12/2021     Lab Results   Component Value Date    WBC 8 14 02/15/2021    HGB 14 9 02/15/2021    HCT 44 8 02/15/2021    MCV 99 (H) 02/15/2021     02/15/2021     No results found for this or any previous visit (from the past 1 hour(s))

## 2021-07-14 ENCOUNTER — OFFICE VISIT (OUTPATIENT)
Dept: UROLOGY | Facility: HOSPITAL | Age: 76
End: 2021-07-14
Payer: MEDICARE

## 2021-07-14 VITALS
SYSTOLIC BLOOD PRESSURE: 130 MMHG | DIASTOLIC BLOOD PRESSURE: 82 MMHG | WEIGHT: 214 LBS | HEIGHT: 69 IN | BODY MASS INDEX: 31.7 KG/M2 | HEART RATE: 82 BPM

## 2021-07-14 DIAGNOSIS — C64.9 RENAL CELL CARCINOMA, UNSPECIFIED LATERALITY (HCC): Primary | ICD-10-CM

## 2021-07-14 PROCEDURE — 99213 OFFICE O/P EST LOW 20 MIN: CPT | Performed by: NURSE PRACTITIONER

## 2021-10-03 DIAGNOSIS — F39 MOOD DISORDER (HCC): ICD-10-CM

## 2021-10-03 RX ORDER — ESCITALOPRAM OXALATE 5 MG/1
TABLET ORAL
Qty: 90 TABLET | Refills: 1 | Status: SHIPPED | OUTPATIENT
Start: 2021-10-03 | End: 2021-10-11 | Stop reason: SDUPTHER

## 2021-10-05 ENCOUNTER — APPOINTMENT (OUTPATIENT)
Dept: LAB | Facility: CLINIC | Age: 76
End: 2021-10-05
Payer: MEDICARE

## 2021-10-11 ENCOUNTER — OFFICE VISIT (OUTPATIENT)
Dept: FAMILY MEDICINE CLINIC | Facility: HOSPITAL | Age: 76
End: 2021-10-11
Payer: MEDICARE

## 2021-10-11 VITALS
DIASTOLIC BLOOD PRESSURE: 80 MMHG | WEIGHT: 217 LBS | HEIGHT: 69 IN | HEART RATE: 102 BPM | SYSTOLIC BLOOD PRESSURE: 132 MMHG | BODY MASS INDEX: 32.14 KG/M2 | TEMPERATURE: 99.4 F

## 2021-10-11 DIAGNOSIS — C64.9 RENAL CELL CARCINOMA, UNSPECIFIED LATERALITY (HCC): ICD-10-CM

## 2021-10-11 DIAGNOSIS — I10 ESSENTIAL HYPERTENSION: Primary | ICD-10-CM

## 2021-10-11 DIAGNOSIS — R73.01 ABNORMAL FASTING GLUCOSE: ICD-10-CM

## 2021-10-11 DIAGNOSIS — N18.31 STAGE 3A CHRONIC KIDNEY DISEASE (HCC): ICD-10-CM

## 2021-10-11 DIAGNOSIS — F39 MOOD DISORDER (HCC): ICD-10-CM

## 2021-10-11 DIAGNOSIS — M17.12 PRIMARY OSTEOARTHRITIS OF LEFT KNEE: ICD-10-CM

## 2021-10-11 DIAGNOSIS — E78.2 MIXED HYPERLIPIDEMIA: ICD-10-CM

## 2021-10-11 DIAGNOSIS — Z23 ENCOUNTER FOR IMMUNIZATION: ICD-10-CM

## 2021-10-11 PROCEDURE — 99214 OFFICE O/P EST MOD 30 MIN: CPT | Performed by: FAMILY MEDICINE

## 2021-10-11 PROCEDURE — G0008 ADMIN INFLUENZA VIRUS VAC: HCPCS

## 2021-10-11 PROCEDURE — 90662 IIV NO PRSV INCREASED AG IM: CPT

## 2021-10-11 RX ORDER — ESCITALOPRAM OXALATE 5 MG/1
5 TABLET ORAL DAILY
Qty: 90 TABLET | Refills: 1 | Status: SHIPPED | OUTPATIENT
Start: 2021-10-11 | End: 2022-04-11 | Stop reason: SDUPTHER

## 2021-10-25 ENCOUNTER — HOSPITAL ENCOUNTER (OUTPATIENT)
Dept: RADIOLOGY | Facility: HOSPITAL | Age: 76
Discharge: HOME | End: 2021-10-25
Attending: PODIATRIST
Payer: MEDICARE

## 2021-10-25 DIAGNOSIS — M79.671 RIGHT FOOT PAIN: ICD-10-CM

## 2021-10-25 PROCEDURE — 73630 X-RAY EXAM OF FOOT: CPT

## 2021-10-27 DIAGNOSIS — E78.2 MIXED HYPERLIPIDEMIA: ICD-10-CM

## 2021-10-27 RX ORDER — ATORVASTATIN CALCIUM 20 MG/1
TABLET, FILM COATED ORAL
Qty: 90 TABLET | Refills: 1 | Status: SHIPPED | OUTPATIENT
Start: 2021-10-27 | End: 2022-04-27

## 2021-11-08 ENCOUNTER — APPOINTMENT (OUTPATIENT)
Dept: RADIOLOGY | Facility: MEDICAL CENTER | Age: 76
End: 2021-11-08
Payer: MEDICARE

## 2021-11-08 ENCOUNTER — CONSULT (OUTPATIENT)
Dept: OBGYN CLINIC | Facility: MEDICAL CENTER | Age: 76
End: 2021-11-08
Payer: MEDICARE

## 2021-11-08 VITALS
HEART RATE: 87 BPM | WEIGHT: 217.6 LBS | HEIGHT: 69 IN | SYSTOLIC BLOOD PRESSURE: 150 MMHG | BODY MASS INDEX: 32.23 KG/M2 | DIASTOLIC BLOOD PRESSURE: 85 MMHG

## 2021-11-08 DIAGNOSIS — M17.11 ARTHRITIS OF RIGHT KNEE: ICD-10-CM

## 2021-11-08 DIAGNOSIS — M17.12 PRIMARY OSTEOARTHRITIS OF LEFT KNEE: ICD-10-CM

## 2021-11-08 DIAGNOSIS — Z01.89 ENCOUNTER FOR LOWER EXTREMITY COMPARISON IMAGING STUDY: Primary | ICD-10-CM

## 2021-11-08 PROCEDURE — 99203 OFFICE O/P NEW LOW 30 MIN: CPT | Performed by: ORTHOPAEDIC SURGERY

## 2021-11-08 PROCEDURE — 73564 X-RAY EXAM KNEE 4 OR MORE: CPT

## 2021-11-08 PROCEDURE — 20610 DRAIN/INJ JOINT/BURSA W/O US: CPT | Performed by: ORTHOPAEDIC SURGERY

## 2021-11-08 RX ORDER — LIDOCAINE HYDROCHLORIDE 10 MG/ML
3 INJECTION, SOLUTION INFILTRATION; PERINEURAL
Status: COMPLETED | OUTPATIENT
Start: 2021-11-08 | End: 2021-11-08

## 2021-11-08 RX ORDER — METHYLPREDNISOLONE ACETATE 40 MG/ML
2 INJECTION, SUSPENSION INTRA-ARTICULAR; INTRALESIONAL; INTRAMUSCULAR; SOFT TISSUE
Status: COMPLETED | OUTPATIENT
Start: 2021-11-08 | End: 2021-11-08

## 2021-11-08 RX ADMIN — LIDOCAINE HYDROCHLORIDE 3 ML: 10 INJECTION, SOLUTION INFILTRATION; PERINEURAL at 11:13

## 2021-11-08 RX ADMIN — METHYLPREDNISOLONE ACETATE 2 ML: 40 INJECTION, SUSPENSION INTRA-ARTICULAR; INTRALESIONAL; INTRAMUSCULAR; SOFT TISSUE at 11:13

## 2021-12-10 ENCOUNTER — HOSPITAL ENCOUNTER (OUTPATIENT)
Dept: CT IMAGING | Facility: HOSPITAL | Age: 76
Discharge: HOME | End: 2021-12-10
Attending: INTERNAL MEDICINE
Payer: MEDICARE

## 2021-12-10 DIAGNOSIS — C64.9 RENAL CELL CARCINOMA, UNSPECIFIED LATERALITY (HCC): ICD-10-CM

## 2021-12-10 PROCEDURE — G1004 CDSM NDSC: HCPCS

## 2021-12-10 PROCEDURE — 74176 CT ABD & PELVIS W/O CONTRAST: CPT

## 2021-12-10 PROCEDURE — 71250 CT THORAX DX C-: CPT

## 2021-12-13 ENCOUNTER — APPOINTMENT (OUTPATIENT)
Dept: LAB | Facility: CLINIC | Age: 76
End: 2021-12-13
Payer: MEDICARE

## 2021-12-13 DIAGNOSIS — C64.9 RENAL CELL CARCINOMA, UNSPECIFIED LATERALITY (HCC): ICD-10-CM

## 2021-12-13 LAB
ALBUMIN SERPL BCP-MCNC: 3.8 G/DL (ref 3.5–5)
ALP SERPL-CCNC: 84 U/L (ref 46–116)
ALT SERPL W P-5'-P-CCNC: 32 U/L (ref 12–78)
ANION GAP SERPL CALCULATED.3IONS-SCNC: 4 MMOL/L (ref 4–13)
AST SERPL W P-5'-P-CCNC: 24 U/L (ref 5–45)
BASOPHILS # BLD AUTO: 0.05 THOUSANDS/ΜL (ref 0–0.1)
BASOPHILS NFR BLD AUTO: 1 % (ref 0–1)
BILIRUB SERPL-MCNC: 0.56 MG/DL (ref 0.2–1)
BUN SERPL-MCNC: 20 MG/DL (ref 5–25)
CALCIUM SERPL-MCNC: 9.2 MG/DL (ref 8.3–10.1)
CHLORIDE SERPL-SCNC: 107 MMOL/L (ref 100–108)
CO2 SERPL-SCNC: 29 MMOL/L (ref 21–32)
CREAT SERPL-MCNC: 1.24 MG/DL (ref 0.6–1.3)
EOSINOPHIL # BLD AUTO: 0.34 THOUSAND/ΜL (ref 0–0.61)
EOSINOPHIL NFR BLD AUTO: 5 % (ref 0–6)
ERYTHROCYTE [DISTWIDTH] IN BLOOD BY AUTOMATED COUNT: 13.2 % (ref 11.6–15.1)
GFR SERPL CREATININE-BSD FRML MDRD: 56 ML/MIN/1.73SQ M
GLUCOSE P FAST SERPL-MCNC: 103 MG/DL (ref 65–99)
HCT VFR BLD AUTO: 44.7 % (ref 36.5–49.3)
HGB BLD-MCNC: 14.6 G/DL (ref 12–17)
IMM GRANULOCYTES # BLD AUTO: 0.03 THOUSAND/UL (ref 0–0.2)
IMM GRANULOCYTES NFR BLD AUTO: 0 % (ref 0–2)
LYMPHOCYTES # BLD AUTO: 2.4 THOUSANDS/ΜL (ref 0.6–4.47)
LYMPHOCYTES NFR BLD AUTO: 32 % (ref 14–44)
MCH RBC QN AUTO: 33.1 PG (ref 26.8–34.3)
MCHC RBC AUTO-ENTMCNC: 32.7 G/DL (ref 31.4–37.4)
MCV RBC AUTO: 101 FL (ref 82–98)
MONOCYTES # BLD AUTO: 0.89 THOUSAND/ΜL (ref 0.17–1.22)
MONOCYTES NFR BLD AUTO: 12 % (ref 4–12)
NEUTROPHILS # BLD AUTO: 3.74 THOUSANDS/ΜL (ref 1.85–7.62)
NEUTS SEG NFR BLD AUTO: 50 % (ref 43–75)
NRBC BLD AUTO-RTO: 0 /100 WBCS
PLATELET # BLD AUTO: 196 THOUSANDS/UL (ref 149–390)
PMV BLD AUTO: 10.9 FL (ref 8.9–12.7)
POTASSIUM SERPL-SCNC: 4.1 MMOL/L (ref 3.5–5.3)
PROT SERPL-MCNC: 7.1 G/DL (ref 6.4–8.2)
RBC # BLD AUTO: 4.41 MILLION/UL (ref 3.88–5.62)
SODIUM SERPL-SCNC: 140 MMOL/L (ref 136–145)
WBC # BLD AUTO: 7.45 THOUSAND/UL (ref 4.31–10.16)

## 2021-12-13 PROCEDURE — 85025 COMPLETE CBC W/AUTO DIFF WBC: CPT

## 2021-12-13 PROCEDURE — 80053 COMPREHEN METABOLIC PANEL: CPT

## 2021-12-13 PROCEDURE — 36415 COLL VENOUS BLD VENIPUNCTURE: CPT

## 2021-12-17 ENCOUNTER — OFFICE VISIT (OUTPATIENT)
Dept: HEMATOLOGY ONCOLOGY | Facility: HOSPITAL | Age: 76
End: 2021-12-17
Payer: MEDICARE

## 2021-12-17 VITALS
HEIGHT: 69 IN | SYSTOLIC BLOOD PRESSURE: 142 MMHG | WEIGHT: 213 LBS | BODY MASS INDEX: 31.55 KG/M2 | OXYGEN SATURATION: 97 % | DIASTOLIC BLOOD PRESSURE: 70 MMHG | RESPIRATION RATE: 16 BRPM | TEMPERATURE: 98 F | HEART RATE: 86 BPM

## 2021-12-17 DIAGNOSIS — C64.9 RENAL CELL CARCINOMA, UNSPECIFIED LATERALITY (HCC): Primary | ICD-10-CM

## 2021-12-17 PROCEDURE — 99214 OFFICE O/P EST MOD 30 MIN: CPT | Performed by: INTERNAL MEDICINE

## 2021-12-20 ENCOUNTER — OFFICE VISIT (OUTPATIENT)
Dept: OBGYN CLINIC | Facility: MEDICAL CENTER | Age: 76
End: 2021-12-20
Payer: MEDICARE

## 2021-12-20 VITALS
WEIGHT: 213 LBS | TEMPERATURE: 97.8 F | HEART RATE: 74 BPM | DIASTOLIC BLOOD PRESSURE: 79 MMHG | BODY MASS INDEX: 31.55 KG/M2 | HEIGHT: 69 IN | SYSTOLIC BLOOD PRESSURE: 127 MMHG

## 2021-12-20 DIAGNOSIS — M17.12 PRIMARY OSTEOARTHRITIS OF LEFT KNEE: Primary | ICD-10-CM

## 2021-12-20 PROCEDURE — 99213 OFFICE O/P EST LOW 20 MIN: CPT | Performed by: ORTHOPAEDIC SURGERY

## 2021-12-29 ENCOUNTER — OFFICE VISIT (OUTPATIENT)
Dept: URGENT CARE | Facility: CLINIC | Age: 76
End: 2021-12-29
Payer: MEDICARE

## 2021-12-29 ENCOUNTER — TELEPHONE (OUTPATIENT)
Dept: FAMILY MEDICINE CLINIC | Facility: HOSPITAL | Age: 76
End: 2021-12-29

## 2021-12-29 VITALS
HEART RATE: 99 BPM | OXYGEN SATURATION: 97 % | TEMPERATURE: 97.8 F | RESPIRATION RATE: 16 BRPM | WEIGHT: 212 LBS | HEIGHT: 70 IN | BODY MASS INDEX: 30.35 KG/M2

## 2021-12-29 DIAGNOSIS — J06.9 ACUTE URI: ICD-10-CM

## 2021-12-29 DIAGNOSIS — Z11.59 SPECIAL SCREENING EXAMINATION FOR VIRAL DISEASE: Primary | ICD-10-CM

## 2021-12-29 PROCEDURE — 99203 OFFICE O/P NEW LOW 30 MIN: CPT | Performed by: PHYSICIAN ASSISTANT

## 2021-12-29 PROCEDURE — G0463 HOSPITAL OUTPT CLINIC VISIT: HCPCS | Performed by: PHYSICIAN ASSISTANT

## 2021-12-29 PROCEDURE — 87636 SARSCOV2 & INF A&B AMP PRB: CPT | Performed by: PHYSICIAN ASSISTANT

## 2021-12-29 NOTE — PATIENT INSTRUCTIONS
Upper Respiratory Infection   AMBULATORY CARE:   An upper respiratory infection  is also called a cold  Your nose, throat, ears, and sinuses may be affected  You are more likely to get a cold in the winter  Your risk of getting a cold may be increased if you smoke cigarettes or have allergies, such as hay fever  What causes a cold? A cold is caused by a virus  Many viruses can cause a cold, and each is contagious  This means the virus can be easily spread to another person when the sick person coughs or sneezes  The virus can also be spread if you touch an object the virus is on and then touch your eyes, mouth, or nose  Cold symptoms  are usually worst for the first 3 to 5 days  You may have any of the following:  · Runny or stuffy nose    · Sneezing and coughing    · Sore throat or hoarseness    · Red, watery, and sore eyes    · Fatigue (you feel more tired than usual)    · Chills and fever    · Headache, body aches, or sore muscles    Call your local emergency number (911 in the [address] [place] [address],[address]) if:   · You have chest pain or trouble breathing  Seek care immediately if:   · You have a fever over 102ºF (39ºC)  Call your doctor if:   · You have a low fever  · Your sore throat gets worse or you see white or yellow spots in your throat  · Your symptoms get worse after 3 to 5 days or are not better in 14 days  · You have a rash anywhere on your skin  · You have large, tender lumps in your neck  · You have thick, green, or yellow drainage from your nose  · You cough up thick yellow, green, or bloody mucus  · You have a bad earache  · You have questions or concerns about your condition or care  Treatment:  Colds are caused by viruses and do not get better with antibiotics  Most people get better in 7 to 14 days  You may continue to cough for 2 to 3 weeks  The following may help decrease your symptoms:  · Decongestants  help reduce nasal congestion and help you breathe more easily   If you take decongestant pills, they may make you feel restless or not able to sleep  Do not use decongestant sprays for more than a few days  · Cough suppressants  help reduce coughing  Ask your healthcare provider which type of cough medicine is best for you  · NSAIDs , such as ibuprofen, help decrease swelling, pain, and fever  NSAIDs can cause stomach bleeding or kidney problems in certain people  If you take blood thinner medicine, always ask your healthcare provider if NSAIDs are safe for you  Always read the medicine label and follow directions  · Acetaminophen  decreases pain and fever  It is available without a doctor's order  Ask how much to take and how often to take it  Follow directions  Read the labels of all other medicines you are using to see if they also contain acetaminophen, or ask your doctor or pharmacist  Acetaminophen can cause liver damage if not taken correctly  Do not use more than 4 grams (4,000 milligrams) total of acetaminophen in one day  Manage a cold:   · Rest as much as possible  Slowly start to do more each day  · Drink more liquids as directed  Liquids will help thin and loosen mucus so you can cough it up  Liquids will also help prevent dehydration  Liquids that help prevent dehydration include water, fruit juice, and broth  Do not drink liquids that contain caffeine  Caffeine can increase your risk for dehydration  Ask your healthcare provider how much liquid to drink each day  · Soothe a sore throat  Gargle with warm salt water  Make salt water by dissolving ¼ teaspoon salt in 1 cup warm water  You may also suck on hard candy or throat lozenges  You may use a sore throat spray  · Use a humidifier or vaporizer  Use a cool mist humidifier or a vaporizer to increase air moisture in your home  This may make it easier for you to breathe and help decrease your cough  · Use saline nasal drops as directed  These help relieve congestion      · Apply petroleum-based jelly around the outside of your nostrils  This can decrease irritation from blowing your nose  · Do not smoke  Nicotine and other chemicals in cigarettes and cigars can make your symptoms worse  They can also cause infections such as bronchitis or pneumonia  Ask your healthcare provider for information if you currently smoke and need help to quit  E-cigarettes or smokeless tobacco still contain nicotine  Talk to your healthcare provider before you use these products  Prevent a cold:   · Wash your hands often  Use soap and water every time you wash your hands  Rub your soapy hands together, lacing your fingers  Use the fingers of one hand to scrub under the nails of the other hand  Wash for at least 20 seconds  Rinse with warm, running water for several seconds  Then dry your hands  Use germ-killing gel if soap and water are not available  Do not touch your eyes or mouth without washing your hands first          · Cover a sneeze or cough  Use a tissue that covers your mouth and nose  Put the used tissue in the trash right away  Use the bend of your arm if a tissue is not available  Wash your hands well with soap and water or use a hand   Do not stand close to anyone who is sneezing or coughing  · Try to stay away from others while you are sick  This is especially important during the first 2 to 3 days when the virus is more easily spread  Wait until a fever, cough, or other symptoms are gone before you return to work or other regular activities  · Do not share items while you are sick  This includes food, drinks, eating utensils, and dishes  Follow up with your doctor as directed:  Write down your questions so you remember to ask them during your visits  © Copyright [employer] 2021 Information is for End User's use only and may not be sold, redistributed or otherwise used for commercial purposes   All illustrations and images included in CareNotes® are the copyrighted property of A  [employer]  or [facility]  The above information is an  only  It is not intended as medical advice for individual conditions or treatments  Talk to your doctor, nurse or pharmacist before following any medical regimen to see if it is safe and effective for you

## 2021-12-29 NOTE — PROGRESS NOTES
[facility]        NAME: Tracy Floyd is a 68 y o  male  : 1945    MRN: [id number]  DATE: 2022  TIME: 11:20 AM    Assessment and Plan   Special screening examination for viral disease [Z11 59]  1  Special screening examination for viral disease  COVID/FLU- Office Collect   2  Acute URI           Patient Instructions       Follow up with PCP in 3-5 days  Proceed to  ER if symptoms worsen  Chief Complaint     Chief Complaint   Patient presents with    Sore Throat     Since  nose started runing, headache, throat scratchy  eye watering         History of Present Illness       80-year-old male presents the clinic with sore throat, runny nose, watery eyes, fatigue, nasal congestion, productive cough and headache across his forehead  Patient states that symptoms started on   Patient has been taking Tylenol and coricidin for symptom relief  Pt is vaccinated did not get booster yet  No sick contacts at home  Review of Systems   Review of Systems   Constitutional: Positive for fatigue  Negative for appetite change, chills and fever  HENT: Positive for congestion, rhinorrhea and sore throat  Negative for ear pain and postnasal drip  Eyes: Positive for discharge (watery eyes)  Respiratory: Positive for cough (productive)  Negative for chest tightness, shortness of breath and wheezing  Gastrointestinal: Negative for abdominal pain, diarrhea, nausea and vomiting  Musculoskeletal: Negative for myalgias  Skin: Negative for rash  Neurological: Positive for headaches (forehead)           Current Medications       Current Outpatient Medications:     amLODIPine (NORVASC) 5 mg tablet, Take 1 tablet (5 mg total) by mouth daily, Disp: 90 tablet, Rfl: 3    atorvastatin (LIPITOR) 20 mg tablet, TAKE 1 TABLET BY MOUTH EVERY DAY, Disp: 90 tablet, Rfl: 1    Blood Pressure KIT, by Does not apply route daily, Disp: 1 each, Rfl: 0    cholecalciferol (VITAMIN D3) 1,000 units tablet, Take 1 tablet (1,000 Units total) by mouth daily, Disp: 30 tablet, Rfl: 5    escitalopram (LEXAPRO) 5 mg tablet, Take 1 tablet (5 mg total) by mouth daily, Disp: 90 tablet, Rfl: 1    gabapentin (NEURONTIN) 100 mg capsule, Take 100 mg by mouth 3 (three) times a day, Disp: , Rfl:     Multiple Vitamins-Minerals (MULTIVITAMIN ADULT) TABS, Take by mouth, Disp: , Rfl:     Omega-3 Fatty Acids (FISH OIL) 1,000 mg, by Does not apply route, Disp: , Rfl:     amoxicillin (AMOXIL) 500 mg capsule, TAKE 4 CAPSULES BY MOUTH 1 HOUR PRIOR TO APPOINTMENT (Patient not taking: Reported on 10/11/2021), Disp: , Rfl: 3    cyclobenzaprine (FLEXERIL) 10 mg tablet, Take 1 tablet (10 mg total) by mouth 3 (three) times a day as needed for muscle spasms (Patient not taking: Reported on 12/29/2021 ), Disp: 30 tablet, Rfl: 0    Current Allergies     Allergies as of 12/29/2021    (No Known Allergies)            The following portions of the patient's history were reviewed and updated as appropriate: allergies, current medications, past family history, past medical history, past social history, past surgical history and problem list      Past Medical History:   Diagnosis Date    Cancer of left kidney ([facility] [address] )     2014    Colon polyp     Diverticulitis of colon     Last assessed - 5/12/14    Hypercholesterolemia     Hyperlipidemia     Renal neoplasm        Past Surgical History:   Procedure Laterality Date    COLONOSCOPY      COLONOSCOPY N/A 4/27/2018    Procedure: COLONOSCOPY;  Surgeon: Kristian Villalobos MD;  Location: [facility] GI LAB;   Service: Gastroenterology    CYSTOSCOPY      Onset - 5/2/16 Margarite Solum     JOINT REPLACEMENT Left     Hip    LAPAROSCOPIC CHOLECYSTECTOMY      NEPHRECTOMY Left     NEPHRECTOMY Left     SHOULDER ARTHROSCOPY W/ ROTATOR CUFF REPAIR Left     TOTAL HIP ARTHROPLASTY Left     original hardware became infected and pt needed a second surgery to replace hardware    UMBILICAL HERNIA REPAIR Family History   Problem Relation Age of Onset    Tuberculosis Mother     Emphysema Father         Lung         Medications have been verified  Objective   Pulse 99   Temp 97 8 °F (36 6 °C) (Tympanic)   Resp 16   Ht 5' 10" (1 778 m)   Wt 96 2 kg (212 lb)   SpO2 97%   BMI 30 42 kg/m²   No LMP for male patient  Physical Exam     Physical Exam  Vitals and nursing note reviewed  Constitutional:       General: He is not in acute distress  Appearance: He is well-developed  He is not diaphoretic  HENT:      Head: Normocephalic and atraumatic  Right Ear: A middle ear effusion is present  Tympanic membrane is not erythematous  Left Ear: A middle ear effusion is present  Tympanic membrane is not erythematous  Nose: Mucosal edema, congestion and rhinorrhea present  Mouth/Throat:      Pharynx: Uvula midline  Posterior oropharyngeal erythema present  Eyes:      Conjunctiva/sclera: Conjunctivae normal    Cardiovascular:      Rate and Rhythm: Normal rate and regular rhythm  Heart sounds: Normal heart sounds  Pulmonary:      Effort: Pulmonary effort is normal       Breath sounds: Normal breath sounds  Musculoskeletal:         General: Normal range of motion  Skin:     General: Skin is warm and dry  Neurological:      Mental Status: He is alert and oriented to person, place, and time

## 2022-01-03 LAB
FLUAV RNA RESP QL NAA+PROBE: NEGATIVE
FLUBV RNA RESP QL NAA+PROBE: NEGATIVE
SARS-COV-2 RNA RESP QL NAA+PROBE: POSITIVE

## 2022-01-04 ENCOUNTER — TELEPHONE (OUTPATIENT)
Dept: FAMILY MEDICINE CLINIC | Facility: HOSPITAL | Age: 77
End: 2022-01-04

## 2022-01-04 NOTE — TELEPHONE ENCOUNTER
Just found out the covid test he had done at  urgent care on 12/29 was positive  He would like a return call for guidance/advice  He is feeling better    PCB

## 2022-02-07 ENCOUNTER — OFFICE VISIT (OUTPATIENT)
Dept: OBGYN CLINIC | Facility: MEDICAL CENTER | Age: 77
End: 2022-02-07
Payer: MEDICARE

## 2022-02-07 VITALS
HEIGHT: 69 IN | SYSTOLIC BLOOD PRESSURE: 150 MMHG | DIASTOLIC BLOOD PRESSURE: 84 MMHG | BODY MASS INDEX: 32.29 KG/M2 | TEMPERATURE: 98.1 F | WEIGHT: 218 LBS | HEART RATE: 103 BPM

## 2022-02-07 DIAGNOSIS — M17.12 PRIMARY OSTEOARTHRITIS OF LEFT KNEE: Primary | ICD-10-CM

## 2022-02-07 PROCEDURE — 20610 DRAIN/INJ JOINT/BURSA W/O US: CPT | Performed by: ORTHOPAEDIC SURGERY

## 2022-02-07 RX ORDER — HYALURONATE SODIUM 10 MG/ML
20 SYRINGE (ML) INTRAARTICULAR
Status: COMPLETED | OUTPATIENT
Start: 2022-02-07 | End: 2022-02-07

## 2022-02-07 RX ADMIN — Medication 20 MG: at 16:35

## 2022-02-07 NOTE — PROGRESS NOTES
Assessment/Plan:  1  Primary osteoarthritis of left knee      No orders of the defined types were placed in this encounter  · Patient received left knee Euflexxa injection 1/3 today  Tolerated the procedure well  Advised to apply ice and avoid strenuous activity for 1-2 days as needed  · Continue tylenol  · Continue activity as tolerated  · We will consider ordering R knee VS if effective for left knee  Return in about 2 weeks (around 2/21/2022) for left knee euflexxa 2/3  I answered all of the patient's questions during the visit and provided education of the patient's condition during the visit  The patient verbalized understanding of the information given and agrees with the plan  This note was dictated using [employer] software  It may contain errors including improperly dictated words  Please contact physician directly for any questions  Subjective   Chief Complaint:   Chief Complaint   Patient presents with    Left Knee - Follow-up       HPI  Eliseo Oreilly is a 68 y o  male who presents for follow up for left knee pain and OA  Patient is here today for left knee Euflexxa injection 1/3  Patient notes his right knee has been feeling alright but his left knee has been painful  He is taking tylenol for pain  He would like to try VS on the left knee first, and consider right VS after  Review of Systems  ROS:    See HPI for musculoskeletal review  All other systems reviewed are negative     History:  Past Medical History:   Diagnosis Date    Cancer of left kidney [facility])     2014    Colon polyp     Diverticulitis of colon     Last assessed - 5/12/14    Hypercholesterolemia     Hyperlipidemia     Renal neoplasm      Past Surgical History:   Procedure Laterality Date    COLONOSCOPY      COLONOSCOPY N/A 4/27/2018    Procedure: COLONOSCOPY;  Surgeon: Christina Akbar MD;  Location: [facility] GI LAB;   Service: Gastroenterology    CYSTOSCOPY      Onset - 5/2/16 Savana Feeling JOINT REPLACEMENT Left     Hip    LAPAROSCOPIC CHOLECYSTECTOMY      NEPHRECTOMY Left     NEPHRECTOMY Left     SHOULDER ARTHROSCOPY W/ ROTATOR CUFF REPAIR Left     TOTAL HIP ARTHROPLASTY Left     original hardware became infected and pt needed a second surgery to replace hardware    UMBILICAL HERNIA REPAIR       Social History   Social History     Substance and Sexual Activity   Alcohol Use Yes    Comment: Social     Social History     Substance and Sexual Activity   Drug Use No     Social History     Tobacco Use   Smoking Status Former Smoker   Smokeless Tobacco Never Used     Family History:   Family History   Problem Relation Age of Onset    Tuberculosis Mother     Emphysema Father         Lung       Current Outpatient Medications on File Prior to Visit   Medication Sig Dispense Refill    amLODIPine (NORVASC) 5 mg tablet Take 1 tablet (5 mg total) by mouth daily 90 tablet 3    amoxicillin (AMOXIL) 500 mg capsule TAKE 4 CAPSULES BY MOUTH 1 HOUR PRIOR TO APPOINTMENT (Patient not taking: Reported on 10/11/2021)  3    atorvastatin (LIPITOR) 20 mg tablet TAKE 1 TABLET BY MOUTH EVERY DAY 90 tablet 1    Blood Pressure KIT by Does not apply route daily 1 each 0    cholecalciferol (VITAMIN D3) 1,000 units tablet Take 1 tablet (1,000 Units total) by mouth daily 30 tablet 5    cyclobenzaprine (FLEXERIL) 10 mg tablet Take 1 tablet (10 mg total) by mouth 3 (three) times a day as needed for muscle spasms (Patient not taking: Reported on 12/29/2021 ) 30 tablet 0    escitalopram (LEXAPRO) 5 mg tablet Take 1 tablet (5 mg total) by mouth daily 90 tablet 1    gabapentin (NEURONTIN) 100 mg capsule Take 100 mg by mouth 3 (three) times a day      Multiple Vitamins-Minerals (MULTIVITAMIN ADULT) TABS Take by mouth      Omega-3 Fatty Acids (FISH OIL) 1,000 mg by Does not apply route       No current facility-administered medications on file prior to visit       No Known Allergies     Objective     /84   Pulse 103   Temp 98 1 °F (36 7 °C)   Ht 5' 9" (1 753 m)   Wt 98 9 kg (218 lb)   BMI 32 19 kg/m²      PE:  AAOx 3  WDWN  Hearing intact, no drainage from eyes  no audible wheezing  no abdominal distension  LE compartments soft, skin intact    Ortho Exam:  left Knee:   No erythema  no swelling  no effusion  no warmth  No TTP  AROM: 3- 120  Stable to varus/valgus stress      Large joint arthrocentesis: L knee  Universal Protocol:  Consent: Verbal consent obtained    Risks and benefits: risks, benefits and alternatives were discussed  Consent given by: patient  Site marked: the operative site was marked  Supporting Documentation  Indications: pain   Procedure Details  Location: knee - L knee  Preparation: Patient was prepped and draped in the usual sterile fashion  Needle size: 22 G  Ultrasound guidance: no  Approach: anterolateral  Medications administered: 20 mg Sodium Hyaluronate 20 MG/2ML    Patient tolerance: patient tolerated the procedure well with no immediate complications  Dressing:  Sterile dressing applied

## 2022-02-21 ENCOUNTER — PROCEDURE VISIT (OUTPATIENT)
Dept: OBGYN CLINIC | Facility: MEDICAL CENTER | Age: 77
End: 2022-02-21
Payer: MEDICARE

## 2022-02-21 VITALS
DIASTOLIC BLOOD PRESSURE: 78 MMHG | HEIGHT: 69 IN | WEIGHT: 220 LBS | BODY MASS INDEX: 32.58 KG/M2 | HEART RATE: 102 BPM | SYSTOLIC BLOOD PRESSURE: 127 MMHG | TEMPERATURE: 97.8 F

## 2022-02-21 DIAGNOSIS — M17.12 PRIMARY OSTEOARTHRITIS OF LEFT KNEE: Primary | ICD-10-CM

## 2022-02-21 PROCEDURE — 20610 DRAIN/INJ JOINT/BURSA W/O US: CPT | Performed by: ORTHOPAEDIC SURGERY

## 2022-02-21 RX ORDER — HYALURONATE SODIUM 10 MG/ML
20 SYRINGE (ML) INTRAARTICULAR
Status: COMPLETED | OUTPATIENT
Start: 2022-02-21 | End: 2022-02-21

## 2022-02-21 RX ADMIN — Medication 20 MG: at 17:07

## 2022-02-21 NOTE — PROGRESS NOTES
Patient is here today for left knee Euflexxa injection 2/3  Post injection instructions reviewed  Follow up 1 week for injection 3/3  Large joint arthrocentesis: L knee  Universal Protocol:  Consent: Verbal consent obtained    Risks and benefits: risks, benefits and alternatives were discussed  Consent given by: patient  Site marked: the operative site was marked  Supporting Documentation  Indications: pain   Procedure Details  Location: knee - L knee  Preparation: Patient was prepped and draped in the usual sterile fashion  Needle size: 22 G  Ultrasound guidance: no  Approach: anterolateral  Medications administered: 20 mg Sodium Hyaluronate 20 MG/2ML    Patient tolerance: patient tolerated the procedure well with no immediate complications  Dressing:  Sterile dressing applied

## 2022-02-28 ENCOUNTER — PROCEDURE VISIT (OUTPATIENT)
Dept: OBGYN CLINIC | Facility: MEDICAL CENTER | Age: 77
End: 2022-02-28
Payer: MEDICARE

## 2022-02-28 VITALS
TEMPERATURE: 97.6 F | SYSTOLIC BLOOD PRESSURE: 129 MMHG | BODY MASS INDEX: 32.58 KG/M2 | HEIGHT: 69 IN | DIASTOLIC BLOOD PRESSURE: 79 MMHG | WEIGHT: 220 LBS | HEART RATE: 103 BPM

## 2022-02-28 DIAGNOSIS — M17.12 PRIMARY OSTEOARTHRITIS OF LEFT KNEE: Primary | ICD-10-CM

## 2022-02-28 PROCEDURE — 20610 DRAIN/INJ JOINT/BURSA W/O US: CPT | Performed by: ORTHOPAEDIC SURGERY

## 2022-02-28 RX ORDER — HYALURONATE SODIUM 10 MG/ML
20 SYRINGE (ML) INTRAARTICULAR
Status: COMPLETED | OUTPATIENT
Start: 2022-02-28 | End: 2022-02-28

## 2022-02-28 RX ADMIN — Medication 20 MG: at 09:07

## 2022-02-28 NOTE — PROGRESS NOTES
Patient is here today for left knee Euflexxa 3/3  Post injections instructions reviewed  He will follow up as needed for left knee  Aware he could have CSI in 2 months if needed  He will follow up in another year for left hip x-rays  Large joint arthrocentesis: L knee  Universal Protocol:  Consent: Verbal consent obtained    Risks and benefits: risks, benefits and alternatives were discussed  Consent given by: patient  Site marked: the operative site was marked  Supporting Documentation  Indications: pain   Procedure Details  Location: knee - L knee  Preparation: Patient was prepped and draped in the usual sterile fashion  Needle size: 22 G  Ultrasound guidance: no  Approach: anterolateral  Medications administered: 20 mg Sodium Hyaluronate 20 MG/2ML    Patient tolerance: patient tolerated the procedure well with no immediate complications  Dressing:  Sterile dressing applied

## 2022-03-25 ENCOUNTER — RA CDI HCC (OUTPATIENT)
Dept: OTHER | Facility: HOSPITAL | Age: 77
End: 2022-03-25

## 2022-03-25 NOTE — PROGRESS NOTES
[facility] [address]  coding opportunities       Chart reviewed, no opportunity found: CHART REVIEWED, NO OPPORTUNITY FOUND        Patients Insurance     Medicare Insurance: Medicare

## 2022-04-04 ENCOUNTER — TELEPHONE (OUTPATIENT)
Dept: FAMILY MEDICINE CLINIC | Facility: HOSPITAL | Age: 77
End: 2022-04-04

## 2022-04-11 ENCOUNTER — OFFICE VISIT (OUTPATIENT)
Dept: FAMILY MEDICINE CLINIC | Facility: HOSPITAL | Age: 77
End: 2022-04-11
Payer: MEDICARE

## 2022-04-11 VITALS
OXYGEN SATURATION: 97 % | WEIGHT: 219 LBS | DIASTOLIC BLOOD PRESSURE: 75 MMHG | BODY MASS INDEX: 32.44 KG/M2 | TEMPERATURE: 98.3 F | SYSTOLIC BLOOD PRESSURE: 120 MMHG | HEART RATE: 91 BPM | HEIGHT: 69 IN

## 2022-04-11 DIAGNOSIS — Z12.5 SCREENING FOR MALIGNANT NEOPLASM OF PROSTATE: ICD-10-CM

## 2022-04-11 DIAGNOSIS — C64.9 RENAL CELL CARCINOMA, UNSPECIFIED LATERALITY (HCC): ICD-10-CM

## 2022-04-11 DIAGNOSIS — N18.2 CKD (CHRONIC KIDNEY DISEASE), STAGE II: ICD-10-CM

## 2022-04-11 DIAGNOSIS — N18.31 STAGE 3A CHRONIC KIDNEY DISEASE (HCC): ICD-10-CM

## 2022-04-11 DIAGNOSIS — Z99.89 OSA ON CPAP: ICD-10-CM

## 2022-04-11 DIAGNOSIS — Z00.00 MEDICARE ANNUAL WELLNESS VISIT, SUBSEQUENT: Primary | ICD-10-CM

## 2022-04-11 DIAGNOSIS — R73.01 ABNORMAL FASTING GLUCOSE: ICD-10-CM

## 2022-04-11 DIAGNOSIS — F39 MOOD DISORDER (HCC): ICD-10-CM

## 2022-04-11 DIAGNOSIS — G47.33 OSA ON CPAP: ICD-10-CM

## 2022-04-11 DIAGNOSIS — I10 ESSENTIAL HYPERTENSION: ICD-10-CM

## 2022-04-11 DIAGNOSIS — J30.1 SEASONAL ALLERGIC RHINITIS DUE TO POLLEN: ICD-10-CM

## 2022-04-11 DIAGNOSIS — I35.8 AORTIC VALVE SCLEROSIS: ICD-10-CM

## 2022-04-11 DIAGNOSIS — E78.2 MIXED HYPERLIPIDEMIA: ICD-10-CM

## 2022-04-11 PROCEDURE — 99214 OFFICE O/P EST MOD 30 MIN: CPT | Performed by: FAMILY MEDICINE

## 2022-04-11 PROCEDURE — G0439 PPPS, SUBSEQ VISIT: HCPCS | Performed by: FAMILY MEDICINE

## 2022-04-11 RX ORDER — ESCITALOPRAM OXALATE 5 MG/1
5 TABLET ORAL DAILY
Qty: 90 TABLET | Refills: 1 | Status: SHIPPED | OUTPATIENT
Start: 2022-04-11

## 2022-04-11 RX ORDER — PREDNISONE 10 MG/1
TABLET ORAL
Qty: 16 TABLET | Refills: 0 | Status: SHIPPED | OUTPATIENT
Start: 2022-04-11

## 2022-04-11 NOTE — PATIENT INSTRUCTIONS

## 2022-04-11 NOTE — PROGRESS NOTES
Assessment/Plan:         Diagnoses and all orders for this visit:    Medicare annual wellness visit, subsequent    Essential hypertension  Comments:  Excellent BP control  Orders:  -     CBC and Platelet; Future  -     TSH, 3rd generation with Free T4 reflex; Future    Aortic valve sclerosis  Comments:  Asymptomatic  Last echo 5/2018    PARVIZ on CPAP    Renal cell carcinoma, unspecified laterality (HCC)  Comments:  JUAN    CKD (chronic kidney disease), stage II  Comments:  Stable kidney function- keep hydrated  Orders:  -     Comprehensive metabolic panel; Future    Mixed hyperlipidemia  -     Lipid Panel with Direct LDL reflex; Future    Abnormal fasting glucose  -     HEMOGLOBIN A1C W/ EAG ESTIMATION; Future    BMI 32 0-32 9,adult    Mood disorder (HCC)  -     escitalopram (LEXAPRO) 5 mg tablet; Take 1 tablet (5 mg total) by mouth daily    Stage 3a chronic kidney disease (HCC)    Seasonal allergic rhinitis due to pollen  -     predniSONE 10 mg tablet; Take 4 tablets today, then three tablets daily for 2 days, two tablets daily for 2 days, then one tablet daily for 2 days  Screening for malignant neoplasm of prostate  -     PSA, Total Screen; Future          Subjective:      Patient ID: Cierra Mendenhall is a 68 y o  male  6 month follow up  Feels very fatigued, can sleep anytime  Has CPAP with good use  Some phlegm he is coughing up  Did home COVID test negative  Sinuses are congested    BP's have remained in good range    Medications reviewed and list revised      The following portions of the patient's history were reviewed and updated as appropriate: allergies, current medications, past family history, past medical history, past social history, past surgical history and problem list     Review of Systems   Constitutional: Negative for unexpected weight change  HENT: Negative  Eyes: Negative for visual disturbance  Respiratory: Negative  Cardiovascular: Negative  Gastrointestinal: Negative  Genitourinary: Negative  Musculoskeletal: Positive for arthralgias  Psychiatric/Behavioral: Negative  All other systems reviewed and are negative  Objective:      /75 (BP Location: Left arm, Patient Position: Sitting, Cuff Size: Large)   Pulse 91   Temp 98 3 °F (36 8 °C) (Tympanic)   Ht 5' 9" (1 753 m)   Wt 99 3 kg (219 lb)   SpO2 97%   BMI 32 34 kg/m²          Physical Exam  Vitals and nursing note reviewed  Cardiovascular:      Rate and Rhythm: Normal rate and regular rhythm  Heart sounds: Murmur heard  Pulmonary:      Effort: Pulmonary effort is normal       Breath sounds: Normal breath sounds  Musculoskeletal:      Right lower leg: No edema  Left lower leg: No edema  Neurological:      Mental Status: He is alert and oriented to person, place, and time

## 2022-04-11 NOTE — PROGRESS NOTES
Assessment and Plan:     Problem List Items Addressed This Visit        Respiratory    PARVIZ on CPAP       Cardiovascular and Mediastinum    Essential hypertension    Relevant Orders    CBC and Platelet    TSH, 3rd generation with Free T4 reflex    Aortic valve sclerosis       Genitourinary    Renal cell carcinoma (HCC)    CKD (chronic kidney disease), stage II    Relevant Orders    Comprehensive metabolic panel    Stage 3 chronic kidney disease (HCC)       Other    Abnormal fasting glucose    Relevant Orders    HEMOGLOBIN A1C W/ EAG ESTIMATION    Mixed hyperlipidemia    Relevant Orders    Lipid Panel with Direct LDL reflex    Medicare annual wellness visit, subsequent - Primary    BMI 32 0-32 9,adult      Other Visit Diagnoses     Mood disorder ([address] )        Relevant Medications    escitalopram (LEXAPRO) 5 mg tablet    Seasonal allergic rhinitis due to pollen        Relevant Medications    predniSONE 10 mg tablet    Screening for malignant neoplasm of prostate        Relevant Orders    PSA, Total Screen        BMI Counseling: Body mass index is 32 34 kg/m²  The BMI is above normal  Nutrition recommendations include decreasing portion sizes, limiting drinks that contain sugar, moderation in carbohydrate intake and reducing intake of cholesterol  Exercise recommendations include vigorous physical activity 75 minutes/week  No pharmacotherapy was ordered  Rationale for BMI follow-up plan is due to patient being overweight or obese  Depression Screening and Follow-up Plan: Patient was screened for depression during today's encounter  They screened negative with a PHQ-2 score of 0  Preventive health issues were discussed with patient, and age appropriate screening tests were ordered as noted in patient's After Visit Summary  Personalized health advice and appropriate referrals for health education or preventive services given if needed, as noted in patient's After Visit Summary       History of Present Illness: Patient presents for Medicare Annual Wellness visit    Patient Care Team:  Mendy Gonzalez MD as PCP - General  DO Maureen Cantu MD Violeta Rockers, MD Orrin Grill, MD Raelyn Slack, MD Charlee Lund, MD as Endoscopist     Problem List:     Patient Active Problem List   Diagnosis    Abnormal fasting glucose    Renal cell carcinoma ([facility] )    CKD (chronic kidney disease), stage II    Disc degeneration, lumbosacral    Edema    Hematuria    Mixed hyperlipidemia    Essential hypertension    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[facility] )    Osteoarthritis of knee    Enlarged prostate    Lower abdominal pain    Screening for colon cancer    History of colon polyps    Diarrhea    Left ventricular diastolic dysfunction    Stage 3 chronic kidney disease ([place] [address] )    Medicare annual wellness visit, subsequent    PARVIZ on CPAP    Peripheral neuropathic pain    Aortic valve sclerosis    BMI 32 0-32 9,adult      Past Medical and Surgical History:     Past Medical History:   Diagnosis Date    Cancer of left kidney ([place] [address] )     2014    Colon polyp     Diverticulitis of colon     Last assessed - 5/12/14    Hypercholesterolemia     Hyperlipidemia     Renal neoplasm      Past Surgical History:   Procedure Laterality Date    COLONOSCOPY      COLONOSCOPY N/A 4/27/2018    Procedure: COLONOSCOPY;  Surgeon: Quynh Hills MD;  Location: [facility];   Service: Gastroenterology    CYSTOSCOPY      Onset - 5/2/16 David Brisker     JOINT REPLACEMENT Left     Hip    LAPAROSCOPIC CHOLECYSTECTOMY      NEPHRECTOMY Left     NEPHRECTOMY Left     SHOULDER ARTHROSCOPY W/ ROTATOR CUFF REPAIR Left     TOTAL HIP ARTHROPLASTY Left     original hardware became infected and pt needed a second surgery to replace hardware    UMBILICAL HERNIA REPAIR        Family History:     Family History   Problem Relation Age of Onset    Tuberculosis Mother     Emphysema Father         Lung      Social History: Social History     Socioeconomic History    Marital status: /Civil Union     Spouse name: None    Number of children: None    Years of education: None    Highest education level: None   Occupational History    None   Tobacco Use    Smoking status: Former Smoker    Smokeless tobacco: Never Used   Vaping Use    Vaping Use: Never used   Substance and Sexual Activity    Alcohol use: Yes     Comment: Social    Drug use: No    Sexual activity: None   Other Topics Concern    None   Social History Narrative    Daily caffeine consumption, 2-3 servings a day     Social Determinants of Health     Financial Resource Strain: Not on file   Food Insecurity: Not on file   Transportation Needs: Not on file   Physical Activity: Not on file   Stress: Not on file   Social Connections: Not on file   Intimate Partner Violence: Not on file   Housing Stability: Not on file      Medications and Allergies:     Current Outpatient Medications   Medication Sig Dispense Refill    amLODIPine (NORVASC) 5 mg tablet Take 1 tablet (5 mg total) by mouth daily 90 tablet 3    atorvastatin (LIPITOR) 20 mg tablet TAKE 1 TABLET BY MOUTH EVERY DAY 90 tablet 1    cholecalciferol (VITAMIN D3) 1,000 units tablet Take 1 tablet (1,000 Units total) by mouth daily 30 tablet 5    cyclobenzaprine (FLEXERIL) 10 mg tablet Take 1 tablet (10 mg total) by mouth 3 (three) times a day as needed for muscle spasms 30 tablet 0    escitalopram (LEXAPRO) 5 mg tablet Take 1 tablet (5 mg total) by mouth daily 90 tablet 1    gabapentin (NEURONTIN) 100 mg capsule Take 100 mg by mouth 3 (three) times a day      Multiple Vitamins-Minerals (MULTIVITAMIN ADULT) TABS Take by mouth      Omega-3 Fatty Acids (FISH OIL) 1,000 mg by Does not apply route      predniSONE 10 mg tablet Take 4 tablets today, then three tablets daily for 2 days, two tablets daily for 2 days, then one tablet daily for 2 days   16 tablet 0     No current facility-administered medications for this visit  No Known Allergies   Immunizations:     Immunization History   Administered Date(s) Administered    COVID-19 PFIZER VACCINE 0 3 ML IM 03/19/2021, 04/10/2021    INFLUENZA 11/07/2018, 10/21/2019    Influenza Split High Dose Preservative Free IM 09/05/2012, 09/22/2014, 01/04/2016, 10/21/2019    Influenza, high dose seasonal 0 7 mL 08/24/2020, 10/11/2021    Influenza, seasonal, injectable 10/21/2013    Pneumococcal Conjugate 13-Valent 02/05/2018    Pneumococcal Polysaccharide PPV23 09/12/2014, 11/07/2018    Tdap 10/15/2017      Health Maintenance:         Topic Date Due    Colorectal Cancer Screening  04/27/2023    Hepatitis C Screening  Completed         Topic Date Due    COVID-19 Vaccine (3 - Booster for Adonay Holland series) 09/10/2021      Medicare Health Risk Assessment:     /75 (BP Location: Left arm, Patient Position: Sitting, Cuff Size: Large)   Pulse 91   Temp 98 3 °F (36 8 °C) (Tympanic)   Ht 5' 9" (1 753 m)   Wt 99 3 kg (219 lb)   SpO2 97%   BMI 32 34 kg/m²      Stevan Rousseau is here for his Subsequent Wellness visit  Last Medicare Wellness visit information reviewed, patient interviewed and updates made to the record today  Health Risk Assessment:   Patient rates overall health as good  Patient feels that their physical health rating is same  Patient is satisfied with their life  Eyesight was rated as same  Hearing was rated as slightly worse  Patient feels that their emotional and mental health rating is same  Patients states they are sometimes angry  Patient states they are sometimes unusually tired/fatigued  Pain experienced in the last 7 days has been some  Patient's pain rating has been 4/10  Patient states that he has experienced no weight loss or gain in last 6 months  Depression Screening:   PHQ-2 Score: 0      Fall Risk Screening:    In the past year, patient has experienced: history of falling in past year    Number of falls: 2 or more  Injured during fall?: No    Feels unsteady when standing or walking?: No    Worried about falling?: No      Home Safety:  Patient does not have trouble with stairs inside or outside of their home  Patient has working smoke alarms and has no working carbon monoxide detector  Home safety hazards include: none  Nutrition:   Current diet is Regular  Medications:   Patient is not currently taking any over-the-counter supplements  Patient is able to manage medications  Activities of Daily Living (ADLs)/Instrumental Activities of Daily Living (IADLs):   Walk and transfer into and out of bed and chair?: Yes  Dress and groom yourself?: Yes    Bathe or shower yourself?: Yes    Feed yourself? Yes  Do your laundry/housekeeping?: Yes  Manage your money, pay your bills and track your expenses?: Yes  Make your own meals?: Yes    Do your own shopping?: Yes    Previous Hospitalizations:   Any hospitalizations or ED visits within the last 12 months?: No      Advance Care Planning:   Living will: Yes    Durable POA for healthcare:  Yes    Advanced directive: Yes    Advanced directive counseling given: Yes    Five wishes given: Yes    Patient declined ACP directive: No    End of Life Decisions reviewed with patient: Yes    Provider agrees with end of life decisions: Yes      Cognitive Screening:   Provider or family/friend/caregiver concerned regarding cognition?: No    PREVENTIVE SCREENINGS      Cardiovascular Screening:    General: Screening Not Indicated and History Lipid Disorder      Diabetes Screening:     General: Screening Current      Colorectal Cancer Screening:     General: Screening Current      Prostate Cancer Screening:    General: Screening Not Indicated      Osteoporosis Screening:    General: Screening Not Indicated      Abdominal Aortic Aneurysm (AAA) Screening:    Risk factors include: tobacco use        General: Screening Not Indicated      Lung Cancer Screening:     General: Screening Not Indicated Hepatitis C Screening:    General: Screening Current    Screening, Brief Intervention, and Referral to Treatment (SBIRT)    Screening  Typical number of drinks in a day: 0  Typical number of drinks in a week: 0  Interpretation: Low risk drinking behavior      Single Item Drug Screening:  How often have you used an illegal drug (including marijuana) or a prescription medication for non-medical reasons in the past year? never    Single Item Drug Screen Score: 0  Interpretation: Negative screen for possible drug use disorder      Geo England MD

## 2022-04-27 DIAGNOSIS — E78.2 MIXED HYPERLIPIDEMIA: ICD-10-CM

## 2022-04-27 RX ORDER — ATORVASTATIN CALCIUM 20 MG/1
TABLET, FILM COATED ORAL
Qty: 90 TABLET | Refills: 1 | Status: SHIPPED | OUTPATIENT
Start: 2022-04-27

## 2022-07-07 DIAGNOSIS — I10 ESSENTIAL HYPERTENSION: ICD-10-CM

## 2022-07-07 RX ORDER — AMLODIPINE BESYLATE 5 MG/1
TABLET ORAL
Qty: 90 TABLET | Refills: 3 | Status: SHIPPED | OUTPATIENT
Start: 2022-07-07

## 2022-09-08 NOTE — PROGRESS NOTES
Daily Note     Today's date: 2018  Patient name: Roseline Lewis  : 1945  MRN: [id number]  Referring provider: JOSE RAFAEL Cramer  Dx:   Encounter Diagnosis     ICD-10-CM    1  Chronic right-sided low back pain without sciatica M54 5     G89 29    2  Disc degeneration, lumbosacral M51 37                   Subjective: pt notes that he has been doing his exercises and does feel a little looser since starting them  Objective: See treatment diary below  Precautions: previous left total hip revision      Daily Treatment Diary       Manuals 18           B quad stretch nv 4'           B hamstring stretch nv 4'                                     Exercise Diary              Bike 10' lv1 new nv 10' lv 1            s/l hip abd nv            Sl clams 2 position nv Green 2x10 ea           Supine bridge with band GTB nv GTB 2x10           Seated hamstring stretch 30'x3 nv 30''x3           Prone active knee flex nv nv           Supine dead bug nv 2x10 ea           Prone knee hip ext  2x10           Prone hip ext with knee flex  2x10                                                                                                                       Assessment: pt continues with decreased posterior chain strength, and limitations in hamstring flexibility secondary to guarding      Plan: Continue per plan of care 
Statement Selected

## 2022-10-10 ENCOUNTER — APPOINTMENT (OUTPATIENT)
Dept: LAB | Facility: CLINIC | Age: 77
End: 2022-10-10
Payer: MEDICARE

## 2022-10-10 ENCOUNTER — RA CDI HCC (OUTPATIENT)
Dept: OTHER | Facility: HOSPITAL | Age: 77
End: 2022-10-10

## 2022-10-10 DIAGNOSIS — I10 ESSENTIAL HYPERTENSION: ICD-10-CM

## 2022-10-10 DIAGNOSIS — N18.2 CKD (CHRONIC KIDNEY DISEASE), STAGE II: ICD-10-CM

## 2022-10-10 DIAGNOSIS — E78.2 MIXED HYPERLIPIDEMIA: ICD-10-CM

## 2022-10-10 DIAGNOSIS — Z12.5 SCREENING FOR MALIGNANT NEOPLASM OF PROSTATE: ICD-10-CM

## 2022-10-10 DIAGNOSIS — R73.01 ABNORMAL FASTING GLUCOSE: ICD-10-CM

## 2022-10-10 LAB
ALBUMIN SERPL BCP-MCNC: 3.7 G/DL (ref 3.5–5)
ALP SERPL-CCNC: 82 U/L (ref 46–116)
ALT SERPL W P-5'-P-CCNC: 34 U/L (ref 12–78)
ANION GAP SERPL CALCULATED.3IONS-SCNC: 4 MMOL/L (ref 4–13)
AST SERPL W P-5'-P-CCNC: 25 U/L (ref 5–45)
BILIRUB SERPL-MCNC: 0.85 MG/DL (ref 0.2–1)
BUN SERPL-MCNC: 20 MG/DL (ref 5–25)
CALCIUM SERPL-MCNC: 9.4 MG/DL (ref 8.3–10.1)
CHLORIDE SERPL-SCNC: 108 MMOL/L (ref 96–108)
CHOLEST SERPL-MCNC: 173 MG/DL
CO2 SERPL-SCNC: 28 MMOL/L (ref 21–32)
CREAT SERPL-MCNC: 1.34 MG/DL (ref 0.6–1.3)
ERYTHROCYTE [DISTWIDTH] IN BLOOD BY AUTOMATED COUNT: 13.4 % (ref 11.6–15.1)
EST. AVERAGE GLUCOSE BLD GHB EST-MCNC: 120 MG/DL
GFR SERPL CREATININE-BSD FRML MDRD: 50 ML/MIN/1.73SQ M
GLUCOSE P FAST SERPL-MCNC: 99 MG/DL (ref 65–99)
HBA1C MFR BLD: 5.8 %
HCT VFR BLD AUTO: 44.3 % (ref 36.5–49.3)
HDLC SERPL-MCNC: 31 MG/DL
HGB BLD-MCNC: 14.2 G/DL (ref 12–17)
LDLC SERPL CALC-MCNC: 73 MG/DL (ref 0–100)
MCH RBC QN AUTO: 32 PG (ref 26.8–34.3)
MCHC RBC AUTO-ENTMCNC: 32.1 G/DL (ref 31.4–37.4)
MCV RBC AUTO: 100 FL (ref 82–98)
PLATELET # BLD AUTO: 193 THOUSANDS/UL (ref 149–390)
PMV BLD AUTO: 10.9 FL (ref 8.9–12.7)
POTASSIUM SERPL-SCNC: 4.2 MMOL/L (ref 3.5–5.3)
PROT SERPL-MCNC: 7 G/DL (ref 6.4–8.4)
PSA SERPL-MCNC: 2.6 NG/ML (ref 0–4)
RBC # BLD AUTO: 4.44 MILLION/UL (ref 3.88–5.62)
SODIUM SERPL-SCNC: 140 MMOL/L (ref 135–147)
TRIGL SERPL-MCNC: 343 MG/DL
TSH SERPL DL<=0.05 MIU/L-ACNC: 1.82 UIU/ML (ref 0.45–4.5)
WBC # BLD AUTO: 6.08 THOUSAND/UL (ref 4.31–10.16)

## 2022-10-10 PROCEDURE — 80061 LIPID PANEL: CPT

## 2022-10-10 PROCEDURE — G0103 PSA SCREENING: HCPCS

## 2022-10-10 PROCEDURE — 80053 COMPREHEN METABOLIC PANEL: CPT

## 2022-10-10 PROCEDURE — 36415 COLL VENOUS BLD VENIPUNCTURE: CPT

## 2022-10-10 PROCEDURE — 85027 COMPLETE CBC AUTOMATED: CPT

## 2022-10-10 PROCEDURE — 84443 ASSAY THYROID STIM HORMONE: CPT

## 2022-10-10 PROCEDURE — 83036 HEMOGLOBIN GLYCOSYLATED A1C: CPT

## 2022-10-17 ENCOUNTER — OFFICE VISIT (OUTPATIENT)
Dept: FAMILY MEDICINE CLINIC | Facility: HOSPITAL | Age: 77
End: 2022-10-17
Payer: MEDICARE

## 2022-10-17 VITALS
TEMPERATURE: 98.4 F | WEIGHT: 215.4 LBS | OXYGEN SATURATION: 96 % | DIASTOLIC BLOOD PRESSURE: 71 MMHG | SYSTOLIC BLOOD PRESSURE: 124 MMHG | BODY MASS INDEX: 31.9 KG/M2 | HEIGHT: 69 IN | HEART RATE: 89 BPM

## 2022-10-17 DIAGNOSIS — E78.2 MIXED HYPERLIPIDEMIA: Primary | ICD-10-CM

## 2022-10-17 DIAGNOSIS — G47.33 OSA ON CPAP: ICD-10-CM

## 2022-10-17 DIAGNOSIS — I10 ESSENTIAL HYPERTENSION: ICD-10-CM

## 2022-10-17 DIAGNOSIS — R73.01 ABNORMAL FASTING GLUCOSE: ICD-10-CM

## 2022-10-17 DIAGNOSIS — Z23 ENCOUNTER FOR IMMUNIZATION: ICD-10-CM

## 2022-10-17 DIAGNOSIS — Z99.89 OSA ON CPAP: ICD-10-CM

## 2022-10-17 DIAGNOSIS — C64.2 RENAL CELL CARCINOMA OF LEFT KIDNEY (HCC): ICD-10-CM

## 2022-10-17 DIAGNOSIS — N18.30 STAGE 3 CHRONIC KIDNEY DISEASE, UNSPECIFIED WHETHER STAGE 3A OR 3B CKD (HCC): ICD-10-CM

## 2022-10-17 PROCEDURE — 90662 IIV NO PRSV INCREASED AG IM: CPT

## 2022-10-17 PROCEDURE — 99214 OFFICE O/P EST MOD 30 MIN: CPT | Performed by: FAMILY MEDICINE

## 2022-10-17 PROCEDURE — G0008 ADMIN INFLUENZA VIRUS VAC: HCPCS

## 2022-10-17 NOTE — PROGRESS NOTES
Name: Umang Wagner      : 1945      MRN: [id number]  Encounter Provider: Ade Barnett MD  Encounter Date: 10/17/2022   Encounter department: [facility]      1  Mixed hyperlipidemia  -     Lipid Panel with Direct LDL reflex; Future; Expected date: 04/10/2023    2  Essential hypertension  -     CBC and differential; Future; Expected date: 04/10/2023  -     Comprehensive metabolic panel; Future; Expected date: 04/10/2023    3  PARVIZ on CPAP    4  Renal cell carcinoma of left kidney (HCC)    5  Stage 3 chronic kidney disease, unspecified whether stage 3a or 3b CKD ([facility] )    6  Abnormal fasting glucose  -     HEMOGLOBIN A1C W/ EAG ESTIMATION; Future; Expected date: 04/10/2023    7  Encounter for immunization  -     influenza vaccine, high-dose, PF 0 7 mL (FLUZONE HIGH-DOSE)        Depression Screening and Follow-up Plan: Patient was screened for depression during today's encounter  They screened negative with a PHQ-2 score of 0  Falls Plan of Care: balance, strength, and gait training instructions were provided  Subjective      6 month follow up  No recent illness or injury    Has itchy watery eyes, using OTC drops  Losing weight by watching portions and carbs    Labs reviewed in detail and copy given to him  Excellent metabolic coverage    Review of Systems   Constitutional: Positive for unexpected weight change  HENT: Negative  Eyes: Negative  Respiratory: Negative  Cardiovascular: Negative  Genitourinary: Negative  Musculoskeletal: Negative  Neurological: Negative  Psychiatric/Behavioral: Negative  All other systems reviewed and are negative        Current Outpatient Medications on File Prior to Visit   Medication Sig   • amLODIPine (NORVASC) 5 mg tablet TAKE 1 TABLET BY MOUTH EVERY DAY   • atorvastatin (LIPITOR) 20 mg tablet TAKE 1 TABLET BY MOUTH EVERY DAY   • cholecalciferol (VITAMIN D3) 1,000 units tablet Take 1 tablet (1,000 Units total) by mouth daily   • cyclobenzaprine (FLEXERIL) 10 mg tablet Take 1 tablet (10 mg total) by mouth 3 (three) times a day as needed for muscle spasms   • escitalopram (LEXAPRO) 5 mg tablet Take 1 tablet (5 mg total) by mouth daily   • gabapentin (NEURONTIN) 100 mg capsule Take 100 mg by mouth 3 (three) times a day   • Multiple Vitamins-Minerals (MULTIVITAMIN ADULT) TABS Take by mouth   • Omega-3 Fatty Acids (FISH OIL) 1,000 mg by Does not apply route   • predniSONE 10 mg tablet Take 4 tablets today, then three tablets daily for 2 days, two tablets daily for 2 days, then one tablet daily for 2 days  (Patient not taking: Reported on 10/17/2022)       Objective     /71 (BP Location: Left arm, Patient Position: Sitting, Cuff Size: Large)   Pulse 89   Temp 98 4 °F (36 9 °C) (Tympanic)   Ht 5' 9" (1 753 m)   Wt 97 7 kg (215 lb 6 4 oz)   SpO2 96%   BMI 31 81 kg/m²     Physical Exam  Vitals and nursing note reviewed  Constitutional:       Appearance: Normal appearance  Neck:      Vascular: No carotid bruit  Cardiovascular:      Rate and Rhythm: Normal rate and regular rhythm  Pulses: Normal pulses  Heart sounds: Normal heart sounds  Pulmonary:      Effort: Pulmonary effort is normal       Breath sounds: Normal breath sounds     Psychiatric:         Mood and Affect: Mood normal        Blanca Mejia MD

## 2022-10-20 DIAGNOSIS — E78.2 MIXED HYPERLIPIDEMIA: ICD-10-CM

## 2022-10-20 DIAGNOSIS — F39 MOOD DISORDER (HCC): ICD-10-CM

## 2022-10-20 RX ORDER — ATORVASTATIN CALCIUM 20 MG/1
TABLET, FILM COATED ORAL
Qty: 90 TABLET | Refills: 1 | Status: SHIPPED | OUTPATIENT
Start: 2022-10-20

## 2022-10-20 RX ORDER — ESCITALOPRAM OXALATE 5 MG/1
5 TABLET ORAL DAILY
Qty: 90 TABLET | Refills: 1 | Status: SHIPPED | OUTPATIENT
Start: 2022-10-20

## 2022-12-12 ENCOUNTER — HOSPITAL ENCOUNTER (OUTPATIENT)
Dept: CT IMAGING | Facility: HOSPITAL | Age: 77
Discharge: HOME | End: 2022-12-12
Attending: INTERNAL MEDICINE

## 2022-12-12 DIAGNOSIS — C64.9 RENAL CELL CARCINOMA, UNSPECIFIED LATERALITY (HCC): ICD-10-CM

## 2022-12-13 ENCOUNTER — APPOINTMENT (OUTPATIENT)
Dept: LAB | Facility: CLINIC | Age: 77
End: 2022-12-13

## 2022-12-13 DIAGNOSIS — C64.9 RENAL CELL CARCINOMA, UNSPECIFIED LATERALITY (HCC): ICD-10-CM

## 2022-12-13 LAB
ALBUMIN SERPL BCP-MCNC: 3.6 G/DL (ref 3.5–5)
ALP SERPL-CCNC: 83 U/L (ref 46–116)
ALT SERPL W P-5'-P-CCNC: 28 U/L (ref 12–78)
ANION GAP SERPL CALCULATED.3IONS-SCNC: 5 MMOL/L (ref 4–13)
AST SERPL W P-5'-P-CCNC: 23 U/L (ref 5–45)
BASOPHILS # BLD AUTO: 0.05 THOUSANDS/ÂΜL (ref 0–0.1)
BASOPHILS NFR BLD AUTO: 1 % (ref 0–1)
BILIRUB SERPL-MCNC: 0.82 MG/DL (ref 0.2–1)
BUN SERPL-MCNC: 19 MG/DL (ref 5–25)
CALCIUM SERPL-MCNC: 9.3 MG/DL (ref 8.3–10.1)
CHLORIDE SERPL-SCNC: 109 MMOL/L (ref 96–108)
CO2 SERPL-SCNC: 27 MMOL/L (ref 21–32)
CREAT SERPL-MCNC: 1.11 MG/DL (ref 0.6–1.3)
EOSINOPHIL # BLD AUTO: 0.18 THOUSAND/ÂΜL (ref 0–0.61)
EOSINOPHIL NFR BLD AUTO: 3 % (ref 0–6)
ERYTHROCYTE [DISTWIDTH] IN BLOOD BY AUTOMATED COUNT: 13.3 % (ref 11.6–15.1)
GFR SERPL CREATININE-BSD FRML MDRD: 63 ML/MIN/1.73SQ M
GLUCOSE P FAST SERPL-MCNC: 110 MG/DL (ref 65–99)
HCT VFR BLD AUTO: 42.4 % (ref 36.5–49.3)
HGB BLD-MCNC: 14 G/DL (ref 12–17)
IMM GRANULOCYTES # BLD AUTO: 0.03 THOUSAND/UL (ref 0–0.2)
IMM GRANULOCYTES NFR BLD AUTO: 0 % (ref 0–2)
LYMPHOCYTES # BLD AUTO: 2.17 THOUSANDS/ÂΜL (ref 0.6–4.47)
LYMPHOCYTES NFR BLD AUTO: 32 % (ref 14–44)
MCH RBC QN AUTO: 32.7 PG (ref 26.8–34.3)
MCHC RBC AUTO-ENTMCNC: 33 G/DL (ref 31.4–37.4)
MCV RBC AUTO: 99 FL (ref 82–98)
MONOCYTES # BLD AUTO: 0.65 THOUSAND/ÂΜL (ref 0.17–1.22)
MONOCYTES NFR BLD AUTO: 10 % (ref 4–12)
NEUTROPHILS # BLD AUTO: 3.61 THOUSANDS/ÂΜL (ref 1.85–7.62)
NEUTS SEG NFR BLD AUTO: 54 % (ref 43–75)
NRBC BLD AUTO-RTO: 0 /100 WBCS
PLATELET # BLD AUTO: 192 THOUSANDS/UL (ref 149–390)
PMV BLD AUTO: 10.7 FL (ref 8.9–12.7)
POTASSIUM SERPL-SCNC: 4 MMOL/L (ref 3.5–5.3)
PROT SERPL-MCNC: 6.8 G/DL (ref 6.4–8.4)
RBC # BLD AUTO: 4.28 MILLION/UL (ref 3.88–5.62)
SODIUM SERPL-SCNC: 141 MMOL/L (ref 135–147)
WBC # BLD AUTO: 6.69 THOUSAND/UL (ref 4.31–10.16)

## 2022-12-19 ENCOUNTER — OFFICE VISIT (OUTPATIENT)
Dept: HEMATOLOGY ONCOLOGY | Facility: HOSPITAL | Age: 77
End: 2022-12-19

## 2022-12-19 VITALS
TEMPERATURE: 98.7 F | WEIGHT: 210 LBS | SYSTOLIC BLOOD PRESSURE: 142 MMHG | OXYGEN SATURATION: 96 % | DIASTOLIC BLOOD PRESSURE: 80 MMHG | BODY MASS INDEX: 31.1 KG/M2 | HEIGHT: 69 IN | RESPIRATION RATE: 14 BRPM | HEART RATE: 90 BPM

## 2022-12-19 DIAGNOSIS — C64.9 RENAL CELL CARCINOMA, UNSPECIFIED LATERALITY (HCC): Primary | ICD-10-CM

## 2022-12-19 DIAGNOSIS — R91.1 LUNG NODULE: ICD-10-CM

## 2022-12-19 NOTE — PROGRESS NOTES
Hematology/Oncology Outpatient Follow- up Note  Horacio Saucedo 68 y o  male MRN: @ Encounter: [id number]        Date:  12/19/2022    Presenting Complaint/Diagnosis : Stage 1 renal cell carcinoma    Previous Hematologic/ Oncologic History:    Nephrectomy    Current Hematologic/ Oncologic Treatment:    Observation    Interval History:    Patient returns for follow-up visit  He states he is doing reasonably well  Ridging shows no evidence of progression or metastatic disease  Lung nodules are stable  Blood work is stable  Patient himself is doing reasonably well  Denies any nausea denies any vomiting denies any diarrhea  Does have some arthritic pain but otherwise his 14 point review of systems today was negative  Test Results:    Imaging: CT chest wo contrast    Result Date: 12/16/2022  Narrative: CT CHEST WITHOUT IV CONTRAST INDICATION: History of RCC with lung nodules  C64 9: Malignant neoplasm of unspecified kidney, except renal pelvis  COMPARISON:  CT chest 12/10/2021  TECHNIQUE: CT examination of the chest was performed without intravenous contrast  Axial, sagittal, and coronal 2D reformatted images were created from the source data and submitted for interpretation  Radiation dose length product (DLP) for this visit:  216 14 mGy-cm   This examination, like all CT scans performed in the [facility], was performed utilizing techniques to minimize radiation dose exposure, including the use of iterative  reconstruction and automated exposure control  FINDINGS: LUNGS:  Scattered subcentimeter pulmonary nodules again noted demonstrating multiyear stability  For example, unchanged 5 mm left lower lobe nodule laterally image 78 series 3  A few calcified granuloma  No new suspicious nodules or masses  PLEURA:  Unremarkable  HEART/GREAT VESSELS: Heart is mildly enlarged  Aortic valvular calcification  Minimal coronary artery calcification  No thoracic aortic aneurysm   MEDIASTINUM AND PRAVEEN:  Small calcified lymph nodes in the mediastinum  CHEST WALL AND LOWER NECK: Small thyroid nodules suggested  Incidental discovery of one or more thyroid nodule(s) measuring less than 1 5 cm and without suspicious features is noted in this patient who is above 28years old; according to guidelines published in the February 2015 white paper on incidental thyroid nodules in the Journal of the [employer] of Radiology [facility]), no further evaluation is recommended  VISUALIZED STRUCTURES IN THE UPPER ABDOMEN:  Status post cholecystectomy  Status post left nephrectomy  OSSEOUS STRUCTURES:  No acute fracture or destructive osseous lesion  Impression: 1  Stable exam with unchanged small pulmonary nodules  No new suspicious pulmonary nodules or masses  Workstation performed: [id number]       Labs:   Lab Results   Component Value Date    WBC 6 69 12/13/2022    HGB 14 0 12/13/2022    HCT 42 4 12/13/2022    MCV 99 (H) 12/13/2022     12/13/2022     Lab Results   Component Value Date     01/06/2016    K 4 0 12/13/2022     (H) 12/13/2022    CO2 27 12/13/2022    ANIONGAP 6 01/06/2016    BUN 19 12/13/2022    CREATININE 1 11 12/13/2022    GLUCOSE 112 01/06/2016    GLUF 110 (H) 12/13/2022    CALCIUM 9 3 12/13/2022    AST 23 12/13/2022    ALT 28 12/13/2022    ALKPHOS 83 12/13/2022    PROT 7 2 01/06/2016    BILITOT 0 43 01/06/2016    EGFR 63 12/13/2022         Lab Results   Component Value Date    PSA 2 6 10/10/2022    PSA 2 4 10/05/2021    PSA 2 0 06/29/2020     ROS: As stated in the history of present illness otherwise his 14 point review of systems today was negative        Active Problems:   Patient Active Problem List   Diagnosis   • Abnormal fasting glucose   • Renal cell carcinoma (HCC)   • CKD (chronic kidney disease), stage II   • Disc degeneration, lumbosacral   • Edema   • Hematuria   • Mixed hyperlipidemia   • Essential hypertension   • 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[address] )   • Osteoarthritis of knee   • Enlarged prostate   • Lower abdominal pain   • Screening for colon cancer   • History of colon polyps   • Diarrhea   • Left ventricular diastolic dysfunction   • Stage 3 chronic kidney disease (HCC)   • Medicare annual wellness visit, subsequent   • PARVIZ on CPAP   • Peripheral neuropathic pain   • Aortic valve sclerosis   • BMI 32 0-32 9,adult       Past Medical History:   Past Medical History:   Diagnosis Date   • Cancer of left kidney ([address] )     2014   • Colon polyp    • Diverticulitis of colon     Last assessed - 5/12/14   • Hypercholesterolemia    • Hyperlipidemia    • Renal neoplasm        Surgical History:   Past Surgical History:   Procedure Laterality Date   • COLONOSCOPY     • COLONOSCOPY N/A 4/27/2018    Procedure: COLONOSCOPY;  Surgeon: Darek Ochoa MD;  Location: [facility]; Service: Gastroenterology   • CYSTOSCOPY      Onset - 5/2/16 Hermilo Donohue    • JOINT REPLACEMENT Left     Hip   • LAPAROSCOPIC CHOLECYSTECTOMY     • NEPHRECTOMY Left    • NEPHRECTOMY Left    • SHOULDER ARTHROSCOPY W/ ROTATOR CUFF REPAIR Left    • TOTAL HIP ARTHROPLASTY Left     original hardware became infected and pt needed a second surgery to replace hardware   • UMBILICAL HERNIA REPAIR         Family History:    Family History   Problem Relation Age of Onset   • Tuberculosis Mother    • Emphysema Father         Lung       Cancer-related family history is not on file      Social History:   Social History     Socioeconomic History   • Marital status: /Civil Union     Spouse name: Not on file   • Number of children: Not on file   • Years of education: Not on file   • Highest education level: Not on file   Occupational History   • Not on file   Tobacco Use   • Smoking status: Former   • Smokeless tobacco: Never   Vaping Use   • Vaping Use: Never used   Substance and Sexual Activity   • Alcohol use: Yes     Comment: Social   • Drug use: No   • Sexual activity: Not on file   Other Topics Concern   • Not on file   Social History Narrative    Daily caffeine consumption, 2-3 servings a day     Social Determinants of Health     Financial Resource Strain: Not on file   Food Insecurity: Not on file   Transportation Needs: Not on file   Physical Activity: Not on file   Stress: Not on file   Social Connections: Not on file   Intimate Partner Violence: Not on file   Housing Stability: Not on file       Current Medications:   Current Outpatient Medications   Medication Sig Dispense Refill   • amLODIPine (NORVASC) 5 mg tablet TAKE 1 TABLET BY MOUTH EVERY DAY 90 tablet 3   • atorvastatin (LIPITOR) 20 mg tablet TAKE 1 TABLET BY MOUTH EVERY DAY 90 tablet 1   • cholecalciferol (VITAMIN D3) 1,000 units tablet Take 1 tablet (1,000 Units total) by mouth daily 30 tablet 5   • cyclobenzaprine (FLEXERIL) 10 mg tablet Take 1 tablet (10 mg total) by mouth 3 (three) times a day as needed for muscle spasms 30 tablet 0   • gabapentin (NEURONTIN) 100 mg capsule Take 100 mg by mouth 3 (three) times a day     • Multiple Vitamins-Minerals (MULTIVITAMIN ADULT) TABS Take by mouth     • Omega-3 Fatty Acids (FISH OIL) 1,000 mg by Does not apply route     • escitalopram (LEXAPRO) 5 mg tablet TAKE 1 TABLET (5 MG TOTAL) BY MOUTH DAILY  (Patient not taking: Reported on 12/19/2022) 90 tablet 1   • predniSONE 10 mg tablet Take 4 tablets today, then three tablets daily for 2 days, two tablets daily for 2 days, then one tablet daily for 2 days  (Patient not taking: Reported on 10/17/2022) 16 tablet 0     No current facility-administered medications for this visit  Allergies: No Known Allergies    Physical Exam:    Body surface area is 2 11 meters squared      Wt Readings from Last 3 Encounters:   12/19/22 95 3 kg (210 lb)   10/17/22 97 7 kg (215 lb 6 4 oz)   04/11/22 99 3 kg (219 lb)        Temp Readings from Last 3 Encounters:   12/19/22 98 7 °F (37 1 °C) (Temporal)   10/17/22 98 4 °F (36 9 °C) (Tympanic)   04/11/22 98 3 °F (36 8 °C) (Tympanic) BP Readings from Last 3 Encounters:   12/19/22 142/80   10/17/22 124/71   04/11/22 120/75         Pulse Readings from Last 3 Encounters:   12/19/22 90   10/17/22 89   04/11/22 91         Physical Exam     Constitutional   General appearance: No acute distress, well appearing and well nourished  Eyes   Conjunctiva and lids: No swelling, erythema or discharge  Pupils and irises: Equal, round and reactive to light  Ears, Nose, Mouth, and Throat   External inspection of ears and nose: Normal     Nasal mucosa, septum, and turbinates: Normal without edema or erythema  Oropharynx: Normal with no erythema, edema, exudate or lesions  Pulmonary   Respiratory effort: No increased work of breathing or signs of respiratory distress  Auscultation of lungs: Clear to auscultation  Cardiovascular   Palpation of heart: Normal PMI, no thrills  Auscultation of heart: Normal rate and rhythm, normal S1 and S2, without murmurs  Examination of extremities for edema and/or varicosities: Normal     Carotid pulses: Normal     Abdomen   Abdomen: Non-tender, no masses  Liver and spleen: No hepatomegaly or splenomegaly  Lymphatic   Palpation of lymph nodes in neck: No lymphadenopathy  Musculoskeletal   Gait and station: Normal     Digits and nails: Normal without clubbing or cyanosis  Inspection/palpation of joints, bones, and muscles: Normal     Skin   Skin and subcutaneous tissue: Normal without rashes or lesions  Neurologic   Cranial nerves: Cranial nerves 2-12 intact  Sensation: No sensory loss  Psychiatric   Orientation to person, place, and time: Normal     Mood and affect: Normal         Assessment / Plan: This is a pleasant 77-year-old male with past medical history of hypertension and hypercholesterolemia who was found to have a left-sided renal mass  Pathology from 10 September 2014 revealed a stage I clear cell carcinoma   He also had some lymph nodes that were enlarged at the time  It was not very clear with a lymph nodes were involved versus reactive  I Decided to observe him  He is doing well  His most recent imaging Showed no evidence of metastatic disease  I'll see the patient back in 12 months with blood work and imaging of his chest   This will be a CT scan without contrast       Goals and Barriers:  Current Goal:  Prolong Survival from renal cell carcinoma  Barriers: None  Patient's Capacity to Self Care:  Patient  able to self care  Portions of the record may have been created with voice recognition software  Occasional wrong word or "sound a like" substitutions may have occurred due to the inherent limitations of voice recognition software  Read the chart carefully and recognize, using context, where substitutions have occurred

## 2023-01-30 ENCOUNTER — OFFICE VISIT (OUTPATIENT)
Dept: PODIATRY | Facility: CLINIC | Age: 78
End: 2023-01-30

## 2023-01-30 VITALS
BODY MASS INDEX: 31.1 KG/M2 | DIASTOLIC BLOOD PRESSURE: 76 MMHG | SYSTOLIC BLOOD PRESSURE: 135 MMHG | WEIGHT: 210 LBS | HEART RATE: 80 BPM | HEIGHT: 69 IN

## 2023-01-30 DIAGNOSIS — B35.1 ONYCHOMYCOSIS: Primary | ICD-10-CM

## 2023-01-30 DIAGNOSIS — M79.672 PAIN IN BOTH FEET: ICD-10-CM

## 2023-01-30 DIAGNOSIS — M72.2 PLANTAR FASCIITIS: ICD-10-CM

## 2023-01-30 DIAGNOSIS — M79.671 PAIN IN BOTH FEET: ICD-10-CM

## 2023-01-30 DIAGNOSIS — I73.9 PERIPHERAL VASCULAR DISEASE, UNSPECIFIED (HCC): ICD-10-CM

## 2023-01-31 NOTE — PROGRESS NOTES
PATIENT:  Shelley Presley  1945    ASSESSMENT/PLAN:  1  Onychomycosis        2  Peripheral vascular disease, unspecified ([facility] [address] )        3  Plantar fasciitis        4  Pain in both feet        Instructed patient on appropriate stretching range of motion exercises for his Achilles tendon  Recommend ice at end of day (frozen water bottle)  OTC anti-inflammatories as needed  Consider obtaining new running type athletic shoes, currently wearing new balance  No orders of the defined types were placed in this encounter  The patient was educated in proper foot wear  Also discussed daily foot assessment and proper foot care  The patient will follow up in 12 weeks for foot exam and care  Procedure: All mycotic toenails were reduced and debrided in length, width, and girth using a nail nipper and dremel  All non-dystrphic nails also trimmed  Patient tolerated procedure(s) well without complications  HPI:  Shelley Presley is a 68 y  o year old male seen for at risk foot exam and care  The patient has class findings with PVD  The patient complained of thick toenails and secondary concern of arch pain  The patient denied any acute pedal disorder, redness, acute swelling, or recent injury  PAST MEDICAL HISTORY:  Past Medical History:   Diagnosis Date   • Cancer of left kidney ([facility] [address] )     2014   • Colon polyp    • Diverticulitis of colon     Last assessed - 5/12/14   • Hypercholesterolemia    • Hyperlipidemia    • Renal neoplasm        PAST SURGICAL HISTORY:  Past Surgical History:   Procedure Laterality Date   • COLONOSCOPY     • COLONOSCOPY N/A 4/27/2018    Procedure: COLONOSCOPY;  Surgeon: Faith Kothari MD;  Location: [facility];   Service: Gastroenterology   • CYSTOSCOPY      Onset - 5/2/16 Sheila Ort    • JOINT REPLACEMENT Left     Hip   • LAPAROSCOPIC CHOLECYSTECTOMY     • NEPHRECTOMY Left    • NEPHRECTOMY Left    • SHOULDER ARTHROSCOPY W/ ROTATOR CUFF REPAIR Left    • TOTAL HIP ARTHROPLASTY Left     original hardware became infected and pt needed a second surgery to replace hardware   • UMBILICAL HERNIA REPAIR          ALLERGIES:  Patient has no known allergies  MEDICATIONS:  Current Outpatient Medications   Medication Sig Dispense Refill   • amLODIPine (NORVASC) 5 mg tablet TAKE 1 TABLET BY MOUTH EVERY DAY 90 tablet 3   • atorvastatin (LIPITOR) 20 mg tablet TAKE 1 TABLET BY MOUTH EVERY DAY 90 tablet 1   • cholecalciferol (VITAMIN D3) 1,000 units tablet Take 1 tablet (1,000 Units total) by mouth daily 30 tablet 5   • cyclobenzaprine (FLEXERIL) 10 mg tablet Take 1 tablet (10 mg total) by mouth 3 (three) times a day as needed for muscle spasms 30 tablet 0   • gabapentin (NEURONTIN) 100 mg capsule Take 100 mg by mouth 3 (three) times a day     • Multiple Vitamins-Minerals (MULTIVITAMIN ADULT) TABS Take by mouth     • Omega-3 Fatty Acids (FISH OIL) 1,000 mg by Does not apply route     • escitalopram (LEXAPRO) 5 mg tablet TAKE 1 TABLET (5 MG TOTAL) BY MOUTH DAILY  (Patient not taking: Reported on 12/19/2022) 90 tablet 1   • predniSONE 10 mg tablet Take 4 tablets today, then three tablets daily for 2 days, two tablets daily for 2 days, then one tablet daily for 2 days  (Patient not taking: Reported on 10/17/2022) 16 tablet 0     No current facility-administered medications for this visit         SOCIAL HISTORY:  Social History     Socioeconomic History   • Marital status: /Civil Union     Spouse name: None   • Number of children: None   • Years of education: None   • Highest education level: None   Occupational History   • None   Tobacco Use   • Smoking status: Former   • Smokeless tobacco: Never   Vaping Use   • Vaping Use: Never used   Substance and Sexual Activity   • Alcohol use: Yes     Comment: Social   • Drug use: No   • Sexual activity: None   Other Topics Concern   • None   Social History Narrative    Daily caffeine consumption, 2-3 servings a day     Social Determinants of Health     Financial Resource Strain: Not on file   Food Insecurity: Not on file   Transportation Needs: Not on file   Physical Activity: Not on file   Stress: Not on file   Social Connections: Not on file   Intimate Partner Violence: Not on file   Housing Stability: Not on file        REVIEW OF SYSTEMS:  GENERAL: No fever or chills  HEART: No chest pain, or palpitation  RESPIRATORY:  No acute SOB or cough  GI: No Nausea, vomit or diarrhea  NEUROLOGIC: No syncope or acute weakness    PHYSICAL EXAM:    /76   Pulse 80   Ht 5' 9" (1 753 m) Comment: verbal  Wt 95 3 kg (210 lb)   BMI 31 01 kg/m²     VASCULAR EXAM  Dorsalis pedis  +1  Posterior tibial artery  absent  The patient has class findings with skin atrophy, lack of digital hair, and nail dystrophy  There is trace lower extremity edema bilaterally  Venous stasis skin changes noted No BLE  NEUROLOGIC EXAM  Sensation is intact to light touch  Sensation is intact to 10gm monofilament  Vibratory sensation is absent  No focal neurologic deficit  DERMATOLOGIC EXAM:   No ulcer or cellulitis noted  The patient has dystrophic/hypertrophic toenails with yellow/white discoloration, onycholysis, and subungal debris  Fungal odor noted  Pain with palpation  Periungual erythema noted  Right foot nails severely dystrophic x5 with 0 5 cm ave thickness  Left foot nails severely dystrophic x5 with 0 5 cm ave thickness  Patient has no hyperkeratotic lesions noted  Texture, Tone and Turgor are diminished? Yes  No notable suspicious skin lesions  MUSCULOSKELETAL EXAM:   No acute joint pain, edema, or redness  Moderate/mild pain localized to medial band of plantar fascia mid arch bilateral   No pain with subtalar joint/ankle joint range of motion  Adequate ankle dorsiflexion  Patient has no gross deformities

## 2023-03-27 ENCOUNTER — TELEPHONE (OUTPATIENT)
Dept: HEMATOLOGY ONCOLOGY | Facility: CLINIC | Age: 78
End: 2023-03-27

## 2023-03-27 NOTE — TELEPHONE ENCOUNTER
BRYAN  Route to \[facility] [place] [facility]\""                        Patient appointment rescheduled due to Transfer of care      Speaking with   Patient   If you not speaking with the patient, is the person listed on patients Medical Communication Consent?  N/A   Physician patient is established with Dr Sofi Porter appointment date and time 12/18/2023 @2:20PM    Appointment Location [facility]   Physician patient is transferring care to   Dr Gloria Lee appointment date and time 12/18/2023 @2:20PM    Reason for RUKHSANA HazardS  Provider is leaving network

## 2023-04-07 ENCOUNTER — RA CDI HCC (OUTPATIENT)
Dept: OTHER | Facility: HOSPITAL | Age: 78
End: 2023-04-07

## 2023-04-07 NOTE — PROGRESS NOTES
[facility]  coding opportunities          Chart Reviewed number of suggestions sent to Provider: 1     Patients Insurance     Medicare Insurance: Medicare        [id number]

## 2023-05-01 ENCOUNTER — OFFICE VISIT (OUTPATIENT)
Dept: PODIATRY | Facility: CLINIC | Age: 78
End: 2023-05-01

## 2023-05-01 VITALS — WEIGHT: 213 LBS | HEIGHT: 69 IN | BODY MASS INDEX: 31.55 KG/M2

## 2023-05-01 DIAGNOSIS — I73.9 PERIPHERAL VASCULAR DISEASE, UNSPECIFIED (HCC): ICD-10-CM

## 2023-05-01 DIAGNOSIS — B35.1 ONYCHOMYCOSIS: Primary | ICD-10-CM

## 2023-05-03 DIAGNOSIS — F39 MOOD DISORDER (HCC): ICD-10-CM

## 2023-05-03 RX ORDER — ESCITALOPRAM OXALATE 5 MG/1
5 TABLET ORAL DAILY
Qty: 90 TABLET | Refills: 1 | Status: SHIPPED | OUTPATIENT
Start: 2023-05-03

## 2023-05-15 NOTE — PROGRESS NOTES
"  PATIENT:  Ricardo Beard  1945    ASSESSMENT:  1  Onychomycosis        2  Peripheral vascular disease, unspecified ([facility] [address] )              No orders of the defined types were placed in this encounter  PLAN:  The patient was educated in proper foot wear  Also discussed daily foot assessment and proper foot care  The patient will follow up in 9-12 weeks for foot exam and care  Procedures: [id number]  All mycotic toenails were reduced and debrided in length, width, and girth using a nail nipper and dremel  All non-dystrphic nails also trimmed  All hyperkeratotic skin lesion(s) if present were sharply pared with a scalpel / forcep with no bleeding or evidence of ulceration  Patient tolerated procedure(s) well without complications  Procedures     HPI:  Ricardo Beard is a 66 y  o year old male seen for at risk foot exam and care  The patient complained of elongated thick painful toenails   The patient has class findings with peripheral vascular disease  The patient denied any acute pedal disorder, redness, acute swelling, or recent injury  The following portions of the patient's history were reviewed and updated as appropriate: allergies, current medications, past family history, past medical history, past social history, past surgical history and problem list     REVIEW OF SYSTEMS:  GENERAL: No fever or chills  HEART: No chest pain, or palpitation  RESPIRATORY:  No acute SOB or cough  GI: No Nausea, vomit or diarrhea  NEUROLOGIC: No syncope or acute weakness    PHYSICAL EXAM:    Ht 5' 9\" (1 753 m)   Wt 96 6 kg (213 lb)   BMI 31 45 kg/m²     VASCULAR EXAM  Posterior tibial artery  absent bilateral     Dorsalis pedis artery +1 bilateral   The patient has class findings with skin atrophy, lack of digital hair, and nail dystrophy  There is trace lower extremity edema bilaterally  NEUROLOGIC EXAM  Sensation is intact to light touch  Sensation is intact to 10gm monofilament    " No focal neurologic deficit  DERMATOLOGIC EXAM:   No ulcer or cellulitis noted  Texture, Tone and Turgor are diminished with moderate atrophic changes noted  The patient has dystrophic/hypertrophic toenails with yellow/white discoloration, onycholysis, and subungal debris  Fungal odor and brittle nature noted  Pain with palpation  Periungual erythema noted  Right foot nails severely dystrophic x5 with 0 5 cm ave thickness (1 through 5)  Left foot nails severely dystrophic x5 with 0 4 cm ave thickness (1 through 5)  Patient has hyperkeratotic lesions noted:   Right foot located at none  Left foot located at none  No notable suspicious skin lesions  MUSCULOSKELETAL EXAM:   No acute joint pain, edema, or redness  No acute musculoskeletal problem  Patient has no gross pedal deformities noted  Heel tenderness noted on prior visit has improved  Risk Category/Class Findings:   Q8(B1, B2 ABC)    A1)  Has the patient had a previous amputation of the foot or integral skeletal portion thereof? No  B1)  Does the patient have absent posterior tibial pulse? Yes  B3)  Does the patient have absent dorsalis pedis? No  B2)  Does the patient have three of the following? Yes           1  Hair growth (increased or decreased), 2   Nail changes (thickening) and 3  Pigmentary changes (discoloring)  C)  Does the patient have two of the following and one above?  No

## 2023-07-14 DIAGNOSIS — I10 ESSENTIAL HYPERTENSION: ICD-10-CM

## 2023-07-14 RX ORDER — AMLODIPINE BESYLATE 5 MG/1
TABLET ORAL
Qty: 90 TABLET | Refills: 3 | Status: SHIPPED | OUTPATIENT
Start: 2023-07-14

## 2023-08-14 ENCOUNTER — OFFICE VISIT (OUTPATIENT)
Dept: PODIATRY | Facility: CLINIC | Age: 78
End: 2023-08-14
Payer: MEDICARE

## 2023-08-14 VITALS
DIASTOLIC BLOOD PRESSURE: 75 MMHG | HEART RATE: 79 BPM | WEIGHT: 214 LBS | HEIGHT: 69 IN | SYSTOLIC BLOOD PRESSURE: 127 MMHG | BODY MASS INDEX: 31.7 KG/M2

## 2023-08-14 DIAGNOSIS — I73.9 PERIPHERAL VASCULAR DISEASE, UNSPECIFIED (HCC): ICD-10-CM

## 2023-08-14 DIAGNOSIS — B35.1 ONYCHOMYCOSIS: Primary | ICD-10-CM

## 2023-08-14 PROCEDURE — 11721 DEBRIDE NAIL 6 OR MORE: CPT | Performed by: PODIATRIST

## 2023-08-14 RX ORDER — ASCORBIC ACID 500 MG
500 TABLET ORAL DAILY
COMMUNITY

## 2023-08-29 ENCOUNTER — OFFICE VISIT (OUTPATIENT)
Dept: FAMILY MEDICINE CLINIC | Facility: HOSPITAL | Age: 78
End: 2023-08-29
Payer: MEDICARE

## 2023-08-29 VITALS
DIASTOLIC BLOOD PRESSURE: 78 MMHG | WEIGHT: 208.4 LBS | BODY MASS INDEX: 30.87 KG/M2 | SYSTOLIC BLOOD PRESSURE: 128 MMHG | OXYGEN SATURATION: 98 % | HEART RATE: 74 BPM | HEIGHT: 69 IN | TEMPERATURE: 98.5 F

## 2023-08-29 DIAGNOSIS — B34.9 VIRAL INFECTION: Primary | ICD-10-CM

## 2023-08-29 DIAGNOSIS — N18.30 STAGE 3 CHRONIC KIDNEY DISEASE, UNSPECIFIED WHETHER STAGE 3A OR 3B CKD (HCC): ICD-10-CM

## 2023-08-29 DIAGNOSIS — R07.9 CHEST PAIN, UNSPECIFIED TYPE: ICD-10-CM

## 2023-08-29 DIAGNOSIS — I35.8 AORTIC VALVE SCLEROSIS: ICD-10-CM

## 2023-08-29 DIAGNOSIS — R30.0 DYSURIA: ICD-10-CM

## 2023-08-29 DIAGNOSIS — R06.02 SOB (SHORTNESS OF BREATH): ICD-10-CM

## 2023-08-29 DIAGNOSIS — F39 MOOD DISORDER (HCC): ICD-10-CM

## 2023-08-29 PROBLEM — E11.51 DIABETES MELLITUS WITH PERIPHERAL VASCULAR DISEASE (HCC): Status: ACTIVE | Noted: 2023-08-29

## 2023-08-29 LAB
SARS-COV-2 AG UPPER RESP QL IA: NEGATIVE
SL AMB  POCT GLUCOSE, UA: ABNORMAL
SL AMB LEUKOCYTE ESTERASE,UA: ABNORMAL
SL AMB POCT BILIRUBIN,UA: ABNORMAL
SL AMB POCT BLOOD,UA: ABNORMAL
SL AMB POCT CLARITY,UA: CLEAR
SL AMB POCT COLOR,UA: YELLOW
SL AMB POCT KETONES,UA: ABNORMAL
SL AMB POCT NITRITE,UA: ABNORMAL
SL AMB POCT PH,UA: 7.5
SL AMB POCT SPECIFIC GRAVITY,UA: 1.01
SL AMB POCT URINE PROTEIN: ABNORMAL
SL AMB POCT UROBILINOGEN: ABNORMAL
VALID CONTROL: NORMAL

## 2023-08-29 PROCEDURE — 93000 ELECTROCARDIOGRAM COMPLETE: CPT | Performed by: NURSE PRACTITIONER

## 2023-08-29 PROCEDURE — 87186 SC STD MICRODIL/AGAR DIL: CPT | Performed by: NURSE PRACTITIONER

## 2023-08-29 PROCEDURE — 99215 OFFICE O/P EST HI 40 MIN: CPT | Performed by: NURSE PRACTITIONER

## 2023-08-29 PROCEDURE — 87077 CULTURE AEROBIC IDENTIFY: CPT | Performed by: NURSE PRACTITIONER

## 2023-08-29 PROCEDURE — 81002 URINALYSIS NONAUTO W/O SCOPE: CPT | Performed by: NURSE PRACTITIONER

## 2023-08-29 PROCEDURE — 87811 SARS-COV-2 COVID19 W/OPTIC: CPT | Performed by: NURSE PRACTITIONER

## 2023-08-29 PROCEDURE — 87086 URINE CULTURE/COLONY COUNT: CPT | Performed by: NURSE PRACTITIONER

## 2023-08-29 RX ORDER — CEPHALEXIN 500 MG/1
500 CAPSULE ORAL EVERY 6 HOURS SCHEDULED
Qty: 20 CAPSULE | Refills: 0 | Status: SHIPPED | OUTPATIENT
Start: 2023-08-29 | End: 2023-09-03

## 2023-08-29 NOTE — PROGRESS NOTES
Assessment/Plan:     His symptoms seem viral and are slowly improving. COVID test in office was negative. With sob (new) and chest discomfort (ongoing) will check echocardiogram. Last done in 2018. With ongoing chest pain. ECG relatively normal today in office. No significant change. Will refer to cardiology. Urine dip is positive. Will treat presumptively for UTI and send urine for culture. Diagnoses and all orders for this visit:    Viral infection  -     POCT Rapid Covid Ag    SOB (shortness of breath)  -     Echo complete w/ contrast if indicated; Future  -     POCT ECG    Chest pain, unspecified type  -     Echo complete w/ contrast if indicated; Future  -     POCT ECG  -     Ambulatory Referral to Cardiology; Future    Mood disorder ([address] )  Comments:  Managed by DR. Theodore Love. F/U with him as scheduled. Stage 3 chronic kidney disease, unspecified whether stage 3a or 3b CKD ([address])  Comments:  Managed by DR. Theodore Love. F/U with him as scheduled. Aortic valve sclerosis  -     Echo complete w/ contrast if indicated; Future    Dysuria  -     Urine culture  -     cephalexin (KEFLEX) 500 mg capsule; Take 1 capsule (500 mg total) by mouth every 6 (six) hours for 5 days          Subjective:     Patient ID: Alecia Davidson is a 66 y.o. male. Pain with urination that started a few days ago. Increased gas. 8 days ago started headaches. Would come and go wiuth OTC meds. Occasional dizziness. Loose stool on and off. No N/V. No fever. Had chills. Feels tired. Has nasal congestion and runny nose. Occasional cough. Feels sob with activity mostly even with just mild exertion. Has body aches but not worse than normal. Symptoms are slowly getting better. Review of Systems   Constitutional: Positive for chills and fatigue. Negative for fever. HENT: Positive for congestion and rhinorrhea. Negative for ear pain and sore throat. Respiratory: Positive for cough and shortness of breath. Cardiovascular: Positive for chest pain. Negative for palpitations and leg swelling. Gastrointestinal: Positive for diarrhea. Negative for abdominal pain, blood in stool, nausea and vomiting. Genitourinary: Positive for dysuria. Musculoskeletal: Positive for myalgias. Neurological: Positive for light-headedness and headaches. The following portions of the patient's history were reviewed and updated as appropriate: allergies, current medications, past family history, past medical history, past social history, past surgical history and problem list.    Objective:  Vitals:    08/29/23 1042   BP: 128/78   Pulse: 74   Temp: 98.5 °F (36.9 °C)   SpO2: 98%      Physical Exam  Vitals reviewed. Constitutional:       General: He is not in acute distress. Appearance: Normal appearance. He is well-developed. He is not ill-appearing. HENT:      Right Ear: Tympanic membrane, ear canal and external ear normal.      Left Ear: Tympanic membrane, ear canal and external ear normal.      Mouth/Throat:      Mouth: Mucous membranes are moist.      Pharynx: Oropharynx is clear. Cardiovascular:      Rate and Rhythm: Normal rate and regular rhythm. Heart sounds: Murmur heard. Pulmonary:      Effort: Pulmonary effort is normal.      Breath sounds: Normal breath sounds. Abdominal:      General: Abdomen is protuberant. Palpations: Abdomen is soft. There is no hepatomegaly or splenomegaly. Tenderness: There is no abdominal tenderness. Lymphadenopathy:      Cervical: No cervical adenopathy. Skin:     General: Skin is warm and dry. Neurological:      Mental Status: He is alert and oriented to person, place, and time. Psychiatric:         Mood and Affect: Mood normal.         Behavior: Behavior normal.         Thought Content:  Thought content normal.         Judgment: Judgment normal.

## 2023-08-31 ENCOUNTER — HOSPITAL ENCOUNTER (OUTPATIENT)
Dept: NON INVASIVE DIAGNOSTICS | Age: 78
Discharge: HOME | End: 2023-08-31
Payer: MEDICARE

## 2023-08-31 VITALS
DIASTOLIC BLOOD PRESSURE: 72 MMHG | BODY MASS INDEX: 30.81 KG/M2 | SYSTOLIC BLOOD PRESSURE: 122 MMHG | HEART RATE: 78 BPM | WEIGHT: 208 LBS | HEIGHT: 69 IN

## 2023-08-31 DIAGNOSIS — R07.9 CHEST PAIN, UNSPECIFIED TYPE: ICD-10-CM

## 2023-08-31 DIAGNOSIS — R06.02 SOB (SHORTNESS OF BREATH): ICD-10-CM

## 2023-08-31 DIAGNOSIS — I35.8 AORTIC VALVE SCLEROSIS: ICD-10-CM

## 2023-08-31 LAB
AORTIC ROOT: 3.4 CM
AORTIC VALVE MEAN VELOCITY: 15.8 M/S
APICAL FOUR CHAMBER EJECTION FRACTION: 72 %
AV AREA BY CONTINUOUS VTI: 1.8 CM2
AV AREA PEAK VELOCITY: 1.6 CM2
AV LVOT MEAN GRADIENT: 3 MMHG
AV LVOT PEAK GRADIENT: 5 MMHG
AV MEAN GRADIENT: 11 MMHG
AV PEAK GRADIENT: 19 MMHG
AV VALVE AREA: 1.78 CM2
AV VELOCITY RATIO: 0.52
BACTERIA UR CULT: ABNORMAL
DOP CALC AO PEAK VEL: 2.19 M/S
DOP CALC AO VTI: 41.3 CM
DOP CALC LVOT AREA: 3.14 CM2
DOP CALC LVOT CARDIAC INDEX: 2.65 L/MIN/M2
DOP CALC LVOT CARDIAC OUTPUT: 5.56 L/MIN
DOP CALC LVOT DIAMETER: 2 CM
DOP CALC LVOT PEAK VEL VTI: 23.43 CM
DOP CALC LVOT PEAK VEL: 1.13 M/S
DOP CALC LVOT STROKE INDEX: 34.3 ML/M2
DOP CALC LVOT STROKE VOLUME: 73.57 CM3
E WAVE DECELERATION TIME: 340 MS
FRACTIONAL SHORTENING: 47 % (ref 28–44)
INTERVENTRICULAR SEPTUM IN DIASTOLE (PARASTERNAL SHORT AXIS VIEW): 1.1 CM
INTERVENTRICULAR SEPTUM: 1.1 CM (ref 0.6–1.1)
LEFT ATRIUM AREA SYSTOLE SINGLE PLANE A4C: 14.7 CM2
LEFT ATRIUM SIZE: 4.3 CM
LEFT INTERNAL DIMENSION IN SYSTOLE: 1.7 CM (ref 2.1–4)
LEFT VENTRICULAR INTERNAL DIMENSION IN DIASTOLE: 3.2 CM (ref 3.5–6)
LEFT VENTRICULAR POSTERIOR WALL IN END DIASTOLE: 1.3 CM
LEFT VENTRICULAR STROKE VOLUME: 33 ML
LVSV (TEICH): 33 ML
MV E'TISSUE VEL-SEP: 7 CM/S
MV PEAK A VEL: 1.05 M/S
MV PEAK E VEL: 74 CM/S
MV STENOSIS PRESSURE HALF TIME: 99 MS
MV VALVE AREA P 1/2 METHOD: 2.22 CM2
RIGHT ATRIUM AREA SYSTOLE A4C: 17.2 CM2
RIGHT VENTRICLE ID DIMENSION: 3.7 CM
SL CV LV EF: 65
SL CV PED ECHO LEFT VENTRICLE DIASTOLIC VOLUME (MOD BIPLANE) 2D: 41 ML
SL CV PED ECHO LEFT VENTRICLE SYSTOLIC VOLUME (MOD BIPLANE) 2D: 8 ML
TRICUSPID ANNULAR PLANE SYSTOLIC EXCURSION: 3 CM

## 2023-08-31 PROCEDURE — C8929 TTE W OR WO FOL WCON,DOPPLER: HCPCS

## 2023-08-31 PROCEDURE — 93306 TTE W/DOPPLER COMPLETE: CPT | Performed by: INTERNAL MEDICINE

## 2023-08-31 RX ADMIN — PERFLUTREN 1.2 ML/MIN: 6.52 INJECTION, SUSPENSION INTRAVENOUS at 11:43

## 2023-09-11 NOTE — PROGRESS NOTES
PATIENT:  Gordo Bronson  1945    ASSESSMENT:  1. Onychomycosis        2. Peripheral vascular disease, unspecified ([address])              No orders of the defined types were placed in this encounter. PLAN:  The patient was educated in proper foot wear. Also discussed daily foot assessment and proper foot care. The patient will follow up in 9-12 weeks for foot exam and care. Procedures: 74523  All mycotic toenails were reduced and debrided in length, width, and girth using a nail nipper and dremel. All non-dystrphic nails also trimmed. All hyperkeratotic skin lesion(s) if present were sharply pared with a scalpel / forcep with no bleeding or evidence of ulceration. Patient tolerated procedure(s) well without complications. Procedures     HPI:  Gordo Bronson is a 66 y. o.year old male seen for at risk foot exam and care. Unchanged clinically from prior visit. The patient complained of elongated thick painful toenails . The patient has class findings with peripheral vascular disease. The patient denied any acute pedal disorder, redness, acute swelling, or recent injury. The following portions of the patient's history were reviewed and updated as appropriate: allergies, current medications, past family history, past medical history, past social history, past surgical history and problem list.    REVIEW OF SYSTEMS:  GENERAL: No fever or chills  HEART: No chest pain, or palpitation  RESPIRATORY:  No acute SOB or cough  GI: No Nausea, vomit or diarrhea  NEUROLOGIC: No syncope or acute weakness    PHYSICAL EXAM:    /75 (BP Location: Left arm, Patient Position: Sitting, Cuff Size: Adult)   Pulse 79   Ht 5' 9" (1.753 m)   Wt 97.1 kg (214 lb)   BMI 31.60 kg/m²     VASCULAR EXAM  Posterior tibial artery  absent bilateral.    Dorsalis pedis artery +1 bilateral.  The patient has class findings with skin atrophy, lack of digital hair, and nail dystrophy.     There is trace lower extremity edema bilaterally. NEUROLOGIC EXAM  Sensation is intact to light touch. Sensation is intact to 10gm monofilament. No focal neurologic deficit. DERMATOLOGIC EXAM:   No ulcer or cellulitis noted. Texture, Tone and Turgor are diminished with moderate atrophic changes noted. The patient has dystrophic/hypertrophic toenails with yellow/white discoloration, onycholysis, and subungal debris. Fungal odor and brittle nature noted. Pain with palpation. Periungual erythema noted. Right foot nails severely dystrophic x5 with 0.5 cm ave thickness (1 through 5)  Left foot nails severely dystrophic x5 with 0.4 cm ave thickness (1 through 5)  Patient has hyperkeratotic lesions noted:   Right foot located at none. Left foot located at none. No notable suspicious skin lesions. MUSCULOSKELETAL EXAM:   No acute joint pain, edema, or redness. No acute musculoskeletal problem. Patient has no gross pedal deformities noted. Heel tenderness noted on prior visit has improved. Risk Category/Class Findings:   Q8(B1, B2 ABC)    A1)  Has the patient had a previous amputation of the foot or integral skeletal portion thereof? No  B1)  Does the patient have absent posterior tibial pulse? Yes  B3)  Does the patient have absent dorsalis pedis? No  B2)  Does the patient have three of the following? Yes           1. Hair growth (increased or decreased), 2.  Nail changes (thickening) and 3. Pigmentary changes (discoloring)  C)  Does the patient have two of the following and one above?  No

## 2023-10-09 ENCOUNTER — RA CDI HCC (OUTPATIENT)
Dept: OTHER | Facility: HOSPITAL | Age: 78
End: 2023-10-09

## 2023-10-09 ENCOUNTER — APPOINTMENT (OUTPATIENT)
Dept: LAB | Facility: CLINIC | Age: 78
End: 2023-10-09
Payer: MEDICARE

## 2023-10-09 DIAGNOSIS — E78.2 MIXED HYPERLIPIDEMIA: ICD-10-CM

## 2023-10-09 DIAGNOSIS — R73.01 ABNORMAL FASTING GLUCOSE: ICD-10-CM

## 2023-10-09 DIAGNOSIS — Z12.5 SCREENING FOR MALIGNANT NEOPLASM OF PROSTATE: ICD-10-CM

## 2023-10-09 DIAGNOSIS — I10 ESSENTIAL HYPERTENSION: ICD-10-CM

## 2023-10-09 LAB
ALBUMIN SERPL BCP-MCNC: 4 G/DL (ref 3.5–5)
ALP SERPL-CCNC: 64 U/L (ref 34–104)
ALT SERPL W P-5'-P-CCNC: 24 U/L (ref 7–52)
ANION GAP SERPL CALCULATED.3IONS-SCNC: 7 MMOL/L
AST SERPL W P-5'-P-CCNC: 27 U/L (ref 13–39)
BASOPHILS # BLD AUTO: 0.06 THOUSANDS/ÂΜL (ref 0–0.1)
BASOPHILS NFR BLD AUTO: 1 % (ref 0–1)
BILIRUB SERPL-MCNC: 0.8 MG/DL (ref 0.2–1)
BUN SERPL-MCNC: 16 MG/DL (ref 5–25)
CALCIUM SERPL-MCNC: 9.2 MG/DL (ref 8.4–10.2)
CHLORIDE SERPL-SCNC: 105 MMOL/L (ref 96–108)
CHOLEST SERPL-MCNC: 183 MG/DL
CO2 SERPL-SCNC: 29 MMOL/L (ref 21–32)
CREAT SERPL-MCNC: 1.27 MG/DL (ref 0.6–1.3)
EOSINOPHIL # BLD AUTO: 0.23 THOUSAND/ÂΜL (ref 0–0.61)
EOSINOPHIL NFR BLD AUTO: 3 % (ref 0–6)
ERYTHROCYTE [DISTWIDTH] IN BLOOD BY AUTOMATED COUNT: 14.1 % (ref 11.6–15.1)
EST. AVERAGE GLUCOSE BLD GHB EST-MCNC: 123 MG/DL
GFR SERPL CREATININE-BSD FRML MDRD: 53 ML/MIN/1.73SQ M
GLUCOSE P FAST SERPL-MCNC: 90 MG/DL (ref 65–99)
HBA1C MFR BLD: 5.9 %
HCT VFR BLD AUTO: 43.2 % (ref 36.5–49.3)
HDLC SERPL-MCNC: 33 MG/DL
HGB BLD-MCNC: 14.4 G/DL (ref 12–17)
IMM GRANULOCYTES # BLD AUTO: 0.02 THOUSAND/UL (ref 0–0.2)
IMM GRANULOCYTES NFR BLD AUTO: 0 % (ref 0–2)
LDLC SERPL CALC-MCNC: 70 MG/DL (ref 0–100)
LYMPHOCYTES # BLD AUTO: 2.21 THOUSANDS/ÂΜL (ref 0.6–4.47)
LYMPHOCYTES NFR BLD AUTO: 30 % (ref 14–44)
MCH RBC QN AUTO: 32.8 PG (ref 26.8–34.3)
MCHC RBC AUTO-ENTMCNC: 33.3 G/DL (ref 31.4–37.4)
MCV RBC AUTO: 98 FL (ref 82–98)
MONOCYTES # BLD AUTO: 0.82 THOUSAND/ÂΜL (ref 0.17–1.22)
MONOCYTES NFR BLD AUTO: 11 % (ref 4–12)
NEUTROPHILS # BLD AUTO: 4.01 THOUSANDS/ÂΜL (ref 1.85–7.62)
NEUTS SEG NFR BLD AUTO: 55 % (ref 43–75)
NRBC BLD AUTO-RTO: 0 /100 WBCS
PLATELET # BLD AUTO: 199 THOUSANDS/UL (ref 149–390)
PMV BLD AUTO: 10.8 FL (ref 8.9–12.7)
POTASSIUM SERPL-SCNC: 4 MMOL/L (ref 3.5–5.3)
PROT SERPL-MCNC: 6.8 G/DL (ref 6.4–8.4)
PSA SERPL-MCNC: 2.68 NG/ML (ref 0–4)
RBC # BLD AUTO: 4.39 MILLION/UL (ref 3.88–5.62)
SODIUM SERPL-SCNC: 141 MMOL/L (ref 135–147)
TRIGL SERPL-MCNC: 399 MG/DL
TSH SERPL DL<=0.05 MIU/L-ACNC: 2.52 UIU/ML (ref 0.45–4.5)
WBC # BLD AUTO: 7.35 THOUSAND/UL (ref 4.31–10.16)

## 2023-10-09 PROCEDURE — 84443 ASSAY THYROID STIM HORMONE: CPT

## 2023-10-09 PROCEDURE — G0103 PSA SCREENING: HCPCS

## 2023-10-09 PROCEDURE — 83036 HEMOGLOBIN GLYCOSYLATED A1C: CPT

## 2023-10-09 PROCEDURE — 85025 COMPLETE CBC W/AUTO DIFF WBC: CPT

## 2023-10-09 PROCEDURE — 80061 LIPID PANEL: CPT

## 2023-10-09 PROCEDURE — 36415 COLL VENOUS BLD VENIPUNCTURE: CPT

## 2023-10-09 PROCEDURE — 80053 COMPREHEN METABOLIC PANEL: CPT

## 2023-10-09 NOTE — PROGRESS NOTES
e11.51  [address] [facility] coding opportunities          Chart Reviewed number of suggestions sent to Provider: 1     Patients Insurance     Medicare Insurance: [employer]

## 2023-10-11 DIAGNOSIS — E78.2 MIXED HYPERLIPIDEMIA: ICD-10-CM

## 2023-10-11 RX ORDER — ATORVASTATIN CALCIUM 20 MG/1
TABLET, FILM COATED ORAL
Qty: 90 TABLET | Refills: 1 | Status: SHIPPED | OUTPATIENT
Start: 2023-10-11

## 2023-10-16 ENCOUNTER — OFFICE VISIT (OUTPATIENT)
Dept: FAMILY MEDICINE CLINIC | Facility: HOSPITAL | Age: 78
End: 2023-10-16
Payer: MEDICARE

## 2023-10-16 VITALS
HEART RATE: 85 BPM | DIASTOLIC BLOOD PRESSURE: 72 MMHG | WEIGHT: 215.4 LBS | HEIGHT: 69 IN | SYSTOLIC BLOOD PRESSURE: 121 MMHG | OXYGEN SATURATION: 98 % | TEMPERATURE: 98.2 F | BODY MASS INDEX: 31.9 KG/M2

## 2023-10-16 DIAGNOSIS — N18.30 STAGE 3 CHRONIC KIDNEY DISEASE, UNSPECIFIED WHETHER STAGE 3A OR 3B CKD (HCC): ICD-10-CM

## 2023-10-16 DIAGNOSIS — N39.0 RECURRENT UTI: ICD-10-CM

## 2023-10-16 DIAGNOSIS — R73.01 ABNORMAL FASTING GLUCOSE: ICD-10-CM

## 2023-10-16 DIAGNOSIS — E78.2 MIXED HYPERLIPIDEMIA: Primary | ICD-10-CM

## 2023-10-16 DIAGNOSIS — Z23 ENCOUNTER FOR IMMUNIZATION: ICD-10-CM

## 2023-10-16 DIAGNOSIS — C64.9 RENAL CELL CARCINOMA, UNSPECIFIED LATERALITY (HCC): ICD-10-CM

## 2023-10-16 DIAGNOSIS — Z12.11 SCREEN FOR COLON CANCER: ICD-10-CM

## 2023-10-16 DIAGNOSIS — I10 ESSENTIAL HYPERTENSION: ICD-10-CM

## 2023-10-16 DIAGNOSIS — I35.8 AORTIC VALVE SCLEROSIS: ICD-10-CM

## 2023-10-16 PROCEDURE — G0008 ADMIN INFLUENZA VIRUS VAC: HCPCS

## 2023-10-16 PROCEDURE — 90662 IIV NO PRSV INCREASED AG IM: CPT

## 2023-10-16 PROCEDURE — 99214 OFFICE O/P EST MOD 30 MIN: CPT | Performed by: FAMILY MEDICINE

## 2023-10-16 NOTE — PROGRESS NOTES
Name: Ben December      : 1945      MRN: [id number]  Encounter Provider: Maurisio Pyle MD  Encounter Date: 10/16/2023   Encounter department: [address]     1. Mixed hyperlipidemia  Assessment & Plan:  Lipid profile stable      2. Abnormal fasting glucose  Assessment & Plan:  A1c stable diet control      3. Stage 3 chronic kidney disease, unspecified whether stage 3a or 3b CKD [facility])  Assessment & Plan:  Lab Results   Component Value Date    EGFR 53 10/09/2023    EGFR 49 2023    EGFR 63 2022    CREATININE 1.27 10/09/2023    CREATININE 1.37 (H) 2023    CREATININE 1.11 2022         4. Essential hypertension  Assessment & Plan:  Excellent BP control      5. Aortic valve sclerosis    6. Screen for colon cancer  -     Ambulatory Referral to Gastroenterology; Future    7. Encounter for immunization  -     influenza vaccine, high-dose, PF 0.7 mL (FLUZONE HIGH-DOSE)    8. Recurrent UTI  -     Urine culture    9. Renal cell carcinoma, unspecified laterality (HCC)      BMI Counseling: Body mass index is 31.81 kg/m². The BMI is above normal. Nutrition recommendations include decreasing portion sizes, encouraging healthy choices of fruits and vegetables, limiting drinks that contain sugar and moderation in carbohydrate intake. Exercise recommendations include exercising 3-5 times per week. No pharmacotherapy was ordered. Rationale for BMI follow-up plan is due to patient being overweight or obese. Subjective     6 month follow up. No recent injury or illness    Seeing Dr Kelli Ty for atypical chest pain next month    Noting a lot of gas he is passing over the past week or so    Nocturia up to 2 times a night      Review of Systems   Constitutional:  Negative for unexpected weight change. HENT: Negative. Respiratory: Negative. Gastrointestinal:  Positive for abdominal distention. Genitourinary: Negative. Musculoskeletal:  Positive for arthralgias. Psychiatric/Behavioral: Negative. All other systems reviewed and are negative. Past Medical History:   Diagnosis Date    Cancer of left kidney [facility])     2014    Colon polyp     Diverticulitis of colon     Last assessed - 5/12/14    Hypercholesterolemia     Hyperlipidemia     Mood disorder ([address]) 8/29/2023    Renal neoplasm      Past Surgical History:   Procedure Laterality Date    COLONOSCOPY      COLONOSCOPY N/A 4/27/2018    Procedure: COLONOSCOPY;  Surgeon: Millicent Larry MD;  Location: [facility] GI LAB;   Service: Gastroenterology    CYSTOSCOPY      Onset - 5/2/16 Domenico Mast     JOINT REPLACEMENT Left     Hip    LAPAROSCOPIC CHOLECYSTECTOMY      NEPHRECTOMY Left     NEPHRECTOMY Left     SHOULDER ARTHROSCOPY W/ ROTATOR CUFF REPAIR Left     TOTAL HIP ARTHROPLASTY Left     original hardware became infected and pt needed a second surgery to replace hardware    UMBILICAL HERNIA REPAIR       Family History   Problem Relation Age of Onset    Tuberculosis Mother     Emphysema Father         Lung     Social History     Socioeconomic History    Marital status: /Civil Union     Spouse name: None    Number of children: None    Years of education: None    Highest education level: None   Occupational History    None   Tobacco Use    Smoking status: Former    Smokeless tobacco: Never   Vaping Use    Vaping Use: Never used   Substance and Sexual Activity    Alcohol use: Yes     Comment: Social    Drug use: No    Sexual activity: None   Other Topics Concern    None   Social History Narrative    Daily caffeine consumption, 2-3 servings a day     Social Determinants of Health     Financial Resource Strain: Low Risk  (4/17/2023)    Overall Financial Resource Strain (CARDIA)     Difficulty of Paying Living Expenses: Not hard at all   Food Insecurity: Not on file   Transportation Needs: No Transportation Needs (4/17/2023)    PRAPARE - Transportation     Lack of Transportation (Medical): No     Lack of Transportation (Non-Medical): No   Physical Activity: Not on file   Stress: Not on file   Social Connections: Not on file   Intimate Partner Violence: Not on file   Housing Stability: Not on file     Current Outpatient Medications on File Prior to Visit   Medication Sig    amLODIPine (NORVASC) 5 mg tablet TAKE 1 TABLET BY MOUTH EVERY DAY    ascorbic acid (VITAMIN C) 500 MG tablet Take 500 mg by mouth daily    atorvastatin (LIPITOR) 20 mg tablet TAKE 1 TABLET BY MOUTH EVERY DAY    escitalopram (LEXAPRO) 5 mg tablet Take 1 tablet (5 mg total) by mouth daily    gabapentin (NEURONTIN) 100 mg capsule Take 100 mg by mouth 3 (three) times a day    Multiple Vitamins-Minerals (MULTIVITAMIN ADULT) TABS Take by mouth    Omega-3 Fatty Acids (FISH OIL) 1,000 mg by Does not apply route     No Known Allergies  Immunization History   Administered Date(s) Administered    COVID-19 PFIZER VACCINE 0.3 ML IM 03/19/2021, 04/10/2021    INFLUENZA 11/07/2018, 10/21/2019    Influenza Split High Dose Preservative Free IM 09/05/2012, 09/22/2014, 01/04/2016, 10/21/2019    Influenza, high dose seasonal 0.7 mL 08/24/2020, 10/11/2021, 10/17/2022, 10/16/2023    Influenza, seasonal, injectable 10/21/2013    Pneumococcal Conjugate 13-Valent 02/05/2018    Pneumococcal Polysaccharide PPV23 09/12/2014, 11/07/2018    Tdap 10/15/2017       Objective     /72 (BP Location: Left arm, Patient Position: Sitting, Cuff Size: Large)   Pulse 85   Temp 98.2 °F (36.8 °C) (Tympanic)   Ht 5' 9" (1.753 m)   Wt 97.7 kg (215 lb 6.4 oz)   SpO2 98%   BMI 31.81 kg/m²     Physical Exam  Vitals reviewed. Neck:      Vascular: No carotid bruit. Cardiovascular:      Rate and Rhythm: Normal rate and regular rhythm. Pulses: Normal pulses. Heart sounds: Normal heart sounds. Pulmonary:      Effort: Pulmonary effort is normal.      Breath sounds: Normal breath sounds.    Neurological:      Mental Status: He is alert and oriented to person, place, and time.    Psychiatric:         Mood and Affect: Mood normal.       Villa Harris MD

## 2023-10-17 PROCEDURE — 87086 URINE CULTURE/COLONY COUNT: CPT | Performed by: FAMILY MEDICINE

## 2023-10-17 PROCEDURE — 87147 CULTURE TYPE IMMUNOLOGIC: CPT | Performed by: FAMILY MEDICINE

## 2023-10-18 LAB
BACTERIA UR CULT: ABNORMAL
BACTERIA UR CULT: ABNORMAL

## 2023-10-19 NOTE — ASSESSMENT & PLAN NOTE
Lab Results   Component Value Date    EGFR 53 10/09/2023    EGFR 49 04/17/2023    EGFR 63 12/13/2022    CREATININE 1.27 10/09/2023    CREATININE 1.37 (H) 04/17/2023    CREATININE 1.11 12/13/2022

## 2023-10-23 ENCOUNTER — TELEPHONE (OUTPATIENT)
Dept: GASTROENTEROLOGY | Facility: CLINIC | Age: 78
End: 2023-10-23

## 2023-10-27 ENCOUNTER — TELEPHONE (OUTPATIENT)
Dept: GASTROENTEROLOGY | Facility: CLINIC | Age: 78
End: 2023-10-27

## 2023-10-27 ENCOUNTER — OFFICE VISIT (OUTPATIENT)
Dept: GASTROENTEROLOGY | Facility: CLINIC | Age: 78
End: 2023-10-27
Payer: MEDICARE

## 2023-10-27 VITALS
DIASTOLIC BLOOD PRESSURE: 60 MMHG | SYSTOLIC BLOOD PRESSURE: 100 MMHG | HEIGHT: 69 IN | BODY MASS INDEX: 31.4 KG/M2 | WEIGHT: 212 LBS

## 2023-10-27 DIAGNOSIS — Z12.11 SCREEN FOR COLON CANCER: ICD-10-CM

## 2023-10-27 PROCEDURE — 99203 OFFICE O/P NEW LOW 30 MIN: CPT | Performed by: INTERNAL MEDICINE

## 2023-10-27 NOTE — TELEPHONE ENCOUNTER
Scheduled date of colonoscopy (as of today): 1/3/24  Physician performing colonoscopy: Dr NEELY [facility]  Location of colonoscopy: [facility]  Bowel prep reviewed with patient: miralax  Instructions reviewed with patient by: gave patient packet  Clearances: No

## 2023-10-27 NOTE — PROGRESS NOTES
[address] [place] [address] [facility]patient Consultation  Cornell Martinez 66 y.o. male MRN: [id number]  Encounter: [id number]    ASSESSMENT AND PLAN:      1. Screen for colon cancer  Patient is due for colon cancer screening with colonoscopy for history of colon polyps. Last colonoscopy in 2018. Patient is not on diabetic medications or blood thinners. We will schedule the patient for colonoscopy and have advised the patient to take the bowel preparation in a split dose bowel preparation. I have discussed with the patient the risks and benefits and alternatives of the procedure which include but are not limited to bleeding, infection, aspiration, perforation.       - Ambulatory Referral to Gastroenterology  - Colonoscopy; Future      Follow up Appointment: For colonoscopy    Chief Complaint   Patient presents with   • Consult     colon       HPI:   Cornell Martinez is a 66y.o. year old male with a PMH significant for colon polyps, diverticulitis, cancer of the left kidney, aortic valve sclerosis who presents from a consultation from PCP for colorectal cancer screening. Patient last had a colonoscopy in 2018. Currently overdue for colonoscopy. Patient denies any GI symptoms of fevers chills nausea vomiting dysphagia abdominal pain or change of bowel habits. No GI bleeding. No unintentional weight loss. Denies any family history of colon cancer. Does have abdominal surgeries in the past including cholecystectomy and nephrectomy.   Historical Information   Past Medical History:   Diagnosis Date   • Cancer of left kidney [facility])     2014   • Colon polyp    • Diverticulitis of colon     Last assessed - 5/12/14   • Hypercholesterolemia    • Hyperlipidemia    • Mood disorder ([address] [facility]) 8/29/2023   • Renal neoplasm      Past Surgical History:   Procedure Laterality Date   • COLONOSCOPY     • COLONOSCOPY N/A 4/27/2018    Procedure: COLONOSCOPY;  Surgeon: Marcos Garcia MD;  Location: [facility] LAB;  Service: Gastroenterology   • CYSTOSCOPY      Onset - 5/2/16 Pat Crowley    • JOINT REPLACEMENT Left     Hip   • LAPAROSCOPIC CHOLECYSTECTOMY     • NEPHRECTOMY Left    • NEPHRECTOMY Left    • SHOULDER ARTHROSCOPY W/ ROTATOR CUFF REPAIR Left    • TOTAL HIP ARTHROPLASTY Left     original hardware became infected and pt needed a second surgery to replace hardware   • UMBILICAL HERNIA REPAIR       Social History     Substance and Sexual Activity   Alcohol Use Yes    Comment: Social     Social History     Substance and Sexual Activity   Drug Use No     Social History     Tobacco Use   Smoking Status Former   Smokeless Tobacco Never     Family History   Problem Relation Age of Onset   • Tuberculosis Mother    • Emphysema Father         Lung       Meds/Allergies     Current Outpatient Medications:   •  amLODIPine (NORVASC) 5 mg tablet  •  ascorbic acid (VITAMIN C) 500 MG tablet  •  atorvastatin (LIPITOR) 20 mg tablet  •  escitalopram (LEXAPRO) 5 mg tablet  •  gabapentin (NEURONTIN) 100 mg capsule  •  Multiple Vitamins-Minerals (MULTIVITAMIN ADULT) TABS  •  Omega-3 Fatty Acids (FISH OIL) 1,000 mg    No Known Allergies    PHYSICAL EXAM:    Blood pressure 100/60, height 5' 9" (1.753 m), weight 96.2 kg (212 lb). Body mass index is 31.31 kg/m². General Appearance: NAD, cooperative, alert  Eyes: Anicteric  GI:  Soft, non-tender, non-distended; normal bowel sounds; no masses, no organomegaly   Rectal: Deferred  Musculoskeletal: No edema.   Skin:  No jaundice    Lab Results:   Lab Results   Component Value Date    WBC 7.35 10/09/2023    WBC 6.36 04/17/2023    WBC 6.69 12/13/2022    HGB 14.4 10/09/2023    HGB 14.7 04/17/2023    HGB 14.0 12/13/2022    MCV 98 10/09/2023     10/09/2023     04/17/2023     12/13/2022    INR 1.90 (H) 09/08/2015    INR 2.14 (H) 09/03/2015    INR 2.56 (H) 08/31/2015     Lab Results   Component Value Date     01/06/2016    K 4.0 10/09/2023     10/09/2023    CO2 29 10/09/2023    ANIONGAP 6 01/06/2016    BUN 16 10/09/2023    CREATININE 1.27 10/09/2023    GLUCOSE 112 01/06/2016    GLUF 90 10/09/2023    CALCIUM 9.2 10/09/2023    AST 27 10/09/2023    AST 28 04/17/2023    AST 23 12/13/2022    ALT 24 10/09/2023    ALT 29 04/17/2023    ALT 28 12/13/2022    ALKPHOS 64 10/09/2023    ALKPHOS 79 04/17/2023    ALKPHOS 83 12/13/2022    PROT 7.2 01/06/2016    BILITOT 0.43 01/06/2016    BILITOT 0.36 10/12/2015    BILITOT 0.22 08/03/2015    EGFR 53 10/09/2023     No results found for: "IRON", "TIBC", "FERRITIN"  Lab Results   Component Value Date    LIPASE 193 05/01/2017       Radiology Results:   No results found.

## 2023-10-29 DIAGNOSIS — F39 MOOD DISORDER (HCC): ICD-10-CM

## 2023-10-29 RX ORDER — ESCITALOPRAM OXALATE 5 MG/1
5 TABLET ORAL DAILY
Qty: 90 TABLET | Refills: 1 | Status: SHIPPED | OUTPATIENT
Start: 2023-10-29

## 2023-11-03 ENCOUNTER — OFFICE VISIT (OUTPATIENT)
Dept: CARDIOLOGY CLINIC | Facility: CLINIC | Age: 78
End: 2023-11-03
Payer: MEDICARE

## 2023-11-03 VITALS
HEART RATE: 82 BPM | SYSTOLIC BLOOD PRESSURE: 114 MMHG | DIASTOLIC BLOOD PRESSURE: 76 MMHG | HEIGHT: 69 IN | WEIGHT: 213 LBS | BODY MASS INDEX: 31.55 KG/M2 | OXYGEN SATURATION: 98 %

## 2023-11-03 DIAGNOSIS — R07.9 CHEST PAIN, UNSPECIFIED TYPE: ICD-10-CM

## 2023-11-03 DIAGNOSIS — E78.2 MIXED HYPERLIPIDEMIA: ICD-10-CM

## 2023-11-03 DIAGNOSIS — I10 PRIMARY HYPERTENSION: Primary | ICD-10-CM

## 2023-11-03 PROCEDURE — 99204 OFFICE O/P NEW MOD 45 MIN: CPT | Performed by: INTERNAL MEDICINE

## 2023-11-03 RX ORDER — ATORVASTATIN CALCIUM 40 MG/1
40 TABLET, FILM COATED ORAL DAILY
Qty: 90 TABLET | Refills: 3 | Status: SHIPPED | OUTPATIENT
Start: 2023-11-03

## 2023-11-03 NOTE — PROGRESS NOTES
Consultation - Cardiology   Pearl Singh 66 y.o. male MRN: [id number]    Encounter: [id number]    4. Primary hypertension  NM myocardial perfusion spect (rx stress and/or rest)      2. Chest pain, unspecified type  Ambulatory Referral to Cardiology    NM myocardial perfusion spect (rx stress and/or rest)      3. Mixed hyperlipidemia  NM myocardial perfusion spect (rx stress and/or rest)    atorvastatin (LIPITOR) 40 mg tablet            Assessment/Plan     Assessment:     Chest Pain  Hypertension  Hyperlipidemia    Plan:    Chest Pain: stable symptoms. Echo shows mild AS with normal EF. Check lexiscan MPI    Hypertension: BP is controlled on amlodipine. Hyperlipidemia: Continue with atorvastatin. LDL. TG have been elevated the last few years. Mild Aortic Stenosis: recommend repeat study in three years. History of Present Illness   Physician Requesting Consult: No att. providers found  Reason for Consult / Principal Problem: Chest Pain  HPI: Pearl Singh is a 66y.o. year old male who presents with chest pain that occurred in August. Last episode of chest discomfort was yesterday. This occurred while sitting down and lasted about thirty minutes. He has dyspnea with moderate exertion. No palpitations. He has no LE edema, orthopnea or PND. He has no prior history of cad. Review of Systems   Constitutional: Negative. HENT: Negative. Eyes: Negative. Cardiovascular: Negative. Respiratory: Negative. Endocrine: Negative. Hematologic/Lymphatic: Negative. Skin: Negative. Musculoskeletal: Negative. Gastrointestinal: Negative. Genitourinary: Negative. Neurological: Negative. Psychiatric/Behavioral: Negative. Allergic/Immunologic: Negative.         Historical Information   Past Medical History:   Diagnosis Date    Cancer of left kidney [facility])     2014    Colon polyp     Diverticulitis of colon     Last assessed - 5/12/14    Hypercholesterolemia     Hyperlipidemia Mood disorder ([address] [facility]) 8/29/2023    Renal neoplasm      Past Surgical History:   Procedure Laterality Date    COLONOSCOPY      COLONOSCOPY N/A 4/27/2018    Procedure: COLONOSCOPY;  Surgeon: Kathia Turner MD;  Location: [facility]; Service: Gastroenterology    CYSTOSCOPY      Onset - 5/2/16 Vilma Flaming     JOINT REPLACEMENT Left     Hip    LAPAROSCOPIC CHOLECYSTECTOMY      NEPHRECTOMY Left     NEPHRECTOMY Left     SHOULDER ARTHROSCOPY W/ ROTATOR CUFF REPAIR Left     TOTAL HIP ARTHROPLASTY Left     original hardware became infected and pt needed a second surgery to replace hardware    UMBILICAL HERNIA REPAIR         Social History:  Social History     Substance and Sexual Activity   Alcohol Use Yes    Comment: Social     Social History     Substance and Sexual Activity   Drug Use No     Social History     Tobacco Use   Smoking Status Former   Smokeless Tobacco Never       Family History:   Family History   Problem Relation Age of Onset    Tuberculosis Mother     Emphysema Father         Lung       Meds/Allergies   No Known Allergies    Current Outpatient Medications:     amLODIPine (NORVASC) 5 mg tablet, TAKE 1 TABLET BY MOUTH EVERY DAY, Disp: 90 tablet, Rfl: 3    ascorbic acid (VITAMIN C) 500 MG tablet, Take 500 mg by mouth daily, Disp: , Rfl:     atorvastatin (LIPITOR) 20 mg tablet, TAKE 1 TABLET BY MOUTH EVERY DAY, Disp: 90 tablet, Rfl: 1    escitalopram (LEXAPRO) 5 mg tablet, TAKE 1 TABLET (5 MG TOTAL) BY MOUTH DAILY. , Disp: 90 tablet, Rfl: 1    gabapentin (NEURONTIN) 100 mg capsule, Take 100 mg by mouth 3 (three) times a day, Disp: , Rfl:     Multiple Vitamins-Minerals (MULTIVITAMIN ADULT) TABS, Take by mouth, Disp: , Rfl:     Omega-3 Fatty Acids (FISH OIL) 1,000 mg, by Does not apply route, Disp: , Rfl:     Vitals:   Pulse: 82  Blood Pressue: 114/76  Weight: 96.6 kg (213 lb)      Physical Exam  Constitutional:       General: He is not in acute distress. Appearance: He is well-developed.  He is not diaphoretic. HENT:      Head: Normocephalic. Eyes:      General: No scleral icterus. Right eye: No discharge. Conjunctiva/sclera: Conjunctivae normal.   Neck:      Vascular: No JVD. Cardiovascular:      Rate and Rhythm: Normal rate and regular rhythm. Heart sounds: Murmur heard. No friction rub. No gallop. Pulmonary:      Effort: Pulmonary effort is normal. No respiratory distress. Breath sounds: Normal breath sounds. No wheezing or rales. Abdominal:      General: Bowel sounds are normal. There is no distension. Palpations: Abdomen is soft. Tenderness: There is no abdominal tenderness. There is no rebound. Musculoskeletal:         General: No tenderness or deformity. Cervical back: Normal range of motion. Skin:     General: Skin is warm and dry. Neurological:      Mental Status: He is alert and oriented to person, place, and time. [unfilled]    Invasive Devices       None                   Lab Results   Component Value Date     01/06/2016     10/09/2023    CO2 29 10/09/2023    ANIONGAP 6 01/06/2016    BUN 16 10/09/2023    CREATININE 1.27 10/09/2023    EGFR 53 10/09/2023    GLUCOSE 112 01/06/2016    CALCIUM 9.2 10/09/2023    AST 27 10/09/2023    ALT 24 10/09/2023    ALKPHOS 64 10/09/2023    PROT 7.2 01/06/2016    BILITOT 0.43 01/06/2016     Lab Results   Component Value Date    WBC 7.35 10/09/2023    HGB 14.4 10/09/2023     10/09/2023     No components found for: "TROP"    Imaging:     EKG: NSR Normal ECG. Counseling / Coordination of Care  Total floor / unit time spent today 45 minutes. Greater than 50% of total time was spent with the patient and / or family counseling and / or coordination of care. A description of the counseling / coordination of care.

## 2023-11-13 ENCOUNTER — OFFICE VISIT (OUTPATIENT)
Dept: PODIATRY | Facility: CLINIC | Age: 78
End: 2023-11-13
Payer: MEDICARE

## 2023-11-13 VITALS
WEIGHT: 213 LBS | DIASTOLIC BLOOD PRESSURE: 71 MMHG | BODY MASS INDEX: 31.55 KG/M2 | HEART RATE: 71 BPM | HEIGHT: 69 IN | SYSTOLIC BLOOD PRESSURE: 142 MMHG

## 2023-11-13 DIAGNOSIS — I73.9 PERIPHERAL VASCULAR DISEASE, UNSPECIFIED (HCC): ICD-10-CM

## 2023-11-13 DIAGNOSIS — B35.1 ONYCHOMYCOSIS: Primary | ICD-10-CM

## 2023-11-13 PROCEDURE — 11721 DEBRIDE NAIL 6 OR MORE: CPT | Performed by: PODIATRIST

## 2023-11-14 NOTE — PROGRESS NOTES
PATIENT:  Gordo Bronson  1945    ASSESSMENT:  1. Onychomycosis        2. Peripheral vascular disease, unspecified ([address])              No orders of the defined types were placed in this encounter. PLAN:  The patient was educated in proper foot wear. Also discussed daily foot assessment and proper foot care. The patient will follow up in 9-12 weeks for foot exam and care. Procedures: 24268  All mycotic toenails were reduced and debrided in length, width, and girth using a nail nipper and dremel. All non-dystrphic nails also trimmed. All hyperkeratotic skin lesion(s) if present were sharply pared with a scalpel / forcep with no bleeding or evidence of ulceration. Patient tolerated procedure(s) well without complications. Procedures     HPI:  Gordo Bronson is a 66 y. o.year old male seen for at risk foot exam and care. Unchanged clinically from prior visit. The patient complained of elongated thick painful toenails . The patient has class findings with peripheral vascular disease. The patient denied any acute pedal disorder, redness, acute swelling, or recent injury. The following portions of the patient's history were reviewed and updated as appropriate: allergies, current medications, past family history, past medical history, past social history, past surgical history and problem list.    REVIEW OF SYSTEMS:  GENERAL: No fever or chills  HEART: No chest pain, or palpitation  RESPIRATORY:  No acute SOB or cough  GI: No Nausea, vomit or diarrhea  NEUROLOGIC: No syncope or acute weakness    PHYSICAL EXAM:    /71   Pulse 71   Ht 5' 9" (1.753 m)   Wt 96.6 kg (213 lb)   BMI 31.45 kg/m²     VASCULAR EXAM  Posterior tibial artery  absent bilateral.    Dorsalis pedis artery +1 bilateral.  The patient has class findings with skin atrophy, lack of digital hair, and nail dystrophy. There is trace lower extremity edema bilaterally.       NEUROLOGIC EXAM  Sensation is intact to light touch. Sensation is intact to 10gm monofilament. No focal neurologic deficit. DERMATOLOGIC EXAM:   No ulcer or cellulitis noted. Texture, Tone and Turgor are diminished with moderate atrophic changes noted. The patient has dystrophic/hypertrophic toenails with yellow/white discoloration, onycholysis, and subungal debris. Fungal odor and brittle nature noted. Pain with palpation. Periungual erythema noted. Right foot nails severely dystrophic x5 with 0.5 cm ave thickness (1 through 5)  Left foot nails severely dystrophic x5 with 0.4 cm ave thickness (1 through 5)  Patient has hyperkeratotic lesions noted:   Right foot located at none. Left foot located at none. No notable suspicious skin lesions. MUSCULOSKELETAL EXAM:   No acute joint pain, edema, or redness. No acute musculoskeletal problem. Patient has no gross pedal deformities noted. Heel tenderness noted on prior visit has improved. Risk Category/Class Findings:   Q8(B1, B2 ABC)    A1)  Has the patient had a previous amputation of the foot or integral skeletal portion thereof? No  B1)  Does the patient have absent posterior tibial pulse? Yes  B3)  Does the patient have absent dorsalis pedis? No  B2)  Does the patient have three of the following? Yes           1. Hair growth (increased or decreased), 2.  Nail changes (thickening) and 3. Pigmentary changes (discoloring)  C)  Does the patient have two of the following and one above?  No

## 2023-11-21 ENCOUNTER — TELEPHONE (OUTPATIENT)
Dept: HEMATOLOGY ONCOLOGY | Facility: CLINIC | Age: 78
End: 2023-11-21

## 2023-11-21 NOTE — TELEPHONE ENCOUNTER
Lm on vm advising 12/18 appt with Dr. Dane Byrd will be r/s to 1/10 at 8:40 with Dr. Dane Byrd in the [facility] office. Provided him with [phone] if appt date/times does not work with his schedule.

## 2023-11-30 ENCOUNTER — HOSPITAL ENCOUNTER (INPATIENT)
Facility: HOSPITAL | Age: 78
LOS: 3 days | Discharge: HOME | DRG: 872 | End: 2023-12-03
Attending: EMERGENCY MEDICINE | Admitting: INTERNAL MEDICINE
Payer: MEDICARE

## 2023-11-30 ENCOUNTER — APPOINTMENT (EMERGENCY)
Dept: ULTRASOUND IMAGING | Facility: HOSPITAL | Age: 78
DRG: 872 | End: 2023-11-30
Payer: MEDICARE

## 2023-11-30 DIAGNOSIS — N45.3 ACUTE EPIDIDYMO-ORCHITIS: ICD-10-CM

## 2023-11-30 DIAGNOSIS — N39.0 UTI (URINARY TRACT INFECTION): ICD-10-CM

## 2023-11-30 DIAGNOSIS — N45.3 EPIDIDYMOORCHITIS: Primary | ICD-10-CM

## 2023-11-30 DIAGNOSIS — R65.10 SIRS (SYSTEMIC INFLAMMATORY RESPONSE SYNDROME) (HCC): ICD-10-CM

## 2023-11-30 PROBLEM — N18.31 STAGE 3A CHRONIC KIDNEY DISEASE (HCC): Status: ACTIVE | Noted: 2019-02-04

## 2023-11-30 PROBLEM — A41.50 SEPSIS DUE TO GRAM-NEGATIVE UTI: Status: ACTIVE | Noted: 2023-11-30

## 2023-11-30 LAB
ALBUMIN SERPL BCP-MCNC: 4 G/DL (ref 3.5–5)
ALP SERPL-CCNC: 66 U/L (ref 34–104)
ALT SERPL W P-5'-P-CCNC: 16 U/L (ref 7–52)
ANION GAP SERPL CALCULATED.3IONS-SCNC: 9 MMOL/L
AST SERPL W P-5'-P-CCNC: 18 U/L (ref 13–39)
BACTERIA UR QL AUTO: ABNORMAL /HPF
BASOPHILS # BLD AUTO: 0.04 THOUSANDS/ÂΜL (ref 0–0.1)
BASOPHILS NFR BLD AUTO: 0 % (ref 0–1)
BILIRUB SERPL-MCNC: 0.95 MG/DL (ref 0.2–1)
BILIRUB UR QL STRIP: NEGATIVE
BUN SERPL-MCNC: 15 MG/DL (ref 5–25)
CALCIUM SERPL-MCNC: 9.3 MG/DL (ref 8.4–10.2)
CHLORIDE SERPL-SCNC: 102 MMOL/L (ref 96–108)
CLARITY UR: ABNORMAL
CO2 SERPL-SCNC: 27 MMOL/L (ref 21–32)
COLOR UR: YELLOW
CREAT SERPL-MCNC: 1.13 MG/DL (ref 0.6–1.3)
EOSINOPHIL # BLD AUTO: 0.03 THOUSAND/ÂΜL (ref 0–0.61)
EOSINOPHIL NFR BLD AUTO: 0 % (ref 0–6)
ERYTHROCYTE [DISTWIDTH] IN BLOOD BY AUTOMATED COUNT: 13.4 % (ref 11.6–15.1)
GFR SERPL CREATININE-BSD FRML MDRD: 61 ML/MIN/1.73SQ M
GLUCOSE SERPL-MCNC: 120 MG/DL (ref 65–140)
GLUCOSE UR STRIP-MCNC: NEGATIVE MG/DL
HCT VFR BLD AUTO: 42.4 % (ref 36.5–49.3)
HGB BLD-MCNC: 13.8 G/DL (ref 12–17)
HGB UR QL STRIP.AUTO: ABNORMAL
IMM GRANULOCYTES # BLD AUTO: 0.14 THOUSAND/UL (ref 0–0.2)
IMM GRANULOCYTES NFR BLD AUTO: 1 % (ref 0–2)
KETONES UR STRIP-MCNC: NEGATIVE MG/DL
LACTATE SERPL-SCNC: 1.1 MMOL/L (ref 0.5–2)
LEUKOCYTE ESTERASE UR QL STRIP: ABNORMAL
LYMPHOCYTES # BLD AUTO: 1.59 THOUSANDS/ÂΜL (ref 0.6–4.47)
LYMPHOCYTES NFR BLD AUTO: 9 % (ref 14–44)
MCH RBC QN AUTO: 32.1 PG (ref 26.8–34.3)
MCHC RBC AUTO-ENTMCNC: 32.5 G/DL (ref 31.4–37.4)
MCV RBC AUTO: 99 FL (ref 82–98)
MONOCYTES # BLD AUTO: 1.5 THOUSAND/ÂΜL (ref 0.17–1.22)
MONOCYTES NFR BLD AUTO: 8 % (ref 4–12)
NEUTROPHILS # BLD AUTO: 14.92 THOUSANDS/ÂΜL (ref 1.85–7.62)
NEUTS SEG NFR BLD AUTO: 82 % (ref 43–75)
NITRITE UR QL STRIP: POSITIVE
NON-SQ EPI CELLS URNS QL MICRO: ABNORMAL /HPF
NRBC BLD AUTO-RTO: 0 /100 WBCS
PH UR STRIP.AUTO: 8 [PH]
PLATELET # BLD AUTO: 184 THOUSANDS/UL (ref 149–390)
PMV BLD AUTO: 10 FL (ref 8.9–12.7)
POTASSIUM SERPL-SCNC: 4 MMOL/L (ref 3.5–5.3)
PROT SERPL-MCNC: 7.7 G/DL (ref 6.4–8.4)
PROT UR STRIP-MCNC: ABNORMAL MG/DL
RBC # BLD AUTO: 4.3 MILLION/UL (ref 3.88–5.62)
RBC #/AREA URNS AUTO: ABNORMAL /HPF
SODIUM SERPL-SCNC: 138 MMOL/L (ref 135–147)
SP GR UR STRIP.AUTO: 1.01 (ref 1–1.03)
UROBILINOGEN UR STRIP-ACNC: <2 MG/DL
WBC # BLD AUTO: 18.22 THOUSAND/UL (ref 4.31–10.16)
WBC #/AREA URNS AUTO: ABNORMAL /HPF

## 2023-11-30 PROCEDURE — 76870 US EXAM SCROTUM: CPT

## 2023-11-30 PROCEDURE — 85025 COMPLETE CBC W/AUTO DIFF WBC: CPT | Performed by: PHYSICIAN ASSISTANT

## 2023-11-30 PROCEDURE — 99285 EMERGENCY DEPT VISIT HI MDM: CPT

## 2023-11-30 PROCEDURE — 81001 URINALYSIS AUTO W/SCOPE: CPT | Performed by: PHYSICIAN ASSISTANT

## 2023-11-30 PROCEDURE — 99285 EMERGENCY DEPT VISIT HI MDM: CPT | Performed by: PHYSICIAN ASSISTANT

## 2023-11-30 PROCEDURE — 80053 COMPREHEN METABOLIC PANEL: CPT | Performed by: PHYSICIAN ASSISTANT

## 2023-11-30 PROCEDURE — 36415 COLL VENOUS BLD VENIPUNCTURE: CPT | Performed by: PHYSICIAN ASSISTANT

## 2023-11-30 PROCEDURE — 83605 ASSAY OF LACTIC ACID: CPT | Performed by: PHYSICIAN ASSISTANT

## 2023-11-30 PROCEDURE — 87186 SC STD MICRODIL/AGAR DIL: CPT | Performed by: PHYSICIAN ASSISTANT

## 2023-11-30 PROCEDURE — 87040 BLOOD CULTURE FOR BACTERIA: CPT | Performed by: PHYSICIAN ASSISTANT

## 2023-11-30 PROCEDURE — 87086 URINE CULTURE/COLONY COUNT: CPT | Performed by: PHYSICIAN ASSISTANT

## 2023-11-30 PROCEDURE — 99223 1ST HOSP IP/OBS HIGH 75: CPT | Performed by: STUDENT IN AN ORGANIZED HEALTH CARE EDUCATION/TRAINING PROGRAM

## 2023-11-30 PROCEDURE — 96365 THER/PROPH/DIAG IV INF INIT: CPT

## 2023-11-30 PROCEDURE — 99222 1ST HOSP IP/OBS MODERATE 55: CPT | Performed by: UROLOGY

## 2023-11-30 PROCEDURE — 87077 CULTURE AEROBIC IDENTIFY: CPT | Performed by: PHYSICIAN ASSISTANT

## 2023-11-30 RX ORDER — POLYETHYLENE GLYCOL 3350 17 G/17G
17 POWDER, FOR SOLUTION ORAL DAILY PRN
Status: DISCONTINUED | OUTPATIENT
Start: 2023-11-30 | End: 2023-12-03 | Stop reason: HOSPADM

## 2023-11-30 RX ORDER — AMLODIPINE BESYLATE 5 MG/1
5 TABLET ORAL DAILY
Status: DISCONTINUED | OUTPATIENT
Start: 2023-12-01 | End: 2023-12-03 | Stop reason: HOSPADM

## 2023-11-30 RX ORDER — GABAPENTIN 100 MG/1
100 CAPSULE ORAL 3 TIMES DAILY
Status: DISCONTINUED | OUTPATIENT
Start: 2023-11-30 | End: 2023-12-03 | Stop reason: HOSPADM

## 2023-11-30 RX ORDER — ONDANSETRON 2 MG/ML
4 INJECTION INTRAMUSCULAR; INTRAVENOUS EVERY 4 HOURS PRN
Status: DISCONTINUED | OUTPATIENT
Start: 2023-11-30 | End: 2023-12-03 | Stop reason: HOSPADM

## 2023-11-30 RX ORDER — ESCITALOPRAM OXALATE 5 MG/1
5 TABLET ORAL DAILY
Status: DISCONTINUED | OUTPATIENT
Start: 2023-12-01 | End: 2023-12-03 | Stop reason: HOSPADM

## 2023-11-30 RX ORDER — ACETAMINOPHEN 325 MG/1
650 TABLET ORAL EVERY 6 HOURS PRN
Status: DISCONTINUED | OUTPATIENT
Start: 2023-11-30 | End: 2023-12-03 | Stop reason: HOSPADM

## 2023-11-30 RX ORDER — CEFTRIAXONE 1 G/50ML
1000 INJECTION, SOLUTION INTRAVENOUS ONCE
Status: COMPLETED | OUTPATIENT
Start: 2023-11-30 | End: 2023-11-30

## 2023-11-30 RX ORDER — ATORVASTATIN CALCIUM 40 MG/1
40 TABLET, FILM COATED ORAL DAILY
Status: DISCONTINUED | OUTPATIENT
Start: 2023-12-01 | End: 2023-12-03 | Stop reason: HOSPADM

## 2023-11-30 RX ORDER — ENOXAPARIN SODIUM 100 MG/ML
40 INJECTION SUBCUTANEOUS DAILY
Status: DISCONTINUED | OUTPATIENT
Start: 2023-12-01 | End: 2023-12-03 | Stop reason: HOSPADM

## 2023-11-30 RX ORDER — LANOLIN ALCOHOL/MO/W.PET/CERES
6 CREAM (GRAM) TOPICAL
Status: DISCONTINUED | OUTPATIENT
Start: 2023-11-30 | End: 2023-12-03 | Stop reason: HOSPADM

## 2023-11-30 RX ORDER — CEFTRIAXONE 1 G/50ML
1000 INJECTION, SOLUTION INTRAVENOUS EVERY 24 HOURS
Status: DISCONTINUED | OUTPATIENT
Start: 2023-12-01 | End: 2023-12-03

## 2023-11-30 RX ORDER — ACETAMINOPHEN 325 MG/1
650 TABLET ORAL ONCE
Status: COMPLETED | OUTPATIENT
Start: 2023-11-30 | End: 2023-11-30

## 2023-11-30 RX ORDER — SODIUM CHLORIDE, SODIUM GLUCONATE, SODIUM ACETATE, POTASSIUM CHLORIDE, MAGNESIUM CHLORIDE, SODIUM PHOSPHATE, DIBASIC, AND POTASSIUM PHOSPHATE .53; .5; .37; .037; .03; .012; .00082 G/100ML; G/100ML; G/100ML; G/100ML; G/100ML; G/100ML; G/100ML
50 INJECTION, SOLUTION INTRAVENOUS CONTINUOUS
Status: DISPENSED | OUTPATIENT
Start: 2023-11-30 | End: 2023-12-01

## 2023-11-30 RX ORDER — MAGNESIUM HYDROXIDE/ALUMINUM HYDROXICE/SIMETHICONE 120; 1200; 1200 MG/30ML; MG/30ML; MG/30ML
30 SUSPENSION ORAL EVERY 6 HOURS PRN
Status: DISCONTINUED | OUTPATIENT
Start: 2023-11-30 | End: 2023-12-03 | Stop reason: HOSPADM

## 2023-11-30 RX ADMIN — ACETAMINOPHEN 650 MG: 325 TABLET, FILM COATED ORAL at 19:41

## 2023-11-30 RX ADMIN — SODIUM CHLORIDE, SODIUM GLUCONATE, SODIUM ACETATE, POTASSIUM CHLORIDE, MAGNESIUM CHLORIDE, SODIUM PHOSPHATE, DIBASIC, AND POTASSIUM PHOSPHATE 50 ML/HR: .53; .5; .37; .037; .03; .012; .00082 INJECTION, SOLUTION INTRAVENOUS at 16:45

## 2023-11-30 RX ADMIN — MELATONIN 6 MG: 3 TAB ORAL at 19:53

## 2023-11-30 RX ADMIN — ACETAMINOPHEN 650 MG: 325 TABLET, FILM COATED ORAL at 12:17

## 2023-11-30 RX ADMIN — CEFTRIAXONE 1000 MG: 1 INJECTION, SOLUTION INTRAVENOUS at 13:16

## 2023-11-30 RX ADMIN — SODIUM CHLORIDE, SODIUM GLUCONATE, SODIUM ACETATE, POTASSIUM CHLORIDE, MAGNESIUM CHLORIDE, SODIUM PHOSPHATE, DIBASIC, AND POTASSIUM PHOSPHATE 50 ML/HR: .53; .5; .37; .037; .03; .012; .00082 INJECTION, SOLUTION INTRAVENOUS at 15:46

## 2023-11-30 RX ADMIN — GABAPENTIN 100 MG: 100 CAPSULE ORAL at 19:41

## 2023-11-30 NOTE — ASSESSMENT & PLAN NOTE
Continue due to groin pain and testicular swelling that started a couple of days ago and gradually worsening started on the left and is now bilateral    In the ED imaging showing-Right epididymoorchitis with right scrotal wall thickening.  A 9 mm right complex epididymal cyst.    Urology consulted recommendations appreciated   see management below

## 2023-11-30 NOTE — ASSESSMENT & PLAN NOTE
Patient meeting SIRS criteria with tachycardia and leukocytosis  Source being urinary tract infection as well as epididymo orchitis  Lactic acid negative and no other evidence of endorgan dysfunction  IVF  Follow-up blood cultures  Follow-up urine cultures  Continue ceftriaxone

## 2023-11-30 NOTE — ASSESSMENT & PLAN NOTE
Lab Results   Component Value Date    EGFR 61 11/30/2023    EGFR 53 10/09/2023    EGFR 49 04/17/2023    CREATININE 1.13 11/30/2023    CREATININE 1.27 10/09/2023    CREATININE 1.37 (H) 04/17/2023     Patient at baseline  Baseline appears to be 1.1-1.3  Continue to trend  Avoid nephrotoxic agents and hypotension  IV fluids  I's and O's

## 2023-11-30 NOTE — H&P
[facility]  H&P  Name: Karyn Morillo 66 y.o. male I MRN: [id number]  Unit/Bed#: OVR 01 I Date of Admission: 11/30/2023   Date of Service: 11/30/2023 I Hospital Day: 0      Assessment/Plan   * Acute epididymo-orchitis  Assessment & Plan  Continue due to groin pain and testicular swelling that started a couple of days ago and gradually worsening started on the left and is now bilateral    In the ED imaging showing-Right epididymoorchitis with right scrotal wall thickening.  A 9 mm right complex epididymal cyst.    Urology consulted recommendations appreciated   see management below    Sepsis due to gram-negative UTI   Assessment & Plan  Patient meeting SIRS criteria with tachycardia and leukocytosis  Source being urinary tract infection as well as epididymo orchitis  Lactic acid negative and no other evidence of endorgan dysfunction  IVF  Follow-up blood cultures  Follow-up urine cultures  Continue ceftriaxone    Stage 3a chronic kidney disease [facility])  Assessment & Plan  Lab Results   Component Value Date    EGFR 61 11/30/2023    EGFR 53 10/09/2023    EGFR 49 04/17/2023    CREATININE 1.13 11/30/2023    CREATININE 1.27 10/09/2023    CREATININE 1.37 (H) 04/17/2023     Patient at baseline  Baseline appears to be 1.1-1.3  Continue to trend  Avoid nephrotoxic agents and hypotension  IV fluids  I's and O's      Left ventricular diastolic dysfunction  Assessment & Plan  Not in exacerbation  Continue to monitor respiratory status while on IV fluids  Daily weights  I's and O's  Cardiac diet  Echo done on 8/31/2023 showed an LVEF of 65% with normal systolic function grade 1 diastolic dysfunction mild aortic stenosis    Essential hypertension  Assessment & Plan  Continue amlodipine    Mixed hyperlipidemia  Assessment & Plan  Continue statin    Renal cell carcinoma of left kidney [facility])  Assessment & Plan  Patient with a history of renal cell carcinoma of the left side that was diagnosed in September 2014.  Has been monitored and showed no evidence of metastatic disease         VTE Pharmacologic Prophylaxis: VTE Score: 8 Moderate Risk (Score 3-4) - Pharmacological DVT Prophylaxis Ordered: enoxaparin (Lovenox). Code Status: No Order full code  Discussion with family: Patient declined call to . Anticipated Length of Stay: Patient will be admitted on an inpatient basis with an anticipated length of stay of greater than 2 midnights secondary to sepsis. Total Time Spent on Date of Encounter in care of patient: 65 mins. This time was spent on one or more of the following: performing physical exam; counseling and coordination of care; obtaining or reviewing history; documenting in the medical record; reviewing/ordering tests, medications or procedures; communicating with other healthcare professionals and discussing with patient's family/caregivers. Chief Complaint: groin pain    History of Present Illness:  Solitario Villaseñor is a 66 y.o. male with a PMH of RCC, HTN, HLD, CKD, left HF who presents with left groin pain that has been gradually worsening since Tuesday. He noticed some testicular swelling and erythema. Then yesterday became bilateral testicular swelling. Patient denies any fevers chills sweats lightheadedness vertigo symptoms chest pain wheezing shortness of breath abdominal pain. In the ED patient met sepsis criteria found to have a urinary tract infection versus prostatitis versus epididymal orchitis that was seen on imaging that was done in the ED. Urology consulted recommended admission. Patient was initiated on ceftriaxone. Will give gentle IV fluids for 24 hours due to tachycardia and leukocytosis. We will follow-up blood cultures and urine cultures. We will continue ceftriaxone. Review of Systems:  Review of Systems   All other systems reviewed and are negative.       Past Medical and Surgical History:   Past Medical History:   Diagnosis Date    Cancer of left kidney [facility])     2014    Colon polyp     Diverticulitis of colon     Last assessed - 5/12/14    Hypercholesterolemia     Hyperlipidemia     Mood disorder ([address]) 8/29/2023    Renal neoplasm        Past Surgical History:   Procedure Laterality Date    COLONOSCOPY      COLONOSCOPY N/A 4/27/2018    Procedure: COLONOSCOPY;  Surgeon: Dell Burkitt, MD;  Location: [facility] GI LAB; Service: Gastroenterology    CYSTOSCOPY      Onset - 5/2/16 Chuck Rosa     JOINT REPLACEMENT Left     Hip    LAPAROSCOPIC CHOLECYSTECTOMY      NEPHRECTOMY Left     NEPHRECTOMY Left     SHOULDER ARTHROSCOPY W/ ROTATOR CUFF REPAIR Left     TOTAL HIP ARTHROPLASTY Left     original hardware became infected and pt needed a second surgery to replace hardware    UMBILICAL HERNIA REPAIR         Meds/Allergies:  Prior to Admission medications    Medication Sig Start Date End Date Taking? Authorizing Provider   amLODIPine (NORVASC) 5 mg tablet TAKE 1 TABLET BY MOUTH EVERY DAY 7/14/23   Ignacia Neff MD   ascorbic acid (VITAMIN C) 500 MG tablet Take 500 mg by mouth daily    Historical Provider, MD   atorvastatin (LIPITOR) 40 mg tablet Take 1 tablet (40 mg total) by mouth daily 11/3/23   Francisca Interiano MD   escitalopram (LEXAPRO) 5 mg tablet TAKE 1 TABLET (5 MG TOTAL) BY MOUTH DAILY. 10/29/23   Ignacia Neff MD   gabapentin (NEURONTIN) 100 mg capsule Take 100 mg by mouth 3 (three) times a day 5/20/20   Historical Provider, MD   Multiple Vitamins-Minerals (MULTIVITAMIN ADULT) TABS Take by mouth    Historical Provider, MD   Omega-3 Fatty Acids (FISH OIL) 1,000 mg by Does not apply route    Historical Provider, MD     I have reviewed home medications using recent Epic encounter.     Allergies: No Known Allergies    Social History:  Marital Status: /Civil Union   Patient Pre-hospital Living Situation: Home, With spouse  Patient Pre-hospital Level of Mobility: walks  Patient Pre-hospital Diet Restrictions: none  Substance Use History:   Social History Substance and Sexual Activity   Alcohol Use Yes    Comment: Social     Social History     Tobacco Use   Smoking Status Former   Smokeless Tobacco Never     Social History     Substance and Sexual Activity   Drug Use No       Family History:  Family History   Problem Relation Age of Onset    Tuberculosis Mother     Emphysema Father         Lung       Physical Exam:     Vitals:   Blood Pressure: 130/73 (11/30/23 0939)  Pulse: (!) 109 (11/30/23 0940)  Temperature: 97.9 °F (36.6 °C) (11/30/23 0939)  Temp Source: Temporal (11/30/23 0939)  Respirations: 18 (11/30/23 0939)  SpO2: 99 % (11/30/23 0939)    Physical Exam  Vitals and nursing note reviewed. Constitutional:       General: He is not in acute distress. Appearance: He is obese. He is not ill-appearing. HENT:      Head: Normocephalic and atraumatic. Cardiovascular:      Rate and Rhythm: Normal rate and regular rhythm. Pulses: Normal pulses. Heart sounds: Normal heart sounds. Pulmonary:      Effort: Pulmonary effort is normal.      Breath sounds: Normal breath sounds. Abdominal:      General: Abdomen is flat. Bowel sounds are normal.      Palpations: Abdomen is soft. Musculoskeletal:      Right lower leg: No edema. Left lower leg: No edema. Skin:     General: Skin is warm. Neurological:      General: No focal deficit present. Mental Status: He is alert and oriented to person, place, and time.           Additional Data:     Lab Results:  Results from last 7 days   Lab Units 11/30/23  1212   WBC Thousand/uL 18.22*   HEMOGLOBIN g/dL 13.8   HEMATOCRIT % 42.4   PLATELETS Thousands/uL 184   NEUTROS PCT % 82*   LYMPHS PCT % 9*   MONOS PCT % 8   EOS PCT % 0     Results from last 7 days   Lab Units 11/30/23  1212   SODIUM mmol/L 138   POTASSIUM mmol/L 4.0   CHLORIDE mmol/L 102   CO2 mmol/L 27   BUN mg/dL 15   CREATININE mg/dL 1.13   ANION GAP mmol/L 9   CALCIUM mg/dL 9.3   ALBUMIN g/dL 4.0   TOTAL BILIRUBIN mg/dL 0.95   ALK PHOS U/L 66   ALT U/L 16   AST U/L 18   GLUCOSE RANDOM mg/dL 120                 Results from last 7 days   Lab Units 11/30/23  1242   LACTIC ACID mmol/L 1.1       Lines/Drains:  Invasive Devices       Peripheral Intravenous Line  Duration             Peripheral IV 11/30/23 Left Antecubital <1 day                        Imaging:   US scrotum and testicles   Final Result by Arlene Byrnes MD (11/30 1241)      Right epididymoorchitis with right scrotal wall thickening. A 9 mm right complex epididymal cyst.      Workstation performed: [id number]             EKG and Other Studies Reviewed on Admission:   EKG: No EKG obtained. ** Please Note: This note has been constructed using a voice recognition system.  **

## 2023-11-30 NOTE — ED PROVIDER NOTES
History  Chief Complaint   Patient presents with    Testicle Swelling     Pt to er with reports of testicle pain and groin pain that started Tuesday night. Noticed bilateral testicle swelling yesterday. Denies nausea or vomiting. Is a 68-year-old male patient who approxi-2 days ago started with right-sided testicular pain and swelling felt like his right testicle became hard. He then slowly developed left-sided pain but does not have swelling or hardness. He is vague regarding dysuria he states it kind of comes and goes but does not appear to be acute. There is no penile discharge. No fever chills headache blurred vision double vision cultures and sore throat no chest pain or shortness of breath no nausea vomiting diarrhea abdominal pain. He has tried some Motrin which does take his pain down nothing makes it worse. He did state that he has been in a tree stand for 3 days before the pain started not sure the relevance. Differential diagnose includes not limited to testicular torsion, epididymitis, orchitis, tumor        Prior to Admission Medications   Prescriptions Last Dose Informant Patient Reported? Taking? Multiple Vitamins-Minerals (MULTIVITAMIN ADULT) TABS  Self Yes No   Sig: Take by mouth   Omega-3 Fatty Acids (FISH OIL) 1,000 mg  Self Yes No   Sig: by Does not apply route   amLODIPine (NORVASC) 5 mg tablet  Self No No   Sig: TAKE 1 TABLET BY MOUTH EVERY DAY   ascorbic acid (VITAMIN C) 500 MG tablet  Self Yes No   Sig: Take 500 mg by mouth daily   atorvastatin (LIPITOR) 40 mg tablet   No No   Sig: Take 1 tablet (40 mg total) by mouth daily   escitalopram (LEXAPRO) 5 mg tablet  Self No No   Sig: TAKE 1 TABLET (5 MG TOTAL) BY MOUTH DAILY.    gabapentin (NEURONTIN) 100 mg capsule  Self Yes No   Sig: Take 100 mg by mouth 3 (three) times a day      Facility-Administered Medications: None       Past Medical History:   Diagnosis Date    Cancer of left kidney ([address])     2014    Colon polyp Diverticulitis of colon     Last assessed - 5/12/14    Hypercholesterolemia     Hyperlipidemia     Mood disorder ([address] ) 8/29/2023    Renal neoplasm        Past Surgical History:   Procedure Laterality Date    COLONOSCOPY      COLONOSCOPY N/A 4/27/2018    Procedure: COLONOSCOPY;  Surgeon: Dell Burkitt, MD;  Location: [facility]; Service: Gastroenterology    CYSTOSCOPY      Onset - 5/2/16 Chuck Rosa     JOINT REPLACEMENT Left     Hip    LAPAROSCOPIC CHOLECYSTECTOMY      NEPHRECTOMY Left     NEPHRECTOMY Left     SHOULDER ARTHROSCOPY W/ ROTATOR CUFF REPAIR Left     TOTAL HIP ARTHROPLASTY Left     original hardware became infected and pt needed a second surgery to replace hardware    UMBILICAL HERNIA REPAIR         Family History   Problem Relation Age of Onset    Tuberculosis Mother     Emphysema Father         Lung     I have reviewed and agree with the history as documented. E-Cigarette/Vaping    E-Cigarette Use Never User      E-Cigarette/Vaping Substances     Social History     Tobacco Use    Smoking status: Former    Smokeless tobacco: Never   Vaping Use    Vaping Use: Never used   Substance Use Topics    Alcohol use: Yes     Comment: Social    Drug use: No       Review of Systems   Constitutional:  Negative for chills, diaphoresis, fatigue and fever. HENT:  Negative for congestion, ear pain, nosebleeds and sore throat. Eyes:  Negative for photophobia, pain, discharge and visual disturbance. Respiratory:  Negative for cough, choking, chest tightness, shortness of breath and wheezing. Cardiovascular:  Negative for chest pain and palpitations. Gastrointestinal:  Negative for abdominal distention, abdominal pain, diarrhea and vomiting. Genitourinary:  Positive for dysuria, scrotal swelling and testicular pain. Negative for decreased urine volume, flank pain, frequency, genital sores, hematuria, penile discharge, penile pain, penile swelling and urgency.    Musculoskeletal:  Negative for arthralgias, back pain, gait problem and joint swelling. Skin:  Negative for color change and rash. Neurological:  Negative for dizziness, seizures, syncope and headaches. Psychiatric/Behavioral:  Negative for behavioral problems and confusion. The patient is not nervous/anxious. All other systems reviewed and are negative. Physical Exam  Physical Exam  Vitals and nursing note reviewed. Exam conducted with a chaperone present. Constitutional:       General: He is not in acute distress. Appearance: Normal appearance. He is well-developed. He is not ill-appearing, toxic-appearing or diaphoretic. HENT:      Head: Normocephalic and atraumatic. Right Ear: Tympanic membrane, ear canal and external ear normal.      Left Ear: Tympanic membrane, ear canal and external ear normal.      Nose: Nose normal.      Mouth/Throat:      Mouth: Mucous membranes are moist.      Pharynx: Oropharynx is clear. No oropharyngeal exudate or posterior oropharyngeal erythema. Eyes:      General: No scleral icterus. Right eye: No discharge. Left eye: No discharge. Conjunctiva/sclera: Conjunctivae normal.      Pupils: Pupils are equal, round, and reactive to light. Cardiovascular:      Rate and Rhythm: Normal rate and regular rhythm. Pulmonary:      Effort: Pulmonary effort is normal.      Breath sounds: Normal breath sounds. Abdominal:      General: Bowel sounds are normal.      Palpations: Abdomen is soft. Tenderness: There is no abdominal tenderness. Genitourinary:     Pubic Area: No rash or pubic lice. Musculoskeletal:         General: Normal range of motion. Cervical back: Normal range of motion and neck supple. Right lower leg: No edema. Left lower leg: No edema. Skin:     General: Skin is warm. Capillary Refill: Capillary refill takes less than 2 seconds. Neurological:      General: No focal deficit present.       Mental Status: He is alert and oriented to person, place, and time. Mental status is at baseline. Psychiatric:         Mood and Affect: Mood normal.         Behavior: Behavior normal.         Vital Signs  ED Triage Vitals   Temperature Pulse Respirations Blood Pressure SpO2   11/30/23 0939 11/30/23 0940 11/30/23 0939 11/30/23 0939 11/30/23 0939   97.9 °F (36.6 °C) (!) 109 18 130/73 99 %      Temp Source Heart Rate Source Patient Position - Orthostatic VS BP Location FiO2 (%)   11/30/23 0939 11/30/23 0940 11/30/23 0939 11/30/23 0939 --   Temporal Monitor Sitting Left arm       Pain Score       11/30/23 0939       9           Vitals:    11/30/23 0939 11/30/23 0940 11/30/23 1613   BP: 130/73  149/90   Pulse:  (!) 109 105   Patient Position - Orthostatic VS: Sitting           Visual Acuity      ED Medications  Medications   multi-electrolyte (PLASMALYTE-A/ISOLYTE-S PH 7.4) IV solution (50 mL/hr Intravenous New Bag 11/30/23 1546)   acetaminophen (TYLENOL) tablet 650 mg (has no administration in time range)   ondansetron (ZOFRAN) injection 4 mg (has no administration in time range)   polyethylene glycol (MIRALAX) packet 17 g (has no administration in time range)   cefTRIAXone (ROCEPHIN) IVPB (premix in dextrose) 1,000 mg 50 mL (has no administration in time range)   acetaminophen (TYLENOL) tablet 650 mg (650 mg Oral Given 11/30/23 1217)   cefTRIAXone (ROCEPHIN) IVPB (premix in dextrose) 1,000 mg 50 mL (0 mg Intravenous Stopped 11/30/23 1550)       Diagnostic Studies  Results Reviewed       Procedure Component Value Units Date/Time    Lactic acid, plasma (w/reflex if result > 2.0) [[id number]]  (Normal) Collected: 11/30/23 1242    Lab Status: Final result Specimen: Blood from Arm, Left Updated: 11/30/23 1315     LACTIC ACID 1.1 mmol/L     Narrative:      Result may be elevated if tourniquet was used during collection. Blood culture #1 [[id number]] Collected: 11/30/23 1242    Lab Status:  In process Specimen: Blood from Arm, Left Updated: 11/30/23 0003 Blood culture #2 [[id number]] Collected: 11/30/23 1242    Lab Status: In process Specimen: Blood from Arm, Right Updated: 11/30/23 1248    Comprehensive metabolic panel [[id number]] Collected: 11/30/23 1212    Lab Status: Final result Specimen: Blood from Arm, Left Updated: 11/30/23 1233     Sodium 138 mmol/L      Potassium 4.0 mmol/L      Chloride 102 mmol/L      CO2 27 mmol/L      ANION GAP 9 mmol/L      BUN 15 mg/dL      Creatinine 1.13 mg/dL      Glucose 120 mg/dL      Calcium 9.3 mg/dL      AST 18 U/L      ALT 16 U/L      Alkaline Phosphatase 66 U/L      Total Protein 7.7 g/dL      Albumin 4.0 g/dL      Total Bilirubin 0.95 mg/dL      eGFR 61 ml/min/1.73sq m     Narrative:      [facility] guidelines for Chronic Kidney Disease (CKD):     Stage 1 with normal or high GFR (GFR > 90 mL/min/1.73 square meters)    Stage 2 Mild CKD (GFR = 60-89 mL/min/1.73 square meters)    Stage 3A Moderate CKD (GFR = 45-59 mL/min/1.73 square meters)    Stage 3B Moderate CKD (GFR = 30-44 mL/min/1.73 square meters)    Stage 4 Severe CKD (GFR = 15-29 mL/min/1.73 square meters)    Stage 5 End Stage CKD (GFR <15 mL/min/1.73 square meters)  Note: GFR calculation is accurate only with a steady state creatinine    Urine Microscopic [[id number]]  (Abnormal) Collected: 11/30/23 1213    Lab Status: Final result Specimen: Urine, Clean Catch Updated: 11/30/23 1228     RBC, UA 4-10 /hpf      WBC, UA 20-30 /hpf      Epithelial Cells None Seen /hpf      Bacteria, UA Innumerable /hpf     Urine culture [[id number]] Collected: 11/30/23 1213    Lab Status:  In process Specimen: Urine, Clean Catch Updated: 11/30/23 1228    UA w Reflex to Microscopic w Reflex to Culture [[id number]]  (Abnormal) Collected: 11/30/23 1213    Lab Status: Final result Specimen: Urine, Clean Catch Updated: 11/30/23 1227     Color, UA Yellow     Clarity, UA Cloudy     Specific Gravity, UA 1.015     pH, UA 8.0     Leukocytes, UA Large     Nitrite, UA Positive Protein, UA 30 (1+) mg/dl      Glucose, UA Negative mg/dl      Ketones, UA Negative mg/dl      Urobilinogen, UA <2.0 mg/dl      Bilirubin, UA Negative     Occult Blood, UA Moderate    CBC and differential [[id number]]  (Abnormal) Collected: 11/30/23 1212    Lab Status: Final result Specimen: Blood from Arm, Left Updated: 11/30/23 1218     WBC 18.22 Thousand/uL      RBC 4.30 Million/uL      Hemoglobin 13.8 g/dL      Hematocrit 42.4 %      MCV 99 fL      MCH 32.1 pg      MCHC 32.5 g/dL      RDW 13.4 %      MPV 10.0 fL      Platelets 748 Thousands/uL      nRBC 0 /100 WBCs      Neutrophils Relative 82 %      Immat GRANS % 1 %      Lymphocytes Relative 9 %      Monocytes Relative 8 %      Eosinophils Relative 0 %      Basophils Relative 0 %      Neutrophils Absolute 14.92 Thousands/µL      Immature Grans Absolute 0.14 Thousand/uL      Lymphocytes Absolute 1.59 Thousands/µL      Monocytes Absolute 1.50 Thousand/µL      Eosinophils Absolute 0.03 Thousand/µL      Basophils Absolute 0.04 Thousands/µL                    US scrotum and testicles   Final Result by Margarito Singh MD (11/30 1241)      Right epididymoorchitis with right scrotal wall thickening. A 9 mm right complex epididymal cyst.      Workstation performed: [id number]                    Procedures  Procedures         ED Course  ED Course as of 11/30/23 1614   Thu Nov 30, 2023   1303 Case was run by urology nurse practitioner who stated can stay at [facility] for admission and IV antibiotics with urology consult                            Initial Sepsis Screening       [address] Name 11/30/23 1330                Is the patient's history suggestive of a new or worsening infection?  Yes (Proceed)  -DD        Suspected source of infection urinary tract infection  -DD        Indicate SIRS criteria Tachycardia > 90 bpm;Leukocytosis (WBC > 69808 IJL) OR Leukopenia (WBC <4000 IJL) OR Bandemia (WBC >10% bands)  -DD        Are two or more of the above signs & symptoms of infection both present and new to the patient? No  -DD                  User Key  (r) = Recorded By, (t) = Taken By, (c) = Cosigned By      [facility] Name Provider Type    DEIDRA Oliver PA-C Physician Assistant                    NICOLAS 20yo+      Flowsheet Row Most Recent Value   Initial Alcohol Screen: US AUDIT-C     1. How often do you have a drink containing alcohol? 0 Filed at: 11/30/2023 0940   2. How many drinks containing alcohol do you have on a typical day you are drinking? 0 Filed at: 11/30/2023 0940   3a. Male UNDER 65: How often do you have five or more drinks on one occasion? 0 Filed at: 11/30/2023 0940   3b. FEMALE Any Age, or MALE 65+: How often do you have 4 or more drinks on one occassion? 0 Filed at: 11/30/2023 0940   Audit-C Score 0 Filed at: 11/30/2023 5786   FLEX: How many times in the past year have you. .. Used an illegal drug or used a prescription medication for non-medical reasons? Never Filed at: 11/30/2023 0940                      Medical Decision Making  80-year-old male patient presents with approximately 3-day history of right testicular pain and swelling along with developing left testicular pain. He denies any fevers just discomfort that does improve with Motrin and slightly within returns immediately. Also says he has intermittent dysuria without penile discharge or hematuria. No flank pain no chest pain or shortness of breath nausea vomiting diarrhea. Differential diagnose includes not limited to torsion, epididymitis, orchitis, abscess, mass    Problems Addressed:  Epididymoorchitis: acute illness or injury     Details: After speaking to urology it was decided that patient be admitted to [facility] for IV antibiotics and urological consult.   SIRS (systemic inflammatory response syndrome) ([address]): acute illness or injury     Details: Patient did qualify for SIRS but not sepsis he was given Rocephin to treat his epididymal orchitis  UTI (urinary tract infection):     Details: Patient did have white cells in his urine consistent with UTI was also given Rocephin. Amount and/or Complexity of Data Reviewed  Independent Historian: caregiver     Details: Daughter did present bedside to help with past medical history. External Data Reviewed: notes. Details: Reviewed previous notes to gain past medical history  Labs: ordered. Decision-making details documented in ED Course. Details: All labs reviewed he does have an 18,000 white count and does have white cells in his urine. He will be treated with Rocephin  Radiology: ordered. Details: Vascular ultrasound was ordered I did review the findings  Discussion of management or test interpretation with external provider(s): Using joint decision-making with the patient internal medicine doctor and urology patient admitted to the hospital for IV antibiotics and further treatment. Risk  OTC drugs. Prescription drug management. Decision regarding hospitalization. Disposition  Final diagnoses:   UTI (urinary tract infection)   Epididymoorchitis   SIRS (systemic inflammatory response syndrome) ([address])     Time reflects when diagnosis was documented in both MDM as applicable and the Disposition within this note       Time User Action Codes Description Comment    11/30/2023  1:39 PM DiNapolJarek ellsworthk Add [N39.0] UTI (urinary tract infection)     11/30/2023  1:43 PM Won Siad Add [N45.3] Acute epididymo-orchitis     11/30/2023  1:44 PM DiNapoliGurmeetMervin Add [N45.3] Epididymoorchitis     11/30/2023  1:44 PM DiNapolGurmeet ellsworthMervin Add [R65.10] SIRS (systemic inflammatory response syndrome) [facility])           ED Disposition       ED Disposition   Admit    Condition   Stable    Date/Time   Thu Nov 30, 2023 5467    Comment   Case was discussed with Dr. Wendy Baugh and the patient's admission status was agreed to be Admission Status: inpatient status to the service of Dr. Wendy Baugh .                Follow-up Information    None         Current Discharge Medication List        CONTINUE these medications which have NOT CHANGED    Details   amLODIPine (NORVASC) 5 mg tablet TAKE 1 TABLET BY MOUTH EVERY DAY  Qty: 90 tablet, Refills: 3    Associated Diagnoses: Essential hypertension      ascorbic acid (VITAMIN C) 500 MG tablet Take 500 mg by mouth daily      atorvastatin (LIPITOR) 40 mg tablet Take 1 tablet (40 mg total) by mouth daily  Qty: 90 tablet, Refills: 3    Associated Diagnoses: Mixed hyperlipidemia      escitalopram (LEXAPRO) 5 mg tablet TAKE 1 TABLET (5 MG TOTAL) BY MOUTH DAILY. Qty: 90 tablet, Refills: 1    Associated Diagnoses: Mood disorder (HCC)      gabapentin (NEURONTIN) 100 mg capsule Take 100 mg by mouth 3 (three) times a day      Multiple Vitamins-Minerals (MULTIVITAMIN ADULT) TABS Take by mouth      Omega-3 Fatty Acids (FISH OIL) 1,000 mg by Does not apply route             No discharge procedures on file.     PDMP Review       None            ED Provider  Electronically Signed by             Homero Encarnacion PA-C  11/30/23 5809

## 2023-11-30 NOTE — PLAN OF CARE
Problem: PAIN - ADULT  Goal: Verbalizes/displays adequate comfort level or baseline comfort level  Description: Interventions:  - Encourage patient to monitor pain and request assistance  - Assess pain using appropriate pain scale  - Administer analgesics based on type and severity of pain and evaluate response  - Implement non-pharmacological measures as appropriate and evaluate response  - Consider cultural and social influences on pain and pain management  - Notify physician/advanced practitioner if interventions unsuccessful or patient reports new pain  Outcome: Progressing     Problem: INFECTION - ADULT  Goal: Absence or prevention of progression during hospitalization  Description: INTERVENTIONS:  - Assess and monitor for signs and symptoms of infection  - Monitor lab/diagnostic results  - Monitor all insertion sites, i.e. indwelling lines, tubes, and drains  - Monitor endotracheal if appropriate and nasal secretions for changes in amount and color  - [place] appropriate cooling/warming therapies per order  - Administer medications as ordered  - Instruct and encourage patient and family to use good hand hygiene technique  - Identify and instruct in appropriate isolation precautions for identified infection/condition  Outcome: Progressing  Goal: Absence of fever/infection during neutropenic period  Description: INTERVENTIONS:  - Monitor WBC    Outcome: Progressing

## 2023-11-30 NOTE — ASSESSMENT & PLAN NOTE
Patient with a history of renal cell carcinoma of the left side that was diagnosed in September 2014.   Has been monitored and showed no evidence of metastatic disease

## 2023-11-30 NOTE — ASSESSMENT & PLAN NOTE
Not in exacerbation  Continue to monitor respiratory status while on IV fluids  Daily weights  I's and O's  Cardiac diet  Echo done on 8/31/2023 showed an LVEF of 65% with normal systolic function grade 1 diastolic dysfunction mild aortic stenosis

## 2023-11-30 NOTE — CONSULTS
Consultation - Urology   Vivian York 66 y.o. male MRN: [id number]  Unit/Bed#: OVR 01 Encounter: [id number]      Assessment/Plan      Assessment:    Patient is a 67 y/o M PMHx RCC, HTN, HLD, CKD, left HF who presents with left groin pain gradually worsening since Tuesday. Plan:    Acute epididymo-orchitis   Right scrotum firm without induration or fluctuance   WBC 18,000  UA positive for pyuria and numerable bacteria   U/S Scrotum and testicles reviewed, findings of right epididymo-orchitis with right scrotal wall thickening. A 9 mm right complex epididymal cyst.   Empiric antibiotics tailored to culture   Pending culture results   Medical optimization/ fluids/ antibiotics per primary team   Serial scrotal exam     History of Present Illness   Attending: Won Dumont MD  Reason for Consult / Principal Problem: Scrotal Swelling     HPI: Vivian York is a 66y.o. year old male with PMHx RCC, HTN, HLD, CKD, and left HF who presents with left groin pain that has been gradually worsening since Tuesday. He also notes of some testicular swelling and erythema. Complaints of testicular pain relieved with Ibuprofen and an ice pack. Associated burning with urination, decreased urinary output, and back pain. Denies any fever, chills, SOB, chest pain, abdominal pain, or changes in bowel movements. Inpatient consult to Urology  Consult performed by: Mirian Shields PA-C  Consult ordered by: Won Dumont MD        Review of Systems   Constitutional:  Negative for activity change, appetite change, chills, fatigue and fever. HENT:  Negative for ear pain and sore throat. Eyes:  Negative for pain and visual disturbance. Respiratory:  Negative for cough and shortness of breath. Cardiovascular:  Negative for chest pain and palpitations. Gastrointestinal:  Negative for abdominal distention, abdominal pain and vomiting.    Genitourinary:  Positive for difficulty urinating (chronic), dysuria (x 2 days), frequency (Chronic) and testicular pain (x 2 days). Negative for hematuria, penile discharge, penile pain and urgency. Musculoskeletal:  Negative for arthralgias and back pain. Skin:  Negative for color change and rash. Neurological:  Negative for seizures and syncope. All other systems reviewed and are negative. Historical Information   Past Medical History:   Diagnosis Date    Cancer of left kidney [facility])     2014    Colon polyp     Diverticulitis of colon     Last assessed - 5/12/14    Hypercholesterolemia     Hyperlipidemia     Mood disorder ([facility]) 8/29/2023    Renal neoplasm      Past Surgical History:   Procedure Laterality Date    COLONOSCOPY      COLONOSCOPY N/A 4/27/2018    Procedure: COLONOSCOPY;  Surgeon: Mohan Dowell MD;  Location: [facility]; Service: Gastroenterology    CYSTOSCOPY      Onset - 5/2/16 Tayler Everts     JOINT REPLACEMENT Left     Hip    LAPAROSCOPIC CHOLECYSTECTOMY      NEPHRECTOMY Left     NEPHRECTOMY Left     SHOULDER ARTHROSCOPY W/ ROTATOR CUFF REPAIR Left     TOTAL HIP ARTHROPLASTY Left     original hardware became infected and pt needed a second surgery to replace hardware    UMBILICAL HERNIA REPAIR       Social History   Social History     Substance and Sexual Activity   Alcohol Use Yes    Comment: Social     @DRUGHX  E-Cigarette/Vaping    E-Cigarette Use Never User      E-Cigarette/Vaping Substances     Social History     Tobacco Use   Smoking Status Former   Smokeless Tobacco Never     Family History: non-contributory    Meds/Allergies   all current active meds have been reviewed  No Known Allergies    Objective   Vitals: Blood pressure 130/73, pulse (!) 109, temperature 97.9 °F (36.6 °C), temperature source Temporal, resp. rate 18, SpO2 99 %. No intake/output data recorded.     Invasive Devices       Peripheral Intravenous Line  Duration             Peripheral IV 11/30/23 Left Antecubital <1 day                    Physical Exam  Vitals and nursing note reviewed. HENT:      Head: Normocephalic. Right Ear: External ear normal.      Left Ear: External ear normal.      Nose: Nose normal.   Eyes:      Conjunctiva/sclera: Conjunctivae normal.      Pupils: Pupils are equal, round, and reactive to light. Cardiovascular:      Rate and Rhythm: Normal rate and regular rhythm. Pulmonary:      Effort: Pulmonary effort is normal.   Abdominal:      General: Bowel sounds are normal. There is no distension. Palpations: Abdomen is soft. Tenderness: There is no abdominal tenderness. Genitourinary:     Penis: Normal. No tenderness. Testes:         Right: Tenderness and swelling present. Testicular hydrocele or varicocele not present. Right testis is descended. Left: Tenderness and swelling present. Testicular hydrocele or varicocele not present. Left testis is descended. Epididymis:      Right: Enlarged. Tenderness present. Left: Enlarged. Tenderness present. Musculoskeletal:         General: Normal range of motion. Skin:     General: Skin is warm. Neurological:      Mental Status: He is alert. Lab Results: I have personally reviewed pertinent reports. CBC:   Lab Results   Component Value Date    WBC 18.22 (H) 11/30/2023    HGB 13.8 11/30/2023    HCT 42.4 11/30/2023    MCV 99 (H) 11/30/2023     11/30/2023    RBC 4.30 11/30/2023    MCH 32.1 11/30/2023    MCHC 32.5 11/30/2023    RDW 13.4 11/30/2023    MPV 10.0 11/30/2023    NRBC 0 11/30/2023     CMP:   Lab Results   Component Value Date    SODIUM 138 11/30/2023     11/30/2023    CO2 27 11/30/2023    BUN 15 11/30/2023    CREATININE 1.13 11/30/2023    CALCIUM 9.3 11/30/2023    AST 18 11/30/2023    ALT 16 11/30/2023    ALKPHOS 66 11/30/2023    EGFR 61 11/30/2023     Imaging Studies: I have personally reviewed pertinent reports. EKG, Pathology, and Other Studies: I have personally reviewed pertinent reports.     VTE Prophylaxis: Sequential compression device Salina Carranza     Code Status: No Order  Advance Directive and Living Will:      Power of :    POLST:      Counseling / Coordination of Care  Total floor / unit time spent today 30 minutes. Greater than 50% of total time was spent with the patient and / or family counseling and / or coordination of care.  A description of the counseling / coordination of care: 30

## 2023-11-30 NOTE — PROGRESS NOTES
Nursing care ended at 1600. Patient transferred to MS2. Report called to the unit by Rodolfo Lal.  SD 11/30/2023

## 2023-11-30 NOTE — QUICK NOTE
Neurology covering upper [address] [place] [address] TT about patient who presented to the emergency department with scrotal pain. Right scrotum firm without induration or fluctuance. WBCs 18,000, urine positive for pyuria and innumerable bacteria.   Ultrasound positive for epididymal orchitis as well as right epididymal cyst    Reviewed with Dr. Tiffanie Howell:  Admit to medical team  Consult urology  Empiric antibiotics tailored to culture  Medical optimization/fluids/antibiotics per primary team  Serial scrotal exam  No need for transfer at this time

## 2023-12-01 LAB
ANION GAP SERPL CALCULATED.3IONS-SCNC: 10 MMOL/L
BUN SERPL-MCNC: 16 MG/DL (ref 5–25)
CALCIUM SERPL-MCNC: 9 MG/DL (ref 8.4–10.2)
CHLORIDE SERPL-SCNC: 102 MMOL/L (ref 96–108)
CO2 SERPL-SCNC: 25 MMOL/L (ref 21–32)
CREAT SERPL-MCNC: 1.11 MG/DL (ref 0.6–1.3)
ERYTHROCYTE [DISTWIDTH] IN BLOOD BY AUTOMATED COUNT: 13.3 % (ref 11.6–15.1)
GFR SERPL CREATININE-BSD FRML MDRD: 63 ML/MIN/1.73SQ M
GLUCOSE SERPL-MCNC: 117 MG/DL (ref 65–140)
HCT VFR BLD AUTO: 40 % (ref 36.5–49.3)
HGB BLD-MCNC: 13.1 G/DL (ref 12–17)
MAGNESIUM SERPL-MCNC: 2.3 MG/DL (ref 1.9–2.7)
MCH RBC QN AUTO: 32.2 PG (ref 26.8–34.3)
MCHC RBC AUTO-ENTMCNC: 32.8 G/DL (ref 31.4–37.4)
MCV RBC AUTO: 98 FL (ref 82–98)
PLATELET # BLD AUTO: 191 THOUSANDS/UL (ref 149–390)
PMV BLD AUTO: 10.8 FL (ref 8.9–12.7)
POTASSIUM SERPL-SCNC: 3.8 MMOL/L (ref 3.5–5.3)
PROCALCITONIN SERPL-MCNC: 0.21 NG/ML
RBC # BLD AUTO: 4.07 MILLION/UL (ref 3.88–5.62)
SODIUM SERPL-SCNC: 137 MMOL/L (ref 135–147)
WBC # BLD AUTO: 16.65 THOUSAND/UL (ref 4.31–10.16)

## 2023-12-01 PROCEDURE — 83735 ASSAY OF MAGNESIUM: CPT | Performed by: INTERNAL MEDICINE

## 2023-12-01 PROCEDURE — 85027 COMPLETE CBC AUTOMATED: CPT | Performed by: INTERNAL MEDICINE

## 2023-12-01 PROCEDURE — 99232 SBSQ HOSP IP/OBS MODERATE 35: CPT | Performed by: PHYSICIAN ASSISTANT

## 2023-12-01 PROCEDURE — 84145 PROCALCITONIN (PCT): CPT | Performed by: INTERNAL MEDICINE

## 2023-12-01 PROCEDURE — 99232 SBSQ HOSP IP/OBS MODERATE 35: CPT | Performed by: INTERNAL MEDICINE

## 2023-12-01 PROCEDURE — 80048 BASIC METABOLIC PNL TOTAL CA: CPT | Performed by: INTERNAL MEDICINE

## 2023-12-01 RX ADMIN — MELATONIN 6 MG: 3 TAB ORAL at 21:02

## 2023-12-01 RX ADMIN — ESCITALOPRAM 5 MG: 5 TABLET, FILM COATED ORAL at 08:23

## 2023-12-01 RX ADMIN — ACETAMINOPHEN 650 MG: 325 TABLET, FILM COATED ORAL at 19:57

## 2023-12-01 RX ADMIN — AMLODIPINE BESYLATE 5 MG: 5 TABLET ORAL at 08:24

## 2023-12-01 RX ADMIN — GABAPENTIN 100 MG: 100 CAPSULE ORAL at 21:02

## 2023-12-01 RX ADMIN — ACETAMINOPHEN 650 MG: 325 TABLET, FILM COATED ORAL at 04:33

## 2023-12-01 RX ADMIN — CEFTRIAXONE 1000 MG: 1 INJECTION, SOLUTION INTRAVENOUS at 13:00

## 2023-12-01 RX ADMIN — ATORVASTATIN CALCIUM 40 MG: 40 TABLET, FILM COATED ORAL at 08:24

## 2023-12-01 RX ADMIN — SODIUM CHLORIDE, SODIUM GLUCONATE, SODIUM ACETATE, POTASSIUM CHLORIDE, MAGNESIUM CHLORIDE, SODIUM PHOSPHATE, DIBASIC, AND POTASSIUM PHOSPHATE 50 ML/HR: .53; .5; .37; .037; .03; .012; .00082 INJECTION, SOLUTION INTRAVENOUS at 12:51

## 2023-12-01 RX ADMIN — GABAPENTIN 100 MG: 100 CAPSULE ORAL at 08:24

## 2023-12-01 RX ADMIN — ENOXAPARIN SODIUM 40 MG: 100 INJECTION SUBCUTANEOUS at 08:24

## 2023-12-01 RX ADMIN — GABAPENTIN 100 MG: 100 CAPSULE ORAL at 17:18

## 2023-12-01 NOTE — ASSESSMENT & PLAN NOTE
Lab Results   Component Value Date    EGFR 63 12/01/2023    EGFR 61 11/30/2023    EGFR 53 10/09/2023    CREATININE 1.11 12/01/2023    CREATININE 1.13 11/30/2023    CREATININE 1.27 10/09/2023     Patient at baseline  Baseline appears to be 1.1-1.3  Continue to trend  Avoid nephrotoxic agents and hypotension  IV fluids  I's and O's

## 2023-12-01 NOTE — PLAN OF CARE
Problem: PAIN - ADULT  Goal: Verbalizes/displays adequate comfort level or baseline comfort level  Description: Interventions:  - Encourage patient to monitor pain and request assistance  - Assess pain using appropriate pain scale  - Administer analgesics based on type and severity of pain and evaluate response  - Implement non-pharmacological measures as appropriate and evaluate response  - Consider cultural and social influences on pain and pain management  - Notify physician/advanced practitioner if interventions unsuccessful or patient reports new pain  Outcome: Progressing     Problem: INFECTION - ADULT  Goal: Absence or prevention of progression during hospitalization  Description: INTERVENTIONS:  - Assess and monitor for signs and symptoms of infection  - Monitor lab/diagnostic results  - Monitor all insertion sites, i.e. indwelling lines, tubes, and drains  - Monitor endotracheal if appropriate and nasal secretions for changes in amount and color  - [place] appropriate cooling/warming therapies per order  - Administer medications as ordered  - Instruct and encourage patient and family to use good hand hygiene technique  - Identify and instruct in appropriate isolation precautions for identified infection/condition  Outcome: Progressing  Goal: Absence of fever/infection during neutropenic period  Description: INTERVENTIONS:  - Monitor WBC    Outcome: Progressing     Problem: SAFETY ADULT  Goal: Patient will remain free of falls  Description: INTERVENTIONS:  - Educate patient/family on patient safety including physical limitations  - Instruct patient to call for assistance with activity   - Consult OT/PT to assist with strengthening/mobility   - Keep Call bell within reach  - Keep bed low and locked with side rails adjusted as appropriate  - Keep care items and personal belongings within reach  - Initiate and maintain comfort rounds  - Make Fall Risk Sign visible to staff  - Offer Toileting every 2 Hours, in advance of need  - Initiate/Maintain bed alarm  - Obtain necessary fall risk management equipment: socks  - Apply yellow socks and bracelet for high fall risk patients  - Consider moving patient to room near nurses station  Outcome: Progressing  Goal: Maintain or return to baseline ADL function  Description: INTERVENTIONS:  -  Assess patient's ability to carry out ADLs; assess patient's baseline for ADL function and identify physical deficits which impact ability to perform ADLs (bathing, care of mouth/teeth, toileting, grooming, dressing, etc.)  - Assess/evaluate cause of self-care deficits   - Assess range of motion  - Assess patient's mobility; develop plan if impaired  - Assess patient's need for assistive devices and provide as appropriate  - Encourage maximum independence but intervene and supervise when necessary  - Involve family in performance of ADLs  - Assess for home care needs following discharge   - Consider OT consult to assist with ADL evaluation and planning for discharge  - Provide patient education as appropriate  Outcome: Progressing  Goal: Maintains/Returns to pre admission functional level  Description: INTERVENTIONS:  - Perform AM-PAC 6 Click Basic Mobility/ Daily Activity assessment daily.  - Set and communicate daily mobility goal to care team and patient/family/caregiver. - Collaborate with rehabilitation services on mobility goals if consulted  - Perform Range of Motion 2 times a day. - Reposition patient every 2 hours.   - Dangle patient 2 times a day  - Stand patient 2 times a day  - Ambulate patient 2 times a day  - Out of bed to chair 2 times a day   - Out of bed for meals 2 times a day  - Out of bed for toileting  - Record patient progress and toleration of activity level   Outcome: Progressing     Problem: DISCHARGE PLANNING  Goal: Discharge to home or other facility with appropriate resources  Description: INTERVENTIONS:  - Identify barriers to discharge w/patient and caregiver  - Arrange for needed discharge resources and transportation as appropriate  - Identify discharge learning needs (meds, wound care, etc.)  - Arrange for interpretive services to assist at discharge as needed  - Refer to Case Management Department for coordinating discharge planning if the patient needs post-hospital services based on physician/advanced practitioner order or complex needs related to functional status, cognitive ability, or social support system  Outcome: Progressing     Problem: Knowledge Deficit  Goal: Patient/family/caregiver demonstrates understanding of disease process, treatment plan, medications, and discharge instructions  Description: Complete learning assessment and assess knowledge base.   Interventions:  - Provide teaching at level of understanding  - Provide teaching via preferred learning methods  Outcome: Progressing     Problem: Potential for Falls  Goal: Patient will remain free of falls  Description: INTERVENTIONS:  - Educate patient/family on patient safety including physical limitations  - Instruct patient to call for assistance with activity   - Consult OT/PT to assist with strengthening/mobility   - Keep Call bell within reach  - Keep bed low and locked with side rails adjusted as appropriate  - Keep care items and personal belongings within reach  - Initiate and maintain comfort rounds  - Make Fall Risk Sign visible to staff  - Offer Toileting every 2 Hours, in advance of need  - Initiate/Maintain bed alarm  - Obtain necessary fall risk management equipment: socks  - Apply yellow socks and bracelet for high fall risk patients  - Consider moving patient to room near nurses station  Outcome: Progressing

## 2023-12-01 NOTE — ASSESSMENT & PLAN NOTE
Now improved  Patient meeting SIRS criteria with tachycardia and leukocytosis  Source being urinary tract infection as well as epididymo orchitis  Lactic acid negative and no other evidence of endorgan dysfunction    Follow-up blood cultures  Follow-up urine cultures- growing gram negative enteric rods   Continue ceftriaxone- day 2

## 2023-12-01 NOTE — PROGRESS NOTES
Progress Note - Kylie Samuels 66 y.o. male MRN: [id number]    Unit/Bed#: -01 Encounter: [id number]      Assessment/Plan:  Patient is a 67 y/o M PMHx RCC, HTN, HLD, CKD, left HF who presents with left groin pain gradually worsening since Tuesday. Plan:     Acute epididymo-orchitis   Right scrotum firm without induration or fluctuance   WBC improved to 16 from 18  UA positive for pyuria and numerable bacteria   U/S Scrotum and testicles reviewed, findings of right epididymo-orchitis with right scrotal wall thickening. A 9 mm right complex epididymal cyst.   Empiric antibiotics tailored to culture   Pending culture results   Medical optimization/ fluids/ antibiotics per primary team   Serial scrotal exam   Will sign off and be available as needed. Discussed with Uro AP    Subjective:   I feel better today. Objective:     Vitals: Blood pressure 123/79, pulse (!) 117, temperature (!) 97.3 °F (36.3 °C), resp. rate 18, height 5' 10" (1.778 m), weight 96.6 kg (213 lb), SpO2 95 %. ,Body mass index is 30.56 kg/m². Intake/Output Summary (Last 24 hours) at 12/1/2023 0914  Last data filed at 11/30/2023 1801  Gross per 24 hour   Intake --   Output 100 ml   Net -100 ml       Physical Exam: General appearance: alert and oriented, in no acute distress  Lungs: clear to auscultation bilaterally  Heart: regular rate and rhythm, S1, S2 normal, no murmur, click, rub or gallop  Scrotum with mild tenderness but, no skin breakdown or fluctuance noted. Invasive Devices       Peripheral Intravenous Line  Duration             Peripheral IV 11/30/23 Left Antecubital <1 day                    Lab, Imaging and other studies: I have personally reviewed pertinent reports.     VTE Pharmacologic Prophylaxis: Sequential compression device (Venodyne)   VTE Mechanical Prophylaxis: sequential compression device

## 2023-12-01 NOTE — CASE MANAGEMENT
Case Management Assessment & Discharge Planning Note    Patient name Desire Glass  Location /-24 MRN [id number]  : 1945 Date 2023       Current Admission Date: 2023  Current Admission Diagnosis:Acute epididymo-orchitis   Patient Active Problem List    Diagnosis Date Noted    Sepsis due to gram-negative UTI  2023    Acute epididymo-orchitis 2023    Mood disorder ([address]) 2023    BMI 31.0-31.9,adult 2022    Aortic valve sclerosis 2020    Peripheral neuropathic pain 2020    PARVIZ on CPAP 2019    Medicare annual wellness visit, subsequent 2019    Stage 3a chronic kidney disease ([address]) 2019    Left ventricular diastolic dysfunction     Lower abdominal pain 2018    Screening for colon cancer 2018    History of colon polyps 2018    Diarrhea 2018    Hematuria 2017    Abnormal fasting glucose 2017    CKD (chronic kidney disease), stage II 2015    Edema 2015    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[address]) 2015    Enlarged prostate 2014    Renal cell carcinoma of left kidney ([address]) 2014    Essential hypertension 2013    Disc degeneration, lumbosacral 2012    Mixed hyperlipidemia 2012    Osteoarthritis of knee 2012      LOS (days): 1  Geometric Mean LOS (GMLOS) (days): 3.60  Days to GMLOS:2.6     OBJECTIVE:    Risk of Unplanned Readmission Score: 10.41         Current admission status: Inpatient       Preferred Pharmacy:   [id number], [address] [place] [address]  Phone: [phone] Fax: [phone]    Primary Care Provider: Randon Mohs, MD    Primary Insurance: MEDICARE  Secondary Insurance: AETNA    ASSESSMENT:  Sebastian Proxies    There are no active Health Care Proxies on file.        Advance Directives  Does patient have a Health Care POA?: Yes  Does patient have Advance Directives?: Yes  Advance Directives: Living will, Power of  for finance, Power of  for health care         Readmission Root Cause  30 Day Readmission: No    Patient Information  Admitted from[de-identified] Home  Mental Status: Alert  During Assessment patient was accompanied by: Not accompanied during assessment  Assessment information provided by[de-identified] Patient  Primary Caregiver: Self  Support Systems: Spouse/significant other, Daughter  What city do you live in?: [address] entry access options.  Select all that apply.: Stairs  Number of steps to enter home.: 5  Do the steps have railings?: Yes  Type of Current Residence: [facility]  Living Arrangements: Lives w/ Spouse/significant other  Is patient a ?: Yes    Activities of Daily Living Prior to Admission  Functional Status: Independent  Completes ADLs independently?: Yes  Ambulates independently?: Yes  Does patient use assisted devices?: No  Does patient currently own DME?: No  Does patient have a history of Outpatient Therapy (PT/OT)?: No  Does the patient have a history of Short-Term Rehab?: No  Does patient have a history of HHC?: No  Does patient currently have [facility]?: No         Patient Information Continued  Income Source: Self-employed  Does patient have prescription coverage?: Yes  Does patient receive dialysis treatments?: No  Does patient have a history of substance abuse?: No  Does patient have a history of Mental Health Diagnosis?: No         Means of Transportation  Means of Transport to Appts[de-identified] Drives Self      Housing Stability: Unknown (12/1/2023)    Housing Stability Vital Sign     Unable to Pay for Housing in the Last Year: No     Number of Places Lived in the Last Year: Not on file     Unstable Housing in the Last Year: No   Food Insecurity: No Food Insecurity (12/1/2023)    Hunger Vital Sign     Worried About Running Out of Food in the Last Year: Never true     Ran Out of Food in the Last Year: Never true   Transportation Needs: No Transportation Needs (12/1/2023)    PRAPARE - Transportation     Lack of Transportation (Medical): No     Lack of Transportation (Non-Medical): No   Utilities: Not At Risk (12/1/2023)    [facility] Utilities     Threatened with loss of utilities: No       DISCHARGE DETAILS:    Discharge planning discussed with[de-identified] Pt at bedside  [place] of Choice: Yes     CM contacted family/caregiver?: No- see comments (Pt in contact with his wife.)  Were Treatment Team discharge recommendations reviewed with patient/caregiver?: Yes  Did patient/caregiver verbalize understanding of patient care needs?: Yes  Were patient/caregiver advised of the risks associated with not following Treatment Team discharge recommendations?: Yes         Requested [address]         Is the patient interested in [facility] at discharge?: No    DME Referral Provided  Referral made for DME?: No                                                           Additional Comments: Met with pt at bedside to introduce CM role and possible discharge planning needs. Pt stated that he lives wit his wife in their [facility] with 5 steps to enter from the garage. Pt stated that he has been independent with all ADL care prior to admission and has no hx with DME, HHC, or STR placement. Pt stated that he has no hx with D&A or MH Inpt psych stays. Pt stated that he has no concerns about his ability to return home. Pt stated that his daughter will provide transport to home.

## 2023-12-01 NOTE — PROGRESS NOTES
[address] [facility]  Progress Note  Name: Francesca Rodgers  MRN: [id number]  Unit/Bed#: -01 I Date of Admission: 11/30/2023   Date of Service: 12/1/2023 I Hospital Day: 1    Assessment/Plan   Sepsis due to gram-negative UTI   Assessment & Plan  Now improved  Patient meeting SIRS criteria with tachycardia and leukocytosis  Source being urinary tract infection as well as epididymo orchitis  Lactic acid negative and no other evidence of endorgan dysfunction    Follow-up blood cultures  Follow-up urine cultures- growing gram negative enteric rods   Continue ceftriaxone- day 2    Stage 3a chronic kidney disease ([address])  Assessment & Plan  Lab Results   Component Value Date    EGFR 63 12/01/2023    EGFR 61 11/30/2023    EGFR 53 10/09/2023    CREATININE 1.11 12/01/2023    CREATININE 1.13 11/30/2023    CREATININE 1.27 10/09/2023     Patient at baseline  Baseline appears to be 1.1-1.3  Continue to trend  Avoid nephrotoxic agents and hypotension  IV fluids  I's and O's      Left ventricular diastolic dysfunction  Assessment & Plan  Not in exacerbation  Continue to monitor volume status  Daily weights  I's and O's  Cardiac diet  Echo done on 8/31/2023 showed an LVEF of 65% with normal systolic function grade 1 diastolic dysfunction mild aortic stenosis    Mixed hyperlipidemia  Assessment & Plan  Continue statin    Renal cell carcinoma of left kidney [facility])  Assessment & Plan  Patient with a history of renal cell carcinoma of the left side that was diagnosed in September 2014. Has been monitored and showed no evidence of metastatic disease               VTE Pharmacologic Prophylaxis: VTE Score: 8 Moderate Risk (Score 3-4) - Pharmacological DVT Prophylaxis Ordered: enoxaparin (Lovenox). Mobility:   Basic Mobility Inpatient Raw Score: 20  JH-HLM Goal: 6: Walk 10 steps or more  JH-HLM Achieved: 6: Walk 10 steps or more  HLM Goal achieved. Continue to encourage appropriate mobility.     Patient Centered Rounds: I performed bedside rounds with nursing staff today. Discussions with Specialists or Other Care Team Provider: yes    Education and Discussions with Family / Patient:  yes. Total Time Spent on Date of Encounter in care of patient: 35 mins. This time was spent on one or more of the following: performing physical exam; counseling and coordination of care; obtaining or reviewing history; documenting in the medical record; reviewing/ordering tests, medications or procedures; communicating with other healthcare professionals and discussing with patient's family/caregivers. Current Length of Stay: 1 day(s)  Current Patient Status: Inpatient   Certification Statement: The patient will continue to require additional inpatient hospital stay due to pending stabilization  Discharge Plan: Anticipate discharge in 24-48 hrs to home. Code Status: Level 1 - Full Code    Subjective:     Seen and examined at bedside  Vital stable  On room air  Stated that his pain and dysuria is improved  Rest of review of systems were negative  Objective:     Vitals:   Temp (24hrs), Av.6 °F (36.4 °C), Min:97.3 °F (36.3 °C), Max:97.9 °F (36.6 °C)    Temp:  [97.3 °F (36.3 °C)-97.9 °F (36.6 °C)] 97.3 °F (36.3 °C)  HR:  [105-117] 117  Resp:  [18-21] 18  BP: (121-149)/(79-90) 123/79  SpO2:  [95 %-99 %] 95 %  Body mass index is 30.56 kg/m². Input and Output Summary (last 24 hours): Intake/Output Summary (Last 24 hours) at 2023 1313  Last data filed at 2023 0901  Gross per 24 hour   Intake --   Output 450 ml   Net -450 ml       Physical Exam:   Physical Exam  Vitals reviewed. Constitutional:       Appearance: Normal appearance. HENT:      Head: Atraumatic. Eyes:      General: No scleral icterus. Cardiovascular:      Rate and Rhythm: Normal rate and regular rhythm. Heart sounds: Normal heart sounds. Pulmonary:      Effort: No respiratory distress. Breath sounds: Normal breath sounds.    Abdominal: General: Bowel sounds are normal.   Musculoskeletal:         General: Swelling present. Cervical back: Neck supple. Right lower leg: No edema. Left lower leg: No edema. Skin:     Findings: Erythema present. Neurological:      Mental Status: He is alert and oriented to person, place, and time. Mental status is at baseline. Psychiatric:         Mood and Affect: Mood normal.          Additional Data:     Labs:  Results from last 7 days   Lab Units 12/01/23  0428 11/30/23  1212   WBC Thousand/uL 16.65* 18.22*   HEMOGLOBIN g/dL 13.1 13.8   HEMATOCRIT % 40.0 42.4   PLATELETS Thousands/uL 191 184   NEUTROS PCT %  --  82*   LYMPHS PCT %  --  9*   MONOS PCT %  --  8   EOS PCT %  --  0     Results from last 7 days   Lab Units 12/01/23  0428 11/30/23  1212   SODIUM mmol/L 137 138   POTASSIUM mmol/L 3.8 4.0   CHLORIDE mmol/L 102 102   CO2 mmol/L 25 27   BUN mg/dL 16 15   CREATININE mg/dL 1.11 1.13   ANION GAP mmol/L 10 9   CALCIUM mg/dL 9.0 9.3   ALBUMIN g/dL  --  4.0   TOTAL BILIRUBIN mg/dL  --  0.95   ALK PHOS U/L  --  66   ALT U/L  --  16   AST U/L  --  18   GLUCOSE RANDOM mg/dL 117 120                 Results from last 7 days   Lab Units 12/01/23  0428 11/30/23  1242   LACTIC ACID mmol/L  --  1.1   PROCALCITONIN ng/ml 0.21  --        Lines/Drains:  Invasive Devices       Peripheral Intravenous Line  Duration             Peripheral IV 11/30/23 Left Antecubital 1 day                          Imaging: No pertinent imaging reviewed. Recent Cultures (last 7 days):   Results from last 7 days   Lab Units 11/30/23  1242 11/30/23  1213   BLOOD CULTURE  Received in Microbiology Lab. Culture in Progress. Received in Microbiology Lab. Culture in Progress.   --    URINE CULTURE   --  >100,000 cfu/ml Gram Negative Dmitri Enteric Like*       Last 24 Hours Medication List:   Current Facility-Administered Medications   Medication Dose Route Frequency Provider Last Rate    acetaminophen  650 mg Oral Q6H PRN Marybeth Rivas Shelby Streeter MD      aluminum-magnesium hydroxide-simethicone  30 mL Oral Q6H PRN Won Dumont MD      amLODIPine  5 mg Oral Daily Won Dumont MD      atorvastatin  40 mg Oral Daily Won Dumont MD      cefTRIAXone  1,000 mg Intravenous Q24H Won Dumont MD      enoxaparin  40 mg Subcutaneous Daily Won Dumont MD      escitalopram  5 mg Oral Daily Won Dumont MD      gabapentin  100 mg Oral TID Won Dumont MD      melatonin  6 mg Oral HS Vidal Murillo PA-C      multi-electrolyte  50 mL/hr Intravenous Continuous Won Dumont MD 50 mL/hr (12/01/23 1251)    ondansetron  4 mg Intravenous Q4H PRN Won Dumont MD      polyethylene glycol  17 g Oral Daily PRN Won Dumont MD          Today, Patient Was Seen By: Fernie Millan MD    **Please Note: This note may have been constructed using a voice recognition system. **

## 2023-12-01 NOTE — PLAN OF CARE
Problem: PAIN - ADULT  Goal: Verbalizes/displays adequate comfort level or baseline comfort level  Description: Interventions:  - Encourage patient to monitor pain and request assistance  - Assess pain using appropriate pain scale  - Administer analgesics based on type and severity of pain and evaluate response  - Implement non-pharmacological measures as appropriate and evaluate response  - Consider cultural and social influences on pain and pain management  - Notify physician/advanced practitioner if interventions unsuccessful or patient reports new pain  Outcome: Progressing     Problem: INFECTION - ADULT  Goal: Absence or prevention of progression during hospitalization  Description: INTERVENTIONS:  - Assess and monitor for signs and symptoms of infection  - Monitor lab/diagnostic results  - Monitor all insertion sites, i.e. indwelling lines, tubes, and drains  - Monitor endotracheal if appropriate and nasal secretions for changes in amount and color  - [place] appropriate cooling/warming therapies per order  - Administer medications as ordered  - Instruct and encourage patient and family to use good hand hygiene technique  - Identify and instruct in appropriate isolation precautions for identified infection/condition  Outcome: Progressing  Goal: Absence of fever/infection during neutropenic period  Description: INTERVENTIONS:  - Monitor WBC    Outcome: Progressing     Problem: SAFETY ADULT  Goal: Maintain or return to baseline ADL function  Description: INTERVENTIONS:  -  Assess patient's ability to carry out ADLs; assess patient's baseline for ADL function and identify physical deficits which impact ability to perform ADLs (bathing, care of mouth/teeth, toileting, grooming, dressing, etc.)  - Assess/evaluate cause of self-care deficits   - Assess range of motion  - Assess patient's mobility; develop plan if impaired  - Assess patient's need for assistive devices and provide as appropriate  - Encourage maximum independence but intervene and supervise when necessary  - Involve family in performance of ADLs  - Assess for home care needs following discharge   - Consider OT consult to assist with ADL evaluation and planning for discharge  - Provide patient education as appropriate  Outcome: Progressing

## 2023-12-01 NOTE — ASSESSMENT & PLAN NOTE
Not in exacerbation  Continue to monitor volume status  Daily weights  I's and O's  Cardiac diet  Echo done on 8/31/2023 showed an LVEF of 65% with normal systolic function grade 1 diastolic dysfunction mild aortic stenosis

## 2023-12-02 PROBLEM — D72.829 LEUCOCYTOSIS: Status: ACTIVE | Noted: 2023-12-02

## 2023-12-02 LAB
ANION GAP SERPL CALCULATED.3IONS-SCNC: 8 MMOL/L
BACTERIA UR CULT: ABNORMAL
BASOPHILS # BLD AUTO: 0.05 THOUSANDS/ÂΜL (ref 0–0.1)
BASOPHILS NFR BLD AUTO: 0 % (ref 0–1)
BUN SERPL-MCNC: 18 MG/DL (ref 5–25)
CALCIUM SERPL-MCNC: 8.8 MG/DL (ref 8.4–10.2)
CHLORIDE SERPL-SCNC: 102 MMOL/L (ref 96–108)
CO2 SERPL-SCNC: 26 MMOL/L (ref 21–32)
CREAT SERPL-MCNC: 1.08 MG/DL (ref 0.6–1.3)
EOSINOPHIL # BLD AUTO: 0.09 THOUSAND/ÂΜL (ref 0–0.61)
EOSINOPHIL NFR BLD AUTO: 1 % (ref 0–6)
ERYTHROCYTE [DISTWIDTH] IN BLOOD BY AUTOMATED COUNT: 13.2 % (ref 11.6–15.1)
GFR SERPL CREATININE-BSD FRML MDRD: 65 ML/MIN/1.73SQ M
GLUCOSE SERPL-MCNC: 118 MG/DL (ref 65–140)
HCT VFR BLD AUTO: 38.5 % (ref 36.5–49.3)
HGB BLD-MCNC: 12.6 G/DL (ref 12–17)
IMM GRANULOCYTES # BLD AUTO: 0.11 THOUSAND/UL (ref 0–0.2)
IMM GRANULOCYTES NFR BLD AUTO: 1 % (ref 0–2)
LYMPHOCYTES # BLD AUTO: 1.37 THOUSANDS/ÂΜL (ref 0.6–4.47)
LYMPHOCYTES NFR BLD AUTO: 11 % (ref 14–44)
MCH RBC QN AUTO: 31.9 PG (ref 26.8–34.3)
MCHC RBC AUTO-ENTMCNC: 32.7 G/DL (ref 31.4–37.4)
MCV RBC AUTO: 98 FL (ref 82–98)
MONOCYTES # BLD AUTO: 1.27 THOUSAND/ÂΜL (ref 0.17–1.22)
MONOCYTES NFR BLD AUTO: 10 % (ref 4–12)
NEUTROPHILS # BLD AUTO: 9.83 THOUSANDS/ÂΜL (ref 1.85–7.62)
NEUTS SEG NFR BLD AUTO: 77 % (ref 43–75)
NRBC BLD AUTO-RTO: 0 /100 WBCS
PLATELET # BLD AUTO: 187 THOUSANDS/UL (ref 149–390)
PMV BLD AUTO: 10.4 FL (ref 8.9–12.7)
POTASSIUM SERPL-SCNC: 3.9 MMOL/L (ref 3.5–5.3)
RBC # BLD AUTO: 3.95 MILLION/UL (ref 3.88–5.62)
SODIUM SERPL-SCNC: 136 MMOL/L (ref 135–147)
WBC # BLD AUTO: 12.72 THOUSAND/UL (ref 4.31–10.16)

## 2023-12-02 PROCEDURE — 80048 BASIC METABOLIC PNL TOTAL CA: CPT | Performed by: INTERNAL MEDICINE

## 2023-12-02 PROCEDURE — 85025 COMPLETE CBC W/AUTO DIFF WBC: CPT | Performed by: INTERNAL MEDICINE

## 2023-12-02 PROCEDURE — 99232 SBSQ HOSP IP/OBS MODERATE 35: CPT | Performed by: INTERNAL MEDICINE

## 2023-12-02 RX ADMIN — ENOXAPARIN SODIUM 40 MG: 100 INJECTION SUBCUTANEOUS at 08:47

## 2023-12-02 RX ADMIN — ESCITALOPRAM 5 MG: 5 TABLET, FILM COATED ORAL at 08:47

## 2023-12-02 RX ADMIN — AMLODIPINE BESYLATE 5 MG: 5 TABLET ORAL at 08:47

## 2023-12-02 RX ADMIN — CEFTRIAXONE 1000 MG: 1 INJECTION, SOLUTION INTRAVENOUS at 12:50

## 2023-12-02 RX ADMIN — ACETAMINOPHEN 650 MG: 325 TABLET, FILM COATED ORAL at 08:47

## 2023-12-02 RX ADMIN — ACETAMINOPHEN 650 MG: 325 TABLET, FILM COATED ORAL at 21:03

## 2023-12-02 RX ADMIN — GABAPENTIN 100 MG: 100 CAPSULE ORAL at 21:03

## 2023-12-02 RX ADMIN — ATORVASTATIN CALCIUM 40 MG: 40 TABLET, FILM COATED ORAL at 08:47

## 2023-12-02 RX ADMIN — MELATONIN 6 MG: 3 TAB ORAL at 21:03

## 2023-12-02 RX ADMIN — GABAPENTIN 100 MG: 100 CAPSULE ORAL at 08:47

## 2023-12-02 RX ADMIN — GABAPENTIN 100 MG: 100 CAPSULE ORAL at 17:26

## 2023-12-02 NOTE — PLAN OF CARE
Problem: PAIN - ADULT  Goal: Verbalizes/displays adequate comfort level or baseline comfort level  Description: Interventions:  - Encourage patient to monitor pain and request assistance  - Assess pain using appropriate pain scale  - Administer analgesics based on type and severity of pain and evaluate response  - Implement non-pharmacological measures as appropriate and evaluate response  - Consider cultural and social influences on pain and pain management  - Notify physician/advanced practitioner if interventions unsuccessful or patient reports new pain  Outcome: Progressing     Problem: INFECTION - ADULT  Goal: Absence or prevention of progression during hospitalization  Description: INTERVENTIONS:  - Assess and monitor for signs and symptoms of infection  - Monitor lab/diagnostic results  - Monitor all insertion sites, i.e. indwelling lines, tubes, and drains  - Monitor endotracheal if appropriate and nasal secretions for changes in amount and color  - [place] appropriate cooling/warming therapies per order  - Administer medications as ordered  - Instruct and encourage patient and family to use good hand hygiene technique  - Identify and instruct in appropriate isolation precautions for identified infection/condition  Outcome: Progressing  Goal: Absence of fever/infection during neutropenic period  Description: INTERVENTIONS:  - Monitor WBC    Outcome: Progressing

## 2023-12-02 NOTE — ASSESSMENT & PLAN NOTE
Continue amlodipine  /75   Pulse 87   Temp (!) 97.2 °F (36.2 °C) (Temporal)   Resp 16   Ht 5' 10" (1.778 m)   Wt 96.6 kg (213 lb)   SpO2 95%   BMI 30.56 kg/m²

## 2023-12-02 NOTE — PLAN OF CARE
Problem: INFECTION - ADULT  Goal: Absence or prevention of progression during hospitalization  Description: INTERVENTIONS:  - Assess and monitor for signs and symptoms of infection  - Monitor lab/diagnostic results  - Monitor all insertion sites, i.e. indwelling lines, tubes, and drains  - Monitor endotracheal if appropriate and nasal secretions for changes in amount and color  - [place] appropriate cooling/warming therapies per order  - Administer medications as ordered  - Instruct and encourage patient and family to use good hand hygiene technique  - Identify and instruct in appropriate isolation precautions for identified infection/condition  Outcome: Progressing  Goal: Absence of fever/infection during neutropenic period  Description: INTERVENTIONS:  - Monitor WBC    Outcome: Progressing     Problem: GENITOURINARY - ADULT  Goal: Maintains or returns to baseline urinary function  Description: INTERVENTIONS:  - Assess urinary function  - Encourage oral fluids to ensure adequate hydration if ordered  - Administer IV fluids as ordered to ensure adequate hydration  - Administer ordered medications as needed  - Offer frequent toileting  - Follow urinary retention protocol if ordered  Outcome: Progressing  Goal: Absence of urinary retention  Description: INTERVENTIONS:  - Assess patient’s ability to void and empty bladder  - Monitor I/O  - Bladder scan as needed  - Discuss with physician/AP medications to alleviate retention as needed  - Discuss catheterization for long term situations as appropriate  Outcome: Progressing

## 2023-12-02 NOTE — ASSESSMENT & PLAN NOTE
Now improved  Patient meeting SIRS criteria with tachycardia and leukocytosis  Source being urinary tract infection as well as epididymo orchitis  Lactic acid negative and no other evidence of endorgan dysfunction    Follow-up blood cultures  Follow-up urine cultures- growing gram negative enteric rods   Continue ceftriaxone- day 3

## 2023-12-02 NOTE — ASSESSMENT & PLAN NOTE
Continue due to groin pain and testicular swelling that started a couple of days ago and gradually worsening started on the left and is now bilateral    In the ED imaging showing-Right epididymoorchitis with right scrotal wall thickening.  A 9 mm right complex epididymal cyst.  On IV ceftriaxone   Urine cultures growing gram-negative enteric rods, follow final urine cultures  Continue to monitor hemodynamics  Improving on exam  Appreciate urology input

## 2023-12-02 NOTE — ASSESSMENT & PLAN NOTE
Lab Results   Component Value Date    EGFR 65 12/02/2023    EGFR 63 12/01/2023    EGFR 61 11/30/2023    CREATININE 1.08 12/02/2023    CREATININE 1.11 12/01/2023    CREATININE 1.13 11/30/2023     Patient at baseline  Baseline appears to be 1.1-1.3  Continue to trend  Avoid nephrotoxic agents and hypotension  I's and O's

## 2023-12-02 NOTE — ASSESSMENT & PLAN NOTE
Recent Labs     11/30/23  1212 12/01/23  0428 12/02/23  0439   WBC 18.22* 16.65* 12.72*      Secondary to acute epididymoorchitis  Now improving  On appropriate IV antibiotics  Continue to monitor WBC count

## 2023-12-02 NOTE — PROGRESS NOTES
[address] [facility]  Progress Note  Name: Louise Morin  MRN: [id number]  Unit/Bed#: -01 I Date of Admission: 11/30/2023   Date of Service: 12/2/2023 I Hospital Day: 2    Assessment/Plan   * Acute epididymo-orchitis  Assessment & Plan  Continue due to groin pain and testicular swelling that started a couple of days ago and gradually worsening started on the left and is now bilateral    In the ED imaging showing-Right epididymoorchitis with right scrotal wall thickening.  A 9 mm right complex epididymal cyst.  On IV ceftriaxone   Urine cultures growing gram-negative enteric rods, follow final urine cultures  Continue to monitor hemodynamics  Improving on exam  Appreciate urology input      Leucocytosis  Assessment & Plan  Recent Labs     11/30/23  1212 12/01/23  0428 12/02/23  0439   WBC 18.22* 16.65* 12.72*      Secondary to acute epididymoorchitis  Now improving  On appropriate IV antibiotics  Continue to monitor WBC count    Sepsis due to gram-negative UTI   Assessment & Plan  Now improved  Patient meeting SIRS criteria with tachycardia and leukocytosis  Source being urinary tract infection as well as epididymo orchitis  Lactic acid negative and no other evidence of endorgan dysfunction    Follow-up blood cultures  Follow-up urine cultures- growing gram negative enteric rods   Continue ceftriaxone- day 3    Stage 3a chronic kidney disease ([address])  Assessment & Plan  Lab Results   Component Value Date    EGFR 65 12/02/2023    EGFR 63 12/01/2023    EGFR 61 11/30/2023    CREATININE 1.08 12/02/2023    CREATININE 1.11 12/01/2023    CREATININE 1.13 11/30/2023     Patient at baseline  Baseline appears to be 1.1-1.3  Continue to trend  Avoid nephrotoxic agents and hypotension  I's and O's      Left ventricular diastolic dysfunction  Assessment & Plan  Not in exacerbation  Continue to monitor volume status  Daily weights  I's and O's  Cardiac diet  Echo done on 8/31/2023 showed an LVEF of 65% with normal systolic function grade 1 diastolic dysfunction mild aortic stenosis    Essential hypertension  Assessment & Plan  Continue amlodipine  /75   Pulse 87   Temp (!) 97.2 °F (36.2 °C) (Temporal)   Resp 16   Ht 5' 10" (1.778 m)   Wt 96.6 kg (213 lb)   SpO2 95%   BMI 30.56 kg/m²       Mixed hyperlipidemia  Assessment & Plan  Continue statin    Renal cell carcinoma of left kidney [facility])  Assessment & Plan  Patient with a history of renal cell carcinoma of the left side that was diagnosed in September 2014. Has been monitored and showed no evidence of metastatic disease               VTE Pharmacologic Prophylaxis: VTE Score: 8 Moderate Risk (Score 3-4) - Pharmacological DVT Prophylaxis Ordered: enoxaparin (Lovenox). Mobility:   Basic Mobility Inpatient Raw Score: 20  JH-HLM Goal: 6: Walk 10 steps or more  JH-HLM Achieved: 6: Walk 10 steps or more  HLM Goal achieved. Continue to encourage appropriate mobility. Patient Centered Rounds: I performed bedside rounds with nursing staff today. Discussions with Specialists or Other Care Team Provider: yes    Education and Discussions with Family / Patient: Updated  (wife and daughter) via phone. Total Time Spent on Date of Encounter in care of patient: 35 mins. This time was spent on one or more of the following: performing physical exam; counseling and coordination of care; obtaining or reviewing history; documenting in the medical record; reviewing/ordering tests, medications or procedures; communicating with other healthcare professionals and discussing with patient's family/caregivers. Current Length of Stay: 2 day(s)  Current Patient Status: Inpatient   Certification Statement: The patient will continue to require additional inpatient hospital stay due to awaiting cultures  Discharge Plan: Anticipate discharge tomorrow to home.     Code Status: Level 1 - Full Code    Subjective:   Seen and examined at bedside  Vital stable  Scrotal swelling improved  Rest of review of systems negative    Objective:     Vitals:   Temp (24hrs), Av.9 °F (36.6 °C), Min:97.2 °F (36.2 °C), Max:98.2 °F (36.8 °C)    Temp:  [97.2 °F (36.2 °C)-98.2 °F (36.8 °C)] 97.2 °F (36.2 °C)  HR:  [] 87  Resp:  [16] 16  BP: (121-122)/(72-81) 121/75  SpO2:  [93 %-95 %] 95 %  Body mass index is 30.56 kg/m². Input and Output Summary (last 24 hours): Intake/Output Summary (Last 24 hours) at 2023 1020  Last data filed at 2023 0440  Gross per 24 hour   Intake --   Output 875 ml   Net -875 ml       Physical Exam:   Physical Exam  Vitals reviewed. Constitutional:       Appearance: Normal appearance. HENT:      Head: Atraumatic. Mouth/Throat:      Mouth: Mucous membranes are moist.   Eyes:      General: No scleral icterus. Cardiovascular:      Rate and Rhythm: Normal rate and regular rhythm. Heart sounds: Normal heart sounds. Pulmonary:      Effort: No respiratory distress. Breath sounds: Normal breath sounds. Abdominal:      General: Bowel sounds are normal.   Genitourinary:     Comments: Scrotal swelling /erythema improved   Musculoskeletal:      Cervical back: Neck supple. Right lower leg: No edema. Left lower leg: No edema. Skin:     General: Skin is warm and dry. Capillary Refill: Capillary refill takes less than 2 seconds. Neurological:      Mental Status: He is alert and oriented to person, place, and time. Mental status is at baseline. Motor: No weakness.    Psychiatric:         Mood and Affect: Mood normal.          Additional Data:     Labs:  Results from last 7 days   Lab Units 23  0439   WBC Thousand/uL 12.72*   HEMOGLOBIN g/dL 12.6   HEMATOCRIT % 38.5   PLATELETS Thousands/uL 187   NEUTROS PCT % 77*   LYMPHS PCT % 11*   MONOS PCT % 10   EOS PCT % 1     Results from last 7 days   Lab Units 23  0439 23  0428 23  1212   SODIUM mmol/L 136   < > 138   POTASSIUM mmol/L 3.9   < > 4.0   CHLORIDE mmol/L 102   < > 102   CO2 mmol/L 26   < > 27   BUN mg/dL 18   < > 15   CREATININE mg/dL 1.08   < > 1.13   ANION GAP mmol/L 8   < > 9   CALCIUM mg/dL 8.8   < > 9.3   ALBUMIN g/dL  --   --  4.0   TOTAL BILIRUBIN mg/dL  --   --  0.95   ALK PHOS U/L  --   --  66   ALT U/L  --   --  16   AST U/L  --   --  18   GLUCOSE RANDOM mg/dL 118   < > 120    < > = values in this interval not displayed. Results from last 7 days   Lab Units 12/01/23  0428 11/30/23  1242   LACTIC ACID mmol/L  --  1.1   PROCALCITONIN ng/ml 0.21  --        Lines/Drains:  Invasive Devices       Peripheral Intravenous Line  Duration             Peripheral IV 11/30/23 Left Antecubital 1 day                          Imaging: No pertinent imaging reviewed. Recent Cultures (last 7 days):   Results from last 7 days   Lab Units 11/30/23  1242 11/30/23  1213   BLOOD CULTURE  No Growth at 24 hrs.   No Growth at 24 hrs.  --    URINE CULTURE   --  >100,000 cfu/ml Gram Negative Dmitri Enteric Like*       Last 24 Hours Medication List:   Current Facility-Administered Medications   Medication Dose Route Frequency Provider Last Rate    acetaminophen  650 mg Oral Q6H PRN Reno Akbar MD      aluminum-magnesium hydroxide-simethicone  30 mL Oral Q6H PRN Reno Akbar MD      amLODIPine  5 mg Oral Daily Reno Akbar MD      atorvastatin  40 mg Oral Daily Reno Akbar MD      cefTRIAXone  1,000 mg Intravenous Q24H Reno Akbar MD 1,000 mg (12/01/23 1300)    enoxaparin  40 mg Subcutaneous Daily Reno Akbar MD      escitalopram  5 mg Oral Daily Reno Akbar MD      gabapentin  100 mg Oral TID Reno Akbar MD      melatonin  6 mg Oral HS Jun Winters PA-C      ondansetron  4 mg Intravenous Q4H PRN Reno Akbar MD      polyethylene glycol  17 g Oral Daily PRN Reno Akbar MD          Today, Patient Was Seen By: Licha Naranjo MD    **Please Note: This note may have been constructed using a voice recognition system. **

## 2023-12-03 VITALS
TEMPERATURE: 97.5 F | HEART RATE: 87 BPM | DIASTOLIC BLOOD PRESSURE: 74 MMHG | BODY MASS INDEX: 30.49 KG/M2 | HEIGHT: 70 IN | WEIGHT: 213 LBS | RESPIRATION RATE: 16 BRPM | OXYGEN SATURATION: 95 % | SYSTOLIC BLOOD PRESSURE: 114 MMHG

## 2023-12-03 PROBLEM — A41.50 SEPSIS DUE TO GRAM-NEGATIVE UTI: Status: RESOLVED | Noted: 2023-11-30 | Resolved: 2023-12-03

## 2023-12-03 PROBLEM — N39.0 SEPSIS DUE TO GRAM-NEGATIVE UTI: Status: RESOLVED | Noted: 2023-11-30 | Resolved: 2023-12-03

## 2023-12-03 PROBLEM — D72.829 LEUCOCYTOSIS: Status: RESOLVED | Noted: 2023-12-02 | Resolved: 2023-12-03

## 2023-12-03 LAB
ANION GAP SERPL CALCULATED.3IONS-SCNC: 9 MMOL/L
BASOPHILS # BLD AUTO: 0.04 THOUSANDS/ÂΜL (ref 0–0.1)
BASOPHILS NFR BLD AUTO: 0 % (ref 0–1)
BUN SERPL-MCNC: 20 MG/DL (ref 5–25)
CALCIUM SERPL-MCNC: 8.9 MG/DL (ref 8.4–10.2)
CHLORIDE SERPL-SCNC: 103 MMOL/L (ref 96–108)
CO2 SERPL-SCNC: 26 MMOL/L (ref 21–32)
CREAT SERPL-MCNC: 1.05 MG/DL (ref 0.6–1.3)
EOSINOPHIL # BLD AUTO: 0.21 THOUSAND/ÂΜL (ref 0–0.61)
EOSINOPHIL NFR BLD AUTO: 2 % (ref 0–6)
ERYTHROCYTE [DISTWIDTH] IN BLOOD BY AUTOMATED COUNT: 13.2 % (ref 11.6–15.1)
GFR SERPL CREATININE-BSD FRML MDRD: 67 ML/MIN/1.73SQ M
GLUCOSE SERPL-MCNC: 95 MG/DL (ref 65–140)
HCT VFR BLD AUTO: 40.3 % (ref 36.5–49.3)
HGB BLD-MCNC: 13.3 G/DL (ref 12–17)
IMM GRANULOCYTES # BLD AUTO: 0.1 THOUSAND/UL (ref 0–0.2)
IMM GRANULOCYTES NFR BLD AUTO: 1 % (ref 0–2)
LYMPHOCYTES # BLD AUTO: 1.63 THOUSANDS/ÂΜL (ref 0.6–4.47)
LYMPHOCYTES NFR BLD AUTO: 17 % (ref 14–44)
MCH RBC QN AUTO: 32.2 PG (ref 26.8–34.3)
MCHC RBC AUTO-ENTMCNC: 33 G/DL (ref 31.4–37.4)
MCV RBC AUTO: 98 FL (ref 82–98)
MONOCYTES # BLD AUTO: 1.18 THOUSAND/ÂΜL (ref 0.17–1.22)
MONOCYTES NFR BLD AUTO: 13 % (ref 4–12)
NEUTROPHILS # BLD AUTO: 6.25 THOUSANDS/ÂΜL (ref 1.85–7.62)
NEUTS SEG NFR BLD AUTO: 67 % (ref 43–75)
NRBC BLD AUTO-RTO: 0 /100 WBCS
PLATELET # BLD AUTO: 187 THOUSANDS/UL (ref 149–390)
PMV BLD AUTO: 10.1 FL (ref 8.9–12.7)
POTASSIUM SERPL-SCNC: 3.9 MMOL/L (ref 3.5–5.3)
RBC # BLD AUTO: 4.13 MILLION/UL (ref 3.88–5.62)
SODIUM SERPL-SCNC: 138 MMOL/L (ref 135–147)
WBC # BLD AUTO: 9.41 THOUSAND/UL (ref 4.31–10.16)

## 2023-12-03 PROCEDURE — 80048 BASIC METABOLIC PNL TOTAL CA: CPT | Performed by: INTERNAL MEDICINE

## 2023-12-03 PROCEDURE — 99239 HOSP IP/OBS DSCHRG MGMT >30: CPT | Performed by: INTERNAL MEDICINE

## 2023-12-03 PROCEDURE — 85025 COMPLETE CBC W/AUTO DIFF WBC: CPT | Performed by: INTERNAL MEDICINE

## 2023-12-03 RX ORDER — CEFAZOLIN SODIUM 2 G/50ML
2000 SOLUTION INTRAVENOUS EVERY 8 HOURS
Status: DISCONTINUED | OUTPATIENT
Start: 2023-12-03 | End: 2023-12-03 | Stop reason: HOSPADM

## 2023-12-03 RX ORDER — LEVOFLOXACIN 500 MG/1
500 TABLET, FILM COATED ORAL EVERY 24 HOURS
Qty: 6 TABLET | Refills: 0 | Status: SHIPPED | OUTPATIENT
Start: 2023-12-03 | End: 2023-12-09

## 2023-12-03 RX ADMIN — ATORVASTATIN CALCIUM 40 MG: 40 TABLET, FILM COATED ORAL at 08:57

## 2023-12-03 RX ADMIN — AMLODIPINE BESYLATE 5 MG: 5 TABLET ORAL at 08:57

## 2023-12-03 RX ADMIN — GABAPENTIN 100 MG: 100 CAPSULE ORAL at 08:57

## 2023-12-03 RX ADMIN — ENOXAPARIN SODIUM 40 MG: 100 INJECTION SUBCUTANEOUS at 09:01

## 2023-12-03 RX ADMIN — ESCITALOPRAM 5 MG: 5 TABLET, FILM COATED ORAL at 08:57

## 2023-12-03 RX ADMIN — CEFAZOLIN SODIUM 2000 MG: 2 SOLUTION INTRAVENOUS at 08:58

## 2023-12-03 NOTE — ASSESSMENT & PLAN NOTE
Not in exacerbation  Echo done on 8/31/2023 showed an LVEF of 65% with normal systolic function grade 1 diastolic dysfunction mild aortic stenosis

## 2023-12-03 NOTE — DISCHARGE SUMMARY
[address] [facility]  Discharge- Bhumi Sizer 1945, 66 y.o. male MRN: [id number]  Unit/Bed#: -Johnie Encounter: [id number]  Primary Care Provider: Ariadna Zapata MD   Date and time admitted to hospital: 11/30/2023 10:52 AM    * Acute epididymo-orchitis  Assessment & Plan  Continue due to groin pain and testicular swelling that started a couple of days ago and gradually worsening started on the left and is now bilateral    In the ED imaging showing-Right epididymoorchitis with right scrotal wall thickening. A 9 mm right complex epididymal cyst.  On IV ceftriaxone-day #4, discharged on p.o.  Levaquin for 6 days  Urine cultures growing gram-E. coli, sensitivity reports reviewed  Continue to monitor hemodynamics  Improving      Stage 3a chronic kidney disease [facility])  Assessment & Plan  Lab Results   Component Value Date    EGFR 67 12/03/2023    EGFR 65 12/02/2023    EGFR 63 12/01/2023    CREATININE 1.05 12/03/2023    CREATININE 1.08 12/02/2023    CREATININE 1.11 12/01/2023     Patient at baseline  Baseline appears to be 1.1-1.3        Left ventricular diastolic dysfunction  Assessment & Plan  Not in exacerbation  Echo done on 8/31/2023 showed an LVEF of 65% with normal systolic function grade 1 diastolic dysfunction mild aortic stenosis    Essential hypertension  Assessment & Plan  Continue amlodipine  /74   Pulse 87   Temp 97.5 °F (36.4 °C)   Resp 16   Ht 5' 10" (1.778 m)   Wt 96.6 kg (213 lb)   SpO2 95%   BMI 30.56 kg/m²       Mixed hyperlipidemia  Assessment & Plan  Continue statin    Leucocytosis-resolved as of 12/3/2023  Assessment & Plan  Recent Labs     12/01/23  0428 12/02/23  0439 12/03/23  0437   WBC 16.65* 12.72* 9.41      Secondary to acute epididymoorchitis  Now improving  On appropriate IV antibiotics  Continue to monitor WBC count    Sepsis due to gram-negative UTI -resolved as of 12/3/2023  Assessment & Plan  Now improved  Patient meeting SIRS criteria with tachycardia and leukocytosis  Source being urinary tract infection as well as epididymo orchitis  Lactic acid negative and no other evidence of endorgan dysfunction    Follow-up blood cultures  Follow-up urine cultures-E. coli, sensitivity report reviewed  Continue ceftriaxone- day 4, discharged on Levaquin 500 mg daily for 6 more days              Medical Problems       Resolved Problems  Date Reviewed: 12/3/2023            Resolved    Sepsis due to gram-negative UTI  12/3/2023     Resolved by  Terry Liu MD    Leucocytosis 12/3/2023     Resolved by  Terry Liu MD          Admission Date:   Admission Orders (From admission, onward)       Ordered        11/30/23 1344  INPATIENT ADMISSION  Once                            Admitting Diagnosis: Epididymoorchitis [N45.3]  UTI (urinary tract infection) [N39.0]  Testicle swelling [N50.89]  SIRS (systemic inflammatory response syndrome) ([address]) [R65.10]  Acute epididymo-orchitis [N45.3]    HPI: as per, Rhonda Gould MD , 11/30/23 Jerona Boast is a 66 y.o. male with a PMH of RCC, HTN, HLD, CKD, left HF who presents with left groin pain that has been gradually worsening since Tuesday. He noticed some testicular swelling and erythema. Then yesterday became bilateral testicular swelling. Patient denies any fevers chills sweats lightheadedness vertigo symptoms chest pain wheezing shortness of breath abdominal pain. In the ED patient met sepsis criteria found to have a urinary tract infection versus prostatitis versus epididymal orchitis that was seen on imaging that was done in the ED. Urology consulted recommended admission. Patient was initiated on ceftriaxone. Will give gentle IV fluids for 24 hours due to tachycardia and leukocytosis. We will follow-up blood cultures and urine cultures. We will continue ceftriaxone. Procedures Performed: No orders of the defined types were placed in this encounter. Summary of Hospital Course:  Throughout his hospital stay, he remained hemodynamically stable. He was seen by urology, supportive care was continued for scrotal swelling which continued to improve. He was started on IV ceftriaxone, final cultures were followed that grew E. coli, sensitivity reports were reviewed and patient was deemed appropriate to be discharged on p.o. Levaquin 500 mg every day for 6 days to complete 10 days of antibiotic course. Patient is advised to have outpatient PCP follow-up  Discharge plan was discussed in detail with the daughter. All questions were answered to satisfaction. Significant Findings, Care, Treatment and Services Provided: See above    Complications: None    Condition at Discharge: Stable        Discharge instructions/Information to patient and family:   See after visit summary for information provided to patient and family. Provisions for Follow-Up Care:  See after visit summary for information related to follow-up care and any pertinent home health orders. PCP: Elham Anthony MD    Disposition: Home    Planned Readmission: No    Discharge Statement   I spent 36 minutes discharging the patient. This time was spent on the day of discharge. I had direct contact with the patient on the day of discharge. Additional documentation is required if more than 30 minutes were spent on discharge. Discharge Medications:  See after visit summary for reconciled discharge medications provided to patient and family.

## 2023-12-03 NOTE — ASSESSMENT & PLAN NOTE
Now improved  Patient meeting SIRS criteria with tachycardia and leukocytosis  Source being urinary tract infection as well as epididymo orchitis  Lactic acid negative and no other evidence of endorgan dysfunction    Follow-up blood cultures  Follow-up urine cultures-E. coli, sensitivity report reviewed  Continue ceftriaxone- day 4, discharged on Levaquin 500 mg daily for 6 more days

## 2023-12-03 NOTE — PLAN OF CARE
Problem: PAIN - ADULT  Goal: Verbalizes/displays adequate comfort level or baseline comfort level  Description: Interventions:  - Encourage patient to monitor pain and request assistance  - Assess pain using appropriate pain scale  - Administer analgesics based on type and severity of pain and evaluate response  - Implement non-pharmacological measures as appropriate and evaluate response  - Consider cultural and social influences on pain and pain management  - Notify physician/advanced practitioner if interventions unsuccessful or patient reports new pain  Outcome: Progressing     Problem: INFECTION - ADULT  Goal: Absence or prevention of progression during hospitalization  Description: INTERVENTIONS:  - Assess and monitor for signs and symptoms of infection  - Monitor lab/diagnostic results  - Monitor all insertion sites, i.e. indwelling lines, tubes, and drains  - Monitor endotracheal if appropriate and nasal secretions for changes in amount and color  - [place] appropriate cooling/warming therapies per order  - Administer medications as ordered  - Instruct and encourage patient and family to use good hand hygiene technique  - Identify and instruct in appropriate isolation precautions for identified infection/condition  Outcome: Progressing  Goal: Absence of fever/infection during neutropenic period  Description: INTERVENTIONS:  - Monitor WBC    Outcome: Progressing     Problem: SAFETY ADULT  Goal: Patient will remain free of falls  Description: INTERVENTIONS:  - Educate patient/family on patient safety including physical limitations  - Instruct patient to call for assistance with activity   - Consult OT/PT to assist with strengthening/mobility   - Keep Call bell within reach  - Keep bed low and locked with side rails adjusted as appropriate  - Keep care items and personal belongings within reach  - Initiate and maintain comfort rounds  - Make Fall Risk Sign visible to staff  - Offer Toileting every  Hours, in advance of need  - Initiate/Maintain alarm  - Obtain necessary fall risk management equipment:   - Apply yellow socks and bracelet for high fall risk patients  - Consider moving patient to room near nurses station  Outcome: Progressing  Goal: Maintain or return to baseline ADL function  Description: INTERVENTIONS:  -  Assess patient's ability to carry out ADLs; assess patient's baseline for ADL function and identify physical deficits which impact ability to perform ADLs (bathing, care of mouth/teeth, toileting, grooming, dressing, etc.)  - Assess/evaluate cause of self-care deficits   - Assess range of motion  - Assess patient's mobility; develop plan if impaired  - Assess patient's need for assistive devices and provide as appropriate  - Encourage maximum independence but intervene and supervise when necessary  - Involve family in performance of ADLs  - Assess for home care needs following discharge   - Consider OT consult to assist with ADL evaluation and planning for discharge  - Provide patient education as appropriate  Outcome: Progressing  Goal: Maintains/Returns to pre admission functional level  Description: INTERVENTIONS:  - Perform AM-PAC 6 Click Basic Mobility/ Daily Activity assessment daily.  - Set and communicate daily mobility goal to care team and patient/family/caregiver. - Collaborate with rehabilitation services on mobility goals if consulted  - Perform Range of Motion  times a day. - Reposition patient every  hours.   - Dangle patient  times a day  - Stand patient  times a day  - Ambulate patient  times a day  - Out of bed to chair  times a day   - Out of bed for meals  times a day  - Out of bed for toileting  - Record patient progress and toleration of activity level   Outcome: Progressing     Problem: DISCHARGE PLANNING  Goal: Discharge to home or other facility with appropriate resources  Description: INTERVENTIONS:  - Identify barriers to discharge w/patient and caregiver  - Arrange for needed discharge resources and transportation as appropriate  - Identify discharge learning needs (meds, wound care, etc.)  - Arrange for interpretive services to assist at discharge as needed  - Refer to Case Management Department for coordinating discharge planning if the patient needs post-hospital services based on physician/advanced practitioner order or complex needs related to functional status, cognitive ability, or social support system  Outcome: Progressing     Problem: Knowledge Deficit  Goal: Patient/family/caregiver demonstrates understanding of disease process, treatment plan, medications, and discharge instructions  Description: Complete learning assessment and assess knowledge base.   Interventions:  - Provide teaching at level of understanding  - Provide teaching via preferred learning methods  Outcome: Progressing     Problem: Potential for Falls  Goal: Patient will remain free of falls  Description: INTERVENTIONS:  - Educate patient/family on patient safety including physical limitations  - Instruct patient to call for assistance with activity   - Consult OT/PT to assist with strengthening/mobility   - Keep Call bell within reach  - Keep bed low and locked with side rails adjusted as appropriate  - Keep care items and personal belongings within reach  - Initiate and maintain comfort rounds  - Make Fall Risk Sign visible to staff  - Offer Toileting every  Hours, in advance of need  - Initiate/Maintain alarm  - Obtain necessary fall risk management equipment:   - Apply yellow socks and bracelet for high fall risk patients  - Consider moving patient to room near nurses station  Outcome: Progressing     Problem: GENITOURINARY - ADULT  Goal: Maintains or returns to baseline urinary function  Description: INTERVENTIONS:  - Assess urinary function  - Encourage oral fluids to ensure adequate hydration if ordered  - Administer IV fluids as ordered to ensure adequate hydration  - Administer ordered medications as needed  - Offer frequent toileting  - Follow urinary retention protocol if ordered  Outcome: Progressing  Goal: Absence of urinary retention  Description: INTERVENTIONS:  - Assess patient’s ability to void and empty bladder  - Monitor I/O  - Bladder scan as needed  - Discuss with physician/AP medications to alleviate retention as needed  - Discuss catheterization for long term situations as appropriate  Outcome: Progressing

## 2023-12-03 NOTE — ASSESSMENT & PLAN NOTE
Continue amlodipine  /74   Pulse 87   Temp 97.5 °F (36.4 °C)   Resp 16   Ht 5' 10" (1.778 m)   Wt 96.6 kg (213 lb)   SpO2 95%   BMI 30.56 kg/m²

## 2023-12-03 NOTE — PLAN OF CARE
Problem: DISCHARGE PLANNING  Goal: Discharge to home or other facility with appropriate resources  Description: INTERVENTIONS:  - Identify barriers to discharge w/patient and caregiver  - Arrange for needed discharge resources and transportation as appropriate  - Identify discharge learning needs (meds, wound care, etc.)  - Arrange for interpretive services to assist at discharge as needed  - Refer to Case Management Department for coordinating discharge planning if the patient needs post-hospital services based on physician/advanced practitioner order or complex needs related to functional status, cognitive ability, or social support system  Outcome: Progressing     Problem: GENITOURINARY - ADULT  Goal: Maintains or returns to baseline urinary function  Description: INTERVENTIONS:  - Assess urinary function  - Encourage oral fluids to ensure adequate hydration if ordered  - Administer IV fluids as ordered to ensure adequate hydration  - Administer ordered medications as needed  - Offer frequent toileting  - Follow urinary retention protocol if ordered  Outcome: Progressing  Goal: Absence of urinary retention  Description: INTERVENTIONS:  - Assess patient’s ability to void and empty bladder  - Monitor I/O  - Bladder scan as needed  - Discuss with physician/AP medications to alleviate retention as needed  - Discuss catheterization for long term situations as appropriate  Outcome: Progressing

## 2023-12-03 NOTE — ASSESSMENT & PLAN NOTE
Continue due to groin pain and testicular swelling that started a couple of days ago and gradually worsening started on the left and is now bilateral    In the ED imaging showing-Right epididymoorchitis with right scrotal wall thickening. A 9 mm right complex epididymal cyst.  On IV ceftriaxone-day #4, discharged on p.o.  Levaquin for 6 days  Urine cultures growing gram-E. coli, sensitivity reports reviewed  Continue to monitor hemodynamics  Improving

## 2023-12-03 NOTE — ASSESSMENT & PLAN NOTE
Lab Results   Component Value Date    EGFR 67 12/03/2023    EGFR 65 12/02/2023    EGFR 63 12/01/2023    CREATININE 1.05 12/03/2023    CREATININE 1.08 12/02/2023    CREATININE 1.11 12/01/2023     Patient at baseline  Baseline appears to be 1.1-1.3

## 2023-12-03 NOTE — DISCHARGE INSTR - AVS FIRST PAGE
Please see your PCP within 7 days of discharge to discuss recent hospitalization  Continue with levofloxacin 500 mg once daily for 6 more days. Keep your scrotum elevated, in the case of pain take Tylenol as needed or apply ice.

## 2023-12-03 NOTE — ASSESSMENT & PLAN NOTE
Recent Labs     12/01/23  0428 12/02/23  0439 12/03/23  0437   WBC 16.65* 12.72* 9.41      Secondary to acute epididymoorchitis  Now improving  On appropriate IV antibiotics  Continue to monitor WBC count

## 2023-12-04 ENCOUNTER — HOSPITAL ENCOUNTER (OUTPATIENT)
Dept: NON INVASIVE DIAGNOSTICS | Facility: HOSPITAL | Age: 78
Discharge: HOME | End: 2023-12-04
Attending: INTERNAL MEDICINE

## 2023-12-04 ENCOUNTER — TRANSITIONAL CARE MANAGEMENT (OUTPATIENT)
Dept: FAMILY MEDICINE CLINIC | Facility: HOSPITAL | Age: 78
End: 2023-12-04

## 2023-12-05 ENCOUNTER — TELEPHONE (OUTPATIENT)
Dept: FAMILY MEDICINE CLINIC | Facility: HOSPITAL | Age: 78
End: 2023-12-05

## 2023-12-05 DIAGNOSIS — N45.3 ACUTE EPIDIDYMO-ORCHITIS: Primary | ICD-10-CM

## 2023-12-05 LAB
BACTERIA BLD CULT: NORMAL
BACTERIA BLD CULT: NORMAL

## 2023-12-05 NOTE — TELEPHONE ENCOUNTER
Still having pain. The appt is on Friday. Is there anything else he can do before that appt. Please call.

## 2023-12-05 NOTE — TELEPHONE ENCOUNTER
Spoke to pt. States he is still having pain in his groin. Pt is still taking his antibiotics and using tylenol for the pain. Pt states the pain is constant, sometimes a little worse than other times. Not having any issues when using the bathroom, says there is a little pain when urinating, but not all the time. Denies any blood in urine. Pt is asking if there is anything we can do for his pain before he comes in for a follow up on Friday with you? Please advise.

## 2023-12-08 ENCOUNTER — OFFICE VISIT (OUTPATIENT)
Dept: FAMILY MEDICINE CLINIC | Facility: HOSPITAL | Age: 78
End: 2023-12-08
Payer: MEDICARE

## 2023-12-08 VITALS
WEIGHT: 206 LBS | BODY MASS INDEX: 29.49 KG/M2 | SYSTOLIC BLOOD PRESSURE: 137 MMHG | HEART RATE: 78 BPM | HEIGHT: 70 IN | DIASTOLIC BLOOD PRESSURE: 90 MMHG | OXYGEN SATURATION: 98 % | TEMPERATURE: 97.4 F

## 2023-12-08 DIAGNOSIS — C64.2 RENAL CELL CARCINOMA OF LEFT KIDNEY (HCC): ICD-10-CM

## 2023-12-08 DIAGNOSIS — N39.0 E. COLI UTI: ICD-10-CM

## 2023-12-08 DIAGNOSIS — N45.3 ACUTE EPIDIDYMO-ORCHITIS: Primary | ICD-10-CM

## 2023-12-08 DIAGNOSIS — I10 ESSENTIAL HYPERTENSION: ICD-10-CM

## 2023-12-08 DIAGNOSIS — B96.20 E. COLI UTI: ICD-10-CM

## 2023-12-08 PROCEDURE — 99495 TRANSJ CARE MGMT MOD F2F 14D: CPT | Performed by: FAMILY MEDICINE

## 2023-12-08 NOTE — PROGRESS NOTES
Assessment & Plan     1. Acute epididymo-orchitis  Assessment & Plan:  Complete current antibiotic and recheck culture in one week      2. E. coli UTI  -     UA w Reflex to Microscopic w Reflex to Culture -Lab Collect; Future; Expected date: 12/08/2023    3. Essential hypertension  Assessment & Plan:  Adequate BP control      4. Renal cell carcinoma of left kidney [facility])         Subjective     Transitional Care Management Review:   Griselda Rodriguez is a 66 y.o. male here for TCM follow up. During the TCM phone call patient stated:  TCM Call       Date and time call was made  12/4/2023  8:52 AM    Hospital care reviewed  Records reviewed    Patient was hospitialized at  [address] [place] [address]    Date of Admission  11/30/23    Date of discharge  12/03/23    Diagnosis  Acute epididymo-orchitis    Disposition  Home    Were the patients medications reviewed and updated  No    Current Symptoms  --  typical pain in his groin/crotch area. TCM Call       Post hospital issues  None    Should patient be enrolled in anticoag monitoring? No    Scheduled for follow up? Yes    Did you obtain your prescribed medications  Yes    Do you need help managing your prescriptions or medications  No    Is transportation to your appointment needed  No    I have advised the patient to call PCP with any new or worsening symptoms  Tad Madison MA          TCM for [facility] admission 11/30- 12/3/23    Admitted for epididymoorchitis  Had associated sepsis which was treated with IV antibiotics   He had e coli grow from urine    Testicular swelling is about back to baseline and is finishing up oral antibiotics  I did advise recheck UA/C&S in 1 week to assure sterility    He is also back on Atorvastatin but is concerned he will get arthralgias as before      Review of Systems   Constitutional:  Negative for unexpected weight change. HENT: Negative. Respiratory: Negative.      Genitourinary:  Positive for scrotal swelling and testicular pain. Negative for frequency and penile discharge. All other systems reviewed and are negative. Objective     /90 (BP Location: Left arm, Patient Position: Sitting, Cuff Size: Standard)   Pulse 78   Temp (!) 97.4 °F (36.3 °C) (Tympanic)   Ht 5' 10" (1.778 m)   Wt 93.4 kg (206 lb)   SpO2 98%   BMI 29.56 kg/m²      Physical Exam  Vitals and nursing note reviewed. Constitutional:       Appearance: Normal appearance. Cardiovascular:      Rate and Rhythm: Regular rhythm. Heart sounds: Normal heart sounds. Pulmonary:      Breath sounds: Normal breath sounds. Neurological:      Mental Status: He is oriented to person, place, and time.    Psychiatric:         Mood and Affect: Mood normal.       Medications have been reviewed by provider in current encounter    Elio Valenzuela MD

## 2023-12-11 ENCOUNTER — HOSPITAL ENCOUNTER (OUTPATIENT)
Dept: CT IMAGING | Facility: HOSPITAL | Age: 78
Discharge: HOME | End: 2023-12-11
Attending: INTERNAL MEDICINE
Payer: MEDICARE

## 2023-12-11 DIAGNOSIS — R91.1 LUNG NODULE: ICD-10-CM

## 2023-12-11 DIAGNOSIS — C64.9 RENAL CELL CARCINOMA, UNSPECIFIED LATERALITY (HCC): ICD-10-CM

## 2023-12-11 PROCEDURE — G1004 CDSM NDSC: HCPCS

## 2023-12-11 PROCEDURE — 71250 CT THORAX DX C-: CPT

## 2024-01-10 ENCOUNTER — OFFICE VISIT (OUTPATIENT)
Age: 79
End: 2024-01-10
Payer: MEDICARE

## 2024-01-10 VITALS
DIASTOLIC BLOOD PRESSURE: 78 MMHG | SYSTOLIC BLOOD PRESSURE: 126 MMHG | WEIGHT: 215 LBS | RESPIRATION RATE: 16 BRPM | OXYGEN SATURATION: 97 % | BODY MASS INDEX: 30.78 KG/M2 | HEART RATE: 89 BPM | TEMPERATURE: 97.3 F | HEIGHT: 70 IN

## 2024-01-10 DIAGNOSIS — C64.9 RENAL CELL CARCINOMA, UNSPECIFIED LATERALITY (HCC): Primary | ICD-10-CM

## 2024-01-10 DIAGNOSIS — N18.31 CHRONIC KIDNEY DISEASE, STAGE 3A (HCC): ICD-10-CM

## 2024-01-10 PROCEDURE — 99213 OFFICE O/P EST LOW 20 MIN: CPT | Performed by: INTERNAL MEDICINE

## 2024-01-11 NOTE — PROGRESS NOTES
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  We have been following stable pulmonary nodules on a CT of the chest.  They remained stable on most recent imaging.  Labs prior to this visit are normal.  Since he was last here he had a hospital admission for E. coli bacteremia.  He denies any current fevers or chills.  No new pain.  No headaches.         REVIEW OF SYSTEMS:  Please note that a 14-point review of systems was performed to include Constitutional, HEENT, Respiratory, CVS, GI, , Musculoskeletal, Integumentary, Neurologic, Rheumatologic, Endocrinologic, Psychiatric, Lymphatic, and Hematologic/Oncologic systems were reviewed and are negative unless otherwise stated in HPI. Positive and negative findings pertinent to this evaluation are incorporated into the history of present illness.      ECOG PS: 0    PROBLEM LIST:  Patient Active Problem List   Diagnosis    Abnormal fasting glucose    Renal cell carcinoma of left kidney (HCC)    CKD (chronic kidney disease), stage II    Disc degeneration, lumbosacral    Edema    Hematuria    Mixed hyperlipidemia    Essential hypertension    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HCC)    Osteoarthritis of knee    Enlarged prostate    Lower abdominal pain    Screening for colon cancer    History of colon  polyps    Diarrhea    Left ventricular diastolic dysfunction    Stage 3a chronic kidney disease (HCC)    Medicare annual wellness visit, subsequent    PARVIZ on CPAP    Peripheral neuropathic pain    Aortic valve sclerosis    BMI 29.0-29.9,adult    Mood disorder (HCC)    Acute epididymo-orchitis       Assessment / Plan: 78-year-old male with an early-stage renal cell carcinoma status post nephrectomy in 2014.  I will continue to check him once a year to follow pulmonary nodules.  They remain stable and I do not see any indication to try to biopsy them.  Right now I am not concerned for metastatic relapse.  He will follow-up in 1 year with a CBC CMP and CT of the chest abdomen and pelvis prior.  He knows to call in the interim with any questions or concerns.       I spent 20 minutes on chart review, face to face counseling time, coordination of care and documentation.    Past Medical History:   has a past medical history of Cancer of left kidney (HCC), Colon polyp, Diverticulitis of colon, Hypercholesterolemia, Hyperlipidemia, Mood disorder (HCC) (8/29/2023), and Renal neoplasm.    PAST SURGICAL HISTORY:   has a past surgical history that includes Nephrectomy (Left); Total hip arthroplasty (Left); Laparoscopic cholecystectomy; CYSTOSCOPY; Shoulder arthroscopy w/ rotator cuff repair (Left); Umbilical hernia repair; Colonoscopy; Nephrectomy (Left); Joint replacement (Left); and Colonoscopy (N/A, 4/27/2018).    CURRENT MEDICATIONS  Current Outpatient Medications   Medication Sig Dispense Refill    amLODIPine (NORVASC) 5 mg tablet TAKE 1 TABLET BY MOUTH EVERY DAY 90 tablet 3    ascorbic acid (VITAMIN C) 500 MG tablet Take 500 mg by mouth daily      atorvastatin (LIPITOR) 40 mg tablet Take 1 tablet (40 mg total) by mouth daily 90 tablet 3    Diclofenac Sodium  MG 24 hr tablet Take 1 tablet (100 mg total) by mouth daily 20 tablet 1    escitalopram (LEXAPRO) 5 mg tablet TAKE 1 TABLET (5 MG TOTAL) BY MOUTH DAILY. 90  "tablet 1    gabapentin (NEURONTIN) 100 mg capsule Take 100 mg by mouth 3 (three) times a day      Multiple Vitamins-Minerals (MULTIVITAMIN ADULT) TABS Take by mouth      Omega-3 Fatty Acids (FISH OIL) 1,000 mg by Does not apply route       No current facility-administered medications for this visit.     [unfilled]    SOCIAL HISTORY:   reports that he has quit smoking. He has never used smokeless tobacco. He reports current alcohol use. He reports that he does not use drugs.     FAMILY HISTORY:  family history includes Emphysema in his father; Tuberculosis in his mother.     ALLERGIES:  has No Known Allergies.      Physical Exam:  Vital Signs:   Visit Vitals  /78 (BP Location: Left arm, Patient Position: Sitting, Cuff Size: Standard)   Pulse 89   Temp (!) 97.3 °F (36.3 °C) (Temporal)   Resp 16   Ht 5' 10\" (1.778 m)   Wt 97.5 kg (215 lb)   SpO2 97%   BMI 30.85 kg/m²   Smoking Status Former   BSA 2.15 m²     Body mass index is 30.85 kg/m².  Body surface area is 2.15 meters squared.    GEN: Alert, awake oriented x3, in no acute distress  HEENT- No pallor, icterus, cyanosis, no oral mucosal lesions,   LAD - no palpable cervical, clavicle, axillary, inguinal LAD  Heart- normal S1 S2, regular rate and rhythm, No murmur, rubs.   Lungs- clear breathing sound bilateral.   Abdomen- soft, Non tender, bowel sounds present  Extremities- No cyanosis, clubbing, edema  Neuro- No focal neurological deficit    Labs:  Lab Results   Component Value Date    WBC 9.41 12/03/2023    HGB 13.3 12/03/2023    HCT 40.3 12/03/2023    MCV 98 12/03/2023     12/03/2023     Lab Results   Component Value Date     01/06/2016    SODIUM 138 12/03/2023    K 3.9 12/03/2023     12/03/2023    CO2 26 12/03/2023    ANIONGAP 6 01/06/2016    AGAP 9 12/03/2023    BUN 20 12/03/2023    CREATININE 1.05 12/03/2023    GLUC 95 12/03/2023    GLUF 90 10/09/2023    CALCIUM 8.9 12/03/2023    AST 18 11/30/2023    ALT 16 11/30/2023    ALKPHOS 66 " 11/30/2023    PROT 7.2 01/06/2016    TP 7.7 11/30/2023    BILITOT 0.43 01/06/2016    TBILI 0.95 11/30/2023    EGFR 67 12/03/2023

## 2024-01-29 ENCOUNTER — HOSPITAL ENCOUNTER (OUTPATIENT)
Dept: NON INVASIVE DIAGNOSTICS | Facility: HOSPITAL | Age: 79
Discharge: HOME | End: 2024-01-29
Attending: INTERNAL MEDICINE
Payer: MEDICARE

## 2024-01-29 ENCOUNTER — HOSPITAL ENCOUNTER (OUTPATIENT)
Dept: NUCLEAR MEDICINE | Facility: HOSPITAL | Age: 79
Discharge: HOME | End: 2024-01-29
Attending: INTERNAL MEDICINE
Payer: MEDICARE

## 2024-01-29 ENCOUNTER — HOSPITAL ENCOUNTER (OUTPATIENT)
Dept: NUCLEAR MEDICINE | Facility: HOSPITAL | Age: 79
Discharge: HOME | End: 2024-01-29
Attending: INTERNAL MEDICINE

## 2024-01-29 DIAGNOSIS — E78.2 MIXED HYPERLIPIDEMIA: ICD-10-CM

## 2024-01-29 DIAGNOSIS — I10 PRIMARY HYPERTENSION: ICD-10-CM

## 2024-01-29 DIAGNOSIS — R07.9 CHEST PAIN, UNSPECIFIED TYPE: ICD-10-CM

## 2024-01-29 LAB
NUC STRESS EJECTION FRACTION: 71 %
SL CV REST NUCLEAR ISOTOPE DOSE: 11 MCI
SL CV STRESS NUCLEAR ISOTOPE DOSE: 32.1 MCI
STRESS ANGINA INDEX: 0
STRESS BASELINE HR: 86 BPM
STRESS POST PEAK BP: 167 MMHG
STRESS/REST PERFUSION RATIO: 0.92

## 2024-01-29 PROCEDURE — G1004 CDSM NDSC: HCPCS

## 2024-01-29 PROCEDURE — 93016 CV STRESS TEST SUPVJ ONLY: CPT | Performed by: INTERNAL MEDICINE

## 2024-01-29 PROCEDURE — 78452 HT MUSCLE IMAGE SPECT MULT: CPT

## 2024-01-29 PROCEDURE — 93018 CV STRESS TEST I&R ONLY: CPT | Performed by: INTERNAL MEDICINE

## 2024-01-29 PROCEDURE — 78452 HT MUSCLE IMAGE SPECT MULT: CPT | Performed by: INTERNAL MEDICINE

## 2024-01-29 PROCEDURE — A9502 TC99M TETROFOSMIN: HCPCS

## 2024-01-29 PROCEDURE — 93017 CV STRESS TEST TRACING ONLY: CPT

## 2024-01-29 RX ORDER — REGADENOSON 0.08 MG/ML
0.4 INJECTION, SOLUTION INTRAVENOUS ONCE
Status: COMPLETED | OUTPATIENT
Start: 2024-01-29 | End: 2024-01-29

## 2024-01-29 RX ADMIN — REGADENOSON 0.4 MG: 0.08 INJECTION, SOLUTION INTRAVENOUS at 14:42

## 2024-01-30 LAB
MAX DIASTOLIC BP: 91 MMHG
MAX PREDICTED HEART RATE: 142 BPM
PROTOCOL NAME: NORMAL
STRESS POST EXERCISE DUR MIN: 3 MIN
STRESS POST EXERCISE DUR SEC: 0 SEC
STRESS POST PEAK HR: 129 BPM
STRESS POST PEAK SYSTOLIC BP: 167 MMHG
TARGET HR FORMULA: NORMAL
TEST INDICATION: NORMAL

## 2024-01-31 ENCOUNTER — TELEPHONE (OUTPATIENT)
Dept: CARDIOLOGY CLINIC | Facility: CLINIC | Age: 79
End: 2024-01-31

## 2024-01-31 NOTE — TELEPHONE ENCOUNTER
----- Message from Live Cuba MD sent at 1/30/2024 10:12 AM EST -----  Looks good. Normal study.     ----- Message -----  From: Humphrey Becker MD  Sent: 1/29/2024   4:50 PM EST  To: Live Cuba MD

## 2024-02-12 ENCOUNTER — OFFICE VISIT (OUTPATIENT)
Dept: PODIATRY | Facility: CLINIC | Age: 79
End: 2024-02-12
Payer: MEDICARE

## 2024-02-12 VITALS
HEIGHT: 70 IN | DIASTOLIC BLOOD PRESSURE: 82 MMHG | BODY MASS INDEX: 30.21 KG/M2 | WEIGHT: 211 LBS | SYSTOLIC BLOOD PRESSURE: 128 MMHG

## 2024-02-12 DIAGNOSIS — B35.1 ONYCHOMYCOSIS: Primary | ICD-10-CM

## 2024-02-12 DIAGNOSIS — I73.9 PERIPHERAL VASCULAR DISEASE, UNSPECIFIED (HCC): ICD-10-CM

## 2024-02-12 PROCEDURE — 11721 DEBRIDE NAIL 6 OR MORE: CPT | Performed by: PODIATRIST

## 2024-02-16 NOTE — PROGRESS NOTES
"  PATIENT:  Winnie De La Cruz  1945    ASSESSMENT:  1. Onychomycosis        2. Peripheral vascular disease, unspecified (HCC)              No orders of the defined types were placed in this encounter.       PLAN:  The patient was educated in proper foot wear.    Also discussed daily foot assessment and proper foot care.    The patient will follow up in 9-12 weeks for foot exam and care.      Procedures: 25836  All mycotic toenails were reduced and debrided in length, width, and girth using a nail nipper and dremel.  All non-dystrphic nails also trimmed.    All hyperkeratotic skin lesion(s) if present were sharply pared with a scalpel / forcep with no bleeding or evidence of ulceration.    Patient tolerated procedure(s) well without complications.    Procedures     HPI:  Winnie De La Cruz is a 79 y.o.year old male seen for at risk foot exam and care.  Unchanged clinically from prior visit.  The patient complained of elongated thick painful toenails .  The patient has class findings with peripheral vascular disease.  The patient denied any acute pedal disorder, redness, acute swelling, or recent injury.      The following portions of the patient's history were reviewed and updated as appropriate: allergies, current medications, past family history, past medical history, past social history, past surgical history and problem list.    REVIEW OF SYSTEMS:  GENERAL: No fever or chills  HEART: No chest pain, or palpitation  RESPIRATORY:  No acute SOB or cough  GI: No Nausea, vomit or diarrhea  NEUROLOGIC: No syncope or acute weakness    PHYSICAL EXAM:    /82   Ht 5' 10\" (1.778 m)   Wt 95.7 kg (211 lb)   BMI 30.28 kg/m²     VASCULAR EXAM  Posterior tibial artery  absent bilateral.    Dorsalis pedis artery +1 bilateral.  The patient has class findings with skin atrophy, lack of digital hair, and nail dystrophy.    There is trace lower extremity edema bilaterally.      NEUROLOGIC EXAM  Sensation is intact to light " touch.  Sensation is intact to 10gm monofilament.    No focal neurologic deficit.          DERMATOLOGIC EXAM:   No ulcer or cellulitis noted.  Texture, Tone and Turgor are diminished with moderate atrophic changes noted.  The patient has dystrophic/hypertrophic toenails with yellow/white discoloration, onycholysis, and subungal debris.   Fungal odor and brittle nature noted.  Pain with palpation.  Periungual erythema noted.  Right foot nails severely dystrophic x5 with 0.5 cm ave thickness (1 through 5)  Left foot nails severely dystrophic x5 with 0.4 cm ave thickness (1 through 5)  Patient has hyperkeratotic lesions noted:   Right foot located at none.  Left foot located at none.  No notable suspicious skin lesions.      MUSCULOSKELETAL EXAM:   No acute joint pain, edema, or redness.  No acute musculoskeletal problem.    Patient has no gross pedal deformities noted.  Heel tenderness noted on prior visit has improved.       Risk Category/Class Findings:   Q8(B1, B2 ABC)    A1)  Has the patient had a previous amputation of the foot or integral skeletal portion thereof? No  B1)  Does the patient have absent posterior tibial pulse? Yes  B3)  Does the patient have absent dorsalis pedis? No  B2)  Does the patient have three of the following? Yes           1.  Hair growth (increased or decreased), 2.  Nail changes (thickening) and 3.  Pigmentary changes (discoloring)  C)  Does the patient have two of the following and one above? No

## 2024-02-21 PROBLEM — Z12.11 SCREENING FOR COLON CANCER: Status: RESOLVED | Noted: 2018-04-02 | Resolved: 2024-02-21

## 2024-02-21 PROBLEM — Z00.00 MEDICARE ANNUAL WELLNESS VISIT, SUBSEQUENT: Status: RESOLVED | Noted: 2019-02-11 | Resolved: 2024-02-21

## 2024-02-27 ENCOUNTER — TELEPHONE (OUTPATIENT)
Dept: GASTROENTEROLOGY | Facility: CLINIC | Age: 79
End: 2024-02-27

## 2024-02-28 NOTE — TELEPHONE ENCOUNTER
Procedure confirmed  Colonoscopy     Via: Voice mail    Instructions given: Given to Patient at Visit     Prep Given: Miralax/Dulcolax    Call the office if there are any questions.

## 2024-03-04 ENCOUNTER — ANESTHESIA (OUTPATIENT)
Dept: GASTROENTEROLOGY | Facility: HOSPITAL | Age: 79
End: 2024-03-04

## 2024-03-04 ENCOUNTER — ANESTHESIA EVENT (OUTPATIENT)
Dept: GASTROENTEROLOGY | Facility: HOSPITAL | Age: 79
End: 2024-03-04

## 2024-03-04 ENCOUNTER — HOSPITAL ENCOUNTER (OUTPATIENT)
Dept: GASTROENTEROLOGY | Facility: HOSPITAL | Age: 79
Setting detail: OUTPATIENT SURGERY
Discharge: HOME | End: 2024-03-04
Attending: INTERNAL MEDICINE
Payer: MEDICARE

## 2024-03-04 VITALS
HEART RATE: 76 BPM | SYSTOLIC BLOOD PRESSURE: 95 MMHG | OXYGEN SATURATION: 96 % | TEMPERATURE: 98.2 F | DIASTOLIC BLOOD PRESSURE: 55 MMHG | RESPIRATION RATE: 12 BRPM

## 2024-03-04 DIAGNOSIS — Z12.11 SCREEN FOR COLON CANCER: ICD-10-CM

## 2024-03-04 PROCEDURE — G0121 COLON CA SCRN NOT HI RSK IND: HCPCS | Performed by: INTERNAL MEDICINE

## 2024-03-04 RX ORDER — PROPOFOL 10 MG/ML
INJECTION, EMULSION INTRAVENOUS AS NEEDED
Status: DISCONTINUED | OUTPATIENT
Start: 2024-03-04 | End: 2024-03-04

## 2024-03-04 RX ORDER — SODIUM CHLORIDE 9 MG/ML
INJECTION, SOLUTION INTRAVENOUS CONTINUOUS PRN
Status: DISCONTINUED | OUTPATIENT
Start: 2024-03-04 | End: 2024-03-04

## 2024-03-04 RX ORDER — PROPOFOL 10 MG/ML
INJECTION, EMULSION INTRAVENOUS CONTINUOUS PRN
Status: DISCONTINUED | OUTPATIENT
Start: 2024-03-04 | End: 2024-03-04

## 2024-03-04 RX ADMIN — SODIUM CHLORIDE: 0.9 INJECTION, SOLUTION INTRAVENOUS at 07:26

## 2024-03-04 RX ADMIN — PROPOFOL 120 MG: 10 INJECTION, EMULSION INTRAVENOUS at 07:30

## 2024-03-04 RX ADMIN — PROPOFOL 150 MCG/KG/MIN: 10 INJECTION, EMULSION INTRAVENOUS at 07:32

## 2024-03-04 NOTE — ANESTHESIA POSTPROCEDURE EVALUATION
Post-Op Assessment Note    CV Status:  Stable  Pain Score: 0    Pain management: adequate       Mental Status:  Alert and awake   Hydration Status:  Stable   PONV Controlled:  None   Airway Patency:  Patent     Post Op Vitals Reviewed: Yes    No anethesia notable event occurred.    Staff: CRNA               BP 95/55 (03/04/24 0753)    Temp 98.2 °F (36.8 °C) (03/04/24 0753)    Pulse 76 (03/04/24 0753)   Resp 12 (03/04/24 0753)    SpO2 96 % (03/04/24 0753)

## 2024-03-04 NOTE — H&P
History and Physical - [facility]  Winnie De La Cruz 79 y.o. male MRN: [id number]    HPI: Winnie De La Cruz is a 79 y.o. male who presents for colonoscopy.  He last had a colonoscopy in 2018 with multiple polyps.    REVIEW OF SYSTEMS: Per the HPI, and otherwise unremarkable.    Historical Information   Past Medical History:   Diagnosis Date    Cancer of left kidney (HCC)     2014    Colon polyp     Diverticulitis of colon     Last assessed - 5/12/14    Hypercholesterolemia     Hyperlipidemia     Mood disorder (HCC) 8/29/2023    Renal neoplasm      Past Surgical History:   Procedure Laterality Date    COLONOSCOPY      COLONOSCOPY N/A 4/27/2018    Procedure: COLONOSCOPY;  Surgeon: Sarah Modi MD;  Location: [facility];  Service: Gastroenterology    CYSTOSCOPY      Onset - 5/2/16 [facility]     JOINT REPLACEMENT Left     Hip    LAPAROSCOPIC CHOLECYSTECTOMY      NEPHRECTOMY Left     NEPHRECTOMY Left     SHOULDER ARTHROSCOPY W/ ROTATOR CUFF REPAIR Left     TOTAL HIP ARTHROPLASTY Left     original hardware became infected and pt needed a second surgery to replace hardware    UMBILICAL HERNIA REPAIR       Social History   Social History     Substance and Sexual Activity   Alcohol Use Yes    Comment: Social     Social History     Substance and Sexual Activity   Drug Use No     Social History     Tobacco Use   Smoking Status Former   Smokeless Tobacco Never     Family History   Problem Relation Age of Onset    Tuberculosis Mother     Emphysema Father         Lung       Meds/Allergies       Current Outpatient Medications:     amLODIPine (NORVASC) 5 mg tablet    ascorbic acid (VITAMIN C) 500 MG tablet    atorvastatin (LIPITOR) 40 mg tablet    Diclofenac Sodium  MG 24 hr tablet    escitalopram (LEXAPRO) 5 mg tablet    gabapentin (NEURONTIN) 100 mg capsule    Multiple Vitamins-Minerals (MULTIVITAMIN ADULT) TABS    Omega-3 Fatty Acids (FISH OIL) 1,000 mg    No Known Allergies    Objective     BP  130/79   Pulse 74   Temp (!) 96.8 °F (36 °C) (Temporal)   Resp 18   SpO2 98%     PHYSICAL EXAM    Gen: NAD AAOx3  Head: Normocephalic, Atraumatic  CV: S1S2 RRR no m/r/g  CHEST: Clear b/l no c/r/w  ABD: soft, +BS NT/ND  EXT: no edema    ASSESSMENT/PLAN:  This is a 79 y.o. year old male here for colonoscopy, and he is stable and optimized for his procedure.

## 2024-03-04 NOTE — ANESTHESIA PREPROCEDURE EVALUATION
Procedure:  COLONOSCOPY    Relevant Problems   CARDIO   (+) Essential hypertension   (+) Mixed hyperlipidemia      /RENAL   (+) CKD (chronic kidney disease), stage II   (+) Renal cell carcinoma of left kidney (HCC)   (+) Stage 3a chronic kidney disease (HCC)      MUSCULOSKELETAL   (+) Disc degeneration, lumbosacral   (+) Osteoarthritis of knee      PULMONARY   (+) PARVIZ on CPAP        Physical Exam    Airway    Mallampati score: II  TM Distance: <3 FB  Neck ROM: full     Dental    upper dentures    Cardiovascular      Pulmonary      Other Findings        Anesthesia Plan  ASA Score- 3     Anesthesia Type- IV sedation with anesthesia with ASA Monitors.         Additional Monitors:     Airway Plan:     Comment: 01:30 - last of PO bowel prep    Patient educated on the possibility for awareness under sedation and of the possibility of airway intervention in the event of an airway or procedural emergency  .       Plan Factors-Exercise tolerance (METS): <4 METS.    Chart reviewed.    Patient summary reviewed.    Patient is not a current smoker.              Induction- intravenous.    Postoperative Plan-     Informed Consent- Anesthetic plan and risks discussed with patient.  I personally reviewed this patient with the CRNA. Discussed and agreed on the Anesthesia Plan with the CRNA..

## 2024-05-08 DIAGNOSIS — F39 MOOD DISORDER (HCC): ICD-10-CM

## 2024-05-08 RX ORDER — ESCITALOPRAM OXALATE 5 MG/1
5 TABLET ORAL DAILY
Qty: 90 TABLET | Refills: 1 | Status: SHIPPED | OUTPATIENT
Start: 2024-05-08

## 2024-05-13 ENCOUNTER — OFFICE VISIT (OUTPATIENT)
Dept: PODIATRY | Facility: CLINIC | Age: 79
End: 2024-05-13
Payer: MEDICARE

## 2024-05-13 VITALS
BODY MASS INDEX: 30.35 KG/M2 | DIASTOLIC BLOOD PRESSURE: 80 MMHG | SYSTOLIC BLOOD PRESSURE: 118 MMHG | WEIGHT: 212 LBS | HEIGHT: 70 IN

## 2024-05-13 DIAGNOSIS — I73.9 PERIPHERAL VASCULAR DISEASE, UNSPECIFIED (HCC): ICD-10-CM

## 2024-05-13 DIAGNOSIS — B35.1 ONYCHOMYCOSIS: Primary | ICD-10-CM

## 2024-05-13 PROCEDURE — 11721 DEBRIDE NAIL 6 OR MORE: CPT | Performed by: PODIATRIST

## 2024-05-13 NOTE — PROGRESS NOTES
"  PATIENT:  Winnie De La Cruz  1945    ASSESSMENT:  1. Onychomycosis        2. Peripheral vascular disease, unspecified (HCC)              No orders of the defined types were placed in this encounter.       PLAN:  The patient was educated in proper foot wear.    Also discussed daily foot assessment and proper foot care.    The patient will follow up in 9-12 weeks for foot exam and care.      Procedures: 15735  All mycotic toenails were reduced and debrided in length, width, and girth using a nail nipper and dremel.  All non-dystrphic nails also trimmed.    Patient tolerated procedure(s) well without complications.    Procedures     HPI:  Winnie De La Cruz is a 79 y.o.year old male seen for at risk foot exam and care.  Unchanged clinically from prior visit.  The patient complained of elongated thick painful toenails .  The patient has class findings with peripheral vascular disease.  The patient denied any acute pedal disorder, redness, acute swelling, or recent injury.      The following portions of the patient's history were reviewed and updated as appropriate: allergies, current medications, past family history, past medical history, past social history, past surgical history and problem list.    REVIEW OF SYSTEMS:  GENERAL: No fever or chills  HEART: No chest pain, or palpitation  RESPIRATORY:  No acute SOB or cough  GI: No Nausea, vomit or diarrhea  NEUROLOGIC: No syncope or acute weakness    PHYSICAL EXAM:    /80 (BP Location: Left arm, Patient Position: Sitting, Cuff Size: Adult)   Ht 5' 10\" (1.778 m) Comment: STATED  Wt 96.2 kg (212 lb)   BMI 30.42 kg/m²     VASCULAR EXAM  Posterior tibial artery  absent bilateral.    Dorsalis pedis artery +1 bilateral.  The patient has class findings with skin atrophy, lack of digital hair, and nail dystrophy.    There is trace lower extremity edema bilaterally.      NEUROLOGIC EXAM  Sensation is intact to light touch.  Sensation is intact to 10gm monofilament.  "   No focal neurologic deficit.          DERMATOLOGIC EXAM:   No ulcer or cellulitis noted.  Texture, Tone and Turgor are diminished with moderate atrophic changes noted.  The patient has dystrophic/hypertrophic toenails with yellow/white discoloration, onycholysis, and subungal debris.   Fungal odor and brittle nature noted.  Pain with palpation.  Periungual erythema noted.  Right foot nails severely dystrophic x5 with 0.5 cm ave thickness (1 through 5)  Left foot nails severely dystrophic x5 with 0.4 cm ave thickness (1 through 5)  Patient has hyperkeratotic lesions noted:   Right foot located at none.  Left foot located at none.  No notable suspicious skin lesions.      MUSCULOSKELETAL EXAM:   No acute joint pain, edema, or redness.  No acute musculoskeletal problem.    Patient has no gross pedal deformities noted.  Heel tenderness noted on prior visit has improved.       Risk Category/Class Findings:   Q8(B1, B2 ABC)    A1)  Has the patient had a previous amputation of the foot or integral skeletal portion thereof? No  B1)  Does the patient have absent posterior tibial pulse? Yes  B3)  Does the patient have absent dorsalis pedis? No  B2)  Does the patient have three of the following? Yes           1.  Hair growth (increased or decreased), 2.  Nail changes (thickening) and 3.  Pigmentary changes (discoloring)  C)  Does the patient have two of the following and one above? No               Cyclophosphamide Pregnancy And Lactation Text: This medication is Pregnancy Category D and it isn't considered safe during pregnancy. This medication is excreted in breast milk.

## 2024-05-17 DIAGNOSIS — I10 ESSENTIAL HYPERTENSION: ICD-10-CM

## 2024-05-18 RX ORDER — AMLODIPINE BESYLATE 5 MG/1
TABLET ORAL
Qty: 90 TABLET | Refills: 1 | Status: SHIPPED | OUTPATIENT
Start: 2024-05-18

## 2024-06-10 ENCOUNTER — OFFICE VISIT (OUTPATIENT)
Dept: FAMILY MEDICINE CLINIC | Facility: HOSPITAL | Age: 79
End: 2024-06-10
Payer: MEDICARE

## 2024-06-10 VITALS
DIASTOLIC BLOOD PRESSURE: 80 MMHG | SYSTOLIC BLOOD PRESSURE: 118 MMHG | HEART RATE: 95 BPM | TEMPERATURE: 98.2 F | BODY MASS INDEX: 30.18 KG/M2 | HEIGHT: 70 IN | WEIGHT: 210.8 LBS | OXYGEN SATURATION: 97 %

## 2024-06-10 DIAGNOSIS — I10 ESSENTIAL HYPERTENSION: ICD-10-CM

## 2024-06-10 DIAGNOSIS — Z00.00 MEDICARE ANNUAL WELLNESS VISIT, SUBSEQUENT: Primary | ICD-10-CM

## 2024-06-10 DIAGNOSIS — N18.31 STAGE 3A CHRONIC KIDNEY DISEASE (HCC): ICD-10-CM

## 2024-06-10 DIAGNOSIS — F39 MOOD DISORDER (HCC): ICD-10-CM

## 2024-06-10 DIAGNOSIS — E78.2 MIXED HYPERLIPIDEMIA: ICD-10-CM

## 2024-06-10 DIAGNOSIS — C64.2 RENAL CELL CARCINOMA OF LEFT KIDNEY (HCC): ICD-10-CM

## 2024-06-10 DIAGNOSIS — R73.01 ABNORMAL FASTING GLUCOSE: ICD-10-CM

## 2024-06-10 PROCEDURE — 99214 OFFICE O/P EST MOD 30 MIN: CPT | Performed by: FAMILY MEDICINE

## 2024-06-10 PROCEDURE — G0439 PPPS, SUBSEQ VISIT: HCPCS | Performed by: FAMILY MEDICINE

## 2024-06-10 NOTE — PATIENT INSTRUCTIONS
Medicare Preventive Visit Patient Instructions  Thank you for completing your Welcome to Medicare Visit or Medicare Annual Wellness Visit today. Your next wellness visit will be due in one year (6/11/2025).  The screening/preventive services that you may require over the next 5-10 years are detailed below. Some tests may not apply to you based off risk factors and/or age. Screening tests ordered at today's visit but not completed yet may show as past due. Also, please note that scanned in results may not display below.  Preventive Screenings:  Service Recommendations Previous Testing/Comments   Colorectal Cancer Screening  Colonoscopy    Fecal Occult Blood Test (FOBT)/Fecal Immunochemical Test (FIT)  Fecal DNA/Cologuard Test  Flexible Sigmoidoscopy Age: 45-75 years old   Colonoscopy: every 10 years (May be performed more frequently if at higher risk)  OR  FOBT/FIT: every 1 year  OR  Cologuard: every 3 years  OR  Sigmoidoscopy: every 5 years  Screening may be recommended earlier than age 45 if at higher risk for colorectal cancer. Also, an individualized decision between you and your healthcare provider will decide whether screening between the ages of 76-85 would be appropriate. Colonoscopy: 03/04/2024  FOBT/FIT: Not on file  Cologuard: Not on file  Sigmoidoscopy: Not on file    Screening Current     Prostate Cancer Screening Individualized decision between patient and health care provider in men between ages of 55-69   Medicare will cover every 12 months beginning on the day after your 50th birthday PSA: 2.68 ng/mL     Screening Not Indicated     Hepatitis C Screening Once for adults born between 1945 and 1965  More frequently in patients at high risk for Hepatitis C Hep C Antibody: 06/29/2020    Screening Current   Diabetes Screening 1-2 times per year if you're at risk for diabetes or have pre-diabetes Fasting glucose: 90 mg/dL (10/9/2023)  A1C: 5.9 % (10/9/2023)  Screening Current   Cholesterol Screening Once  every 5 years if you don't have a lipid disorder. May order more often based on risk factors. Lipid panel: 10/09/2023  Screening Not Indicated  History Lipid Disorder      Other Preventive Screenings Covered by Medicare:  Abdominal Aortic Aneurysm (AAA) Screening: covered once if your at risk. You're considered to be at risk if you have a family history of AAA or a male between the age of 65-75 who smoking at least 100 cigarettes in your lifetime.  Lung Cancer Screening: covers low dose CT scan once per year if you meet all of the following conditions: (1) Age 55-77; (2) No signs or symptoms of lung cancer; (3) Current smoker or have quit smoking within the last 15 years; (4) You have a tobacco smoking history of at least 20 pack years (packs per day x number of years you smoked); (5) You get a written order from a healthcare provider.  Glaucoma Screening: covered annually if you're considered high risk: (1) You have diabetes OR (2) Family history of glaucoma OR (3)  aged 50 and older OR (4)  American aged 65 and older  Osteoporosis Screening: covered every 2 years if you meet one of the following conditions: (1) Have a vertebral abnormality; (2) On glucocorticoid therapy for more than 3 months; (3) Have primary hyperparathyroidism; (4) On osteoporosis medications and need to assess response to drug therapy.  HIV Screening: covered annually if you're between the age of 15-65. Also covered annually if you are younger than 15 and older than 65 with risk factors for HIV infection. For pregnant patients, it is covered up to 3 times per pregnancy.    Immunizations:  Immunization Recommendations   Influenza Vaccine Annual influenza vaccination during flu season is recommended for all persons aged >= 6 months who do not have contraindications   Pneumococcal Vaccine   * Pneumococcal conjugate vaccine = PCV13 (Prevnar 13), PCV15 (Vaxneuvance), PCV20 (Prevnar 20)  * Pneumococcal polysaccharide vaccine  = PPSV23 (Pneumovax) Adults 19-63 yo with certain risk factors or if 65+ yo  If never received any pneumonia vaccine: recommend Prevnar 20 (PCV20)  Give PCV20 if previously received 1 dose of PCV13 or PPSV23   Hepatitis B Vaccine 3 dose series if at intermediate or high risk (ex: diabetes, end stage renal disease, liver disease)   Respiratory syncytial virus (RSV) Vaccine - COVERED BY MEDICARE PART D  * RSVPreF3 (Arexvy) CDC recommends that adults 60 years of age and older may receive a single dose of RSV vaccine using shared clinical decision-making (SCDM)   Tetanus (Td) Vaccine - COST NOT COVERED BY MEDICARE PART B Following completion of primary series, a booster dose should be given every 10 years to maintain immunity against tetanus. Td may also be given as tetanus wound prophylaxis.   Tdap Vaccine - COST NOT COVERED BY MEDICARE PART B Recommended at least once for all adults. For pregnant patients, recommended with each pregnancy.   Shingles Vaccine (Shingrix) - COST NOT COVERED BY MEDICARE PART B  2 shot series recommended in those 19 years and older who have or will have weakened immune systems or those 50 years and older     Health Maintenance Due:      Topic Date Due   • Hepatitis C Screening  Completed   • Colorectal Cancer Screening  Discontinued     Immunizations Due:      Topic Date Due   • COVID-19 Vaccine (3 - 2023-24 season) 09/01/2023     Advance Directives   What are advance directives?  Advance directives are legal documents that state your wishes and plans for medical care. These plans are made ahead of time in case you lose your ability to make decisions for yourself. Advance directives can apply to any medical decision, such as the treatments you want, and if you want to donate organs.   What are the types of advance directives?  There are many types of advance directives, and each state has rules about how to use them. You may choose a combination of any of the following:  Living will:  This  is a written record of the treatment you want. You can also choose which treatments you do not want, which to limit, and which to stop at a certain time. This includes surgery, medicine, IV fluid, and tube feedings.   Durable power of  for healthcare (DPAHC):  This is a written record that states who you want to make healthcare choices for you when you are unable to make them for yourself. This person, called a proxy, is usually a family member or a friend. You may choose more than 1 proxy.  Do not resuscitate (DNR) order:  A DNR order is used in case your heart stops beating or you stop breathing. It is a request not to have certain forms of treatment, such as CPR. A DNR order may be included in other types of advance directives.  Medical directive:  This covers the care that you want if you are in a coma, near death, or unable to make decisions for yourself. You can list the treatments you want for each condition. Treatment may include pain medicine, surgery, blood transfusions, dialysis, IV or tube feedings, and a ventilator (breathing machine).  Values history:  This document has questions about your views, beliefs, and how you feel and think about life. This information can help others choose the care that you would choose.  Why are advance directives important?  An advance directive helps you control your care. Although spoken wishes may be used, it is better to have your wishes written down. Spoken wishes can be misunderstood, or not followed. Treatments may be given even if you do not want them. An advance directive may make it easier for your family to make difficult choices about your care.   Weight Management   Why it is important to manage your weight:  Being overweight increases your risk of health conditions such as heart disease, high blood pressure, type 2 diabetes, and certain types of cancer. It can also increase your risk for osteoarthritis, sleep apnea, and other respiratory problems. Aim  for a slow, steady weight loss. Even a small amount of weight loss can lower your risk of health problems.  How to lose weight safely:  A safe and healthy way to lose weight is to eat fewer calories and get regular exercise. You can lose up about 1 pound a week by decreasing the number of calories you eat by 500 calories each day.   Healthy meal plan for weight management:  A healthy meal plan includes a variety of foods, contains fewer calories, and helps you stay healthy. A healthy meal plan includes the following:  Eat whole-grain foods more often.  A healthy meal plan should contain fiber. Fiber is the part of grains, fruits, and vegetables that is not broken down by your body. Whole-grain foods are healthy and provide extra fiber in your diet. Some examples of whole-grain foods are whole-wheat breads and pastas, oatmeal, brown rice, and bulgur.  Eat a variety of vegetables every day.  Include dark, leafy greens such as spinach, kale, chandler greens, and mustard greens. Eat yellow and orange vegetables such as carrots, sweet potatoes, and winter squash.   Eat a variety of fruits every day.  Choose fresh or canned fruit (canned in its own juice or light syrup) instead of juice. Fruit juice has very little or no fiber.  Eat low-fat dairy foods.  Drink fat-free (skim) milk or 1% milk. Eat fat-free yogurt and low-fat cottage cheese. Try low-fat cheeses such as mozzarella and other reduced-fat cheeses.  Choose meat and other protein foods that are low in fat.  Choose beans or other legumes such as split peas or lentils. Choose fish, skinless poultry (chicken or turkey), or lean cuts of red meat (beef or pork). Before you cook meat or poultry, cut off any visible fat.   Use less fat and oil.  Try baking foods instead of frying them. Add less fat, such as margarine, sour cream, regular salad dressing and mayonnaise to foods. Eat fewer high-fat foods. Some examples of high-fat foods include french fries, doughnuts, ice  cream, and cakes.  Eat fewer sweets.  Limit foods and drinks that are high in sugar. This includes candy, cookies, regular soda, and sweetened drinks.  Exercise:  Exercise at least 30 minutes per day on most days of the week. Some examples of exercise include walking, biking, dancing, and swimming. You can also fit in more physical activity by taking the stairs instead of the elevator or parking farther away from stores. Ask your healthcare provider about the best exercise plan for you.      © Copyright [employer] 2018 Information is for End User's use only and may not be sold, redistributed or otherwise used for commercial purposes. All illustrations and images included in CareNotes® are the copyrighted property of [employer] or [employer]

## 2024-06-10 NOTE — PROGRESS NOTES
Ambulatory Visit  Name: Winnie De La Cruz      : 1945      MRN: [id number]  Encounter Provider: Jun Owens MD  Encounter Date: 6/10/2024   Encounter department: [facility]     Assessment & Plan   1. Medicare annual wellness visit, subsequent  2. Mixed hyperlipidemia  -     Lipid Panel with Direct LDL reflex; Future  3. Abnormal fasting glucose  Assessment & Plan:  A1c diet controlled  Orders:  -     Hemoglobin A1C; Future  4. Renal cell carcinoma of left kidney (HCC)  Assessment & Plan:  JUAN  5. Stage 3a chronic kidney disease (HCC)  Assessment & Plan:  Lab Results   Component Value Date    EGFR 67 2023    EGFR 65 2023    EGFR 63 2023    CREATININE 1.05 2023    CREATININE 1.08 2023    CREATININE 1.11 2023     6. Essential hypertension  Assessment & Plan:  Good BP control  Orders:  -     CBC and differential; Future  -     TSH, 3rd generation with Free T4 reflex; Future  -     Comprehensive metabolic panel; Future  7. Mood disorder (HCC)      Depression Screening and Follow-up Plan: Patient was screened for depression during today's encounter. They screened negative with a PHQ-2 score of 0.      Preventive health issues were discussed with patient, and age appropriate screening tests were ordered as noted in patient's After Visit Summary. Personalized health advice and appropriate referrals for health education or preventive services given if needed, as noted in patient's After Visit Summary.    History of Present Illness     6 month follow up    No recent illness or injury    Following with Dr Garcia for renal cell follow up    Continues to work several days a week       Patient Care Team:  Jun Owens MD as PCP - General  DO Kian Sheikh MD Guillermo Carnero, MD Wei-Shen Lin, MD Daniel Stauffer, MD Sarina Kapoor, MD as Endoscopist    Review of Systems   Constitutional:  Negative for unexpected weight  change.   Eyes:  Negative for visual disturbance.   Respiratory: Negative.     Cardiovascular: Negative.    Genitourinary: Negative.    Musculoskeletal:  Positive for arthralgias.   Hematological: Negative.    Psychiatric/Behavioral: Negative.     All other systems reviewed and are negative.    Medical History Reviewed by provider this encounter:  Tobacco  Allergies  Meds  Problems  Med Hx  Surg Hx  Fam Hx       Annual Wellness Visit Questionnaire   Winnie is here for his Subsequent Wellness visit. Last Medicare Wellness visit information reviewed, patient interviewed and updates made to the record today.      Health Risk Assessment:   Patient rates overall health as very good. Patient feels that their physical health rating is same. Patient is satisfied with their life. Eyesight was rated as slightly worse. Hearing was rated as slightly worse. Patient feels that their emotional and mental health rating is same. Patients states they are never, rarely angry. Patient states they are sometimes unusually tired/fatigued. Pain experienced in the last 7 days has been none. Patient states that he has experienced no weight loss or gain in last 6 months.     Depression Screening:   PHQ-2 Score: 0      Fall Risk Screening:   In the past year, patient has experienced: no history of falling in past year      Home Safety:  Patient does not have trouble with stairs inside or outside of their home. Patient has working smoke alarms and has no working carbon monoxide detector. Home safety hazards include: none.     Nutrition:   Current diet is Regular.     Medications:   Patient is not currently taking any over-the-counter supplements. Patient is able to manage medications.     Activities of Daily Living (ADLs)/Instrumental Activities of Daily Living (IADLs):   Walk and transfer into and out of bed and chair?: Yes  Dress and groom yourself?: Yes    Bathe or shower yourself?: Yes    Feed yourself? Yes  Do your  laundry/housekeeping?: Yes  Manage your money, pay your bills and track your expenses?: Yes  Make your own meals?: Yes    Do your own shopping?: Yes    Previous Hospitalizations:   Any hospitalizations or ED visits within the last 12 months?: No      Advance Care Planning:   Living will: Yes    Durable POA for healthcare: Yes    Advanced directive: Yes    Advanced directive counseling given: Yes    Five wishes given: No    Patient declined ACP directive: No    End of Life Decisions reviewed with patient: Yes    Provider agrees with end of life decisions: Yes      Cognitive Screening:   Provider or family/friend/caregiver concerned regarding cognition?: No    PREVENTIVE SCREENINGS      Cardiovascular Screening:    General: Screening Not Indicated and History Lipid Disorder      Diabetes Screening:     General: Screening Current      Colorectal Cancer Screening:     General: Screening Current      Prostate Cancer Screening:    General: Screening Not Indicated      Osteoporosis Screening:    General: Screening Not Indicated      Abdominal Aortic Aneurysm (AAA) Screening:    Risk factors include: tobacco use        General: Screening Not Indicated      Lung Cancer Screening:     General: Screening Not Indicated      Hepatitis C Screening:    General: Screening Current    Screening, Brief Intervention, and Referral to Treatment (SBIRT)    Screening  Typical number of drinks in a day: 0  Typical number of drinks in a week: 0  Interpretation: Low risk drinking behavior.    Single Item Drug Screening:  How often have you used an illegal drug (including marijuana) or a prescription medication for non-medical reasons in the past year? never    Single Item Drug Screen Score: 0  Interpretation: Negative screen for possible drug use disorder    Social Determinants of Health     Financial Resource Strain: Low Risk  (4/17/2023)    Overall Financial Resource Strain (CARDIA)     Difficulty of Paying Living Expenses: Not hard at all  "  Food Insecurity: No Food Insecurity (6/10/2024)    Hunger Vital Sign     Worried About Running Out of Food in the Last Year: Never true     Ran Out of Food in the Last Year: Never true   Transportation Needs: No Transportation Needs (6/10/2024)    PRAPARE - Transportation     Lack of Transportation (Medical): No     Lack of Transportation (Non-Medical): No   Housing Stability: Low Risk  (6/10/2024)    Housing Stability Vital Sign     Unable to Pay for Housing in the Last Year: No     Number of Times Moved in the Last Year: 1     Homeless in the Last Year: No   Utilities: Not At Risk (6/10/2024)    [facility] Utilities     Threatened with loss of utilities: No     Vision Screening    Right eye Left eye Both eyes   Without correction      With correction 20/30 20/50 20/30       Objective     /80 (BP Location: Left arm, Patient Position: Sitting, Cuff Size: Standard)   Pulse 95   Temp 98.2 °F (36.8 °C) (Tympanic)   Ht 5' 10\" (1.778 m)   Wt 95.6 kg (210 lb 12.8 oz)   SpO2 97%   BMI 30.25 kg/m²     Physical Exam  Vitals and nursing note reviewed.   Constitutional:       Appearance: Normal appearance.   HENT:      Right Ear: Tympanic membrane and ear canal normal.      Left Ear: Tympanic membrane and ear canal normal.   Neck:      Vascular: No carotid bruit.   Cardiovascular:      Rate and Rhythm: Normal rate and regular rhythm.      Heart sounds: Normal heart sounds.   Pulmonary:      Breath sounds: Normal breath sounds.   Abdominal:      General: Bowel sounds are normal.   Musculoskeletal:      Right lower leg: No edema.      Left lower leg: No edema.   Neurological:      Mental Status: He is alert and oriented to person, place, and time.   Psychiatric:         Mood and Affect: Mood normal.       Administrative Statements           "

## 2024-06-10 NOTE — ASSESSMENT & PLAN NOTE
Lab Results   Component Value Date    EGFR 67 12/03/2023    EGFR 65 12/02/2023    EGFR 63 12/01/2023    CREATININE 1.05 12/03/2023    CREATININE 1.08 12/02/2023    CREATININE 1.11 12/01/2023

## 2024-07-10 PROBLEM — Z00.00 MEDICARE ANNUAL WELLNESS VISIT, SUBSEQUENT: Status: RESOLVED | Noted: 2019-02-11 | Resolved: 2024-07-10

## 2024-08-12 ENCOUNTER — OFFICE VISIT (OUTPATIENT)
Dept: PODIATRY | Facility: CLINIC | Age: 79
End: 2024-08-12
Payer: MEDICARE

## 2024-08-12 VITALS
HEIGHT: 70 IN | DIASTOLIC BLOOD PRESSURE: 61 MMHG | WEIGHT: 211 LBS | BODY MASS INDEX: 30.21 KG/M2 | SYSTOLIC BLOOD PRESSURE: 125 MMHG

## 2024-08-12 DIAGNOSIS — B35.1 ONYCHOMYCOSIS: Primary | ICD-10-CM

## 2024-08-12 DIAGNOSIS — I73.9 PERIPHERAL VASCULAR DISEASE, UNSPECIFIED (HCC): ICD-10-CM

## 2024-08-12 PROCEDURE — 11721 DEBRIDE NAIL 6 OR MORE: CPT | Performed by: PODIATRIST

## 2024-08-15 NOTE — PROGRESS NOTES
"  PATIENT:  Winnie De La Cruz  1945    ASSESSMENT:  1. Onychomycosis        2. Peripheral vascular disease, unspecified (HCC)                No orders of the defined types were placed in this encounter.       PLAN:  The patient was educated in proper foot wear.    Also discussed daily foot assessment and proper foot care.    The patient will follow up in 9-12 weeks for foot exam and care.      Procedures: 98754  All mycotic toenails were reduced and debrided in length, width, and girth using a nail nipper and dremel.  All non-dystrphic nails also trimmed.    Patient tolerated procedure(s) well without complications.    Procedures     HPI:  Winnie De La Cruz is a 79 y.o.year old male seen for at risk foot exam and care.  Unchanged clinically from prior visit.  The patient complained of elongated thick painful toenails .  The patient has class findings with peripheral vascular disease.  The patient denied any acute pedal disorder, redness, acute swelling, or recent injury.      The following portions of the patient's history were reviewed and updated as appropriate: allergies, current medications, past family history, past medical history, past social history, past surgical history and problem list.    REVIEW OF SYSTEMS:  GENERAL: No fever or chills  HEART: No chest pain, or palpitation  RESPIRATORY:  No acute SOB or cough  GI: No Nausea, vomit or diarrhea  NEUROLOGIC: No syncope or acute weakness    PHYSICAL EXAM:    /61 (BP Location: Left arm, Patient Position: Sitting, Cuff Size: Large)   Ht 5' 10\" (1.778 m) Comment: stated  Wt 95.7 kg (211 lb)   BMI 30.28 kg/m²     VASCULAR EXAM  Posterior tibial artery  absent bilateral.    Dorsalis pedis artery +1 bilateral.  The patient has class findings with skin atrophy, lack of digital hair, and nail dystrophy.    There is trace lower extremity edema bilaterally.      NEUROLOGIC EXAM  Sensation is intact to light touch.  Sensation is intact to 10gm monofilament.  "   No focal neurologic deficit.          DERMATOLOGIC EXAM:   No ulcer or cellulitis noted.  Texture, Tone and Turgor are diminished with moderate atrophic changes noted.  The patient has dystrophic/hypertrophic toenails with yellow/white discoloration, onycholysis, and subungal debris.   Fungal odor and brittle nature noted.  Pain with palpation.  Periungual erythema noted.  Right foot nails severely dystrophic x5 with 0.5 cm ave thickness (1 through 5)  Left foot nails severely dystrophic x5 with 0.4 cm ave thickness (1 through 5)  Patient has hyperkeratotic lesions noted:   Right foot located at none.  Left foot located at none.  No notable suspicious skin lesions.      MUSCULOSKELETAL EXAM:   No acute joint pain, edema, or redness.  No acute musculoskeletal problem.    Patient has no gross pedal deformities noted.  Heel tenderness noted on prior visit has improved.       Risk Category/Class Findings:   Q8(B1, B2 ABC)    A1)  Has the patient had a previous amputation of the foot or integral skeletal portion thereof? No  B1)  Does the patient have absent posterior tibial pulse? Yes  B3)  Does the patient have absent dorsalis pedis? No  B2)  Does the patient have three of the following? Yes           1.  Hair growth (increased or decreased), 2.  Nail changes (thickening) and 3.  Pigmentary changes (discoloring)  C)  Does the patient have two of the following and one above? No

## 2024-10-31 DIAGNOSIS — E78.2 MIXED HYPERLIPIDEMIA: ICD-10-CM

## 2024-10-31 RX ORDER — ATORVASTATIN CALCIUM 40 MG/1
40 TABLET, FILM COATED ORAL DAILY
Qty: 90 TABLET | Refills: 0 | Status: SHIPPED | OUTPATIENT
Start: 2024-10-31

## 2024-11-01 ENCOUNTER — TELEPHONE (OUTPATIENT)
Dept: CARDIOLOGY CLINIC | Facility: CLINIC | Age: 79
End: 2024-11-01

## 2024-11-01 NOTE — TELEPHONE ENCOUNTER
Patient is requesting a new order to get any blood work done that Dr Cuba needs.  Please mail blood work order to the patient

## 2024-11-01 NOTE — TELEPHONE ENCOUNTER
Called spoke to pt, relayed that labs have already been ordered by PCP that we here at [facility] would order for him, and just to complete these would be ok. Pt verbalized understanding.

## 2024-11-07 ENCOUNTER — TELEPHONE (OUTPATIENT)
Dept: FAMILY MEDICINE CLINIC | Facility: HOSPITAL | Age: 79
End: 2024-11-07

## 2024-11-07 NOTE — TELEPHONE ENCOUNTER
Previous dr tafoya patient will be seeing you going forward.    Currently scheduled for 12/9/24.  However having cataract surgery 1/7 & 1/21/25.    Ok to reschedule to 12/23 and use your new patient spot?

## 2024-11-11 ENCOUNTER — OFFICE VISIT (OUTPATIENT)
Dept: PODIATRY | Facility: CLINIC | Age: 79
End: 2024-11-11
Payer: MEDICARE

## 2024-11-11 VITALS
HEART RATE: 87 BPM | DIASTOLIC BLOOD PRESSURE: 63 MMHG | WEIGHT: 210 LBS | HEIGHT: 70 IN | SYSTOLIC BLOOD PRESSURE: 121 MMHG | BODY MASS INDEX: 30.06 KG/M2

## 2024-11-11 DIAGNOSIS — B35.1 ONYCHOMYCOSIS: Primary | ICD-10-CM

## 2024-11-11 DIAGNOSIS — I73.9 PERIPHERAL VASCULAR DISEASE, UNSPECIFIED (HCC): ICD-10-CM

## 2024-11-11 PROCEDURE — 11721 DEBRIDE NAIL 6 OR MORE: CPT | Performed by: PODIATRIST

## 2024-11-18 NOTE — PROGRESS NOTES
"  PATIENT:  Winnie De La Cruz  1945    ASSESSMENT:  1. Onychomycosis        2. Peripheral vascular disease, unspecified (HCC)                No orders of the defined types were placed in this encounter.       PLAN:  The patient was educated in proper foot wear.    Also discussed daily foot assessment and proper foot care.    The patient will follow up in 9-12 weeks for foot exam and care.      Procedures: 48197  All mycotic toenails were reduced and debrided in length, width, and girth using a nail nipper and dremel.  All non-dystrphic nails also trimmed.    Patient tolerated procedure(s) well without complications.    Procedures     HPI:  Winnie De La Cruz is a 79 y.o.year old male seen for at risk foot exam and care.  Unchanged clinically from prior visit.  The patient complained of elongated thick painful toenails .  The patient has class findings with peripheral vascular disease.  The patient denied any acute pedal disorder, redness, acute swelling, or recent injury.      The following portions of the patient's history were reviewed and updated as appropriate: allergies, current medications, past family history, past medical history, past social history, past surgical history and problem list.    REVIEW OF SYSTEMS:  GENERAL: No fever or chills  HEART: No chest pain, or palpitation  RESPIRATORY:  No acute SOB or cough  GI: No Nausea, vomit or diarrhea  NEUROLOGIC: No syncope or acute weakness    PHYSICAL EXAM:    /63 (BP Location: Left arm, Patient Position: Sitting, Cuff Size: Adult)   Pulse 87   Ht 5' 10\" (1.778 m) Comment: stated  Wt 95.3 kg (210 lb)   BMI 30.13 kg/m²     VASCULAR EXAM  Posterior tibial artery  absent bilateral.    Dorsalis pedis artery +1 bilateral.  The patient has class findings with skin atrophy, lack of digital hair, and nail dystrophy.    There is trace lower extremity edema bilaterally.      NEUROLOGIC EXAM  Sensation is intact to light touch.  Sensation is intact to 10gm " monofilament.    No focal neurologic deficit.          DERMATOLOGIC EXAM:   No ulcer or cellulitis noted.  Texture, Tone and Turgor are diminished with moderate atrophic changes noted.  The patient has dystrophic/hypertrophic toenails with yellow/white discoloration, onycholysis, and subungal debris.   Fungal odor and brittle nature noted.  Pain with palpation.  Periungual erythema noted.  Right foot nails severely dystrophic x5 with 0.5 cm ave thickness (1 through 5)  Left foot nails severely dystrophic x5 with 0.4 cm ave thickness (1 through 5)  Patient has hyperkeratotic lesions noted:   Right foot located at none.  Left foot located at none.  No notable suspicious skin lesions.      MUSCULOSKELETAL EXAM:   No acute joint pain, edema, or redness.  No acute musculoskeletal problem.    Patient has no gross pedal deformities noted.  Heel tenderness noted on prior visit has improved.       Risk Category/Class Findings:   Q8(B1, B2 ABC)    A1)  Has the patient had a previous amputation of the foot or integral skeletal portion thereof? No  B1)  Does the patient have absent posterior tibial pulse? Yes  B3)  Does the patient have absent dorsalis pedis? No  B2)  Does the patient have three of the following? Yes           1.  Hair growth (increased or decreased), 2.  Nail changes (thickening) and 3.  Pigmentary changes (discoloring)  C)  Does the patient have two of the following and one above? No

## 2024-12-15 ENCOUNTER — RA CDI HCC (OUTPATIENT)
Dept: OTHER | Facility: HOSPITAL | Age: 79
End: 2024-12-15

## 2024-12-16 ENCOUNTER — APPOINTMENT (OUTPATIENT)
Dept: LAB | Facility: CLINIC | Age: 79
End: 2024-12-16
Payer: MEDICARE

## 2024-12-16 DIAGNOSIS — I10 ESSENTIAL HYPERTENSION: ICD-10-CM

## 2024-12-16 DIAGNOSIS — E78.2 MIXED HYPERLIPIDEMIA: ICD-10-CM

## 2024-12-16 DIAGNOSIS — R73.01 ABNORMAL FASTING GLUCOSE: ICD-10-CM

## 2024-12-16 LAB
ALBUMIN SERPL BCG-MCNC: 4.1 G/DL (ref 3.5–5)
ALP SERPL-CCNC: 81 U/L (ref 34–104)
ALT SERPL W P-5'-P-CCNC: 16 U/L (ref 7–52)
ANION GAP SERPL CALCULATED.3IONS-SCNC: 10 MMOL/L (ref 4–13)
AST SERPL W P-5'-P-CCNC: 22 U/L (ref 13–39)
BASOPHILS # BLD AUTO: 0.04 THOUSANDS/ÂΜL (ref 0–0.1)
BASOPHILS NFR BLD AUTO: 1 % (ref 0–1)
BILIRUB SERPL-MCNC: 0.54 MG/DL (ref 0.2–1)
BUN SERPL-MCNC: 16 MG/DL (ref 5–25)
CALCIUM SERPL-MCNC: 9.4 MG/DL (ref 8.4–10.2)
CHLORIDE SERPL-SCNC: 106 MMOL/L (ref 96–108)
CHOLEST SERPL-MCNC: 185 MG/DL (ref ?–200)
CO2 SERPL-SCNC: 25 MMOL/L (ref 21–32)
CREAT SERPL-MCNC: 1.08 MG/DL (ref 0.6–1.3)
EOSINOPHIL # BLD AUTO: 0.12 THOUSAND/ÂΜL (ref 0–0.61)
EOSINOPHIL NFR BLD AUTO: 1 % (ref 0–6)
ERYTHROCYTE [DISTWIDTH] IN BLOOD BY AUTOMATED COUNT: 13.2 % (ref 11.6–15.1)
EST. AVERAGE GLUCOSE BLD GHB EST-MCNC: 123 MG/DL
GFR SERPL CREATININE-BSD FRML MDRD: 64 ML/MIN/1.73SQ M
GLUCOSE P FAST SERPL-MCNC: 101 MG/DL (ref 65–99)
HBA1C MFR BLD: 5.9 %
HCT VFR BLD AUTO: 43.9 % (ref 36.5–49.3)
HDLC SERPL-MCNC: 28 MG/DL
HGB BLD-MCNC: 14.4 G/DL (ref 12–17)
IMM GRANULOCYTES # BLD AUTO: 0.02 THOUSAND/UL (ref 0–0.2)
IMM GRANULOCYTES NFR BLD AUTO: 0 % (ref 0–2)
LDLC SERPL DIRECT ASSAY-MCNC: 55 MG/DL (ref 0–100)
LYMPHOCYTES # BLD AUTO: 1.45 THOUSANDS/ÂΜL (ref 0.6–4.47)
LYMPHOCYTES NFR BLD AUTO: 17 % (ref 14–44)
MCH RBC QN AUTO: 32.5 PG (ref 26.8–34.3)
MCHC RBC AUTO-ENTMCNC: 32.8 G/DL (ref 31.4–37.4)
MCV RBC AUTO: 99 FL (ref 82–98)
MONOCYTES # BLD AUTO: 0.96 THOUSAND/ÂΜL (ref 0.17–1.22)
MONOCYTES NFR BLD AUTO: 11 % (ref 4–12)
NEUTROPHILS # BLD AUTO: 6.12 THOUSANDS/ÂΜL (ref 1.85–7.62)
NEUTS SEG NFR BLD AUTO: 70 % (ref 43–75)
NRBC BLD AUTO-RTO: 0 /100 WBCS
PLATELET # BLD AUTO: 175 THOUSANDS/UL (ref 149–390)
PMV BLD AUTO: 11.5 FL (ref 8.9–12.7)
POTASSIUM SERPL-SCNC: 4.3 MMOL/L (ref 3.5–5.3)
PROT SERPL-MCNC: 6.5 G/DL (ref 6.4–8.4)
RBC # BLD AUTO: 4.43 MILLION/UL (ref 3.88–5.62)
SODIUM SERPL-SCNC: 141 MMOL/L (ref 135–147)
TRIGL SERPL-MCNC: 810 MG/DL (ref ?–150)
TSH SERPL DL<=0.05 MIU/L-ACNC: 1.77 UIU/ML (ref 0.45–4.5)
WBC # BLD AUTO: 8.71 THOUSAND/UL (ref 4.31–10.16)

## 2024-12-16 PROCEDURE — 80061 LIPID PANEL: CPT

## 2024-12-16 PROCEDURE — 83036 HEMOGLOBIN GLYCOSYLATED A1C: CPT

## 2024-12-16 PROCEDURE — 85025 COMPLETE CBC W/AUTO DIFF WBC: CPT

## 2024-12-16 PROCEDURE — 83721 ASSAY OF BLOOD LIPOPROTEIN: CPT

## 2024-12-16 PROCEDURE — 84443 ASSAY THYROID STIM HORMONE: CPT

## 2024-12-16 PROCEDURE — 80053 COMPREHEN METABOLIC PANEL: CPT

## 2024-12-16 PROCEDURE — 36415 COLL VENOUS BLD VENIPUNCTURE: CPT

## 2024-12-17 ENCOUNTER — RESULTS FOLLOW-UP (OUTPATIENT)
Dept: FAMILY MEDICINE CLINIC | Facility: HOSPITAL | Age: 79
End: 2024-12-17

## 2024-12-23 ENCOUNTER — OFFICE VISIT (OUTPATIENT)
Dept: FAMILY MEDICINE CLINIC | Facility: HOSPITAL | Age: 79
End: 2024-12-23
Payer: MEDICARE

## 2024-12-23 VITALS
HEIGHT: 70 IN | HEART RATE: 90 BPM | SYSTOLIC BLOOD PRESSURE: 140 MMHG | WEIGHT: 209 LBS | BODY MASS INDEX: 29.92 KG/M2 | OXYGEN SATURATION: 97 % | TEMPERATURE: 98.4 F | DIASTOLIC BLOOD PRESSURE: 80 MMHG

## 2024-12-23 DIAGNOSIS — E78.2 MIXED HYPERLIPIDEMIA: ICD-10-CM

## 2024-12-23 DIAGNOSIS — E78.1 HYPERTRIGLYCERIDEMIA: ICD-10-CM

## 2024-12-23 DIAGNOSIS — H26.9 CATARACT OF BOTH EYES, UNSPECIFIED CATARACT TYPE: ICD-10-CM

## 2024-12-23 DIAGNOSIS — I50.9 HEART FAILURE, UNSPECIFIED HF CHRONICITY, UNSPECIFIED HEART FAILURE TYPE (HCC): ICD-10-CM

## 2024-12-23 DIAGNOSIS — Z01.818 PREOPERATIVE EXAMINATION: Primary | ICD-10-CM

## 2024-12-23 PROCEDURE — 99214 OFFICE O/P EST MOD 30 MIN: CPT

## 2024-12-23 PROCEDURE — G2211 COMPLEX E/M VISIT ADD ON: HCPCS

## 2024-12-23 RX ORDER — EZETIMIBE 10 MG/1
10 TABLET ORAL DAILY
Qty: 30 TABLET | Refills: 5 | Status: SHIPPED | OUTPATIENT
Start: 2024-12-23 | End: 2025-06-21

## 2024-12-23 NOTE — PROGRESS NOTES
Pre-operative Clearance  Name: Winnie De La Cruz      : 1945      MRN: [id number]  Encounter Provider: Nereyda Owen DO  Encounter Date: 2024   Encounter department: [facility] [address]    Assessment & Plan  Preoperative examination  - Pt will undergo an elective Low Risk surgery on  & 25.  Bilateral cataract surgery.  He is RCRI class I risk  with 3.9% 30-day risk of death, MI, or cardiac arrest.     Plan:  - Pt optimized for procedure, may proceed  -Patient will bring form to office for completion, and then will fax         Cataract of both eyes, unspecified cataract type         Hypertriglyceridemia    Orders:    ezetimibe (ZETIA) 10 mg tablet; Take 1 tablet (10 mg total) by mouth daily    Lipid panel; Future    LDL cholesterol, direct; Future    Mixed hyperlipidemia  Lipid       Lab Results   Component Value Date    CHOLESTEROL 185 2024    CHOLESTEROL 183 10/09/2023    CHOLESTEROL 177 2023    TRIG 810 (H) 2024    TRIG 399 (H) 10/09/2023    TRIG 477 (H) 2023    HDL 28 (L) 2024    HDL 33 (L) 10/09/2023    HDL 30 (L) 2023    LDLCALC  2024      Comment:      Calculated LDL invalid, triglycerides >400 mg/dl    LDLCALC 70 10/09/2023    LDLCALC  2023      Comment:      Calculated LDL invalid, triglycerides >400 mg/dl       - uncontrolled  - Current regimen: Lipitor 40mg QD  - Reports compliance, denies side effects    Plan:  - Will maintain patient on atorvastatin, add on zetia  - Lab work reviewed with pt, will repeat in 6 months  - Medication sent, pharmacy confirmed    Orders:    Lipid panel; Future    LDL cholesterol, direct; Future    Heart failure, unspecified HF chronicity, unspecified heart failure type (HCC)  Wt Readings from Last 3 Encounters:   24 94.8 kg (209 lb)   24 95.3 kg (210 lb)   24 95.7 kg (211 lb)     - Controlled, euvolemic  - Last echo 2023 shows normal systolic  function               Pre-operative Clearance:     Medication Instructions:   - Antidepressants: Continue to take this medication on your normal schedule.  - Calcium channel blockers: Continue to take this medication on your normal schedule.  - Hyperlipidemia meds: Continue to take this medication on your normal schedule.       History of Present Illness     Patient does not take any blood thinners.  No history of stroke or MI.  No issues with anesthesia in the past.  No history of type 2 diabetes on insulin.      HLD:  Lab work reviewed with patient.  CBC normal, CMP shows elevated fasting glucose 101, but patient is not diabetic.  In addition, GFR is decreased, but patient states he has 1 kidney.  TSH is normal.  Fasting lipid panel shows LDL is normal, HDL is low, triglycerides are elevated.    Atorvastain reports compliance  No HLD in family  Exercise working, but no other formal exercise  Diet sometimes balanced diet  Patient is interested in medication rather than lifestyle modification          Review of Systems   Eyes:  Positive for visual disturbance (b/l cataracts).     Past Medical History   Past Medical History:   Diagnosis Date    Cancer of left kidney (HCC)     2014    Colon polyp     Diverticulitis of colon     Last assessed - 5/12/14    Hypercholesterolemia     Hyperlipidemia     Mood disorder (HCC) 8/29/2023    Renal neoplasm      Past Surgical History:   Procedure Laterality Date    COLONOSCOPY      COLONOSCOPY N/A 4/27/2018    Procedure: COLONOSCOPY;  Surgeon: Sarah Modi MD;  Location: [facility];  Service: Gastroenterology    CYSTOSCOPY      Onset - 5/2/16 Kian Gross     JOINT REPLACEMENT Left     Hip    LAPAROSCOPIC CHOLECYSTECTOMY      NEPHRECTOMY Left     NEPHRECTOMY Left     SHOULDER ARTHROSCOPY W/ ROTATOR CUFF REPAIR Left     TOTAL HIP ARTHROPLASTY Left     original hardware became infected and pt needed a second surgery to replace hardware    UMBILICAL HERNIA REPAIR       Family  "History   Problem Relation Age of Onset    Tuberculosis Mother     Emphysema Father         Lung     Social History     Tobacco Use    Smoking status: Former     Passive exposure: Past    Smokeless tobacco: Never   Vaping Use    Vaping status: Never Used   Substance and Sexual Activity    Alcohol use: Yes     Comment: Social    Drug use: No    Sexual activity: Not on file     Current Outpatient Medications on File Prior to Visit   Medication Sig    amLODIPine (NORVASC) 5 mg tablet TAKE 1 TABLET BY MOUTH EVERY DAY    ascorbic acid (VITAMIN C) 500 MG tablet Take 500 mg by mouth daily    atorvastatin (LIPITOR) 40 mg tablet TAKE 1 TABLET BY MOUTH EVERY DAY    escitalopram (LEXAPRO) 5 mg tablet TAKE 1 TABLET (5 MG TOTAL) BY MOUTH DAILY.    Multiple Vitamins-Minerals (MULTIVITAMIN ADULT) TABS Take by mouth    Omega-3 Fatty Acids (FISH OIL) 1,000 mg by Does not apply route     No Known Allergies  Objective   /80   Pulse 90   Temp 98.4 °F (36.9 °C) (Tympanic)   Ht 5' 10\" (1.778 m)   Wt 94.8 kg (209 lb)   SpO2 97%   BMI 29.99 kg/m²     Physical Exam  Vitals reviewed.   Constitutional:       General: He is not in acute distress.     Appearance: Normal appearance. He is not ill-appearing.   HENT:      Head: Normocephalic and atraumatic.   Cardiovascular:      Rate and Rhythm: Normal rate and regular rhythm.      Heart sounds: Normal heart sounds.   Pulmonary:      Effort: Pulmonary effort is normal.      Breath sounds: Normal breath sounds.   Neurological:      Mental Status: He is alert.   Psychiatric:         Mood and Affect: Mood normal.         Behavior: Behavior normal.           Nereyda Owen, DO  "

## 2024-12-23 NOTE — ASSESSMENT & PLAN NOTE
Wt Readings from Last 3 Encounters:   12/23/24 94.8 kg (209 lb)   11/11/24 95.3 kg (210 lb)   08/12/24 95.7 kg (211 lb)     - Controlled, euvolemic  - Last echo 8/2023 shows normal systolic function

## 2024-12-23 NOTE — ASSESSMENT & PLAN NOTE
Lipid       Lab Results   Component Value Date    CHOLESTEROL 185 12/16/2024    CHOLESTEROL 183 10/09/2023    CHOLESTEROL 177 04/17/2023    TRIG 810 (H) 12/16/2024    TRIG 399 (H) 10/09/2023    TRIG 477 (H) 04/17/2023    HDL 28 (L) 12/16/2024    HDL 33 (L) 10/09/2023    HDL 30 (L) 04/17/2023    LDLCALC  12/16/2024      Comment:      Calculated LDL invalid, triglycerides >400 mg/dl    LDLCALC 70 10/09/2023    LDLCALC  04/17/2023      Comment:      Calculated LDL invalid, triglycerides >400 mg/dl       - uncontrolled  - Current regimen: Lipitor 40mg QD  - Reports compliance, denies side effects    Plan:  - Will maintain patient on atorvastatin, add on zetia  - Lab work reviewed with pt, will repeat in 6 months  - Medication sent, pharmacy confirmed    Orders:    Lipid panel; Future    LDL cholesterol, direct; Future

## 2025-01-06 ENCOUNTER — HOSPITAL ENCOUNTER (OUTPATIENT)
Dept: CT IMAGING | Facility: HOSPITAL | Age: 80
Discharge: HOME | End: 2025-01-06
Attending: INTERNAL MEDICINE
Payer: MEDICARE

## 2025-01-06 DIAGNOSIS — C64.9 RENAL CELL CARCINOMA, UNSPECIFIED LATERALITY (HCC): ICD-10-CM

## 2025-01-06 PROCEDURE — 74176 CT ABD & PELVIS W/O CONTRAST: CPT

## 2025-01-06 PROCEDURE — 71250 CT THORAX DX C-: CPT

## 2025-01-13 ENCOUNTER — OFFICE VISIT (OUTPATIENT)
Age: 80
End: 2025-01-13
Payer: MEDICARE

## 2025-01-13 VITALS
HEART RATE: 93 BPM | OXYGEN SATURATION: 99 % | TEMPERATURE: 97.3 F | RESPIRATION RATE: 16 BRPM | WEIGHT: 210 LBS | HEIGHT: 70 IN | SYSTOLIC BLOOD PRESSURE: 126 MMHG | BODY MASS INDEX: 30.06 KG/M2 | DIASTOLIC BLOOD PRESSURE: 82 MMHG

## 2025-01-13 DIAGNOSIS — C64.9 RENAL CELL CARCINOMA, UNSPECIFIED LATERALITY (HCC): Primary | ICD-10-CM

## 2025-01-13 PROCEDURE — 99213 OFFICE O/P EST LOW 20 MIN: CPT | Performed by: INTERNAL MEDICINE

## 2025-01-13 NOTE — PROGRESS NOTES
Name: Winnie De La Cruz      : 1945      MRN: [id number]  Encounter Provider: Karime Garcia DO  Encounter Date: 2025   Encounter department: 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  We were over 10 years out from his diagnosis and he is JUAN on CT scan.  His pulmonary nodules have stayed stable enough that I think we can discontinue scanning going forward.  I offered him to continue to see us in this office once a year versus just following with his PCP.  He will follow with his PCP and he knows that if there are any concerns or questions in the future we are available.    History of Present Illness   Chief Complaint   Patient presents with    Follow-up     79-year-old male with a history of an early-stage renal cell carcinoma on the left status post nephrectomy in .  He had a CT scan prior to this visit to continue to follow small pulmonary nodules.  They remain stable in size and likely represent benign etiology.  There were no other signs of cancer relapse.  He denies any new medical problems since the last visit.  He has some left low back pain that is musculoskeletal in nature.    Oncology History   Cancer Staging   Renal cell carcinoma of left kidney (HCC)  Staging form: Kidney, AJCC 7th Edition  - Pathologic stage from 2014: Stage Unknown (T1b, NX, cM0) - Signed by Karime Garcia DO on 2024  Oncology History Overview Note    - Stage I, pT1bNX, clear cell renal cell carcinoma s/p nephrectomy     Renal cell carcinoma of left kidney (HCC)   2014 Initial Diagnosis    Renal cell carcinoma of left kidney (HCC)     2014 -  Cancer Staged    Staging form: Kidney, AJCC 7th Edition  - Pathologic stage from 2014: Stage Unknown (T1b, NX, cM0) - Signed by Karime Garcia  "DO on 1/11/2024             Review of Systems otherwise neg        Objective   /82 (BP Location: Left arm, Patient Position: Sitting, Cuff Size: Adult)   Pulse 93   Temp (!) 97.3 °F (36.3 °C) (Temporal)   Resp 16   Ht 5' 10\" (1.778 m)   Wt 95.3 kg (210 lb)   SpO2 99%   BMI 30.13 kg/m²     Pain Screening:  Pain Score: 0-No pain  ECOG   0  Physical Exam      GEN: Alert, awake oriented x3, in no acute distress  HEENT- No pallor, icterus, cyanosis, no oral mucosal lesions,   LAD - no palpable cervical, clavicle, axillary, inguinal LAD  Heart- normal S1 S2, regular rate and rhythm, No murmur, rubs.   Lungs- clear breathing sound bilateral.   Abdomen- soft, Non tender, bowel sounds present  Extremities- No cyanosis, clubbing, edema  Neuro- No focal neurological deficit    Labs: I have reviewed the following labs:  Lab Results   Component Value Date/Time    WBC 8.71 12/16/2024 07:18 AM    RBC 4.43 12/16/2024 07:18 AM    Hemoglobin 14.4 12/16/2024 07:18 AM    Hematocrit 43.9 12/16/2024 07:18 AM    MCV 99 (H) 12/16/2024 07:18 AM    MCH 32.5 12/16/2024 07:18 AM    RDW 13.2 12/16/2024 07:18 AM    Platelets 175 12/16/2024 07:18 AM    Segmented % 70 12/16/2024 07:18 AM    Lymphocytes % 17 12/16/2024 07:18 AM    Monocytes % 11 12/16/2024 07:18 AM    Eosinophils Relative 1 12/16/2024 07:18 AM    Basophils Relative 1 12/16/2024 07:18 AM    Immature Grans % 0 12/16/2024 07:18 AM    Absolute Neutrophils 6.12 12/16/2024 07:18 AM     Lab Results   Component Value Date/Time    Potassium 4.3 12/16/2024 07:18 AM    Chloride 106 12/16/2024 07:18 AM    CO2 25 12/16/2024 07:18 AM    BUN 16 12/16/2024 07:18 AM    Creatinine 1.08 12/16/2024 07:18 AM    Glucose, Fasting 101 (H) 12/16/2024 07:18 AM    Calcium 9.4 12/16/2024 07:18 AM    AST 22 12/16/2024 07:18 AM    ALT 16 12/16/2024 07:18 AM    Alkaline Phosphatase 81 12/16/2024 07:18 AM    Total Protein 6.5 12/16/2024 07:18 AM    Albumin 4.1 12/16/2024 07:18 AM    Total Bilirubin " 0.54 12/16/2024 07:18 AM    eGFR 64 12/16/2024 07:18 AM         Radiology Results Review: I have reviewed radiology reports from prior to this visit including: CT chest and CT abdomen/pelvis.

## 2025-01-21 DIAGNOSIS — E78.1 HYPERTRIGLYCERIDEMIA: ICD-10-CM

## 2025-01-21 RX ORDER — EZETIMIBE 10 MG/1
10 TABLET ORAL DAILY
Qty: 90 TABLET | Refills: 1 | Status: SHIPPED | OUTPATIENT
Start: 2025-01-21

## 2025-01-24 ENCOUNTER — TELEPHONE (OUTPATIENT)
Age: 80
End: 2025-01-24

## 2025-01-24 NOTE — TELEPHONE ENCOUNTER
Caller: Winnie     Doctor and/or Office: Dr Bales     CB#: [phone]     Escalation: AppointmentPatient returned  your call , but he needs a Monday and the first available is not until March 31 st and he asked if there is anyway you can get him in before that.  Please advise thank you.

## 2025-01-30 DIAGNOSIS — E78.2 MIXED HYPERLIPIDEMIA: ICD-10-CM

## 2025-01-30 RX ORDER — ATORVASTATIN CALCIUM 40 MG/1
40 TABLET, FILM COATED ORAL DAILY
Qty: 90 TABLET | Refills: 0 | Status: SHIPPED | OUTPATIENT
Start: 2025-01-30

## 2025-02-11 ENCOUNTER — IMMUNIZATIONS (OUTPATIENT)
Dept: FAMILY MEDICINE CLINIC | Facility: HOSPITAL | Age: 80
End: 2025-02-11
Payer: MEDICARE

## 2025-02-11 DIAGNOSIS — F39 MOOD DISORDER (HCC): ICD-10-CM

## 2025-02-11 DIAGNOSIS — R63.39 PICKY EATER: Primary | ICD-10-CM

## 2025-02-11 DIAGNOSIS — I10 ESSENTIAL HYPERTENSION: ICD-10-CM

## 2025-02-11 DIAGNOSIS — Z23 ENCOUNTER FOR IMMUNIZATION: Primary | ICD-10-CM

## 2025-02-11 PROCEDURE — G0008 ADMIN INFLUENZA VIRUS VAC: HCPCS

## 2025-02-11 PROCEDURE — 90662 IIV NO PRSV INCREASED AG IM: CPT

## 2025-02-12 RX ORDER — ASCORBIC ACID 500 MG
500 TABLET ORAL DAILY
Qty: 100 TABLET | Refills: 3 | Status: SHIPPED | OUTPATIENT
Start: 2025-02-12

## 2025-02-12 RX ORDER — AMLODIPINE BESYLATE 5 MG/1
5 TABLET ORAL DAILY
Qty: 90 TABLET | Refills: 1 | Status: SHIPPED | OUTPATIENT
Start: 2025-02-12

## 2025-02-12 RX ORDER — ESCITALOPRAM OXALATE 5 MG/1
5 TABLET ORAL DAILY
Qty: 90 TABLET | Refills: 1 | Status: SHIPPED | OUTPATIENT
Start: 2025-02-12

## 2025-02-17 ENCOUNTER — OFFICE VISIT (OUTPATIENT)
Dept: PODIATRY | Facility: CLINIC | Age: 80
End: 2025-02-17
Payer: MEDICARE

## 2025-02-17 VITALS — WEIGHT: 209 LBS | HEIGHT: 70 IN | BODY MASS INDEX: 29.92 KG/M2

## 2025-02-17 DIAGNOSIS — I73.9 PERIPHERAL VASCULAR DISEASE, UNSPECIFIED (HCC): ICD-10-CM

## 2025-02-17 DIAGNOSIS — B35.1 ONYCHOMYCOSIS: Primary | ICD-10-CM

## 2025-02-17 PROCEDURE — 11721 DEBRIDE NAIL 6 OR MORE: CPT | Performed by: PODIATRIST

## 2025-02-17 PROCEDURE — RECHECK: Performed by: PODIATRIST

## 2025-02-18 NOTE — PROGRESS NOTES
"  PATIENT:  Winnie De La Cruz  1945    ASSESSMENT:  1. Onychomycosis        2. Peripheral vascular disease, unspecified (HCC)                No orders of the defined types were placed in this encounter.       PLAN:  The patient was educated in proper foot wear.    Also discussed daily foot assessment and proper foot care.    The patient will follow up in 9-12 weeks for foot exam and care.      Procedures: 41496  All mycotic toenails were reduced and debrided in length, width, and girth using a nail nipper and dremel.  All non-dystrphic nails also trimmed.    Patient tolerated procedure(s) well without complications.    Procedures     HPI:  Winnie De La Cruz is a 80 y.o.year old male seen for at risk foot exam and care.  Unchanged clinically from prior visit.  The patient complained of elongated thick painful toenails .  The patient has class findings with peripheral vascular disease.  The patient denied any acute pedal disorder, redness, acute swelling, or recent injury.      The following portions of the patient's history were reviewed and updated as appropriate: allergies, current medications, past family history, past medical history, past social history, past surgical history and problem list.    REVIEW OF SYSTEMS:  GENERAL: No fever or chills  HEART: No chest pain, or palpitation  RESPIRATORY:  No acute SOB or cough  GI: No Nausea, vomit or diarrhea  NEUROLOGIC: No syncope or acute weakness    PHYSICAL EXAM:    Ht 5' 10\" (1.778 m) Comment: stated  Wt 94.8 kg (209 lb)   BMI 29.99 kg/m²     VASCULAR EXAM  Posterior tibial artery  absent bilateral.    Dorsalis pedis artery +1 bilateral.  The patient has class findings with skin atrophy, lack of digital hair, and nail dystrophy.    There is trace lower extremity edema bilaterally.      NEUROLOGIC EXAM  Sensation is intact to light touch.  Sensation is intact to 10gm monofilament.    No focal neurologic deficit.          DERMATOLOGIC EXAM:   No ulcer or " cellulitis noted.  Texture, Tone and Turgor are diminished with moderate atrophic changes noted.  The patient has dystrophic/hypertrophic toenails with yellow/white discoloration, onycholysis, and subungal debris.   Fungal odor and brittle nature noted.  Pain with palpation.  Periungual erythema noted.  Right foot nails severely dystrophic x5 with 0.5 cm ave thickness (1 through 5)  Left foot nails severely dystrophic x5 with 0.4 cm ave thickness (1 through 5)  Patient has hyperkeratotic lesions noted:   Right foot located at none.  Left foot located at none.  No notable suspicious skin lesions.      MUSCULOSKELETAL EXAM:   No acute joint pain, edema, or redness.  No acute musculoskeletal problem.    Patient has no gross pedal deformities noted.  Heel tenderness noted on prior visit has improved.       Risk Category/Class Findings:   Q8(B1, B2 ABC)    A1)  Has the patient had a previous amputation of the foot or integral skeletal portion thereof? No  B1)  Does the patient have absent posterior tibial pulse? Yes  B3)  Does the patient have absent dorsalis pedis? No  B2)  Does the patient have three of the following? Yes           1.  Hair growth (increased or decreased), 2.  Nail changes (thickening) and 3.  Pigmentary changes (discoloring)  C)  Does the patient have two of the following and one above? No

## 2025-03-06 ENCOUNTER — OFFICE VISIT (OUTPATIENT)
Dept: CARDIOLOGY CLINIC | Facility: CLINIC | Age: 80
End: 2025-03-06
Payer: MEDICARE

## 2025-03-06 VITALS
SYSTOLIC BLOOD PRESSURE: 130 MMHG | WEIGHT: 212 LBS | HEIGHT: 70 IN | HEART RATE: 64 BPM | DIASTOLIC BLOOD PRESSURE: 72 MMHG | BODY MASS INDEX: 30.35 KG/M2

## 2025-03-06 DIAGNOSIS — N18.31 STAGE 3A CHRONIC KIDNEY DISEASE (HCC): ICD-10-CM

## 2025-03-06 DIAGNOSIS — I35.0 MILD AORTIC STENOSIS: ICD-10-CM

## 2025-03-06 DIAGNOSIS — I10 PRIMARY HYPERTENSION: Primary | ICD-10-CM

## 2025-03-06 PROCEDURE — G2211 COMPLEX E/M VISIT ADD ON: HCPCS | Performed by: INTERNAL MEDICINE

## 2025-03-06 PROCEDURE — 93000 ELECTROCARDIOGRAM COMPLETE: CPT | Performed by: INTERNAL MEDICINE

## 2025-03-06 PROCEDURE — 99214 OFFICE O/P EST MOD 30 MIN: CPT | Performed by: INTERNAL MEDICINE

## 2025-03-06 NOTE — PROGRESS NOTES
Cardiology Follow Up    Winnie MANI De La Cruz  1945  [id number]  [facility] [address]  [facility] [place] [id number] [phone]    1. Primary hypertension  POCT ECG      2. Stage 3a chronic kidney disease (HCC)        3. Mild aortic stenosis            Interval History: Followup HTN aortic stenosis.    Doing well. No chest pain, dyspnea or palpitations.     Medical Problems       Problem List       Abnormal fasting glucose    Renal cell carcinoma of left kidney (HCC)    Overview Signed 2/4/2018  3:02 PM by Jun Owens MD   Transitioned From: Mass of left kidney         Disc degeneration, lumbosacral    Edema    Hematuria    Mixed hyperlipidemia    Essential hypertension    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HCC)    Osteoarthritis of knee    Overview Signed 2/4/2018  3:02 PM by Jun Owens MD   Laterality: Bilateral         Enlarged prostate    Lower abdominal pain    Overview Signed 4/2/2018 10:28 AM by Sarah Modi MD   Added automatically from request for surgery [id number]         History of colon polyps    Overview Signed 4/2/2018 10:28 AM by Sarah Modi MD   Added automatically from request for surgery [id number]         Diarrhea    Overview Signed 4/2/2018 10:28 AM by Sarah Modi MD   Added automatically from request for surgery [id number]         Left ventricular diastolic dysfunction    Stage 3a chronic kidney disease (HCC)    Lab Results   Component Value Date    EGFR 64 12/16/2024    EGFR 67 12/03/2023    EGFR 65 12/02/2023    CREATININE 1.08 12/16/2024    CREATININE 1.05 12/03/2023    CREATININE 1.08 12/02/2023         PARVIZ on CPAP    Peripheral neuropathic pain    Aortic valve sclerosis    BMI 29.0-29.9,adult    Mood disorder (HCC)    Acute epididymo-orchitis    Heart failure, unspecified HF chronicity, unspecified heart failure type (HCC)    Wt Readings from Last 3 Encounters:   03/06/25 96.2 kg (212 lb)    02/17/25 94.8 kg (209 lb)   01/13/25 95.3 kg (210 lb)                     Past Medical History:   Diagnosis Date    Cancer of left kidney (HCC)     2014    Colon polyp     Diverticulitis of colon     Last assessed - 5/12/14    Hypercholesterolemia     Hyperlipidemia     Mood disorder (HCC) 8/29/2023    Renal neoplasm      Social History     Socioeconomic History    Marital status: /Civil Union     Spouse name: Not on file    Number of children: Not on file    Years of education: Not on file    Highest education level: Not on file   Occupational History    Not on file   Tobacco Use    Smoking status: Former     Passive exposure: Past    Smokeless tobacco: Never   Vaping Use    Vaping status: Never Used   Substance and Sexual Activity    Alcohol use: Yes     Comment: Social    Drug use: No    Sexual activity: Not on file   Other Topics Concern    Not on file   Social History Narrative    Daily caffeine consumption, 2-3 servings a day     Social Drivers of Health     Financial Resource Strain: Low Risk  (4/17/2023)    Overall Financial Resource Strain (CARDIA)     Difficulty of Paying Living Expenses: Not hard at all   Food Insecurity: No Food Insecurity (6/10/2024)    Nursing - Inadequate Food Risk Classification     Worried About Running Out of Food in the Last Year: Never true     Ran Out of Food in the Last Year: Never true     Ran Out of Food in the Last Year: Not on file   Transportation Needs: No Transportation Needs (6/10/2024)    PRAPARE - Transportation     Lack of Transportation (Medical): No     Lack of Transportation (Non-Medical): No   Physical Activity: Not on file   Stress: Not on file   Social Connections: Not on file   Intimate Partner Violence: Not on file   Housing Stability: Low Risk  (6/10/2024)    Housing Stability Vital Sign     Unable to Pay for Housing in the Last Year: No     Number of Times Moved in the Last Year: 1     Homeless in the Last Year: No      Family History   Problem  Relation Age of Onset    Tuberculosis Mother     Emphysema Father         Lung     Past Surgical History:   Procedure Laterality Date    COLONOSCOPY      COLONOSCOPY N/A 4/27/2018    Procedure: COLONOSCOPY;  Surgeon: Sarah Modi MD;  Location: [facility] GI [facility];  Service: Gastroenterology    CYSTOSCOPY      Onset - 5/2/16 Kian Gross     JOINT REPLACEMENT Left     Hip    LAPAROSCOPIC CHOLECYSTECTOMY      NEPHRECTOMY Left     NEPHRECTOMY Left     SHOULDER ARTHROSCOPY W/ ROTATOR CUFF REPAIR Left     TOTAL HIP ARTHROPLASTY Left     original hardware became infected and pt needed a second surgery to replace hardware    UMBILICAL HERNIA REPAIR         Current Outpatient Medications:     amLODIPine (NORVASC) 5 mg tablet, Take 1 tablet (5 mg total) by mouth daily, Disp: 90 tablet, Rfl: 1    ascorbic acid (VITAMIN C) 500 MG tablet, Take 1 tablet (500 mg total) by mouth daily, Disp: 100 tablet, Rfl: 3    atorvastatin (LIPITOR) 40 mg tablet, TAKE 1 TABLET BY MOUTH EVERY DAY, Disp: 90 tablet, Rfl: 0    escitalopram (LEXAPRO) 5 mg tablet, Take 1 tablet (5 mg total) by mouth daily, Disp: 90 tablet, Rfl: 1    ezetimibe (ZETIA) 10 mg tablet, TAKE 1 TABLET BY MOUTH EVERY DAY, Disp: 90 tablet, Rfl: 1    Multiple Vitamins-Minerals (MULTIVITAMIN ADULT) TABS, Take by mouth, Disp: , Rfl:     Omega-3 Fatty Acids (FISH OIL) 1,000 mg, by Does not apply route, Disp: , Rfl:   No Known Allergies    Labs:     Chemistry        Component Value Date/Time     01/06/2016 0715    K 4.3 12/16/2024 0718    K 4.3 01/06/2016 0715     12/16/2024 0718     01/06/2016 0715    CO2 25 12/16/2024 0718    CO2 25 01/06/2016 0715    BUN 16 12/16/2024 0718    BUN 21 01/06/2016 0715    CREATININE 1.08 12/16/2024 0718    CREATININE 1.22 01/06/2016 0715        Component Value Date/Time    CALCIUM 9.4 12/16/2024 0718    CALCIUM 8.6 01/06/2016 0715    ALKPHOS 81 12/16/2024 0718    ALKPHOS 89 01/06/2016 0715    AST 22 12/16/2024 0718    AST 20  "01/06/2016 0715    ALT 16 12/16/2024 0718    ALT 30 01/06/2016 0715    BILITOT 0.43 01/06/2016 0715            Lab Results   Component Value Date    CHOL 218 01/06/2016    CHOL 204 03/09/2015    CHOL 197 11/03/2014     Lab Results   Component Value Date    HDL 28 (L) 12/16/2024    HDL 33 (L) 10/09/2023    HDL 30 (L) 04/17/2023     Lab Results   Component Value Date    LDLCALC  12/16/2024      Comment:      Calculated LDL invalid, triglycerides >400 mg/dl    LDLCALC 70 10/09/2023    LDLCALC  04/17/2023      Comment:      Calculated LDL invalid, triglycerides >400 mg/dl     Lab Results   Component Value Date    TRIG 810 (H) 12/16/2024    TRIG 399 (H) 10/09/2023    TRIG 477 (H) 04/17/2023     No results found for: \"CHOLHDL\"    Imaging: No results found.    EKG: Normal Sinus Rhythm.    Review of Systems   Constitutional: Negative.   HENT: Negative.     Eyes: Negative.    Cardiovascular: Negative.    Respiratory: Negative.     Endocrine: Negative.    Hematologic/Lymphatic: Negative.    Skin: Negative.    Musculoskeletal: Negative.    Gastrointestinal: Negative.    Genitourinary: Negative.    Neurological: Negative.    Psychiatric/Behavioral: Negative.     Allergic/Immunologic: Negative.        Vitals:    03/06/25 1313   BP: 130/72   Pulse: 64           Physical Exam  Vitals reviewed.   Constitutional:       Appearance: Normal appearance.   HENT:      Head: Normocephalic.      Nose: Nose normal.      Mouth/Throat:      Mouth: Mucous membranes are moist.      Pharynx: Oropharynx is clear.   Eyes:      General: No scleral icterus.     Conjunctiva/sclera: Conjunctivae normal.   Cardiovascular:      Rate and Rhythm: Normal rate and regular rhythm.      Heart sounds: Murmur heard.      No friction rub. No gallop.   Pulmonary:      Effort: Pulmonary effort is normal. No respiratory distress.      Breath sounds: Normal breath sounds. No wheezing or rales.   Abdominal:      General: Abdomen is flat. Bowel sounds are normal. There " is no distension.      Palpations: Abdomen is soft.      Tenderness: There is no abdominal tenderness. There is no guarding.   Musculoskeletal:      Cervical back: Normal range of motion and neck supple.      Right lower leg: No edema.      Left lower leg: No edema.   Skin:     General: Skin is warm and dry.   Neurological:      General: No focal deficit present.      Mental Status: He is alert and oriented to person, place, and time.   Psychiatric:         Mood and Affect: Mood normal.         Behavior: Behavior normal.         Discussion/Summary:      Hypertension: BP is controlled on amlodipine. No changes.      Hyperlipidemia: Continue with atorvastatin. LDL 55.  . Recheck lipids.      Mild Aortic Stenosis: recommend repeat study in three years.      I have spent a total time of 25 minutes in caring for this patient on the day of the visit/encounter including Diagnostic results, Prognosis, Risks and benefits of tx options, Instructions for management, Patient and family education, Importance of tx compliance, Risk factor reductions, and Impressions.

## 2025-04-22 ENCOUNTER — APPOINTMENT (EMERGENCY)
Dept: RADIOLOGY | Facility: HOSPITAL | Age: 80
DRG: 871 | End: 2025-04-22
Payer: MEDICARE

## 2025-04-22 ENCOUNTER — APPOINTMENT (EMERGENCY)
Dept: CT IMAGING | Facility: HOSPITAL | Age: 80
DRG: 871 | End: 2025-04-22
Payer: MEDICARE

## 2025-04-22 ENCOUNTER — HOSPITAL ENCOUNTER (INPATIENT)
Facility: HOSPITAL | Age: 80
LOS: 1 days | Discharge: HOME | DRG: 871 | End: 2025-04-24
Attending: EMERGENCY MEDICINE | Admitting: FAMILY MEDICINE
Payer: MEDICARE

## 2025-04-22 DIAGNOSIS — J18.9 MULTIFOCAL PNEUMONIA: Primary | ICD-10-CM

## 2025-04-22 DIAGNOSIS — K20.90 ESOPHAGITIS: ICD-10-CM

## 2025-04-22 DIAGNOSIS — R13.10 ODYNOPHAGIA: ICD-10-CM

## 2025-04-22 DIAGNOSIS — R93.3 ABNORMAL CT SCAN, ESOPHAGUS: ICD-10-CM

## 2025-04-22 DIAGNOSIS — A41.9 SEPSIS (HCC): ICD-10-CM

## 2025-04-22 LAB
2HR DELTA HS TROPONIN: -3 NG/L
ALBUMIN SERPL BCG-MCNC: 3.9 G/DL (ref 3.5–5)
ALP SERPL-CCNC: 59 U/L (ref 34–104)
ALT SERPL W P-5'-P-CCNC: 21 U/L (ref 7–52)
ANION GAP SERPL CALCULATED.3IONS-SCNC: 10 MMOL/L (ref 4–13)
ANISOCYTOSIS BLD QL SMEAR: PRESENT
APTT PPP: 29 SECONDS (ref 23–34)
AST SERPL W P-5'-P-CCNC: 23 U/L (ref 13–39)
ATRIAL RATE: 114 BPM
BACTERIA UR QL AUTO: ABNORMAL /HPF
BASOPHILS # BLD MANUAL: 0 THOUSAND/UL (ref 0–0.1)
BASOPHILS NFR MAR MANUAL: 0 % (ref 0–1)
BILIRUB SERPL-MCNC: 1.41 MG/DL (ref 0.2–1)
BILIRUB UR QL STRIP: NEGATIVE
BNP SERPL-MCNC: 116 PG/ML (ref 0–100)
BUN SERPL-MCNC: 27 MG/DL (ref 5–25)
CALCIUM SERPL-MCNC: 9 MG/DL (ref 8.4–10.2)
CARDIAC TROPONIN I PNL SERPL HS: 13 NG/L (ref ?–50)
CARDIAC TROPONIN I PNL SERPL HS: 16 NG/L (ref ?–50)
CHLORIDE SERPL-SCNC: 103 MMOL/L (ref 96–108)
CLARITY UR: CLEAR
CO2 SERPL-SCNC: 23 MMOL/L (ref 21–32)
COLOR UR: YELLOW
CREAT SERPL-MCNC: 1.27 MG/DL (ref 0.6–1.3)
D DIMER PPP FEU-MCNC: 0.9 UG/ML FEU
EOSINOPHIL # BLD MANUAL: 0 THOUSAND/UL (ref 0–0.4)
EOSINOPHIL NFR BLD MANUAL: 0 % (ref 0–6)
ERYTHROCYTE [DISTWIDTH] IN BLOOD BY AUTOMATED COUNT: 13.5 % (ref 11.6–15.1)
FLUAV RNA RESP QL NAA+PROBE: NEGATIVE
FLUBV RNA RESP QL NAA+PROBE: NEGATIVE
GFR SERPL CREATININE-BSD FRML MDRD: 53 ML/MIN/1.73SQ M
GLUCOSE SERPL-MCNC: 117 MG/DL (ref 65–140)
GLUCOSE UR STRIP-MCNC: NEGATIVE MG/DL
HCT VFR BLD AUTO: 41.4 % (ref 36.5–49.3)
HGB BLD-MCNC: 13.9 G/DL (ref 12–17)
HGB UR QL STRIP.AUTO: ABNORMAL
INR PPP: 1.15 (ref 0.85–1.19)
KETONES UR STRIP-MCNC: NEGATIVE MG/DL
L PNEUMO1 AG UR QL IA.RAPID: NEGATIVE
LACTATE SERPL-SCNC: 1.6 MMOL/L (ref 0.5–2)
LEUKOCYTE ESTERASE UR QL STRIP: NEGATIVE
LIPASE SERPL-CCNC: 10 U/L (ref 11–82)
LYMPHOCYTES # BLD AUTO: 0.97 THOUSAND/UL (ref 0.6–4.47)
LYMPHOCYTES # BLD AUTO: 4 % (ref 14–44)
MCH RBC QN AUTO: 32.3 PG (ref 26.8–34.3)
MCHC RBC AUTO-ENTMCNC: 33.6 G/DL (ref 31.4–37.4)
MCV RBC AUTO: 96 FL (ref 82–98)
MONOCYTES # BLD AUTO: 1.22 THOUSAND/UL (ref 0–1.22)
MONOCYTES NFR BLD: 5 % (ref 4–12)
MUCOUS THREADS UR QL AUTO: ABNORMAL
NEUTROPHILS # BLD MANUAL: 22.13 THOUSAND/UL (ref 1.85–7.62)
NEUTS BAND NFR BLD MANUAL: 9 % (ref 0–8)
NEUTS SEG NFR BLD AUTO: 82 % (ref 43–75)
NITRITE UR QL STRIP: NEGATIVE
NON-SQ EPI CELLS URNS QL MICRO: ABNORMAL /HPF
P AXIS: 40 DEGREES
PH UR STRIP.AUTO: 6 [PH]
PLATELET # BLD AUTO: 176 THOUSANDS/UL (ref 149–390)
PLATELET BLD QL SMEAR: ADEQUATE
PMV BLD AUTO: 10 FL (ref 8.9–12.7)
POLYCHROMASIA BLD QL SMEAR: PRESENT
POTASSIUM SERPL-SCNC: 3.8 MMOL/L (ref 3.5–5.3)
PR INTERVAL: 180 MS
PROCALCITONIN SERPL-MCNC: 6.4 NG/ML
PROT SERPL-MCNC: 6.7 G/DL (ref 6.4–8.4)
PROT UR STRIP-MCNC: ABNORMAL MG/DL
PROTHROMBIN TIME: 15.2 SECONDS (ref 12.3–15)
QRS AXIS: 20 DEGREES
QRSD INTERVAL: 74 MS
QT INTERVAL: 306 MS
QTC INTERVAL: 421 MS
RBC # BLD AUTO: 4.31 MILLION/UL (ref 3.88–5.62)
RBC #/AREA URNS AUTO: ABNORMAL /HPF
RBC MORPH BLD: PRESENT
RSV RNA RESP QL NAA+PROBE: NEGATIVE
S PNEUM AG UR QL: NEGATIVE
SARS-COV-2 RNA RESP QL NAA+PROBE: NEGATIVE
SODIUM SERPL-SCNC: 136 MMOL/L (ref 135–147)
SP GR UR STRIP.AUTO: 1.02 (ref 1–1.03)
T WAVE AXIS: -4 DEGREES
TSH SERPL DL<=0.05 MIU/L-ACNC: 0.85 UIU/ML (ref 0.45–4.5)
UROBILINOGEN UR STRIP-ACNC: <2 MG/DL
VENTRICULAR RATE: 114 BPM
WBC # BLD AUTO: 24.32 THOUSAND/UL (ref 4.31–10.16)
WBC #/AREA URNS AUTO: ABNORMAL /HPF

## 2025-04-22 PROCEDURE — 36415 COLL VENOUS BLD VENIPUNCTURE: CPT

## 2025-04-22 PROCEDURE — 80053 COMPREHEN METABOLIC PANEL: CPT

## 2025-04-22 PROCEDURE — 85730 THROMBOPLASTIN TIME PARTIAL: CPT

## 2025-04-22 PROCEDURE — 99285 EMERGENCY DEPT VISIT HI MDM: CPT

## 2025-04-22 PROCEDURE — 0241U HB NFCT DS VIR RESP RNA 4 TRGT: CPT

## 2025-04-22 PROCEDURE — 96367 TX/PROPH/DG ADDL SEQ IV INF: CPT

## 2025-04-22 PROCEDURE — 84443 ASSAY THYROID STIM HORMONE: CPT

## 2025-04-22 PROCEDURE — 71275 CT ANGIOGRAPHY CHEST: CPT

## 2025-04-22 PROCEDURE — 87081 CULTURE SCREEN ONLY: CPT

## 2025-04-22 PROCEDURE — 94760 N-INVAS EAR/PLS OXIMETRY 1: CPT

## 2025-04-22 PROCEDURE — 85610 PROTHROMBIN TIME: CPT

## 2025-04-22 PROCEDURE — 99222 1ST HOSP IP/OBS MODERATE 55: CPT | Performed by: PHYSICIAN ASSISTANT

## 2025-04-22 PROCEDURE — 87449 NOS EACH ORGANISM AG IA: CPT

## 2025-04-22 PROCEDURE — 83690 ASSAY OF LIPASE: CPT

## 2025-04-22 PROCEDURE — 92610 EVALUATE SWALLOWING FUNCTION: CPT

## 2025-04-22 PROCEDURE — 96365 THER/PROPH/DIAG IV INF INIT: CPT

## 2025-04-22 PROCEDURE — 85379 FIBRIN DEGRADATION QUANT: CPT

## 2025-04-22 PROCEDURE — 87040 BLOOD CULTURE FOR BACTERIA: CPT

## 2025-04-22 PROCEDURE — 85027 COMPLETE CBC AUTOMATED: CPT

## 2025-04-22 PROCEDURE — 83605 ASSAY OF LACTIC ACID: CPT

## 2025-04-22 PROCEDURE — 74177 CT ABD & PELVIS W/CONTRAST: CPT

## 2025-04-22 PROCEDURE — 99223 1ST HOSP IP/OBS HIGH 75: CPT | Performed by: FAMILY MEDICINE

## 2025-04-22 PROCEDURE — 84145 PROCALCITONIN (PCT): CPT

## 2025-04-22 PROCEDURE — 94660 CPAP INITIATION&MGMT: CPT

## 2025-04-22 PROCEDURE — 93010 ELECTROCARDIOGRAM REPORT: CPT | Performed by: INTERNAL MEDICINE

## 2025-04-22 PROCEDURE — 81001 URINALYSIS AUTO W/SCOPE: CPT

## 2025-04-22 PROCEDURE — 93005 ELECTROCARDIOGRAM TRACING: CPT

## 2025-04-22 PROCEDURE — 84484 ASSAY OF TROPONIN QUANT: CPT

## 2025-04-22 PROCEDURE — 83880 ASSAY OF NATRIURETIC PEPTIDE: CPT

## 2025-04-22 PROCEDURE — 85007 BL SMEAR W/DIFF WBC COUNT: CPT

## 2025-04-22 PROCEDURE — 71046 X-RAY EXAM CHEST 2 VIEWS: CPT

## 2025-04-22 RX ORDER — ESCITALOPRAM OXALATE 5 MG/1
5 TABLET ORAL DAILY
Status: DISCONTINUED | OUTPATIENT
Start: 2025-04-22 | End: 2025-04-24 | Stop reason: HOSPADM

## 2025-04-22 RX ORDER — CEFTRIAXONE 1 G/50ML
1000 INJECTION, SOLUTION INTRAVENOUS EVERY 24 HOURS
Status: DISCONTINUED | OUTPATIENT
Start: 2025-04-23 | End: 2025-04-24 | Stop reason: HOSPADM

## 2025-04-22 RX ORDER — NYSTATIN 100000 [USP'U]/ML
500000 SUSPENSION ORAL 4 TIMES DAILY
Status: DISCONTINUED | OUTPATIENT
Start: 2025-04-22 | End: 2025-04-24 | Stop reason: HOSPADM

## 2025-04-22 RX ORDER — ACETAMINOPHEN 325 MG/1
650 TABLET ORAL EVERY 6 HOURS PRN
Status: DISCONTINUED | OUTPATIENT
Start: 2025-04-22 | End: 2025-04-24 | Stop reason: HOSPADM

## 2025-04-22 RX ORDER — PANTOPRAZOLE SODIUM 40 MG/10ML
40 INJECTION, POWDER, LYOPHILIZED, FOR SOLUTION INTRAVENOUS EVERY 12 HOURS SCHEDULED
Status: DISCONTINUED | OUTPATIENT
Start: 2025-04-22 | End: 2025-04-24 | Stop reason: HOSPADM

## 2025-04-22 RX ORDER — ACETAMINOPHEN 10 MG/ML
1000 INJECTION, SOLUTION INTRAVENOUS ONCE
Status: COMPLETED | OUTPATIENT
Start: 2025-04-22 | End: 2025-04-22

## 2025-04-22 RX ORDER — ATORVASTATIN CALCIUM 40 MG/1
40 TABLET, FILM COATED ORAL DAILY
Status: DISCONTINUED | OUTPATIENT
Start: 2025-04-22 | End: 2025-04-24 | Stop reason: HOSPADM

## 2025-04-22 RX ORDER — CHLORAL HYDRATE 500 MG
1000 CAPSULE ORAL DAILY
Status: DISCONTINUED | OUTPATIENT
Start: 2025-04-22 | End: 2025-04-24 | Stop reason: HOSPADM

## 2025-04-22 RX ORDER — IPRATROPIUM BROMIDE AND ALBUTEROL SULFATE 2.5; .5 MG/3ML; MG/3ML
3 SOLUTION RESPIRATORY (INHALATION) EVERY 8 HOURS PRN
Status: DISCONTINUED | OUTPATIENT
Start: 2025-04-22 | End: 2025-04-24 | Stop reason: HOSPADM

## 2025-04-22 RX ORDER — EZETIMIBE 10 MG/1
10 TABLET ORAL DAILY
Status: DISCONTINUED | OUTPATIENT
Start: 2025-04-22 | End: 2025-04-24 | Stop reason: HOSPADM

## 2025-04-22 RX ORDER — AMLODIPINE BESYLATE 5 MG/1
5 TABLET ORAL DAILY
Status: DISCONTINUED | OUTPATIENT
Start: 2025-04-22 | End: 2025-04-24 | Stop reason: HOSPADM

## 2025-04-22 RX ORDER — GUAIFENESIN/DEXTROMETHORPHAN 100-10MG/5
10 SYRUP ORAL EVERY 4 HOURS PRN
Status: DISCONTINUED | OUTPATIENT
Start: 2025-04-22 | End: 2025-04-24 | Stop reason: HOSPADM

## 2025-04-22 RX ORDER — CEFTRIAXONE 1 G/50ML
1000 INJECTION, SOLUTION INTRAVENOUS ONCE
Status: COMPLETED | OUTPATIENT
Start: 2025-04-22 | End: 2025-04-22

## 2025-04-22 RX ORDER — HEPARIN SODIUM 5000 [USP'U]/ML
5000 INJECTION, SOLUTION INTRAVENOUS; SUBCUTANEOUS EVERY 8 HOURS SCHEDULED
Status: DISCONTINUED | OUTPATIENT
Start: 2025-04-22 | End: 2025-04-24 | Stop reason: HOSPADM

## 2025-04-22 RX ADMIN — NYSTATIN 500000 UNITS: 100000 SUSPENSION ORAL at 21:39

## 2025-04-22 RX ADMIN — HEPARIN SODIUM 5000 UNITS: 5000 INJECTION INTRAVENOUS; SUBCUTANEOUS at 21:39

## 2025-04-22 RX ADMIN — IOHEXOL 100 ML: 350 INJECTION, SOLUTION INTRAVENOUS at 09:08

## 2025-04-22 RX ADMIN — GUAIFENESIN AND DEXTROMETHORPHAN 10 ML: 100; 10 SYRUP ORAL at 15:28

## 2025-04-22 RX ADMIN — SODIUM CHLORIDE 500 ML: 0.9 INJECTION, SOLUTION INTRAVENOUS at 07:58

## 2025-04-22 RX ADMIN — ATORVASTATIN CALCIUM 40 MG: 40 TABLET, FILM COATED ORAL at 13:23

## 2025-04-22 RX ADMIN — PANTOPRAZOLE SODIUM 40 MG: 40 INJECTION, POWDER, FOR SOLUTION INTRAVENOUS at 13:24

## 2025-04-22 RX ADMIN — CEFTRIAXONE 1000 MG: 1 INJECTION, SOLUTION INTRAVENOUS at 10:25

## 2025-04-22 RX ADMIN — EZETIMIBE 10 MG: 10 TABLET ORAL at 13:23

## 2025-04-22 RX ADMIN — AMLODIPINE BESYLATE 5 MG: 5 TABLET ORAL at 13:23

## 2025-04-22 RX ADMIN — ACETAMINOPHEN 1000 MG: 1000 INJECTION, SOLUTION INTRAVENOUS at 08:14

## 2025-04-22 RX ADMIN — AZITHROMYCIN MONOHYDRATE 500 MG: 500 INJECTION, POWDER, LYOPHILIZED, FOR SOLUTION INTRAVENOUS at 13:52

## 2025-04-22 RX ADMIN — OMEGA-3 FATTY ACIDS CAP 1000 MG 1000 MG: 1000 CAP at 13:23

## 2025-04-22 RX ADMIN — NYSTATIN 500000 UNITS: 100000 SUSPENSION ORAL at 13:25

## 2025-04-22 RX ADMIN — PANTOPRAZOLE SODIUM 40 MG: 40 INJECTION, POWDER, FOR SOLUTION INTRAVENOUS at 21:39

## 2025-04-22 RX ADMIN — HEPARIN SODIUM 5000 UNITS: 5000 INJECTION INTRAVENOUS; SUBCUTANEOUS at 13:24

## 2025-04-22 RX ADMIN — ESCITALOPRAM OXALATE 5 MG: 5 TABLET, FILM COATED ORAL at 13:23

## 2025-04-22 RX ADMIN — ACETAMINOPHEN 650 MG: 325 TABLET, FILM COATED ORAL at 15:28

## 2025-04-22 RX ADMIN — NYSTATIN 500000 UNITS: 100000 SUSPENSION ORAL at 17:07

## 2025-04-22 NOTE — PLAN OF CARE
Problem: Potential for Falls  Goal: Patient will remain free of falls  Description: INTERVENTIONS:- Educate patient/family on patient safety including physical limitations- Instruct patient to call for assistance with activity - Consult OT/PT to assist with strengthening/mobility - Keep Call bell within reach- Keep bed low and locked with side rails adjusted as appropriate- Keep care items and personal belongings within reach- Initiate and maintain comfort rounds- Make Fall Risk Sign visible to staff- Offer Toileting every  Hours, in advance of need- Initiate/Maintain alarm- Obtain necessary fall risk management equipme- Apply yellow socks and bracelet for high fall risk patients- Consider moving patient to room near nurses station  Outcome: Progressing     Problem: PAIN - ADULT  Goal: Verbalizes/displays adequate comfort level or baseline comfort level  Description: Interventions:- Encourage patient to monitor pain and request assistance- Assess pain using appropriate pain scale- Administer analgesics based on type and severity of pain and evaluate response- Implement non-pharmacological measures as appropriate and evaluate response- Consider cultural and social influences on pain and pain management- Notify physician/advanced practitioner if interventions unsuccessful or patient reports new pain  Outcome: Progressing     Problem: INFECTION - ADULT  Goal: Absence or prevention of progression during hospitalization  Description: INTERVENTIONS:- Assess and monitor for signs and symptoms of infection- Monitor lab/diagnostic results- Monitor all insertion sites, i.e. indwelling lines, tubes, and drains- Monitor endotracheal if appropriate and nasal secretions for changes in amount and color- [place] appropriate cooling/warming therapies per order- Administer medications as ordered- Instruct and encourage patient and family to use good hand hygiene technique- Identify and instruct in appropriate isolation  precautions for identified infection/condition  Outcome: Progressing  Goal: Absence of fever/infection during neutropenic period  Description: INTERVENTIONS:- Monitor WBC  Outcome: Progressing     Problem: DISCHARGE PLANNING  Goal: Discharge to home or other facility with appropriate resources  Description: INTERVENTIONS:- Identify barriers to discharge w/patient and caregiver- Arrange for needed discharge resources and transportation as appropriate- Identify discharge learning needs (meds, wound care, etc.)- Arrange for interpretive services to assist at discharge as needed- Refer to Case Management Department for coordinating discharge planning if the patient needs post-hospital services based on physician/advanced practitioner order or complex needs related to functional status, cognitive ability, or social support system  Outcome: Progressing     Problem: Knowledge Deficit  Goal: Patient/family/caregiver demonstrates understanding of disease process, treatment plan, medications, and discharge instructions  Description: Complete learning assessment and assess knowledge base.Interventions:- Provide teaching at level of understanding- Provide teaching via preferred learning methods  Outcome: Progressing

## 2025-04-22 NOTE — CONSULTS
Consultation - Gastroenterology   Name: Winnie De La Cruz 80 y.o. male I MRN: [id number]  Unit/Bed#: -01 I Date of Admission: 4/22/2025   Date of Service: 4/22/2025 I Hospital Day: 0   Inpatient consult to gastroenterology  Consult performed by: Roxana Mathis PA-C  Consult ordered by: Mari Ernandez PA-C        Physician Requesting Evaluation: Sandra Poon MD   Reason for Evaluation / Principal Problem: Esophagitis    Please see earlier consult note completed today by Dr. Chang

## 2025-04-22 NOTE — H&P
H&P - Hospitalist   Name: Winnie De La Cruz 80 y.o. male I MRN: [id number]  Unit/Bed#: -01 I Date of Admission: 4/22/2025   Date of Service: 4/22/2025 I Hospital Day: 0     Assessment & Plan  Sepsis due to pneumonia (HCC)  Presented for cough, fevers/chills, chest pain/irritation x 3 days. No sick contacts  POA, SIRS: WBC 24, tachycardia, subjective fevers/chills   Procal 6.4, Lactic 1.5  CTA PE study: Multifocal consolidations as described above. Additional patchy groundglass opacifications predominantly in the lung bases. Findings are concerning for multilobar pneumonia with a component of aspiration pneumonia   Viral swab negative   UA pending   MRSA + sputum culture pending  Urine antigens  BC x 2 pending   IV abx: rocephin + azithromycin   Speech consult + aspiration precautions and modified diet   Trend WBC, procal, fevers  Stable on RA  Renal cell carcinoma of left kidney (HCC)  S/P L nephrectomy 2014  No prior chemotherapy   Follows with Dr Garcia with current observation of known pulmonary nodules (stable on CTA PE study this admission)  Mixed hyperlipidemia  Continue home lipitor 40 mg daily, zetia 10 mg daily, fish oil pills  Essential hypertension  SBP stable  Continue home amlodipine 5 mg daily   Stage 3a chronic kidney disease (HCC)  Lab Results   Component Value Date    EGFR 53 04/22/2025    EGFR 64 12/16/2024    EGFR 67 12/03/2023    CREATININE 1.27 04/22/2025    CREATININE 1.08 12/16/2024    CREATININE 1.05 12/03/2023     Cr baseline appears 1-1.1  Cr on admission 1.27  Avoid nephrotoxins and hypotension  PARVIZ on CPAP  Continue CPAP HS  Esophagitis  Patient notes mid-sternal chest discomfort which is worse with cough + direct palpation   Low suspicion for ACS given (-) trops, ECG without changes, and reproducible sx  Patient denies significant reflux symptoms  CT chest: Diffuse circumferential mural thickening of the esophagus, which can be seen in the setting of esophagitis.   Patient is not  immunosuppressed however tongue with mild white patches  Trial PO nystatin swish/swallow  IV protonix   Will discuss with GI for possible inpt vs outpt EGD      VTE Pharmacologic Prophylaxis: VTE Score: 5 High Risk (Score >/= 5) - Pharmacological DVT Prophylaxis Ordered: heparin. Sequential Compression Devices Ordered.  Code Status: Level 1 - Full Code   Discussion with family: Patient declined call to .     Anticipated Length of Stay: Patient will be admitted on an observation basis with an anticipated length of stay of less than 2 midnights secondary to PNA.    History of Present Illness   Chief Complaint: cough, fevers/chills    Winnie De La Cruz is a 80 y.o. male with a PMH of hyperlipidemia, hypertension, sleep apnea, chronic diastolic CHF, CKD, history of renal cell carcinoma status post left nephrectomy, PARVIZ who presents with worsening cough, congestion, generalized weakness/malaise and fever/chills over the past 3 days.  Patient denies any known sick contacts, lives with his wife.  He did not take his temperature at home however has been feeling diaphoretic with chills intermittently.  He did have 1 episode of vomiting yesterday after coughing on his sputum however denies additional nausea/vomiting, belly pain, diarrhea, urinary symptoms.  Patient also notes midsternal chest discomfort which is worse with coughing, deep inspiration, and direct palpation over this area.  At time of admission, he notes minimal discomfort.  He denies any recent reflux symptoms and is not on any immunosuppressive medications.    Review of Systems   Constitutional:  Positive for activity change, chills, fatigue and fever.   HENT:  Positive for congestion and rhinorrhea. Negative for sore throat.    Eyes:  Negative for visual disturbance.   Respiratory:  Positive for cough and chest tightness. Negative for shortness of breath and wheezing.    Cardiovascular:  Positive for chest pain. Negative for palpitations and leg  swelling.   Gastrointestinal:  Negative for abdominal pain, constipation, diarrhea, nausea and vomiting.   Genitourinary:  Negative for difficulty urinating, dysuria and frequency.   Musculoskeletal:  Negative for arthralgias and myalgias.   Skin:  Negative for rash and wound.   Neurological:  Negative for dizziness, light-headedness and headaches.   All other systems reviewed and are negative.      Historical Information   Past Medical History:   Diagnosis Date    Cancer of left kidney (HCC)     2014    Colon polyp     Diverticulitis of colon     Last assessed - 5/12/14    Hypercholesterolemia     Hyperlipidemia     Mood disorder ([facility]) 8/29/2023    Renal neoplasm      Past Surgical History:   Procedure Laterality Date    COLONOSCOPY      COLONOSCOPY N/A 4/27/2018    Procedure: COLONOSCOPY;  Surgeon: Sarah Modi MD;  Location: [facility] GI LAB;  Service: Gastroenterology    CYSTOSCOPY      Onset - 5/2/16 Gross, Kian     JOINT REPLACEMENT Left     Hip    LAPAROSCOPIC CHOLECYSTECTOMY      NEPHRECTOMY Left     NEPHRECTOMY Left     SHOULDER ARTHROSCOPY W/ ROTATOR CUFF REPAIR Left     TOTAL HIP ARTHROPLASTY Left     original hardware became infected and pt needed a second surgery to replace hardware    UMBILICAL HERNIA REPAIR       Social History     Tobacco Use    Smoking status: Former     Passive exposure: Past    Smokeless tobacco: Never   Vaping Use    Vaping status: Never Used   Substance and Sexual Activity    Alcohol use: Not Currently     Alcohol/week: 1.0 standard drink of alcohol     Types: 1 Cans of beer per week     Comment: Social    Drug use: No    Sexual activity: Yes     Partners: Female     E-Cigarette/Vaping    E-Cigarette Use Never User      E-Cigarette/Vaping Substances    Nicotine No     THC No     CBD No     Flavoring No     Other No     Unknown No      Family history non-contributory  Social History:  Marital Status: /Civil Union   Occupation: Not assessed  Patient Pre-hospital Living  Situation: Home, With spouse  Patient Pre-hospital Level of Mobility: walks  Patient Pre-hospital Diet Restrictions: none    Meds/Allergies   I have reviewed home medications with patient personally.  Prior to Admission medications    Medication Sig Start Date End Date Taking? Authorizing Provider   amLODIPine (NORVASC) 5 mg tablet Take 1 tablet (5 mg total) by mouth daily 2/12/25  Yes Nereyda Owen DO   ascorbic acid (VITAMIN C) 500 MG tablet Take 1 tablet (500 mg total) by mouth daily 2/12/25  Yes Nereyda Owen DO   atorvastatin (LIPITOR) 40 mg tablet TAKE 1 TABLET BY MOUTH EVERY DAY 1/30/25  Yes Live Cuba MD   escitalopram (LEXAPRO) 5 mg tablet Take 1 tablet (5 mg total) by mouth daily 2/12/25  Yes Nereyda Owen DO   ezetimibe (ZETIA) 10 mg tablet TAKE 1 TABLET BY MOUTH EVERY DAY 1/21/25  Yes Nereyda Owen DO   Multiple Vitamins-Minerals (MULTIVITAMIN ADULT) TABS Take by mouth   Yes Historical Provider, MD   Omega-3 Fatty Acids (FISH OIL) 1,000 mg by Does not apply route   Yes Historical Provider, MD     No Known Allergies    Objective :  Temp:  [98.4 °F (36.9 °C)-99.1 °F (37.3 °C)] 99.1 °F (37.3 °C)  HR:  [] 94  BP: (111-162)/(57-70) 114/70  Resp:  [18-20] 20  SpO2:  [93 %-97 %] 95 %  O2 Device: None (Room air)    Physical Exam  Vitals and nursing note reviewed.   Constitutional:       General: He is not in acute distress.     Appearance: He is well-developed. He is not ill-appearing.   HENT:      Head: Normocephalic and atraumatic.   Eyes:      General:         Right eye: No discharge.         Left eye: No discharge.      Extraocular Movements: Extraocular movements intact.      Conjunctiva/sclera: Conjunctivae normal.   Cardiovascular:      Rate and Rhythm: Normal rate and regular rhythm.      Heart sounds: Murmur heard.      Comments: Discomfort with palpation over sternum  Pulmonary:      Effort: Pulmonary effort is normal. No  respiratory distress.      Breath sounds: Normal breath sounds. No wheezing, rhonchi or rales.      Comments: Stable on RA  Abdominal:      General: Bowel sounds are normal. There is no distension.      Palpations: Abdomen is soft.      Tenderness: There is no abdominal tenderness.   Musculoskeletal:         General: No swelling.      Cervical back: Neck supple.      Right lower leg: No edema.      Left lower leg: No edema.   Skin:     General: Skin is warm and dry.      Capillary Refill: Capillary refill takes less than 2 seconds.   Neurological:      General: No focal deficit present.      Mental Status: He is alert and oriented to person, place, and time. Mental status is at baseline.      Cranial Nerves: No cranial nerve deficit.   Psychiatric:         Mood and Affect: Mood normal.         Behavior: Behavior normal.          Lines/Drains:            Lab Results: I have reviewed the following results:  Results from last 7 days   Lab Units 04/22/25  0757   WBC Thousand/uL 24.32*   HEMOGLOBIN g/dL 13.9   HEMATOCRIT % 41.4   PLATELETS Thousands/uL 176   BANDS PCT % 9*   LYMPHO PCT % 4*   MONO PCT % 5   EOS PCT % 0     Results from last 7 days   Lab Units 04/22/25  0757   SODIUM mmol/L 136   POTASSIUM mmol/L 3.8   CHLORIDE mmol/L 103   CO2 mmol/L 23   BUN mg/dL 27*   CREATININE mg/dL 1.27   ANION GAP mmol/L 10   CALCIUM mg/dL 9.0   ALBUMIN g/dL 3.9   TOTAL BILIRUBIN mg/dL 1.41*   ALK PHOS U/L 59   ALT U/L 21   AST U/L 23   GLUCOSE RANDOM mg/dL 117     Results from last 7 days   Lab Units 04/22/25  0758   INR  1.15         Lab Results   Component Value Date    HGBA1C 5.9 (H) 12/16/2024    HGBA1C 5.9 (H) 10/09/2023    HGBA1C 5.6 04/17/2023     Results from last 7 days   Lab Units 04/22/25  0832 04/22/25  0757   LACTIC ACID mmol/L 1.6  --    PROCALCITONIN ng/ml  --  6.40*       Imaging Results Review: I reviewed radiology reports from this admission including: CT chest.  Other Study Results Review: EKG was reviewed.      Administrative Statements   I have spent a total time of 60 minutes in caring for this patient on the day of the visit/encounter including Diagnostic results, Documenting in the medical record, Reviewing/placing orders in the medical record (including tests, medications, and/or procedures), Obtaining or reviewing history  , and Communicating with other healthcare professionals .    ** Please Note: This note has been constructed using a voice recognition system. **

## 2025-04-22 NOTE — ASSESSMENT & PLAN NOTE
Patient notes mid-sternal chest discomfort which is worse with cough + direct palpation   Low suspicion for ACS given (-) trops, ECG without changes, and reproducible sx  Patient denies significant reflux symptoms  CT chest: Diffuse circumferential mural thickening of the esophagus, which can be seen in the setting of esophagitis.   Patient is not immunosuppressed however tongue with mild white patches  Trial PO nystatin swish/swallow  IV protonix   Will discuss with GI for possible inpt vs outpt EGD

## 2025-04-22 NOTE — ED PROVIDER NOTES
Time reflects when diagnosis was documented in both MDM as applicable and the Disposition within this note       Time User Action Codes Description Comment    4/22/2025 10:53 AM Pili Liang [J18.9] Multifocal pneumonia     4/22/2025 10:53 AM Pili Liang [A41.9] Sepsis (HCC)           ED Disposition       ED Disposition   Admit    Condition   Stable    Date/Time   Tue Apr 22, 2025 10:52 AM    Comment   Case was discussed with Dr. Poon and the patient's admission status was agreed to be Admission Status: observation status to the service of Dr. Poon .               Assessment & Plan       Medical Decision Making  DDX including but not limited to: ACS, pneumonia, pleurisy, pericarditis, myocarditis    Patient with cough for 3 to 4 days, now with pleuritic chest pain and shortness of breath.  Chest x-ray concerning for pulmonary vascular congestion versus multi lobar pneumonia.  EKG obtained without acute ischemic changes.  Troponin stable at 16.  D-dimer sent given tachycardia and shortness of breath, elevated, will obtain CT PE.    Patient's workup with finding of leukocytosis with 9% bandemia, multilobar pneumonia seen on CT imaging.  SIRS criteria met, giving fluid resuscitation and broad-spectrum antibiotics.  Will discuss with inpatient medicine for admission.    Problems Addressed:  Multifocal pneumonia: acute illness or injury  Sepsis (HCC): acute illness or injury    Amount and/or Complexity of Data Reviewed  Labs: ordered. Decision-making details documented in ED Course.  Radiology: ordered.  ECG/medicine tests: ordered and independent interpretation performed.    Risk  OTC drugs.  Prescription drug management.  Decision regarding hospitalization.        ED Course as of 04/22/25 1056   Tue Apr 22, 2025   0753 Echo from 8/31/2023 with EF of 65%   0828 D-Dimer, Quant(!): 0.90  Will obtain CT PE   0835 Patient updated on lab results, given elevated D-dimer, will obtain CT PE.  He is in agreement  with plan.   1020 Pt updated on results, continues to rest comfortably, labs concerning for sepsis, CT evidence is multi lobar pneumonia, giving Rocephin, will discuss with [facility]   1054 Patient accepted to observation status by Dr. Poon       Medications   sodium chloride 0.9 % bolus 500 mL (0 mL Intravenous Stopped 4/22/25 0922)   acetaminophen (Ofirmev) injection 1,000 mg (0 mg Intravenous Stopped 4/22/25 0922)   iohexol (OMNIPAQUE) 350 MG/ML injection (MULTI-DOSE) 100 mL (100 mL Intravenous Given 4/22/25 0908)   cefTRIAXone (ROCEPHIN) IVPB (premix in dextrose) 1,000 mg 50 mL (1,000 mg Intravenous New Bag 4/22/25 1025)       ED Risk Strat Scores                    No data recorded    PERC Rule for PE      Flowsheet Row Most Recent Value   PERC Rule for PE    Age >=50 1 Filed at: 04/22/2025 0756   HR >=100 1 Filed at: 04/22/2025 0756   O2 Sat on room air < 95% 0 Filed at: 04/22/2025 0756   History of PE or DVT 0 Filed at: 04/22/2025 0756   Recent trauma or surgery 0 Filed at: 04/22/2025 0756   Hemoptysis 0 Filed at: 04/22/2025 0756   Exogenous estrogen 0 Filed at: 04/22/2025 0756   Unilateral leg swelling 0 Filed at: 04/22/2025 0756   PERC Rule for PE Results 2 Filed at: 04/22/2025 0756            SBIRT 22yo+      Flowsheet Row Most Recent Value   Initial Alcohol Screen: US AUDIT-C     1. How often do you have a drink containing alcohol? 1 Filed at: 04/22/2025 0743   2. How many drinks containing alcohol do you have on a typical day you are drinking?  1 Filed at: 04/22/2025 0743   3b. FEMALE Any Age, or MALE 65+: How often do you have 4 or more drinks on one occassion? 0 Filed at: 04/22/2025 0743   Audit-C Score 2 Filed at: 04/22/2025 0743   FLEX: How many times in the past year have you...    Used an illegal drug or used a prescription medication for non-medical reasons? Never Filed at: 04/22/2025 0743            Wells' Criteria for PE      Flowsheet Row Most Recent Value   Wells' Criteria for PE    Clinical signs  and symptoms of DVT 0 Filed at: 04/22/2025 0756   PE is primary diagnosis or equally likely 0 Filed at: 04/22/2025 0756   HR >100 1.5 Filed at: 04/22/2025 0756   Immobilization at least 3 days or Surgery in the previous 4 weeks 0 Filed at: 04/22/2025 0756   Previous, objectively diagnosed PE or DVT 0 Filed at: 04/22/2025 0756   Hemoptysis 0 Filed at: 04/22/2025 0756   Malignancy with treatment within 6 months or palliative 0 Filed at: 04/22/2025 0756   Wells' Criteria Total 1.5 Filed at: 04/22/2025 0756                        History of Present Illness       Chief Complaint   Patient presents with    Chest Pain     Patient presents to the ER with chest pain, headache and nausea.       Past Medical History:   Diagnosis Date    Cancer of left kidney (HCC)     2014    Colon polyp     Diverticulitis of colon     Last assessed - 5/12/14    Hypercholesterolemia     Hyperlipidemia     Mood disorder (HCC) 8/29/2023    Renal neoplasm       Past Surgical History:   Procedure Laterality Date    COLONOSCOPY      COLONOSCOPY N/A 4/27/2018    Procedure: COLONOSCOPY;  Surgeon: Sarah Modi MD;  Location: [facility];  Service: Gastroenterology    CYSTOSCOPY      Onset - 5/2/16 Gross, Kian     JOINT REPLACEMENT Left     Hip    LAPAROSCOPIC CHOLECYSTECTOMY      NEPHRECTOMY Left     NEPHRECTOMY Left     SHOULDER ARTHROSCOPY W/ ROTATOR CUFF REPAIR Left     TOTAL HIP ARTHROPLASTY Left     original hardware became infected and pt needed a second surgery to replace hardware    UMBILICAL HERNIA REPAIR        Family History   Problem Relation Age of Onset    Tuberculosis Mother     Emphysema Father         Lung      Social History     Tobacco Use    Smoking status: Former     Passive exposure: Past    Smokeless tobacco: Never   Vaping Use    Vaping status: Never Used   Substance Use Topics    Alcohol use: Yes     Comment: Social    Drug use: No      E-Cigarette/Vaping    E-Cigarette Use Never User       E-Cigarette/Vaping Substances     Nicotine No     THC No     CBD No     Flavoring No     Other No     Unknown No       I have reviewed and agree with the history as documented.     The patient is an 80-year-old male with PMH of HLD, aortic stenosis, diverticulitis, and stage IIIa CKD presenting for evaluation of cough, chest pain, and shortness of breath.  The patient started on Friday (4 days PTA) with a congested, nonproductive cough.  He neck started on Sunday evening (2 days PTA) with midsternal chest pain described as waxing and waning, sharp/pressure, worsened with activity and deep inspiration, and relieved with position changes and time.  He does report mild shortness of breath that is constant and unrelated to laying down and exertion.  He denies associated fevers but endorses fatigue and chills.  He also reports a mild generalized headache.  He reports mild nausea and did vomit 1 time last night.  He denies headache, lightheadedness/dizziness, neck pain, palpitations, abdominal pain, flank pain, urinary complaints, skin changes, leg swelling.      History provided by:  Patient   used: No    Chest Pain  Associated symptoms: cough, fatigue, headache, nausea, shortness of breath and vomiting (X 1 last night)    Associated symptoms: no abdominal pain, no back pain, no dizziness, no dysphagia, no fever, no palpitations and no weakness        Review of Systems   Constitutional:  Positive for appetite change (Decreased), chills and fatigue. Negative for fever.   HENT:  Negative for congestion, sore throat and trouble swallowing.    Respiratory:  Positive for cough and shortness of breath.    Cardiovascular:  Positive for chest pain. Negative for palpitations and leg swelling.   Gastrointestinal:  Positive for nausea and vomiting (X 1 last night). Negative for abdominal pain and diarrhea.   Genitourinary: Negative.    Musculoskeletal:  Negative for back pain and gait problem.   Skin:  Negative for rash and wound.    Neurological:  Positive for headaches. Negative for dizziness, syncope, weakness and light-headedness.   All other systems reviewed and are negative.          Objective       ED Triage Vitals [04/22/25 0742]   Temperature Pulse Blood Pressure Respirations SpO2 Patient Position - Orthostatic VS   98.4 °F (36.9 °C) (!) 121 162/69 18 97 % Lying      Temp Source Heart Rate Source BP Location FiO2 (%) Pain Score    Oral Monitor Left arm -- 5      Vitals      Date and Time Temp Pulse SpO2 Resp BP Pain Score FACES Pain Rating User   04/22/25 1034 -- 98 93 % 18 111/57 -- -- GS   04/22/25 0926 -- 100 95 % 18 116/64 -- -- GS   04/22/25 0835 -- 103 96 % 20 -- -- -- TS   04/22/25 0742 98.4 °F (36.9 °C) 121 97 % 18 162/69 5 -- CAC            Physical Exam  Vitals and nursing note reviewed.   Constitutional:       General: He is not in acute distress.     Appearance: Normal appearance. He is well-developed. He is not ill-appearing, toxic-appearing or diaphoretic.   HENT:      Head: Normocephalic and atraumatic.      Jaw: There is normal jaw occlusion.      Nose: Nose normal.      Mouth/Throat:      Lips: Pink.      Mouth: Mucous membranes are moist.      Pharynx: Oropharynx is clear. Uvula midline.   Eyes:      General: Lids are normal. Vision grossly intact. Gaze aligned appropriately.      Extraocular Movements: Extraocular movements intact.      Conjunctiva/sclera: Conjunctivae normal.      Pupils: Pupils are equal, round, and reactive to light.   Cardiovascular:      Rate and Rhythm: Regular rhythm. Tachycardia present.      Pulses:           Radial pulses are 2+ on the right side and 2+ on the left side.        Dorsalis pedis pulses are 2+ on the right side and 2+ on the left side.      Heart sounds: Normal heart sounds, S1 normal and S2 normal.   Pulmonary:      Effort: Pulmonary effort is normal. No tachypnea, accessory muscle usage, respiratory distress or retractions.      Breath sounds: Normal air entry. No stridor,  decreased air movement or transmitted upper airway sounds. Examination of the right-middle field reveals rhonchi. Examination of the left-middle field reveals rhonchi. Examination of the right-lower field reveals decreased breath sounds and rhonchi. Examination of the left-lower field reveals decreased breath sounds and rhonchi. Decreased breath sounds and rhonchi present. No wheezing.   Musculoskeletal:         General: Normal range of motion.      Cervical back: Neck supple.      Right lower leg: No edema.      Left lower leg: No edema.   Skin:     General: Skin is warm and dry.      Capillary Refill: Capillary refill takes less than 2 seconds.   Neurological:      General: No focal deficit present.      Mental Status: He is alert and oriented to person, place, and time. Mental status is at baseline.         Results Reviewed       Procedure Component Value Units Date/Time    Lactic acid, plasma (w/reflex if result > 2.0) [[id number]]  (Normal) Collected: 04/22/25 0832    Lab Status: Final result Specimen: Blood from Arm, Right Updated: 04/22/25 0906     LACTIC ACID 1.6 mmol/L     Narrative:      Result may be elevated if tourniquet was used during collection.    Procalcitonin [[id number]]  (Abnormal) Collected: 04/22/25 0757    Lab Status: Final result Specimen: Blood from Arm, Left Updated: 04/22/25 0902     Procalcitonin 6.40 ng/ml     RBC Morphology Reflex Test [[id number]] Collected: 04/22/25 0757    Lab Status: Final result Specimen: Blood from Arm, Left Updated: 04/22/25 0901    B-Type Natriuretic Peptide(BNP) [[id number]]  (Abnormal) Collected: 04/22/25 0757    Lab Status: Final result Specimen: Blood from Arm, Left Updated: 04/22/25 0851      pg/mL     FLU/RSV/COVID - if FLU/RSV clinically relevant (2hr TAT) [[id number]]  (Normal) Collected: 04/22/25 0758    Lab Status: Final result Specimen: Nares from Nose Updated: 04/22/25 0843     SARS-CoV-2 Negative     INFLUENZA A PCR Negative     INFLUENZA B PCR  Negative     RSV PCR Negative    Narrative:      This test has been performed using the CoV-2/Flu/RSV plus assay on the [employer] GeneXpert platform. This test has been validated by the  and verified by the performing laboratory.     This test is designed to amplify and detect the following: nucleocapsid (N), envelope (E), and RNA-dependent RNA polymerase (RdRP) genes of the SARS-CoV-2 genome; matrix (M), basic polymerase (PB2), and acidic protein (PA) segments of the influenza A genome; matrix (M) and non-structural protein (NS) segments of the influenza B genome, and the nucleocapsid genes of RSV A and RSV B.     Positive results are indicative of the presence of Flu A, Flu B, RSV, and/or SARS-CoV-2 RNA. Positive results for SARS-CoV-2 or suspected novel influenza should be reported to state, local, or federal health departments according to local reporting requirements.      All results should be assessed in conjunction with clinical presentation and other laboratory markers for clinical management.     FOR PEDIATRIC PATIENTS - copy/paste COVID Guidelines URL to browser: [email or web address]       CBC and differential [[id number]]  (Abnormal) Collected: 04/22/25 0757    Lab Status: Final result Specimen: Blood from Arm, Left Updated: 04/22/25 0839     WBC 24.32 Thousand/uL      RBC 4.31 Million/uL      Hemoglobin 13.9 g/dL      Hematocrit 41.4 %      MCV 96 fL      MCH 32.3 pg      MCHC 33.6 g/dL      RDW 13.5 %      MPV 10.0 fL      Platelets 176 Thousands/uL     Narrative:      This is an appended report.  These results have been appended to a previously verified report.    Manual Differential(PHLEBS Do Not Order) [[id number]]  (Abnormal) Collected: 04/22/25 0757    Lab Status: Final result Specimen: Blood from Arm, Left Updated: 04/22/25 0839     Segmented % 82 %      Bands % 9 %      Lymphocytes % 4 %      Monocytes % 5 %      Eosinophils % 0 %       Basophils % 0 %      Absolute Neutrophils 22.13 Thousand/uL      Absolute Lymphocytes 0.97 Thousand/uL      Absolute Monocytes 1.22 Thousand/uL      Absolute Eosinophils 0.00 Thousand/uL      Absolute Basophils 0.00 Thousand/uL      Total Counted --     RBC Morphology Present     Platelet Estimate Adequate     Anisocytosis Present     Polychromasia Present    Blood culture #2 [[id number]] Collected: 04/22/25 0832    Lab Status: In process Specimen: Blood from Arm, Right Updated: 04/22/25 0838    Blood culture #1 [[id number]] Collected: 04/22/25 0832    Lab Status: In process Specimen: Blood from Arm, Left Updated: 04/22/25 0838    TSH, 3rd generation with Free T4 reflex [[id number]]  (Normal) Collected: 04/22/25 0757    Lab Status: Final result Specimen: Blood from Arm, Left Updated: 04/22/25 0836     TSH 3RD GENERATON 0.849 uIU/mL     HS Troponin 0hr (reflex protocol) [[id number]]  (Normal) Collected: 04/22/25 0757    Lab Status: Final result Specimen: Blood from Arm, Left Updated: 04/22/25 0827     hs TnI 0hr 16 ng/L     HS Troponin I 2hr [[id number]]     Lab Status: No result Specimen: Blood     HS Troponin I 4hr [[id number]]     Lab Status: No result Specimen: Blood     D-Dimer [[id number]]  (Abnormal) Collected: 04/22/25 0758    Lab Status: Final result Specimen: Blood from Arm, Left Updated: 04/22/25 0821     D-Dimer, Quant 0.90 ug/ml FEU     Narrative:      In the evaluation for possible pulmonary embolism, in the appropriate (Well's Score of 4 or less) patient, the age adjusted d-dimer cutoff for this patient can be calculated as:    Age x 0.01 (in ug/mL) for Age-adjusted D-dimer exclusion threshold for a patient over 50 years.    Protime-INR [[id number]]  (Abnormal) Collected: 04/22/25 0758    Lab Status: Final result Specimen: Blood from Arm, Left Updated: 04/22/25 0821     Protime 15.2 seconds      INR 1.15    Narrative:      INR Therapeutic Range    Indication                                             INR  Range      Atrial Fibrillation                                               2.0-3.0  Hypercoagulable State                                    2.0.2.3  Left Ventricular Asist Device                            2.0-3.0  Mechanical Heart Valve                                  -    Aortic(with afib, MI, embolism, HF, LA enlargement,    and/or coagulopathy)                                     2.0-3.0 (2.5-3.5)     Mitral                                                             2.5-3.5  Prosthetic/Bioprosthetic Heart Valve               2.0-3.0  Venous thromboembolism (VTE: VT, PE        2.0-3.0    APTT [[id number]]  (Normal) Collected: 04/22/25 0758    Lab Status: Final result Specimen: Blood from Arm, Left Updated: 04/22/25 0821     PTT 29 seconds     Comprehensive metabolic panel [[id number]]  (Abnormal) Collected: 04/22/25 0757    Lab Status: Final result Specimen: Blood from Arm, Left Updated: 04/22/25 0821     Sodium 136 mmol/L      Potassium 3.8 mmol/L      Chloride 103 mmol/L      CO2 23 mmol/L      ANION GAP 10 mmol/L      BUN 27 mg/dL      Creatinine 1.27 mg/dL      Glucose 117 mg/dL      Calcium 9.0 mg/dL      AST 23 U/L      ALT 21 U/L      Alkaline Phosphatase 59 U/L      Total Protein 6.7 g/dL      Albumin 3.9 g/dL      Total Bilirubin 1.41 mg/dL      eGFR 53 ml/min/1.73sq m     Narrative:      National Kidney Disease Foundation guidelines for Chronic Kidney Disease (CKD):     Stage 1 with normal or high GFR (GFR > 90 mL/min/1.73 square meters)    Stage 2 Mild CKD (GFR = 60-89 mL/min/1.73 square meters)    Stage 3A Moderate CKD (GFR = 45-59 mL/min/1.73 square meters)    Stage 3B Moderate CKD (GFR = 30-44 mL/min/1.73 square meters)    Stage 4 Severe CKD (GFR = 15-29 mL/min/1.73 square meters)    Stage 5 End Stage CKD (GFR <15 mL/min/1.73 square meters)  Note: GFR calculation is accurate only with a steady state creatinine    Lipase [[id number]]  (Abnormal) Collected: 04/22/25 0757    Lab Status: Final result  Specimen: Blood from Arm, Left Updated: 04/22/25 0821     Lipase 10 u/L     UA w Reflex to Microscopic w Reflex to Culture [[id number]]     Lab Status: No result Specimen: Urine             CT pe study w abdomen pelvis w contrast   Final Interpretation by Singh Holt MD (04/22 0958)      1. No pulmonary embolus or evidence of right heart strain. Evaluation is mildly limited by suboptimal contrast bolus.      2. Multifocal consolidations as described above. Additional patchy groundglass opacifications predominantly in the lung bases. Findings are concerning for multilobar pneumonia with a component of aspiration pneumonia.      3. Stable bilateral pulmonary nodules as described above      4. Diffuse circumferential mural thickening of the esophagus, which can be seen in the setting of esophagitis.      5. Mild bladder wall thickening. This is also likely exacerbated by under distention. In the setting of prostate enlargement, consider bladder outlet obstruction. Otherwise consider correlation with urinalysis.         Resident: PARAMJIT Peacock I, the attending radiologist, have reviewed the images and agree with the final report above.      Workstation performed: [id number]         XR chest 2 views    (Results Pending)       ECG 12 Lead Documentation Only    Date/Time: 4/22/2025 7:49 AM    Performed by: JOSE RAFAEL Purcell  Authorized by: JOSE RAFAEL Purcell    Indications / Diagnosis:  CP  ECG reviewed by me, the ED Provider: yes    Patient location:  ED  Previous ECG:     Previous ECG:  Compared to current    Comparison ECG info:  April 13, 2015    Similarity:  Changes noted (New nonspecific changes to inferior leads)    Comparison to cardiac monitor: Yes    Interpretation:     Interpretation: non-specific    Rate:     ECG rate:  114    ECG rate assessment: tachycardic    Rhythm:     Rhythm: sinus tachycardia    Ectopy:     Ectopy: none    QRS:     QRS axis:  Normal    QRS intervals:  Normal  Conduction:      Conduction: normal    ST segments:     ST segments:  Non-specific  T waves:     T waves: non-specific and inverted      Inverted:  III  Comments:      Sinus tachycardia, normal axis, normal intervals, T wave inversion of lead III      ED Medication and Procedure Management   Prior to Admission Medications   Prescriptions Last Dose Informant Patient Reported? Taking?   Multiple Vitamins-Minerals (MULTIVITAMIN ADULT) TABS  Self Yes No   Sig: Take by mouth   Omega-3 Fatty Acids (FISH OIL) 1,000 mg  Self Yes No   Sig: by Does not apply route   amLODIPine (NORVASC) 5 mg tablet  Self No No   Sig: Take 1 tablet (5 mg total) by mouth daily   ascorbic acid (VITAMIN C) 500 MG tablet  Self No No   Sig: Take 1 tablet (500 mg total) by mouth daily   atorvastatin (LIPITOR) 40 mg tablet  Self No No   Sig: TAKE 1 TABLET BY MOUTH EVERY DAY   escitalopram (LEXAPRO) 5 mg tablet  Self No No   Sig: Take 1 tablet (5 mg total) by mouth daily   ezetimibe (ZETIA) 10 mg tablet  Self No No   Sig: TAKE 1 TABLET BY MOUTH EVERY DAY      Facility-Administered Medications: None     Patient's Medications   Discharge Prescriptions    No medications on file     No discharge procedures on file.  ED SEPSIS DOCUMENTATION   Time reflects when diagnosis was documented in both MDM as applicable and the Disposition within this note       Time User Action Codes Description Comment    4/22/2025 10:53 AM Pili Liang [J18.9] Multifocal pneumonia     4/22/2025 10:53 AM Pili Liang [A41.9] Sepsis (HCC)            Initial Sepsis Screening       Row Name 04/22/25 1006                Is the patient's history suggestive of a new or worsening infection? Yes (Proceed)  -TS        Suspected source of infection pneumonia  -TS        Indicate SIRS criteria Tachycardia > 90 bpm;Leukocytosis (WBC > 99807 IJL) OR Leukopenia (WBC <4000 IJL) OR Bandemia (WBC >10% bands)  -TS        Are two or more of the above signs & symptoms of infection both present and new to  the patient? Yes (Proceed)  -TS        Assess for evidence of organ dysfunction: Are any of the below criteria present within 6 hours of suspected infection and SIRS criteria that are NOT considered to be chronic conditions? --  N/A  -TS                  User Key  (r) = Recorded By, (t) = Taken By, (c) = Cosigned By      Initials Name Provider Type    JOSE RAFAEL Thorne Nurse Practitioner                       JOSE RAFAEL Purcell  04/22/25 1024

## 2025-04-22 NOTE — ASSESSMENT & PLAN NOTE
Lab Results   Component Value Date    EGFR 53 04/22/2025    EGFR 64 12/16/2024    EGFR 67 12/03/2023    CREATININE 1.27 04/22/2025    CREATININE 1.08 12/16/2024    CREATININE 1.05 12/03/2023     Cr baseline appears 1-1.1  Cr on admission 1.27  Avoid nephrotoxins and hypotension

## 2025-04-22 NOTE — ASSESSMENT & PLAN NOTE
S/P L nephrectomy 2014  No prior chemotherapy   Follows with Dr Garcia with current observation of known pulmonary nodules (stable on CTA PE study this admission)

## 2025-04-22 NOTE — SPEECH THERAPY NOTE
Speech Language/Pathology  Speech-Language Pathology Bedside Swallow Evaluation      Patient Name: Winnie De La Cruz    Today's Date: 4/22/2025     Problem List  Principal Problem:    Sepsis due to pneumonia (HCC)  Active Problems:    Renal cell carcinoma of left kidney (HCC)    Mixed hyperlipidemia    Essential hypertension    Stage 3a chronic kidney disease (HCC)    PARVIZ on CPAP    Esophagitis      Past Medical History  Past Medical History:   Diagnosis Date    Cancer of left kidney (HCC)     2014    Colon polyp     Diverticulitis of colon     Last assessed - 5/12/14    Hypercholesterolemia     Hyperlipidemia     Mood disorder (HCC) 8/29/2023    Renal neoplasm        Past Surgical History  Past Surgical History:   Procedure Laterality Date    COLONOSCOPY      COLONOSCOPY N/A 4/27/2018    Procedure: COLONOSCOPY;  Surgeon: Sarah Modi MD;  Location: [facility];  Service: Gastroenterology    CYSTOSCOPY      Onset - 5/2/16 Gross, Kian     JOINT REPLACEMENT Left     Hip    LAPAROSCOPIC CHOLECYSTECTOMY      NEPHRECTOMY Left     NEPHRECTOMY Left     SHOULDER ARTHROSCOPY W/ ROTATOR CUFF REPAIR Left     TOTAL HIP ARTHROPLASTY Left     original hardware became infected and pt needed a second surgery to replace hardware    UMBILICAL HERNIA REPAIR         Summary   Pt presents w/ s/s suggestive of functional oropharyngeal swallow skills.     Complete labial seal for retrieval and containment of all materials, no anterior bolus loss present. Timely rotary mastication of regular textures w/ complete bolus breakdown and adequate bolus transfer. No oral residue noted. Suspected fairly prompt swallow initiation and fair hyolaryngeal elevation to palpation. No overt s/s of aspiration at this time, VSS, no change in vocal quality. Of note, cannot r/o sensory deficit at bedside.     Education initiated w/ pt regarding strategies to optimize swallow safety including frequent/thorough oral care and to notify medical staff if s/s of  aspiration arise. Pt verbalized understanding and agreement, denies questions or concerns at this time.     Pt w/ increased risk of aspiration 2/2 current admission w/ cough and weakness, abn lung imaging, and multiple medical co-morbidities. SLP to f/u for diet tolerance as able and appropriate, will monitor need for VFSS completion, if ongoing concern for aspiration can consider completion of VFSS.       Recommended Diet: regular diet and thin liquids   Recommended Form of Meds: as tolerated    Aspiration precautions and swallowing strategies: upright posture, only feed when fully alert, slow rate of feeding, and small bites/sips  Other Recommendations: Continue frequent oral care        Current Medical Status  Pt is a 80 y.o. male with a PMH of hyperlipidemia, hypertension, sleep apnea, chronic diastolic CHF, CKD, history of renal cell carcinoma status post left nephrectomy, PARVIZ who presents with worsening cough, congestion, generalized weakness/malaise and fever/chills over the past 3 days.  Patient denies any known sick contacts, lives with his wife.  He did not take his temperature at home however has been feeling diaphoretic with chills intermittently.  He did have 1 episode of vomiting yesterday after coughing on his sputum however denies additional nausea/vomiting, belly pain, diarrhea, urinary symptoms.  Patient also notes midsternal chest discomfort which is worse with coughing, deep inspiration, and direct palpation over this area.  At time of admission, he notes minimal discomfort.  He denies any recent reflux symptoms and is not on any immunosuppressive medications.     Current Precautions:  Fall  Aspiration    Allergies:  No known food allergies    Past medical history:  Please see H&P for details    Special Studies:  4/22/25 CT pe study w abdomen pelvis w contrast:  1. No pulmonary embolus or evidence of right heart strain. Evaluation is mildly limited by suboptimal contrast bolus.     2. Multifocal  consolidations as described above. Additional patchy groundglass opacifications predominantly in the lung bases. Findings are concerning for multilobar pneumonia with a component of aspiration pneumonia.     3. Stable bilateral pulmonary nodules as described above     4. Diffuse circumferential mural thickening of the esophagus, which can be seen in the setting of esophagitis.     5. Mild bladder wall thickening. This is also likely exacerbated by under distention. In the setting of prostate enlargement, consider bladder outlet obstruction. Otherwise consider correlation with urinalysis    4/22/25 XR chest 2 views:  Multifocal infiltrates, largest in the left upper lobe.     History of VFSS:  N/A     Social/Education/Vocational Hx:  Pt lives w/ spouse     Swallow Information   Current Diet: soft/level 3 diet and thin liquids   Baseline Diet: regular diet and thin liquids per pt report       Baseline Assessment   Behavior/Cognition: alert  Speech/Language Status: able to participate in conversation and able to follow commands  Patient Positioning: upright in bed  Pain Status/Interventions/Response to Interventions: No report of or nonverbal indications of pain.       Oral Mechanism Exam  Facial: symmetrical  Labial: WFL  Lingual: WFL  Velum: symmetrical  Mandible: adequate ROM  Dentition: partial upper denture, adequate lower dentition   Vocal quality:clear/adequate   Volitional Cough: strong/productive   Respiratory Status: on RA      Consistencies Assessed and Performance   Consistencies Administered: thin liquids and hard solids    Oral Stage:   Complete labial seal for retrieval and containment of all materials, no anterior bolus loss present. Timely rotary mastication of regular textures w/ complete bolus breakdown and adequate bolus transfer. No oral residue noted.    Pharyngeal Stage:   Suspected fairly prompt swallow initiation and fair hyolaryngeal elevation to palpation. No overt s/s of aspiration at this  time, VSS, no change in vocal quality. Of note, cannot r/o sensory deficit at bedside.     Esophageal Concerns: none reported    Per CT:  Diffuse circumferential mural thickening of the esophagus, which can be seen in the setting of esophagitis.     GI following     Summary and Recommendations (see above)    Results Reviewed with: patient, RN, and MD     Treatment Recommended: as able and appropriate for diet tolerance, will monitor need for VFSS completion if medically indicated     Dysphagia LTG  -Patient will demonstrate safe and effective oral intake (without overt s/s significant oral/pharyngeal dysphagia including s/s penetration or aspiration) for the highest appropriate diet level.     Short Term Goals:  -Pt will tolerate regular textures an thin liquid with no significant s/s oral or pharyngeal dysphagia across 1-3 diagnostic session/s    -Patient will comply with a Video/Modified Barium Swallow study for more complete assessment of swallowing anatomy/physiology/aspiration risk and to assess efficacy of treatment techniques so as to best guide treatment plan if medically indicated    Speech Therapy Prognosis   Prognosis: good    Prognosis Considerations: age, medical status, and prior medical history

## 2025-04-22 NOTE — CONSULTS
Consultation - Gastroenterology   Name: Winnie De La Cruz 80 y.o. male I MRN: [id number]  Unit/Bed#: -01 I Date of Admission: 4/22/2025   Date of Service: 4/22/2025 I Hospital Day: 0   Consults  Physician Requesting Evaluation: Sandra Poon MD   Reason for Evaluation / Principal Problem: Esophagitis on CT    Assessment & Plan  Sepsis due to pneumonia (HCC)  80M with history of hypertension sleep apnea, heart failure, CKD, renal cell carcinoma status post left nephrectomy presenting with about 3 days of progressive cough and fever/chills, found to have leukocytosis and multi lobar pneumonia, concerning for aspiration  Continue antibiotics per primary team speech therapy consult pending    Esophagitis  Admission CT shows diffuse circumferential thickening of the esophagus.  The patient denies any reflux or dysphagia prior to admission  He has oral thrush on exam.  Agree with nystatin swish and swallow  Agree with once daily PPI  Patient agrees to upper endoscopy when he improves from a pulmonary standpoint, likely prior to discharge, in order to assess for erosive esophagitis versus Candida esophagitis.          History of Present Illness   HPI:  Winnie De La Cruz is a 80 y.o. male who presents to the emergency department with 3 to 4 days of cough associated with shortness of breath and chest pain.  CT shows multilobar pneumonia.  GI is consulted for a thickened esophagus on CT scan.  The patient denies any issues with frequent heartburn.  He denies any use of antacids on a regular basis.  He reports chest pain in the remote past that was ascribed to a hiatal hernia.  He has been advised fried foods since then with improvement in symptoms.  His appetite is good and his weight is stable.  He denies any lower GI complaints.  He had an unremarkable colonoscopy March 2024    Review of Systems  Medical History Review: I have reviewed the patient's PMH, PSH, Social History, Family History, Meds, and Allergies      Objective :  Temp:  [98.4 °F (36.9 °C)-99.1 °F (37.3 °C)] 99.1 °F (37.3 °C)  HR:  [] 94  BP: (111-162)/(57-70) 114/70  Resp:  [18-20] 20  SpO2:  [93 %-97 %] 95 %  O2 Device: None (Room air)    Physical Exam  Constitutional:       Appearance: Normal appearance.   HENT:      Head: Normocephalic and atraumatic.      Nose: Nose normal.   Eyes:      Extraocular Movements: Extraocular movements intact.      Conjunctiva/sclera: Conjunctivae normal.   Neck:      Comments: Mild thrush on tongue  Cardiovascular:      Rate and Rhythm: Normal rate and regular rhythm.   Pulmonary:      Effort: Pulmonary effort is normal.      Breath sounds: Examination of the right-upper field reveals decreased breath sounds. Examination of the left-upper field reveals decreased breath sounds. Decreased breath sounds present.   Abdominal:      General: Abdomen is flat. Bowel sounds are normal. There is no distension.      Palpations: Abdomen is soft. There is no mass.      Tenderness: There is no guarding.   Musculoskeletal:         General: Normal range of motion.      Cervical back: Normal range of motion.   Skin:     General: Skin is warm and dry.   Neurological:      General: No focal deficit present.      Mental Status: He is alert.   Psychiatric:         Mood and Affect: Mood normal.         Behavior: Behavior normal.           Lab Results: I have reviewed the following results:CBC/BMP:   .     04/22/25  0757   WBC 24.32*   HGB 13.9   HCT 41.4      BANDSPCT 9*   SODIUM 136   K 3.8      CO2 23   BUN 27*   CREATININE 1.27   GLUC 117        Imaging Results Review: No pertinent imaging studies reviewed.  Other Study Results Review: Other studies reviewed include: Colonoscopy

## 2025-04-22 NOTE — ASSESSMENT & PLAN NOTE
80M with history of hypertension sleep apnea, heart failure, CKD, renal cell carcinoma status post left nephrectomy presenting with about 3 days of progressive cough and fever/chills, found to have leukocytosis and multi lobar pneumonia, concerning for aspiration  Continue antibiotics per primary team speech therapy consult pending

## 2025-04-22 NOTE — ASSESSMENT & PLAN NOTE
Presented for cough, fevers/chills, chest pain/irritation x 3 days. No sick contacts  POA, SIRS: WBC 24, tachycardia, subjective fevers/chills   Procal 6.4, Lactic 1.5  CTA PE study: Multifocal consolidations as described above. Additional patchy groundglass opacifications predominantly in the lung bases. Findings are concerning for multilobar pneumonia with a component of aspiration pneumonia   Viral swab negative   UA pending   MRSA + sputum culture pending  Urine antigens  BC x 2 pending   IV abx: rocephin + azithromycin   Speech consult + aspiration precautions and modified diet   Trend WBC, procal, fevers  Stable on RA

## 2025-04-22 NOTE — ASSESSMENT & PLAN NOTE
Admission CT shows diffuse circumferential thickening of the esophagus.  The patient denies any reflux or dysphagia prior to admission  He has oral thrush on exam.  Agree with nystatin swish and swallow  Agree with once daily PPI  Patient agrees to upper endoscopy when he improves from a pulmonary standpoint, likely prior to discharge, in order to assess for erosive esophagitis versus Candida esophagitis.

## 2025-04-23 LAB
ALBUMIN SERPL BCG-MCNC: 3.3 G/DL (ref 3.5–5)
ALP SERPL-CCNC: 55 U/L (ref 34–104)
ALT SERPL W P-5'-P-CCNC: 15 U/L (ref 7–52)
ANION GAP SERPL CALCULATED.3IONS-SCNC: 5 MMOL/L (ref 4–13)
AST SERPL W P-5'-P-CCNC: 18 U/L (ref 13–39)
BASOPHILS # BLD AUTO: 0.05 THOUSANDS/ÂΜL (ref 0–0.1)
BASOPHILS NFR BLD AUTO: 0 % (ref 0–1)
BILIRUB SERPL-MCNC: 1.02 MG/DL (ref 0.2–1)
BUN SERPL-MCNC: 21 MG/DL (ref 5–25)
CALCIUM ALBUM COR SERPL-MCNC: 9 MG/DL (ref 8.3–10.1)
CALCIUM SERPL-MCNC: 8.4 MG/DL (ref 8.4–10.2)
CHLORIDE SERPL-SCNC: 108 MMOL/L (ref 96–108)
CO2 SERPL-SCNC: 25 MMOL/L (ref 21–32)
CREAT SERPL-MCNC: 1.09 MG/DL (ref 0.6–1.3)
EOSINOPHIL # BLD AUTO: 0.17 THOUSAND/ÂΜL (ref 0–0.61)
EOSINOPHIL NFR BLD AUTO: 1 % (ref 0–6)
ERYTHROCYTE [DISTWIDTH] IN BLOOD BY AUTOMATED COUNT: 13.6 % (ref 11.6–15.1)
GFR SERPL CREATININE-BSD FRML MDRD: 63 ML/MIN/1.73SQ M
GLUCOSE SERPL-MCNC: 102 MG/DL (ref 65–140)
HCT VFR BLD AUTO: 37.8 % (ref 36.5–49.3)
HGB BLD-MCNC: 12.3 G/DL (ref 12–17)
IMM GRANULOCYTES # BLD AUTO: 0.22 THOUSAND/UL (ref 0–0.2)
IMM GRANULOCYTES NFR BLD AUTO: 1 % (ref 0–2)
LYMPHOCYTES # BLD AUTO: 1.49 THOUSANDS/ÂΜL (ref 0.6–4.47)
LYMPHOCYTES NFR BLD AUTO: 8 % (ref 14–44)
MCH RBC QN AUTO: 32 PG (ref 26.8–34.3)
MCHC RBC AUTO-ENTMCNC: 32.5 G/DL (ref 31.4–37.4)
MCV RBC AUTO: 98 FL (ref 82–98)
MONOCYTES # BLD AUTO: 1.11 THOUSAND/ÂΜL (ref 0.17–1.22)
MONOCYTES NFR BLD AUTO: 6 % (ref 4–12)
MRSA NOSE QL CULT: NORMAL
NEUTROPHILS # BLD AUTO: 15.72 THOUSANDS/ÂΜL (ref 1.85–7.62)
NEUTS SEG NFR BLD AUTO: 84 % (ref 43–75)
NRBC BLD AUTO-RTO: 0 /100 WBCS
PLATELET # BLD AUTO: 158 THOUSANDS/UL (ref 149–390)
PMV BLD AUTO: 10.9 FL (ref 8.9–12.7)
POTASSIUM SERPL-SCNC: 4 MMOL/L (ref 3.5–5.3)
PROCALCITONIN SERPL-MCNC: 4.84 NG/ML
PROT SERPL-MCNC: 6 G/DL (ref 6.4–8.4)
RBC # BLD AUTO: 3.84 MILLION/UL (ref 3.88–5.62)
SODIUM SERPL-SCNC: 138 MMOL/L (ref 135–147)
WBC # BLD AUTO: 18.76 THOUSAND/UL (ref 4.31–10.16)

## 2025-04-23 PROCEDURE — 99232 SBSQ HOSP IP/OBS MODERATE 35: CPT | Performed by: INTERNAL MEDICINE

## 2025-04-23 PROCEDURE — 85025 COMPLETE CBC W/AUTO DIFF WBC: CPT | Performed by: FAMILY MEDICINE

## 2025-04-23 PROCEDURE — 99232 SBSQ HOSP IP/OBS MODERATE 35: CPT | Performed by: PHYSICIAN ASSISTANT

## 2025-04-23 PROCEDURE — 84145 PROCALCITONIN (PCT): CPT | Performed by: FAMILY MEDICINE

## 2025-04-23 PROCEDURE — 80053 COMPREHEN METABOLIC PANEL: CPT | Performed by: FAMILY MEDICINE

## 2025-04-23 PROCEDURE — 94660 CPAP INITIATION&MGMT: CPT

## 2025-04-23 PROCEDURE — 94760 N-INVAS EAR/PLS OXIMETRY 1: CPT

## 2025-04-23 RX ADMIN — PANTOPRAZOLE SODIUM 40 MG: 40 INJECTION, POWDER, FOR SOLUTION INTRAVENOUS at 21:18

## 2025-04-23 RX ADMIN — NYSTATIN 500000 UNITS: 100000 SUSPENSION ORAL at 08:10

## 2025-04-23 RX ADMIN — AMLODIPINE BESYLATE 5 MG: 5 TABLET ORAL at 08:10

## 2025-04-23 RX ADMIN — NYSTATIN 500000 UNITS: 100000 SUSPENSION ORAL at 21:18

## 2025-04-23 RX ADMIN — NYSTATIN 500000 UNITS: 100000 SUSPENSION ORAL at 12:09

## 2025-04-23 RX ADMIN — CEFTRIAXONE 1000 MG: 1 INJECTION, SOLUTION INTRAVENOUS at 12:09

## 2025-04-23 RX ADMIN — EZETIMIBE 10 MG: 10 TABLET ORAL at 08:10

## 2025-04-23 RX ADMIN — PANTOPRAZOLE SODIUM 40 MG: 40 INJECTION, POWDER, FOR SOLUTION INTRAVENOUS at 08:10

## 2025-04-23 RX ADMIN — HEPARIN SODIUM 5000 UNITS: 5000 INJECTION INTRAVENOUS; SUBCUTANEOUS at 06:00

## 2025-04-23 RX ADMIN — HEPARIN SODIUM 5000 UNITS: 5000 INJECTION INTRAVENOUS; SUBCUTANEOUS at 13:36

## 2025-04-23 RX ADMIN — NYSTATIN 500000 UNITS: 100000 SUSPENSION ORAL at 17:38

## 2025-04-23 RX ADMIN — OMEGA-3 FATTY ACIDS CAP 1000 MG 1000 MG: 1000 CAP at 08:10

## 2025-04-23 RX ADMIN — ATORVASTATIN CALCIUM 40 MG: 40 TABLET, FILM COATED ORAL at 08:10

## 2025-04-23 RX ADMIN — ESCITALOPRAM OXALATE 5 MG: 5 TABLET, FILM COATED ORAL at 08:10

## 2025-04-23 NOTE — ASSESSMENT & PLAN NOTE
Lab Results   Component Value Date    EGFR 63 04/23/2025    EGFR 53 04/22/2025    EGFR 64 12/16/2024    CREATININE 1.09 04/23/2025    CREATININE 1.27 04/22/2025    CREATININE 1.08 12/16/2024     Cr baseline appears to be around 1.0  Cr on admission 1.27  Creatinine stable and within baseline

## 2025-04-23 NOTE — ASSESSMENT & PLAN NOTE
Admission CT shows diffuse circumferential thickening of the esophagus.  The patient denies any reflux or dysphagia prior to admission  Thrush improving with nystatin  Continue PPI  Patient agrees to upper endoscopy 4/24.  Discussed with YORDAN ANDRADE

## 2025-04-23 NOTE — PLAN OF CARE
Problem: Potential for Falls  Goal: Patient will remain free of falls  Description: INTERVENTIONS:- Educate patient/family on patient safety including physical limitations- Instruct patient to call for assistance with activity - Consult OT/PT to assist with strengthening/mobility - Keep Call bell within reach- Keep bed low and locked with side rails adjusted as appropriate- Keep care items and personal belongings within reach- Initiate and maintain comfort rounds- Make Fall Risk Sign visible to staff- Offer Toileting every x Hours, in advance of need- Initiate/Maintain xalarm- Obtain necessary fall risk management equipment: x- Apply yellow socks and bracelet for high fall risk patients- Consider moving patient to room near nurses station  Outcome: Progressing     Problem: PAIN - ADULT  Goal: Verbalizes/displays adequate comfort level or baseline comfort level  Description: Interventions:- Encourage patient to monitor pain and request assistance- Assess pain using appropriate pain scale- Administer analgesics based on type and severity of pain and evaluate response- Implement non-pharmacological measures as appropriate and evaluate response- Consider cultural and social influences on pain and pain management- Notify physician/advanced practitioner if interventions unsuccessful or patient reports new pain  Outcome: Progressing     Problem: INFECTION - ADULT  Goal: Absence or prevention of progression during hospitalization  Description: INTERVENTIONS:- Assess and monitor for signs and symptoms of infection- Monitor lab/diagnostic results- Monitor all insertion sites, i.e. indwelling lines, tubes, and drains- Monitor endotracheal if appropriate and nasal secretions for changes in amount and color- [place] appropriate cooling/warming therapies per order- Administer medications as ordered- Instruct and encourage patient and family to use good hand hygiene technique- Identify and instruct in appropriate isolation  precautions for identified infection/condition  Outcome: Progressing  Goal: Absence of fever/infection during neutropenic period  Description: INTERVENTIONS:- Monitor WBC  Outcome: Progressing     Problem: DISCHARGE PLANNING  Goal: Discharge to home or other facility with appropriate resources  Description: INTERVENTIONS:- Identify barriers to discharge w/patient and caregiver- Arrange for needed discharge resources and transportation as appropriate- Identify discharge learning needs (meds, wound care, etc.)- Arrange for interpretive services to assist at discharge as needed- Refer to Case Management Department for coordinating discharge planning if the patient needs post-hospital services based on physician/advanced practitioner order or complex needs related to functional status, cognitive ability, or social support system  Outcome: Progressing     Problem: Knowledge Deficit  Goal: Patient/family/caregiver demonstrates understanding of disease process, treatment plan, medications, and discharge instructions  Description: Complete learning assessment and assess knowledge base.Interventions:- Provide teaching at level of understanding- Provide teaching via preferred learning methods  Outcome: Progressing

## 2025-04-23 NOTE — CASE MANAGEMENT
Case Management Assessment & Discharge Planning Note    Patient name Winnie De La Cruz  Location /-01 MRN [id number]  : 1945 Date 2025       Current Admission Date: 2025  Current Admission Diagnosis:Sepsis due to pneumonia (HCC)   Patient Active Problem List    Diagnosis Date Noted Date Diagnosed    Sepsis due to pneumonia (HCC) 2025     Esophagitis 2025     Heart failure, unspecified HF chronicity, unspecified heart failure type (HCC) 2024     Acute epididymo-orchitis 2023     Mood disorder ([facility]) 2023     BMI 29.0-29.9,adult 2022     Aortic valve sclerosis 2020     Peripheral neuropathic pain 2020     PARVIZ on CPAP 2019     Left ventricular diastolic dysfunction 2018     Lower abdominal pain 2018     History of colon polyps 2018     Diarrhea 2018     Hematuria 2017     Abnormal fasting glucose 2017     Stage 3a chronic kidney disease (HCC) 2015     Edema 2015     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     Enlarged prostate 2014     Renal cell carcinoma of left kidney ([facility]) 2014     Essential hypertension 2013     Disc degeneration, lumbosacral 2012     Mixed hyperlipidemia 2012     Osteoarthritis of knee 2012       LOS (days): 0  Geometric Mean LOS (GMLOS) (days): 4.9  Days to GMLOS:4.7     OBJECTIVE:    Risk of Unplanned Readmission Score: 10.62         Current admission status: Inpatient       Preferred Pharmacy:   [id number] [place] - [address]  [address]  [place] [address]  Phone: [phone] Fax: [phone]    Primary Care Provider: JOSE RAFAEL Tolentino    Primary Insurance: MEDICARE  Secondary Insurance: [employer]    ASSESSMENT:  Active Health Care Proxies       Nissa De La Cruz [facility] Health Care Representative - Spouse   Primary Phone: [phone] (Mobile)  Home Phone:  [phone]                 Advance Directives  Does patient have a Health Care POA?: Yes              Patient Information  Admitted from:: Home  Mental Status: Alert  During Assessment patient was accompanied by: Not accompanied during assessment  Assessment information provided by:: Patient  Primary Caregiver: Self  Support Systems: Self, Spouse/significant other  What city do you live in?: [place]  Home entry access options. Select all that apply.: Stairs  Number of steps to enter home.: 2  Do the steps have railings?: No  Type of Current Residence: [facility]  Living Arrangements: Lives w/ Spouse/significant other  Is patient a ?: Yes    Activities of Daily Living Prior to Admission  Functional Status: Independent  Completes ADLs independently?: Yes  Ambulates independently?: Yes  Does patient use assisted devices?: No  Does patient currently own DME?: No  Does patient have a history of Outpatient Therapy (PT/OT)?: No  Does the patient have a history of Short-Term Rehab?: No  Does patient have a history of HHC?: No  Does patient currently have HHC?: No         Patient Information Continued  Income Source: Self-employed  Does patient have prescription coverage?: Yes  Can the patient afford their medications and any related supplies (such as glucometers or test strips)?: Yes  Does patient receive dialysis treatments?: No  Does patient have a history of substance abuse?: No  Does patient have a history of Mental Health Diagnosis?: No         Means of Transportation  Means of Transport to Appts:: Drives Self          DISCHARGE DETAILS:    Discharge planning discussed with:: Pt at bedside  [place] of Choice: Yes     CM contacted family/caregiver?: No- see comments (Pt declined)  Were Treatment Team discharge recommendations reviewed with patient/caregiver?: Yes  Did patient/caregiver verbalize understanding of patient care needs?: Yes  Were patient/caregiver advised of the risks associated with not following  Treatment Team discharge recommendations?: Yes      Additional Comments: Met with pt at bedside to review CM role and possible discharge planning needs. Pt lives with his wife in a 1SH with 2 steps to enter. Pt stated that he has been indpendent with all ADL care prior to admission. Pt stated that he has no hx with wtih DME, HHC or STR placement. Pt reports no hx with D&A or MH Inpt stays. Pt has no concerns about his ability to return home and care for himself. Pt has his car in the parking lot and will drive himself home. Made pt aware of CM availability for ongoing discharge planning needs.

## 2025-04-23 NOTE — ASSESSMENT & PLAN NOTE
Presented for cough, fevers/chills, chest pain/irritation x 3 days. No sick contacts  POA, SIRS: WBC 24, tachycardia, subjective fevers/chills   Procal 6.4, Lactic 1.5  CTA PE study: Multifocal consolidations as described above. Additional patchy groundglass opacifications predominantly in the lung bases. Findings are concerning for multilobar pneumonia with a component of aspiration pneumonia   Viral swab negative   UA negative UTI  MRSA + sputum culture pending  Urine antigens negative - d/c azithromycin   BC x 2 pending   IV abx: rocephin  Speech consult + aspiration precautions   Trend WBC, procal, fevers  Stable on RA

## 2025-04-23 NOTE — PROGRESS NOTES
Progress Note - Hospitalist   Name: Winnie De La Cruz 80 y.o. male I MRN: [id number]  Unit/Bed#: -01 I Date of Admission: 4/22/2025   Date of Service: 4/23/2025 I Hospital Day: 0    Assessment & Plan  Sepsis due to pneumonia (HCC)  Presented for cough, fevers/chills, chest pain/irritation x 3 days. No sick contacts  POA, SIRS: WBC 24, tachycardia, subjective fevers/chills   Procal 6.4, Lactic 1.5  CTA PE study: Multifocal consolidations as described above. Additional patchy groundglass opacifications predominantly in the lung bases. Findings are concerning for multilobar pneumonia with a component of aspiration pneumonia   Viral swab negative   UA negative UTI  MRSA + sputum culture pending  Urine antigens negative - d/c azithromycin   BC x 2 pending   IV abx: rocephin  Speech consult + aspiration precautions   Trend WBC, procal, fevers  Stable on RA  Esophagitis  Patient notes mid-sternal chest discomfort which is worse with cough + direct palpation   Low suspicion for ACS given (-) trops, ECG without changes, and reproducible sx  Patient denies significant reflux symptoms  CT chest: Diffuse circumferential mural thickening of the esophagus, which can be seen in the setting of esophagitis.   Patient is not immunosuppressed however tongue with mild white patches  Continue PO nystatin swish/swallow  IV protonix   GI following for possible inpt EGD  Renal cell carcinoma of left kidney (HCC)  S/P L nephrectomy 2014  No prior chemotherapy   Follows with Dr Garcia with current observation of known pulmonary nodules (stable on CTA PE study this admission)  Mixed hyperlipidemia  Continue home lipitor 40 mg daily, zetia 10 mg daily, fish oil pills  Essential hypertension  SBP stable  Continue home amlodipine 5 mg daily   Stage 3a chronic kidney disease (HCC)  Lab Results   Component Value Date    EGFR 63 04/23/2025    EGFR 53 04/22/2025    EGFR 64 12/16/2024    CREATININE 1.09 04/23/2025    CREATININE 1.27 04/22/2025     CREATININE 1.08 12/16/2024     Cr baseline appears to be around 1.0  Cr on admission 1.27  Creatinine stable and within baseline  PARVIZ on CPAP  Continue CPAP HS    VTE Pharmacologic Prophylaxis: VTE Score: 5 High Risk (Score >/= 5) - Pharmacological DVT Prophylaxis Ordered: heparin. Sequential Compression Devices Ordered.    Mobility:   Basic Mobility Inpatient Raw Score: 24  JH-HLM Goal: 8: Walk 250 feet or more  JH-HLM Achieved: 7: Walk 25 feet or more  JH-HLM Goal NOT achieved. Continue with multidisciplinary rounding and encourage appropriate mobility to improve upon JH-HLM goals.    Patient Centered Rounds: I performed bedside rounds with nursing staff today.   Discussions with Specialists or Other Care Team Provider: SUSAN, will discuss with GI    Education and Discussions with Family / Patient: Updated  (daughter) via phone.    Current Length of Stay: 0 day(s)  Current Patient Status: Observation   Certification Statement: The patient will continue to require additional inpatient hospital stay due to IV abx, follow up cx results, speech and GI eval  Discharge Plan: Anticipate discharge in 24-48 hrs to home.    Code Status: Level 1 - Full Code    Subjective   Patient overall feels well, no events overnight.  Reports intermittent substernal chest discomfort similar to prior.  Denies palpitations, shortness of breath, cough, nausea/vomiting, abdominal pain, fever/chills.    Objective :  Temp:  [97 °F (36.1 °C)-100.8 °F (38.2 °C)] 97 °F (36.1 °C)  HR:  [] 85  BP: (111-143)/(57-80) 127/80  Resp:  [16-22] 16  SpO2:  [93 %-96 %] 94 %  O2 Device: CPAP  FiO2 (%):  [21] 21    Body mass index is 29.73 kg/m².     Input and Output Summary (last 24 hours):     Intake/Output Summary (Last 24 hours) at 4/23/2025 0847  Last data filed at 4/23/2025 0601  Gross per 24 hour   Intake 1200 ml   Output 950 ml   Net 250 ml       Physical Exam  Vitals and nursing note reviewed.   Constitutional:       Appearance:  Normal appearance.      Comments: No acute distress   HENT:      Head: Normocephalic.   Eyes:      General: No scleral icterus.     Extraocular Movements: Extraocular movements intact.      Conjunctiva/sclera: Conjunctivae normal.   Cardiovascular:      Rate and Rhythm: Normal rate and regular rhythm.      Heart sounds: Normal heart sounds. No murmur heard.  Pulmonary:      Effort: Pulmonary effort is normal. No respiratory distress.      Breath sounds: Decreased breath sounds and rhonchi present.   Abdominal:      General: Bowel sounds are normal.      Palpations: Abdomen is soft.      Tenderness: There is no abdominal tenderness. There is no guarding or rebound.   Musculoskeletal:      Cervical back: Normal range of motion.      Comments: Able to move upper/lower ext bilaterally, no edema   Skin:     General: Skin is warm and dry.   Neurological:      Mental Status: He is alert and oriented to person, place, and time.   Psychiatric:         Mood and Affect: Mood normal.         Speech: Speech normal.         Behavior: Behavior normal.           Lines/Drains:              Lab Results: I have reviewed the following results:   Results from last 7 days   Lab Units 04/23/25 0429 04/22/25  0757   WBC Thousand/uL 18.76* 24.32*   HEMOGLOBIN g/dL 12.3 13.9   HEMATOCRIT % 37.8 41.4   PLATELETS Thousands/uL 158 176   BANDS PCT %  --  9*   SEGS PCT % 84*  --    LYMPHO PCT % 8* 4*   MONO PCT % 6 5   EOS PCT % 1 0     Results from last 7 days   Lab Units 04/23/25  0429   SODIUM mmol/L 138   POTASSIUM mmol/L 4.0   CHLORIDE mmol/L 108   CO2 mmol/L 25   BUN mg/dL 21   CREATININE mg/dL 1.09   ANION GAP mmol/L 5   CALCIUM mg/dL 8.4   ALBUMIN g/dL 3.3*   TOTAL BILIRUBIN mg/dL 1.02*   ALK PHOS U/L 55   ALT U/L 15   AST U/L 18   GLUCOSE RANDOM mg/dL 102     Results from last 7 days   Lab Units 04/22/25  0758   INR  1.15             Results from last 7 days   Lab Units 04/23/25  0429 04/22/25  0832 04/22/25  0757   LACTIC ACID mmol/L   --  1.6  --    PROCALCITONIN ng/ml 4.84*  --  6.40*       Recent Cultures (last 7 days):   Results from last 7 days   Lab Units 04/22/25  1547 04/22/25  0832   BLOOD CULTURE   --  Received in Microbiology Lab. Culture in Progress.  Received in Microbiology Lab. Culture in Progress.   LEGIONELLA URINARY ANTIGEN  Negative  --        Imaging Results Review: I reviewed radiology reports from this admission including: CT chest and CT abdomen/pelvis.  Other Study Results Review: No additional pertinent studies reviewed.    Last 24 Hours Medication List:     Current Facility-Administered Medications:     acetaminophen (TYLENOL) tablet 650 mg, Q6H PRN    amLODIPine (NORVASC) tablet 5 mg, Daily    atorvastatin (LIPITOR) tablet 40 mg, Daily    azithromycin (ZITHROMAX) 500 mg in sodium chloride 0.9% 250mL IVPB 500 mg, Q24H    cefTRIAXone (ROCEPHIN) IVPB (premix in dextrose) 1,000 mg 50 mL, Q24H    dextromethorphan-guaiFENesin (ROBITUSSIN DM) oral syrup 10 mL, Q4H PRN    escitalopram (LEXAPRO) tablet 5 mg, Daily    ezetimibe (ZETIA) tablet 10 mg, Daily    fish oil capsule 1,000 mg, Daily    heparin (porcine) subcutaneous injection 5,000 Units, Q8H FLAVIA    ipratropium-albuterol (DUO-NEB) 0.5-2.5 mg/3 mL inhalation solution 3 mL, Q8H PRN    nystatin (MYCOSTATIN) oral suspension 500,000 Units, 4x Daily    pantoprazole (PROTONIX) injection 40 mg, Q12H FLAVIA    Administrative Statements   Today, Patient Was Seen By: Pearl Plascencia PA-C  I have spent a total time of 35 minutes in caring for this patient on the day of the visit/encounter including Diagnostic results, Instructions for management, Patient and family education, Importance of tx compliance, Risk factor reductions, Impressions, Counseling / Coordination of care, Documenting in the medical record, Reviewing/placing orders in the medical record (including tests, medications, and/or procedures), Obtaining or reviewing history  , and Communicating with other healthcare  professionals .    **Please Note: This note may have been constructed using a voice recognition system.**

## 2025-04-23 NOTE — ASSESSMENT & PLAN NOTE
Patient notes mid-sternal chest discomfort which is worse with cough + direct palpation   Low suspicion for ACS given (-) trops, ECG without changes, and reproducible sx  Patient denies significant reflux symptoms  CT chest: Diffuse circumferential mural thickening of the esophagus, which can be seen in the setting of esophagitis.   Patient is not immunosuppressed however tongue with mild white patches  Continue PO nystatin swish/swallow  IV protonix   GI following for possible inpt EGD

## 2025-04-23 NOTE — ASSESSMENT & PLAN NOTE
80M with history of hypertension sleep apnea, heart failure, CKD, renal cell carcinoma status post left nephrectomy presenting with about 3 days of progressive cough and fever/chills, found to have leukocytosis and multi lobar pneumonia, concerning for aspiration    Continue antibiotics per primary team  Afebrile.  On room air without dyspnea.  Leukocytosis improving  Appreciate speech therapy consult, functional oropharyngeal swallow

## 2025-04-23 NOTE — PROGRESS NOTES
Progress Note - Gastroenterology   Name: Winnie De La Cruz 80 y.o. male I MRN: [id number]  Unit/Bed#: -01 I Date of Admission: 4/22/2025   Date of Service: 4/23/2025 I Hospital Day: 0    Assessment & Plan  Sepsis due to pneumonia (HCC)  80M with history of hypertension sleep apnea, heart failure, CKD, renal cell carcinoma status post left nephrectomy presenting with about 3 days of progressive cough and fever/chills, found to have leukocytosis and multi lobar pneumonia, concerning for aspiration    Continue antibiotics per primary team  Afebrile.  On room air without dyspnea.  Leukocytosis improving  Appreciate speech therapy consult, functional oropharyngeal swallow  Esophagitis  Admission CT shows diffuse circumferential thickening of the esophagus.  The patient denies any reflux or dysphagia prior to admission  Thrush improving with nystatin  Continue PPI  Patient agrees to upper endoscopy 4/24.  Discussed with YORDAN ANDRADE            Subjective   Denies any reflux or dysphagia today.  No fevers or chills    Objective :  Temp:  [97 °F (36.1 °C)-100.8 °F (38.2 °C)] 97 °F (36.1 °C)  HR:  [] 85  BP: (127-143)/(79-80) 127/80  Resp:  [16-22] 16  SpO2:  [94 %-96 %] 95 %  O2 Device: None (Room air)  FiO2 (%):  [21] 21    Physical Exam  Constitutional:       Appearance: Normal appearance.   HENT:      Head: Normocephalic and atraumatic.      Nose: Nose normal.   Eyes:      Extraocular Movements: Extraocular movements intact.      Conjunctiva/sclera: Conjunctivae normal.   Cardiovascular:      Rate and Rhythm: Normal rate and regular rhythm.   Pulmonary:      Effort: Pulmonary effort is normal.      Breath sounds: Normal breath sounds.   Abdominal:      General: Abdomen is flat. Bowel sounds are normal. There is no distension.      Palpations: Abdomen is soft. There is no mass.      Tenderness: There is no guarding.   Musculoskeletal:         General: Normal range of motion.      Cervical back: Normal range of  motion.   Skin:     General: Skin is warm and dry.   Neurological:      General: No focal deficit present.      Mental Status: He is alert.   Psychiatric:         Mood and Affect: Mood normal.         Behavior: Behavior normal.           Lab Results: I have reviewed the following results:CBC/BMP:   .     04/23/25  0429   WBC 18.76*   HGB 12.3   HCT 37.8      SODIUM 138   K 4.0      CO2 25   BUN 21   CREATININE 1.09   GLUC 102

## 2025-04-24 ENCOUNTER — ANESTHESIA EVENT (INPATIENT)
Dept: GASTROENTEROLOGY | Facility: HOSPITAL | Age: 80
DRG: 871 | End: 2025-04-24
Payer: MEDICARE

## 2025-04-24 ENCOUNTER — APPOINTMENT (INPATIENT)
Dept: GASTROENTEROLOGY | Facility: HOSPITAL | Age: 80
DRG: 871 | End: 2025-04-24
Attending: INTERNAL MEDICINE
Payer: MEDICARE

## 2025-04-24 ENCOUNTER — ANESTHESIA (INPATIENT)
Dept: GASTROENTEROLOGY | Facility: HOSPITAL | Age: 80
DRG: 871 | End: 2025-04-24
Payer: MEDICARE

## 2025-04-24 VITALS
BODY MASS INDEX: 29.67 KG/M2 | HEIGHT: 70 IN | DIASTOLIC BLOOD PRESSURE: 75 MMHG | HEART RATE: 80 BPM | OXYGEN SATURATION: 95 % | TEMPERATURE: 97.2 F | RESPIRATION RATE: 16 BRPM | WEIGHT: 207.23 LBS | SYSTOLIC BLOOD PRESSURE: 122 MMHG

## 2025-04-24 LAB
ANION GAP SERPL CALCULATED.3IONS-SCNC: 7 MMOL/L (ref 4–13)
BASOPHILS # BLD AUTO: 0.06 THOUSANDS/ÂΜL (ref 0–0.1)
BASOPHILS NFR BLD AUTO: 1 % (ref 0–1)
BUN SERPL-MCNC: 17 MG/DL (ref 5–25)
CALCIUM SERPL-MCNC: 8.8 MG/DL (ref 8.4–10.2)
CHLORIDE SERPL-SCNC: 107 MMOL/L (ref 96–108)
CO2 SERPL-SCNC: 23 MMOL/L (ref 21–32)
CREAT SERPL-MCNC: 1 MG/DL (ref 0.6–1.3)
EOSINOPHIL # BLD AUTO: 0.26 THOUSAND/ÂΜL (ref 0–0.61)
EOSINOPHIL NFR BLD AUTO: 2 % (ref 0–6)
ERYTHROCYTE [DISTWIDTH] IN BLOOD BY AUTOMATED COUNT: 13.3 % (ref 11.6–15.1)
GFR SERPL CREATININE-BSD FRML MDRD: 70 ML/MIN/1.73SQ M
GLUCOSE SERPL-MCNC: 107 MG/DL (ref 65–140)
HCT VFR BLD AUTO: 40.5 % (ref 36.5–49.3)
HGB BLD-MCNC: 13.2 G/DL (ref 12–17)
IMM GRANULOCYTES # BLD AUTO: 0.15 THOUSAND/UL (ref 0–0.2)
IMM GRANULOCYTES NFR BLD AUTO: 1 % (ref 0–2)
LYMPHOCYTES # BLD AUTO: 1.73 THOUSANDS/ÂΜL (ref 0.6–4.47)
LYMPHOCYTES NFR BLD AUTO: 15 % (ref 14–44)
MCH RBC QN AUTO: 31.8 PG (ref 26.8–34.3)
MCHC RBC AUTO-ENTMCNC: 32.6 G/DL (ref 31.4–37.4)
MCV RBC AUTO: 98 FL (ref 82–98)
MONOCYTES # BLD AUTO: 0.64 THOUSAND/ÂΜL (ref 0.17–1.22)
MONOCYTES NFR BLD AUTO: 6 % (ref 4–12)
NEUTROPHILS # BLD AUTO: 8.87 THOUSANDS/ÂΜL (ref 1.85–7.62)
NEUTS SEG NFR BLD AUTO: 75 % (ref 43–75)
NRBC BLD AUTO-RTO: 0 /100 WBCS
PLATELET # BLD AUTO: 181 THOUSANDS/UL (ref 149–390)
PMV BLD AUTO: 10.8 FL (ref 8.9–12.7)
POTASSIUM SERPL-SCNC: 4 MMOL/L (ref 3.5–5.3)
RBC # BLD AUTO: 4.15 MILLION/UL (ref 3.88–5.62)
SODIUM SERPL-SCNC: 137 MMOL/L (ref 135–147)
WBC # BLD AUTO: 11.71 THOUSAND/UL (ref 4.31–10.16)

## 2025-04-24 PROCEDURE — 94660 CPAP INITIATION&MGMT: CPT

## 2025-04-24 PROCEDURE — 85025 COMPLETE CBC W/AUTO DIFF WBC: CPT | Performed by: PHYSICIAN ASSISTANT

## 2025-04-24 PROCEDURE — 94760 N-INVAS EAR/PLS OXIMETRY 1: CPT

## 2025-04-24 PROCEDURE — 88305 TISSUE EXAM BY PATHOLOGIST: CPT | Performed by: STUDENT IN AN ORGANIZED HEALTH CARE EDUCATION/TRAINING PROGRAM

## 2025-04-24 PROCEDURE — 80048 BASIC METABOLIC PNL TOTAL CA: CPT | Performed by: PHYSICIAN ASSISTANT

## 2025-04-24 PROCEDURE — 43239 EGD BIOPSY SINGLE/MULTIPLE: CPT | Performed by: INTERNAL MEDICINE

## 2025-04-24 PROCEDURE — 0DB78ZX EXCISION OF STOMACH, PYLORUS, VIA NATURAL OR ARTIFICIAL OPENING ENDOSCOPIC, DIAGNOSTIC: ICD-10-PCS | Performed by: INTERNAL MEDICINE

## 2025-04-24 PROCEDURE — 99239 HOSP IP/OBS DSCHRG MGMT >30: CPT | Performed by: PHYSICIAN ASSISTANT

## 2025-04-24 PROCEDURE — NC001 PR NO CHARGE: Performed by: INTERNAL MEDICINE

## 2025-04-24 RX ORDER — OMEPRAZOLE 40 MG/1
40 CAPSULE, DELAYED RELEASE ORAL DAILY
Qty: 30 CAPSULE | Refills: 0 | Status: SHIPPED | OUTPATIENT
Start: 2025-04-24

## 2025-04-24 RX ORDER — CEFDINIR 300 MG/1
300 CAPSULE ORAL EVERY 12 HOURS SCHEDULED
Qty: 8 CAPSULE | Refills: 0 | Status: SHIPPED | OUTPATIENT
Start: 2025-04-25 | End: 2025-04-29

## 2025-04-24 RX ORDER — NYSTATIN 100000 [USP'U]/ML
500000 SUSPENSION ORAL 4 TIMES DAILY
Qty: 100 ML | Refills: 0 | Status: SHIPPED | OUTPATIENT
Start: 2025-04-24 | End: 2025-04-29

## 2025-04-24 RX ORDER — SODIUM CHLORIDE, SODIUM LACTATE, POTASSIUM CHLORIDE, CALCIUM CHLORIDE 600; 310; 30; 20 MG/100ML; MG/100ML; MG/100ML; MG/100ML
125 INJECTION, SOLUTION INTRAVENOUS CONTINUOUS
Status: DISCONTINUED | OUTPATIENT
Start: 2025-04-24 | End: 2025-04-24 | Stop reason: HOSPADM

## 2025-04-24 RX ORDER — PROPOFOL 10 MG/ML
INJECTION, EMULSION INTRAVENOUS AS NEEDED
Status: DISCONTINUED | OUTPATIENT
Start: 2025-04-24 | End: 2025-04-24

## 2025-04-24 RX ORDER — LIDOCAINE HYDROCHLORIDE 20 MG/ML
INJECTION, SOLUTION EPIDURAL; INFILTRATION; INTRACAUDAL; PERINEURAL AS NEEDED
Status: DISCONTINUED | OUTPATIENT
Start: 2025-04-24 | End: 2025-04-24

## 2025-04-24 RX ORDER — SODIUM CHLORIDE 9 MG/ML
INJECTION, SOLUTION INTRAVENOUS CONTINUOUS PRN
Status: DISCONTINUED | OUTPATIENT
Start: 2025-04-24 | End: 2025-04-24

## 2025-04-24 RX ADMIN — PANTOPRAZOLE SODIUM 40 MG: 40 INJECTION, POWDER, FOR SOLUTION INTRAVENOUS at 10:06

## 2025-04-24 RX ADMIN — LIDOCAINE HYDROCHLORIDE 100 MG: 20 INJECTION, SOLUTION EPIDURAL; INFILTRATION; INTRACAUDAL; PERINEURAL at 08:33

## 2025-04-24 RX ADMIN — ATORVASTATIN CALCIUM 40 MG: 40 TABLET, FILM COATED ORAL at 10:06

## 2025-04-24 RX ADMIN — PROPOFOL 100 MG: 10 INJECTION, EMULSION INTRAVENOUS at 08:33

## 2025-04-24 RX ADMIN — NYSTATIN 500000 UNITS: 100000 SUSPENSION ORAL at 10:06

## 2025-04-24 RX ADMIN — CEFTRIAXONE 1000 MG: 1 INJECTION, SOLUTION INTRAVENOUS at 10:39

## 2025-04-24 RX ADMIN — PROPOFOL 30 MG: 10 INJECTION, EMULSION INTRAVENOUS at 08:35

## 2025-04-24 RX ADMIN — SODIUM CHLORIDE: 0.9 INJECTION, SOLUTION INTRAVENOUS at 08:30

## 2025-04-24 RX ADMIN — ESCITALOPRAM OXALATE 5 MG: 5 TABLET, FILM COATED ORAL at 10:06

## 2025-04-24 RX ADMIN — EZETIMIBE 10 MG: 10 TABLET ORAL at 10:06

## 2025-04-24 RX ADMIN — AMLODIPINE BESYLATE 5 MG: 5 TABLET ORAL at 10:06

## 2025-04-24 RX ADMIN — OMEGA-3 FATTY ACIDS CAP 1000 MG 1000 MG: 1000 CAP at 10:06

## 2025-04-24 NOTE — SPEECH THERAPY NOTE
Speech Language/Pathology  Chart reviewed. Pt NPO for EGD this date. SLP to defer dysphagia f/u at this time, will monitor chart/pt's status and f/u as able and appropriate.

## 2025-04-24 NOTE — ASSESSMENT & PLAN NOTE
Patient notes mid-sternal chest discomfort which is worse with cough + direct palpation   Low suspicion for ACS given (-) trops, ECG without changes, and reproducible sx  Patient denies significant reflux symptoms  CT chest: Diffuse circumferential mural thickening of the esophagus, which can be seen in the setting of esophagitis.   Patient is not immunosuppressed however tongue with mild white patches  Continue PO nystatin swish/swallow  GI following   S/p EGD 4/24 with mild esophagitis/gastritis  Script for omeprazole sent by GI

## 2025-04-24 NOTE — ANESTHESIA POSTPROCEDURE EVALUATION
Post-Op Assessment Note    CV Status:  Stable  Pain Score: 0    Pain management: adequate       Mental Status:  Alert and awake   Hydration Status:  Euvolemic   PONV Controlled:  Controlled   Airway Patency:  Patent     Post Op Vitals Reviewed: Yes    No anethesia notable event occurred.    Staff: CRNA, Anesthesiologist           Last Filed PACU Vitals:  Vitals Value Taken Time   Temp     Pulse     /82    Resp     SpO2 92%

## 2025-04-24 NOTE — ANESTHESIA PREPROCEDURE EVALUATION
Procedure:  EGD    Relevant Problems   CARDIO   (+) Aortic valve sclerosis   (+) Essential hypertension   (+) Mixed hyperlipidemia      /RENAL   (+) Renal cell carcinoma of left kidney (HCC)   (+) Stage 3a chronic kidney disease (HCC)      MUSCULOSKELETAL   (+) Osteoarthritis of knee      PULMONARY   (+) PARVIZ on CPAP        Physical Exam    Airway    Mallampati score: II         Dental   No notable dental hx     Cardiovascular      Pulmonary      Other Findings        Anesthesia Plan  ASA Score- 3     Anesthesia Type- IV sedation with anesthesia with ASA Monitors.         Additional Monitors:     Airway Plan:     Comment: I, Dr. Byrne, the attending physician, have personally seen and evaluated the patient prior to anesthetic care.  I have reviewed the pre-anesthetic record, and other medical records if appropriate to the anesthetic care.  If a CRNA is involved in the case, I have reviewed the CRNA assessment, if present, and agree.  The patient is in a suitable condition to proceed with my formulated anesthetic plan.  .       Plan Factors-    Chart reviewed.                      Induction- intravenous.    Postoperative Plan-     Perioperative Resuscitation Plan - Level 1 - Full Code.       Informed Consent- Anesthetic plan and risks discussed with patient.  I personally reviewed this patient with the CRNA. Discussed and agreed on the Anesthesia Plan with the CRNA..      NPO Status:  Vitals Value Taken Time   Date of last liquid 04/23/25 04/24/25 0745   Time of last liquid 2100 04/24/25 0745   Date of last solid 04/23/25 04/24/25 0745   Time of last solid 1700 04/24/25 0745

## 2025-04-24 NOTE — OP NOTE
EGD IMPRESSION:  The duodenum appeared normal.  Mild erythematous mucosa in the antrum, consistent with gastritis; performed cold forceps biopsy to rule out H. pylori  Esophagitis in the GE junction    Mild erosive gastritis and nonerosive esophagitis.  No Croft's    RECOMMENDATION:  Resume diet  Treat gastritis and esophagitis with 1 month of omeprazole, prescription sent to pharmacy  Endoscopist will call with H. pylori biopsy if positive  Follow-up in GI office as-needed  No additional inpatient GI workup necessary.  GI will sign off, please call if questions arise

## 2025-04-24 NOTE — ASSESSMENT & PLAN NOTE
Lab Results   Component Value Date    EGFR 70 04/24/2025    EGFR 63 04/23/2025    EGFR 53 04/22/2025    CREATININE 1.00 04/24/2025    CREATININE 1.09 04/23/2025    CREATININE 1.27 04/22/2025     Cr baseline appears to be around 1.0  Cr within baseline

## 2025-04-24 NOTE — ANESTHESIA POSTPROCEDURE EVALUATION
Post-Op Assessment Note    Last Filed PACU Vitals:  Vitals Value Taken Time   Temp     Pulse 80 04/24/25 0852   /75 04/24/25 0852   Resp 16 04/24/25 0852   SpO2 95 % 04/24/25 0852       Modified Klaus:     Vitals Value Taken Time   Activity 2 04/24/25 0846   Respiration 2 04/24/25 0846   Circulation 2 04/24/25 0846   Consciousness 2 04/24/25 0846   Oxygen Saturation 2 04/24/25 0846     Modified Klaus Score: 10

## 2025-04-24 NOTE — PLAN OF CARE
Problem: Potential for Falls  Goal: Patient will remain free of falls  Description: INTERVENTIONS:- Educate patient/family on patient safety including physical limitations- Instruct patient to call for assistance with activity - Consult OT/PT to assist with strengthening/mobility - Keep Call bell within reach- Keep bed low and locked with side rails adjusted as appropriate- Keep care items and personal belongings within reach- Initiate and maintain comfort rounds- Make Fall Risk Sign visible to staff- Offer Toileting every 2 Hours, in advance of need- Initiate/Maintain alarm- Obtain necessary fall risk management equipment: - Apply yellow socks and bracelet for high fall risk patients- Consider moving patient to room near nurses station  Outcome: Progressing     Problem: PAIN - ADULT  Goal: Verbalizes/displays adequate comfort level or baseline comfort level  Description: Interventions:- Encourage patient to monitor pain and request assistance- Assess pain using appropriate pain scale- Administer analgesics based on type and severity of pain and evaluate response- Implement non-pharmacological measures as appropriate and evaluate response- Consider cultural and social influences on pain and pain management- Notify physician/advanced practitioner if interventions unsuccessful or patient reports new pain  Outcome: Progressing     Problem: INFECTION - ADULT  Goal: Absence or prevention of progression during hospitalization  Description: INTERVENTIONS:- Assess and monitor for signs and symptoms of infection- Monitor lab/diagnostic results- Monitor all insertion sites, i.e. indwelling lines, tubes, and drains- Monitor endotracheal if appropriate and nasal secretions for changes in amount and color- [place] appropriate cooling/warming therapies per order- Administer medications as ordered- Instruct and encourage patient and family to use good hand hygiene technique- Identify and instruct in appropriate isolation  precautions for identified infection/condition  Outcome: Progressing  Goal: Absence of fever/infection during neutropenic period  Description: INTERVENTIONS:- Monitor WBC  Outcome: Progressing     Problem: DISCHARGE PLANNING  Goal: Discharge to home or other facility with appropriate resources  Description: INTERVENTIONS:- Identify barriers to discharge w/patient and caregiver- Arrange for needed discharge resources and transportation as appropriate- Identify discharge learning needs (meds, wound care, etc.)- Arrange for interpretive services to assist at discharge as needed- Refer to Case Management Department for coordinating discharge planning if the patient needs post-hospital services based on physician/advanced practitioner order or complex needs related to functional status, cognitive ability, or social support system  Outcome: Progressing     Problem: Knowledge Deficit  Goal: Patient/family/caregiver demonstrates understanding of disease process, treatment plan, medications, and discharge instructions  Description: Complete learning assessment and assess knowledge base.Interventions:- Provide teaching at level of understanding- Provide teaching via preferred learning methods  Outcome: Progressing

## 2025-04-24 NOTE — ASSESSMENT & PLAN NOTE
Presented for cough, fevers/chills, chest pain/irritation x 3 days. No sick contacts  POA, SIRS: WBC 24, tachycardia, subjective fevers/chills   Procal 6.4, Lactic 1.5  CTA PE study: Multifocal consolidations as described above. Additional patchy groundglass opacifications predominantly in the lung bases. Findings are concerning for multilobar pneumonia with a component of aspiration pneumonia   Viral swab negative   UA negative UTI  Urine antigens negative - d/c azithromycin   Blood cultures negative x2 after 24 hrs  IV abx: rocephin  Speech consult - OK to continue regular diet w/ thin liquids  Leukocytosis downtrending, afebrile  Stable on RA  Medically stable for discharge home today on cefdinir to complete course

## 2025-04-24 NOTE — PLAN OF CARE
Problem: PAIN - ADULT  Goal: Verbalizes/displays adequate comfort level or baseline comfort level  Description: Interventions:- Encourage patient to monitor pain and request assistance- Assess pain using appropriate pain scale- Administer analgesics based on type and severity of pain and evaluate response- Implement non-pharmacological measures as appropriate and evaluate response- Consider cultural and social influences on pain and pain management- Notify physician/advanced practitioner if interventions unsuccessful or patient reports new pain  Outcome: Progressing     Problem: INFECTION - ADULT  Goal: Absence or prevention of progression during hospitalization  Description: INTERVENTIONS:- Assess and monitor for signs and symptoms of infection- Monitor lab/diagnostic results- Monitor all insertion sites, i.e. indwelling lines, tubes, and drains- Monitor endotracheal if appropriate and nasal secretions for changes in amount and color- [place] appropriate cooling/warming therapies per order- Administer medications as ordered- Instruct and encourage patient and family to use good hand hygiene technique- Identify and instruct in appropriate isolation precautions for identified infection/condition  Outcome: Progressing  Goal: Absence of fever/infection during neutropenic period  Description: INTERVENTIONS:- Monitor WBC  Outcome: Progressing

## 2025-04-24 NOTE — DISCHARGE INSTR - AVS FIRST PAGE
Follow-up with PCP within 1 week for posthospitalization follow-up  Gastroenterology office will contact you regarding biopsies taken during EGD today once resulted    Return to the emergency department for further evaluation with any chest pain/palpitations, worsening shortness of breath, nausea/vomiting, abdominal pain, fever/chills.

## 2025-04-24 NOTE — DISCHARGE SUMMARY
Discharge Summary - Hospitalist   Name: Winnie De La Cruz 80 y.o. male I MRN: [id number]  Unit/Bed#: -01 I Date of Admission: 4/22/2025   Date of Service: 4/24/2025 I Hospital Day: 1     Assessment & Plan  Sepsis due to pneumonia (HCC)  Presented for cough, fevers/chills, chest pain/irritation x 3 days. No sick contacts  POA, SIRS: WBC 24, tachycardia, subjective fevers/chills   Procal 6.4, Lactic 1.5  CTA PE study: Multifocal consolidations as described above. Additional patchy groundglass opacifications predominantly in the lung bases. Findings are concerning for multilobar pneumonia with a component of aspiration pneumonia   Viral swab negative   UA negative UTI  Urine antigens negative - d/c azithromycin   Blood cultures negative x2 after 24 hrs  IV abx: rocephin  Speech consult - OK to continue regular diet w/ thin liquids  Leukocytosis downtrending, afebrile  Stable on RA  Medically stable for discharge home today on cefdinir to complete course  Esophagitis  Patient notes mid-sternal chest discomfort which is worse with cough + direct palpation   Low suspicion for ACS given (-) trops, ECG without changes, and reproducible sx  Patient denies significant reflux symptoms  CT chest: Diffuse circumferential mural thickening of the esophagus, which can be seen in the setting of esophagitis.   Patient is not immunosuppressed however tongue with mild white patches  Continue PO nystatin swish/swallow  GI following   S/p EGD 4/24 with mild esophagitis/gastritis  Script for omeprazole sent by GI  Renal cell carcinoma of left kidney (HCC)  S/P L nephrectomy 2014  No prior chemotherapy   Follows with Dr Garcia with current observation of known pulmonary nodules (stable on CTA PE study this admission)  Mixed hyperlipidemia  Continue home lipitor 40 mg daily, zetia 10 mg daily, fish oil pills  Essential hypertension  SBP stable  Continue home amlodipine 5 mg daily   Stage 3a chronic kidney disease (HCC)  Lab Results    Component Value Date    EGFR 70 04/24/2025    EGFR 63 04/23/2025    EGFR 53 04/22/2025    CREATININE 1.00 04/24/2025    CREATININE 1.09 04/23/2025    CREATININE 1.27 04/22/2025     Cr baseline appears to be around 1.0  Cr within baseline  PARVIZ on CPAP  Continue CPAP HS     Medical Problems       Resolved Problems  Date Reviewed: 1/13/2025   None       Discharging Physician / Practitioner: Pearl Plascencia PA-C  PCP: JOSE RAFAEL Tolentino  Admission Date:   Admission Orders (From admission, onward)       Ordered        04/23/25 0847  INPATIENT ADMISSION  Once            04/22/25 1054  Place in Observation  Once                          Discharge Date: 04/24/25    Consultations During Hospital Stay:  GI  Speech    Procedures Performed:   EGD: The duodenum appeared normal.  Mild erythematous mucosa in the antrum, consistent with gastritis; performed cold forceps biopsy to rule out H. Pylori.  Esophagitis in the GE junction    Significant Findings / Test Results:   CXR: multifocal infiltrates, largest in the CHAVA  CT pe study w abdomen/pelvis: 1. No pulmonary embolus or evidence of right heart strain. Evaluation is mildly limited by suboptimal contrast bolus.  2. Multifocal consolidations as described above. Additional patchy groundglass opacifications predominantly in the lung bases. Findings are concerning for multilobar pneumonia with a component of aspiration pneumonia.  3. Stable bilateral pulmonary nodules as described above  4. Diffuse circumferential mural thickening of the esophagus, which can be seen in the setting of esophagitis.  5. Mild bladder wall thickening. This is also likely exacerbated by under distention. In the setting of prostate enlargement, consider bladder outlet obstruction. Otherwise consider correlation with urinalysis.  MRSA swab negative  Urine antigens negative  Blood cultures negative x2 after 24 hrs    Incidental Findings:   None     Test Results Pending at Discharge (will require follow  "up):   Bx taken during EGD     Outpatient Tests Requested:  None    Complications:  None    Reason for Admission: Sepsis due to PNA    Hospital Course:   Winnie De La Cruz is a 80 y.o. male patient who originally presented to the hospital on 4/22/2025 due to cough, fever/chills.    Past medical history significant for chronic kidney disease, PARVIZ, hypertension, hyperlipidemia, renal cell carcinoma of the left kidney.  Patient presented to the emergency department due to increased generalized weakness, fever/chills and cough.  Patient was found to have multifocal pneumonia and was started on IV Rocephin and azithromycin.  Patient met sepsis criteria on admission.  Blood cultures negative to date.  Urine antigens negative, azithromycin was discontinued.  CT imaging concerning for possible aspiration component, no concerns for aspiration per speech therapy and okay to continue regular diet with thin liquids.  Gastroenterology was consulted due to concern for esophagitis on CT imaging and complaints of chest pain.  Patient underwent EGD on 4/24 which demonstrated mild esophagitis and gastritis.  Biopsies were taken to rule out H. pylori.  Patient to continue PPI on discharge.  Incidentally was also noted to have thrush, started on nystatin with improvement.  On day of discharge patient was afebrile, hemodynamically stable and verbalized understanding for requested outpatient follow-up.    Please see above list of diagnoses and related plan for additional information.     Condition at Discharge: stable    Discharge Day Visit / Exam:   Subjective:  Feeling much better, looking forward to going home.  Vitals: Blood Pressure: 122/75 (04/24/25 0852)  Pulse: 80 (04/24/25 0852)  Temperature: (!) 97.2 °F (36.2 °C) (04/24/25 0748)  Temp Source: Temporal (04/24/25 0748)  Respirations: 16 (04/24/25 0852)  Height: 5' 10\" (177.8 cm) (04/22/25 1200)  Weight - Scale: 94 kg (207 lb 3.7 oz) (04/22/25 1200)  SpO2: 95 % (04/24/25 " 6333)  Physical Exam  Vitals and nursing note reviewed.   Constitutional:       Appearance: Normal appearance.      Comments: No acute distress   HENT:      Head: Normocephalic.   Eyes:      General: No scleral icterus.     Extraocular Movements: Extraocular movements intact.      Conjunctiva/sclera: Conjunctivae normal.   Cardiovascular:      Rate and Rhythm: Normal rate and regular rhythm.      Heart sounds: Normal heart sounds. No murmur heard.  Pulmonary:      Effort: Pulmonary effort is normal. No respiratory distress.      Breath sounds: Normal breath sounds. No wheezing, rhonchi or rales.   Abdominal:      General: Bowel sounds are normal.      Palpations: Abdomen is soft.      Tenderness: There is no abdominal tenderness. There is no guarding or rebound.   Musculoskeletal:      Cervical back: Normal range of motion.      Comments: Able to move upper/lower extremities bilaterally, no edema   Skin:     General: Skin is warm and dry.   Neurological:      Mental Status: He is alert and oriented to person, place, and time.   Psychiatric:         Mood and Affect: Mood normal.         Speech: Speech normal.         Behavior: Behavior normal.          Discussion with Family: Updated  (daughter) via phone.    Discharge instructions/Information to patient and family:   See after visit summary for information provided to patient and family.      Provisions for Follow-Up Care:  See after visit summary for information related to follow-up care and any pertinent home health orders.      Mobility at time of Discharge:   Basic Mobility Inpatient Raw Score: 24  JH-HLM Goal: 8: Walk 250 feet or more  JH-HLM Achieved: 8: Walk 250 feet ot more  HLM Goal achieved. Continue to encourage appropriate mobility.     Disposition:   Home    Planned Readmission: None    Discharge Medications:  See after visit summary for reconciled discharge medications provided to patient and/or family.      Administrative Statements    Discharge Statement:  I have spent a total time of 60 minutes in caring for this patient on the day of the visit/encounter. >30 minutes of time was spent on: Diagnostic results, Instructions for management, Patient and family education, Importance of tx compliance, Risk factor reductions, Impressions, Counseling / Coordination of care, Documenting in the medical record, Reviewing / ordering tests, medicine, procedures  , and Communicating with other healthcare professionals .    **Please Note: This note may have been constructed using a voice recognition system**

## 2025-04-25 ENCOUNTER — TRANSITIONAL CARE MANAGEMENT (OUTPATIENT)
Dept: FAMILY MEDICINE CLINIC | Facility: HOSPITAL | Age: 80
End: 2025-04-25

## 2025-04-25 ENCOUNTER — TELEPHONE (OUTPATIENT)
Age: 80
End: 2025-04-25

## 2025-04-25 NOTE — TELEPHONE ENCOUNTER
Pt called back to schedule TCM .  Please contact pt to schedule. Would like to come on Tuesday if possible.

## 2025-04-27 LAB
BACTERIA BLD CULT: NORMAL
BACTERIA BLD CULT: NORMAL

## 2025-04-28 PROCEDURE — 88305 TISSUE EXAM BY PATHOLOGIST: CPT | Performed by: STUDENT IN AN ORGANIZED HEALTH CARE EDUCATION/TRAINING PROGRAM

## 2025-04-29 ENCOUNTER — OFFICE VISIT (OUTPATIENT)
Dept: FAMILY MEDICINE CLINIC | Facility: HOSPITAL | Age: 80
End: 2025-04-29
Payer: MEDICARE

## 2025-04-29 VITALS
HEIGHT: 70 IN | DIASTOLIC BLOOD PRESSURE: 82 MMHG | WEIGHT: 203 LBS | OXYGEN SATURATION: 96 % | HEART RATE: 84 BPM | TEMPERATURE: 98.6 F | BODY MASS INDEX: 29.06 KG/M2 | SYSTOLIC BLOOD PRESSURE: 128 MMHG

## 2025-04-29 DIAGNOSIS — Z09 HOSPITAL DISCHARGE FOLLOW-UP: ICD-10-CM

## 2025-04-29 DIAGNOSIS — K20.90 ESOPHAGITIS: ICD-10-CM

## 2025-04-29 DIAGNOSIS — I50.9 HEART FAILURE, UNSPECIFIED HF CHRONICITY, UNSPECIFIED HEART FAILURE TYPE (HCC): ICD-10-CM

## 2025-04-29 DIAGNOSIS — J18.9 SEPSIS DUE TO PNEUMONIA (HCC): Primary | ICD-10-CM

## 2025-04-29 DIAGNOSIS — A41.9 SEPSIS DUE TO PNEUMONIA (HCC): Primary | ICD-10-CM

## 2025-04-29 PROBLEM — R10.30 LOWER ABDOMINAL PAIN: Status: RESOLVED | Noted: 2018-04-02 | Resolved: 2025-04-29

## 2025-04-29 PROBLEM — R19.7 DIARRHEA: Status: RESOLVED | Noted: 2018-04-02 | Resolved: 2025-04-29

## 2025-04-29 PROBLEM — N45.3 ACUTE EPIDIDYMO-ORCHITIS: Status: RESOLVED | Noted: 2023-11-30 | Resolved: 2025-04-29

## 2025-04-29 PROCEDURE — 99495 TRANSJ CARE MGMT MOD F2F 14D: CPT | Performed by: NURSE PRACTITIONER

## 2025-04-29 NOTE — ASSESSMENT & PLAN NOTE
Sepsis resolved.   One more dose of antibiotic.   Overall feeling better.   Call with any worsening symptoms.

## 2025-04-29 NOTE — ASSESSMENT & PLAN NOTE
Wt Readings from Last 3 Encounters:   04/29/25 92.1 kg (203 lb)   04/22/25 94 kg (207 lb 3.7 oz)   03/06/25 96.2 kg (212 lb)     Managed by cardiology.   Sees them yearly.

## 2025-04-29 NOTE — PROGRESS NOTES
Name: Winnie De La Cruz      : 1945      MRN: [id number]  Encounter Provider: JOSE RAFAEL Tolentino  Encounter Date: 2025   Encounter department: [facility] [address]   :  Assessment & Plan  Sepsis due to pneumonia (HCC)  Sepsis resolved.   One more dose of antibiotic.   Overall feeling better.   Call with any worsening symptoms.          Esophagitis  Continue on omeprazole.        Hospital discharge follow-up         Heart failure, unspecified HF chronicity, unspecified heart failure type (HCC)  Wt Readings from Last 3 Encounters:   25 92.1 kg (203 lb)   25 94 kg (207 lb 3.7 oz)   25 96.2 kg (212 lb)     Managed by cardiology.   Sees them yearly.                       History of Present Illness   Admitted -25    Hospital Course:   Winnie De La Cruz is a 80 y.o. male patient who originally presented to the hospital on 2025 due to cough, fever/chills.     Past medical history significant for chronic kidney disease, PARVIZ, hypertension, hyperlipidemia, renal cell carcinoma of the left kidney.  Patient presented to the emergency department due to increased generalized weakness, fever/chills and cough.  Patient was found to have multifocal pneumonia and was started on IV Rocephin and azithromycin.  Patient met sepsis criteria on admission.  Blood cultures negative to date.  Urine antigens negative, azithromycin was discontinued.  CT imaging concerning for possible aspiration component, no concerns for aspiration per speech therapy and okay to continue regular diet with thin liquids.  Gastroenterology was consulted due to concern for esophagitis on CT imaging and complaints of chest pain.  Patient underwent EGD on  which demonstrated mild esophagitis and gastritis.  Biopsies were taken to rule out H. pylori.  Patient to continue PPI on discharge.  Incidentally was also noted to have thrush, started on nystatin with improvement.  On day of discharge patient  "was afebrile, hemodynamically stable and verbalized understanding for requested outpatient follow-up.    Still feeling sob with going up steps. Feels tired. Still with cough but better. No fever or chills. No abdominal pain, acid reflux, difficulty swallowing.       Review of Systems   Constitutional:  Positive for fatigue. Negative for chills and fever.   Respiratory:  Positive for cough and shortness of breath.    Gastrointestinal:  Negative for abdominal pain, nausea and vomiting.       Objective   /82 (Patient Position: Sitting, Cuff Size: Standard)   Pulse 84   Temp 98.6 °F (37 °C) (Tympanic)   Ht 5' 10\" (1.778 m)   Wt 92.1 kg (203 lb)   SpO2 96%   BMI 29.13 kg/m²      Physical Exam  Vitals reviewed.   Constitutional:       Appearance: Normal appearance.   Cardiovascular:      Rate and Rhythm: Normal rate and regular rhythm.      Heart sounds: Normal heart sounds. No murmur heard.  Pulmonary:      Effort: Pulmonary effort is normal.      Breath sounds: Normal breath sounds.   Skin:     General: Skin is warm and dry.   Neurological:      Mental Status: He is alert and oriented to person, place, and time.   Psychiatric:         Mood and Affect: Mood normal.         Behavior: Behavior normal.         Thought Content: Thought content normal.         Judgment: Judgment normal.         "

## 2025-05-01 DIAGNOSIS — E78.2 MIXED HYPERLIPIDEMIA: ICD-10-CM

## 2025-05-01 RX ORDER — ATORVASTATIN CALCIUM 40 MG/1
40 TABLET, FILM COATED ORAL DAILY
Qty: 90 TABLET | Refills: 1 | Status: SHIPPED | OUTPATIENT
Start: 2025-05-01

## 2025-05-15 ENCOUNTER — TELEPHONE (OUTPATIENT)
Age: 80
End: 2025-05-15

## 2025-05-15 NOTE — TELEPHONE ENCOUNTER
Caller: [facility]     Doctor: Dr. Arrieta     Reason for call: asked for status of Cpap supplies,I did state  is not the correct physician

## 2025-05-21 DIAGNOSIS — R93.3 ABNORMAL CT SCAN, ESOPHAGUS: ICD-10-CM

## 2025-05-21 RX ORDER — OMEPRAZOLE 40 MG/1
40 CAPSULE, DELAYED RELEASE ORAL DAILY
Qty: 30 CAPSULE | Refills: 5 | Status: SHIPPED | OUTPATIENT
Start: 2025-05-21

## 2025-06-05 ENCOUNTER — PROCEDURE VISIT (OUTPATIENT)
Dept: PODIATRY | Facility: CLINIC | Age: 80
End: 2025-06-05
Payer: MEDICARE

## 2025-06-05 VITALS — HEIGHT: 70 IN | WEIGHT: 203 LBS | BODY MASS INDEX: 29.06 KG/M2

## 2025-06-05 DIAGNOSIS — I73.9 PERIPHERAL VASCULAR DISEASE, UNSPECIFIED (HCC): ICD-10-CM

## 2025-06-05 DIAGNOSIS — B35.1 ONYCHOMYCOSIS: Primary | ICD-10-CM

## 2025-06-05 PROCEDURE — 11721 DEBRIDE NAIL 6 OR MORE: CPT | Performed by: PODIATRIST

## 2025-06-05 NOTE — PROGRESS NOTES
"  PATIENT:  Winnie De La Cruz  1945    Assessment & Plan  Onychomycosis  Peripheral vascular disease, unspecified (HCC)  The patient was educated in proper foot wear.    Also discussed daily foot assessment and proper foot care.    The patient will follow up in 9-12 weeks for foot exam and care.  Debridement of mycotic nails as noted below       Procedures: 71581  All mycotic toenails were reduced and debrided in length, width, and girth using a nail nipper and dremel.  All non-dystrphic nails also trimmed.    Patient tolerated procedure(s) well without complications.    Procedures     HPI:  Winnie De La Cruz is a 80 y.o.year old male seen for at risk foot exam and care.  Unchanged clinically from prior visit.  The patient complained of elongated thick painful toenails .  The patient has class findings with peripheral vascular disease.  The patient denied any acute pedal disorder, redness, acute swelling, or recent injury.      The following portions of the patient's history were reviewed and updated as appropriate: allergies, current medications, past family history, past medical history, past social history, past surgical history and problem list.    REVIEW OF SYSTEMS:  GENERAL: No fever or chills  HEART: No chest pain, or palpitation  RESPIRATORY:  No acute SOB or cough  GI: No Nausea, vomit or diarrhea  NEUROLOGIC: No syncope or acute weakness    PHYSICAL EXAM:    Ht 5' 10\" (1.778 m)   Wt 92.1 kg (203 lb)   BMI 29.13 kg/m²     VASCULAR EXAM  Posterior tibial artery  absent bilateral.    Dorsalis pedis artery +1 bilateral.  The patient has class findings with skin atrophy, lack of digital hair, and nail dystrophy.    There is trace lower extremity edema bilaterally.      NEUROLOGIC EXAM  Sensation is intact to light touch.  Sensation is intact to 10gm monofilament.    No focal neurologic deficit.          DERMATOLOGIC EXAM:   No ulcer or cellulitis noted.  Texture, Tone and Turgor are diminished with moderate " atrophic changes noted.  The patient has dystrophic/hypertrophic toenails with yellow/white discoloration, onycholysis, and subungal debris.   Fungal odor and brittle nature noted.  Pain with palpation.  Periungual erythema noted.  Right foot nails severely dystrophic x5 with 0.5 cm ave thickness (1 through 5)  Left foot nails severely dystrophic x5 with 0.4 cm ave thickness (1 through 5)  Patient has hyperkeratotic lesions noted:   Right foot located at none.  Left foot located at none.  No notable suspicious skin lesions.      MUSCULOSKELETAL EXAM:   No acute joint pain, edema, or redness.  No acute musculoskeletal problem.    Patient has no gross pedal deformities noted.  Heel tenderness noted on prior visit has improved.       Risk Category/Class Findings:   Q8(B1, B2 ABC)    A1)  Has the patient had a previous amputation of the foot or integral skeletal portion thereof? No  B1)  Does the patient have absent posterior tibial pulse? Yes  B3)  Does the patient have absent dorsalis pedis? No  B2)  Does the patient have three of the following? Yes           1.  Hair growth (increased or decreased), 2.  Nail changes (thickening) and 3.  Pigmentary changes (discoloring)  C)  Does the patient have two of the following and one above? No

## 2025-06-12 DIAGNOSIS — R93.3 ABNORMAL CT SCAN, ESOPHAGUS: ICD-10-CM

## 2025-06-12 RX ORDER — OMEPRAZOLE 40 MG/1
40 CAPSULE, DELAYED RELEASE ORAL DAILY
Qty: 90 CAPSULE | Refills: 1 | Status: SHIPPED | OUTPATIENT
Start: 2025-06-12

## 2025-06-16 ENCOUNTER — OFFICE VISIT (OUTPATIENT)
Dept: FAMILY MEDICINE CLINIC | Facility: HOSPITAL | Age: 80
End: 2025-06-16
Payer: MEDICARE

## 2025-06-16 VITALS
BODY MASS INDEX: 29.6 KG/M2 | WEIGHT: 206.8 LBS | HEIGHT: 70 IN | HEART RATE: 90 BPM | OXYGEN SATURATION: 97 % | DIASTOLIC BLOOD PRESSURE: 70 MMHG | SYSTOLIC BLOOD PRESSURE: 124 MMHG | TEMPERATURE: 97.9 F

## 2025-06-16 DIAGNOSIS — N18.31 STAGE 3A CHRONIC KIDNEY DISEASE (HCC): ICD-10-CM

## 2025-06-16 DIAGNOSIS — E78.2 MIXED HYPERLIPIDEMIA: ICD-10-CM

## 2025-06-16 DIAGNOSIS — F39 MOOD DISORDER (HCC): ICD-10-CM

## 2025-06-16 DIAGNOSIS — E78.1 HYPERTRIGLYCERIDEMIA: ICD-10-CM

## 2025-06-16 DIAGNOSIS — K20.90 ESOPHAGITIS: ICD-10-CM

## 2025-06-16 DIAGNOSIS — R73.03 PREDIABETES: ICD-10-CM

## 2025-06-16 DIAGNOSIS — Z00.00 MEDICARE ANNUAL WELLNESS VISIT, SUBSEQUENT: ICD-10-CM

## 2025-06-16 DIAGNOSIS — I10 ESSENTIAL HYPERTENSION: Primary | ICD-10-CM

## 2025-06-16 PROCEDURE — G0439 PPPS, SUBSEQ VISIT: HCPCS | Performed by: NURSE PRACTITIONER

## 2025-06-16 PROCEDURE — G2211 COMPLEX E/M VISIT ADD ON: HCPCS | Performed by: NURSE PRACTITIONER

## 2025-06-16 PROCEDURE — 99214 OFFICE O/P EST MOD 30 MIN: CPT | Performed by: NURSE PRACTITIONER

## 2025-06-16 RX ORDER — EZETIMIBE 10 MG/1
10 TABLET ORAL DAILY
Qty: 90 TABLET | Refills: 1 | Status: SHIPPED | OUTPATIENT
Start: 2025-06-16

## 2025-06-16 NOTE — ASSESSMENT & PLAN NOTE
He had stopped omeprazole.   EGD in April confirmed esophagitis and gastritis.   I recommended he continue with omeprazole for now.   Can consider wean in 6 months.

## 2025-06-16 NOTE — ASSESSMENT & PLAN NOTE
Last A1C 5.9  Recheck in 6 months  Orders:    Comprehensive metabolic panel; Future    Hemoglobin A1C; Future

## 2025-06-16 NOTE — ASSESSMENT & PLAN NOTE
Lab Results   Component Value Date    EGFR 70 04/24/2025    EGFR 63 04/23/2025    EGFR 53 04/22/2025    CREATININE 1.00 04/24/2025    CREATININE 1.09 04/23/2025    CREATININE 1.27 04/22/2025   Last eGFR 70  Continue to monitor.     Orders:    CBC and differential; Future    Comprehensive metabolic panel; Future

## 2025-06-16 NOTE — PROGRESS NOTES
Name: Winnie De La Cruz      : 1945      MRN: [id number]  Encounter Provider: JOSE RAFAEL Tolentino  Encounter Date: 2025   Encounter department: [facility] [address] [address]   :  Assessment & Plan  Essential hypertension  BP well controlled.   Continue on current regimen.   F/U in 6 months.    Orders:    CBC and differential; Future    Comprehensive metabolic panel; Future    Stage 3a chronic kidney disease (HCC)  Lab Results   Component Value Date    EGFR 70 2025    EGFR 63 2025    EGFR 53 2025    CREATININE 1.00 2025    CREATININE 1.09 2025    CREATININE 1.27 2025   Last eGFR 70  Continue to monitor.     Orders:    CBC and differential; Future    Comprehensive metabolic panel; Future    Mood disorder (HCC)  Mood is controlled on 5 mg lexapro.   Continue with this.   F/U in 6 months       Mixed hyperlipidemia  Last FLP controlled.   Continue on atorvastatin and Zetia.   Recheck in 6 months   Orders:    Comprehensive metabolic panel; Future    Lipid panel; Future    Esophagitis  He had stopped omeprazole.   EGD in April confirmed esophagitis and gastritis.   I recommended he continue with omeprazole for now.   Can consider wean in 6 months.        Prediabetes  Last A1C 5.9  Recheck in 6 months  Orders:    Comprehensive metabolic panel; Future    Hemoglobin A1C; Future    Medicare annual wellness visit, subsequent         Hypertriglyceridemia    Orders:    ezetimibe (ZETIA) 10 mg tablet; Take 1 tablet (10 mg total) by mouth daily       Preventive health issues were discussed with patient, and age appropriate screening tests were ordered as noted in patient's After Visit Summary. Personalized health advice and appropriate referrals for health education or preventive services given if needed, as noted in patient's After Visit Summary.    History of Present Illness     He is doing well.   Mood is good.   Stopped omeprazole. Unsure why he was taking it. No  chest pain or sob. EGD in hospital in April confirmed esophagitis and gastritis.   Sees cardiology yearly-next March 2026       Patient Care Team:  JOSE RAFAEL Tolentino as PCP - General (Family Medicine)  DO Kian Sheikh MD Guillermo Carnero, MD Wei-Shen Lin, MD Daniel Stauffer, MD Sarina Kapoor, MD as Endoscopist    Review of Systems   Constitutional:  Negative for fatigue and unexpected weight change.   Eyes:  Negative for visual disturbance.   Respiratory:  Negative for shortness of breath.    Cardiovascular:  Negative for chest pain, palpitations and leg swelling.   Musculoskeletal:  Negative for myalgias.   Neurological:  Negative for dizziness, weakness, light-headedness, numbness and headaches.     Medical History Reviewed by provider this encounter:  [facility]       Annual Wellness Visit Questionnaire   Winnie is here for his Subsequent Wellness visit.     Health Risk Assessment:   Patient rates overall health as good. Patient feels that their physical health rating is same. Patient is very satisfied with their life. Eyesight was rated as slightly better. Hearing was rated as slightly better. Patient feels that their emotional and mental health rating is same. Patients states they are never, rarely angry. Patient states they are sometimes unusually tired/fatigued. Pain experienced in the last 7 days has been none. Patient states that he has experienced no weight loss or gain in last 6 months.     Depression Screening:   PHQ-2 Score: 0  PHQ-9 Score: 1      Fall Risk Screening:   In the past year, patient has experienced: no history of falling in past year      Home Safety:  Patient does not have trouble with stairs inside or outside of their home. Patient has working smoke alarms and has no working carbon monoxide detector. Home safety hazards include: none.     Nutrition:   Current diet is No Added Salt and Regular.     Medications:   Patient is currently taking over-the-counter  supplements. OTC medications include: see medication list. Patient is able to manage medications.     Activities of Daily Living (ADLs)/Instrumental Activities of Daily Living (IADLs):   Walk and transfer into and out of bed and chair?: Yes  Dress and groom yourself?: Yes    Bathe or shower yourself?: Yes    Feed yourself? Yes  Do your laundry/housekeeping?: Yes  Manage your money, pay your bills and track your expenses?: Yes  Make your own meals?: Yes    Do your own shopping?: Yes    Previous Hospitalizations:   Any hospitalizations or ED visits within the last 12 months?: Yes    How many hospitalizations have you had in the last year?: 1-2    Hospitalization Comments: Pneumonia    Advance Care Planning:   Living will: Yes    Advanced directive: Yes      Cognitive Screening:   Provider or family/friend/caregiver concerned regarding cognition?: No    Preventive Screenings      Cardiovascular Screening:    General: Screening Not Indicated and History Lipid Disorder      Diabetes Screening:     General: Screening Current      Colorectal Cancer Screening:     General: Screening Current      Prostate Cancer Screening:    General: Screening Not Indicated      Osteoporosis Screening:    General: Screening Not Indicated      Abdominal Aortic Aneurysm (AAA) Screening:    Risk factors include: tobacco use        Lung Cancer Screening:     General: Screening Not Indicated      Hepatitis C Screening:    General: Screening Current    Immunizations:  - Immunizations due: Zoster (Shingrix)    Screening, Brief Intervention, and Referral to Treatment (SBIRT)     Screening      AUDIT-C Screenin) How often did you have a drink containing alcohol in the past year? monthly or less  2) How many drinks did you have on a typical day when you were drinking in the past year? 1 to 2  3) How often did you have 6 or more drinks on one occasion in the past year? never    AUDIT-C Score: 1  Interpretation: Score 0-3 (male): Negative screen  "for alcohol misuse    Single Item Drug Screening:  How often have you used an illegal drug (including marijuana) or a prescription medication for non-medical reasons in the past year? never    Single Item Drug Screen Score: 0  Interpretation: Negative screen for possible drug use disorder    Social Drivers of Health     Financial Resource Strain: Low Risk  (4/17/2023)    Overall Financial Resource Strain (CARDIA)     Difficulty of Paying Living Expenses: Not hard at all   Food Insecurity: No Food Insecurity (6/16/2025)    Nursing - Inadequate Food Risk Classification     Worried About Running Out of Food in the Last Year: Never true     Ran Out of Food in the Last Year: Never true     Ran Out of Food in the Last Year: Never true   Transportation Needs: No Transportation Needs (6/16/2025)    PRAPARE - Transportation     Lack of Transportation (Medical): No     Lack of Transportation (Non-Medical): No   Housing Stability: Low Risk  (6/16/2025)    Housing Stability Vital Sign     Unable to Pay for Housing in the Last Year: No     Number of Times Moved in the Last Year: 1     Homeless in the Last Year: No   Utilities: Not At Risk (6/16/2025)    [facility]     Threatened with loss of utilities: No     Vision Screening    Right eye Left eye Both eyes   Without correction 20/20 20/20 20/20   With correction          Objective   /70 (Patient Position: Sitting, Cuff Size: Standard)   Pulse 90   Temp 97.9 °F (36.6 °C) (Tympanic)   Ht 5' 10\" (1.778 m)   Wt 93.8 kg (206 lb 12.8 oz)   SpO2 97%   BMI 29.67 kg/m²     Physical Exam  Vitals reviewed.   Constitutional:       Appearance: Normal appearance. He is well-developed.     Cardiovascular:      Rate and Rhythm: Normal rate and regular rhythm.      Pulses:           Carotid pulses are 2+ on the right side and 2+ on the left side.     Heart sounds: Murmur heard.   Pulmonary:      Effort: Pulmonary effort is normal.      Breath sounds: Normal breath sounds. "     Skin:     General: Skin is warm and dry.     Neurological:      Mental Status: He is alert and oriented to person, place, and time.     Psychiatric:         Mood and Affect: Mood normal.         Behavior: Behavior normal.         Thought Content: Thought content normal.         Judgment: Judgment normal.

## 2025-06-16 NOTE — ASSESSMENT & PLAN NOTE
Last FLP controlled.   Continue on atorvastatin and Zetia.   Recheck in 6 months   Orders:    Comprehensive metabolic panel; Future    Lipid panel; Future